# Patient Record
Sex: MALE | Race: WHITE | NOT HISPANIC OR LATINO | Employment: OTHER | URBAN - METROPOLITAN AREA
[De-identification: names, ages, dates, MRNs, and addresses within clinical notes are randomized per-mention and may not be internally consistent; named-entity substitution may affect disease eponyms.]

---

## 2019-03-16 ENCOUNTER — APPOINTMENT (EMERGENCY)
Dept: CT IMAGING | Facility: HOSPITAL | Age: 79
End: 2019-03-16
Payer: COMMERCIAL

## 2019-03-16 ENCOUNTER — APPOINTMENT (EMERGENCY)
Dept: RADIOLOGY | Facility: HOSPITAL | Age: 79
End: 2019-03-16
Payer: COMMERCIAL

## 2019-03-16 ENCOUNTER — HOSPITAL ENCOUNTER (EMERGENCY)
Facility: HOSPITAL | Age: 79
Discharge: HOME | End: 2019-03-17
Attending: EMERGENCY MEDICINE
Payer: COMMERCIAL

## 2019-03-16 VITALS
DIASTOLIC BLOOD PRESSURE: 78 MMHG | TEMPERATURE: 97.8 F | HEART RATE: 62 BPM | RESPIRATION RATE: 16 BRPM | OXYGEN SATURATION: 96 % | BODY MASS INDEX: 32.84 KG/M2 | WEIGHT: 235.45 LBS | SYSTOLIC BLOOD PRESSURE: 158 MMHG
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
## 2019-03-16 DIAGNOSIS — S09.90XA INJURY OF HEAD, INITIAL ENCOUNTER: ICD-10-CM

## 2019-03-16 DIAGNOSIS — S63.289A DISLOCATION OF PROXIMAL INTERPHALANGEAL JOINT OF FINGER, INITIAL ENCOUNTER: Primary | ICD-10-CM

## 2019-03-16 PROCEDURE — 70450 CT HEAD/BRAIN W/O DYE: CPT

## 2019-03-16 PROCEDURE — 73140 X-RAY EXAM OF FINGER(S): CPT

## 2019-03-16 PROCEDURE — 90715 TDAP VACCINE 7 YRS/> IM: CPT | Performed by: PHYSICIAN ASSISTANT

## 2019-03-16 PROCEDURE — 99284 EMERGENCY DEPT VISIT MOD MDM: CPT

## 2019-03-16 PROCEDURE — 90471 IMMUNIZATION ADMIN: CPT

## 2019-03-16 RX ADMIN — TETANUS TOXOID, REDUCED DIPHTHERIA TOXOID AND ACELLULAR PERTUSSIS VACCINE, ADSORBED 0.5 ML: 5; 2.5; 8; 8; 2.5 SUSPENSION INTRAMUSCULAR at 23:17

## 2019-03-17 ENCOUNTER — APPOINTMENT (EMERGENCY)
Dept: RADIOLOGY | Facility: HOSPITAL | Age: 79
End: 2019-03-17
Payer: COMMERCIAL

## 2019-03-17 PROCEDURE — 73140 X-RAY EXAM OF FINGER(S): CPT

## 2019-03-17 RX ORDER — DIPHENHYDRAMINE HCL 25 MG
50 TABLET ORAL ONCE
Status: COMPLETED | OUTPATIENT
Start: 2019-03-17 | End: 2019-03-17

## 2019-03-17 RX ORDER — TETRACAINE HCL 10 MG/ML
2 INJECTION SUBARACHNOID ONCE
Status: COMPLETED | OUTPATIENT
Start: 2019-03-17 | End: 2019-03-17

## 2019-03-17 RX ADMIN — TETRACAINE HCL 2 ML: 10 INJECTION SUBARACHNOID at 01:04

## 2019-03-17 RX ADMIN — DIPHENHYDRAMINE HCL 50 MG: 25 TABLET, FILM COATED ORAL at 01:35

## 2019-03-17 NOTE — ED PROVIDER NOTES
History  Chief Complaint   Patient presents with    Fall     Pt presents to ED from home after having a few drinks then falling hitting head and finger injury  Pt (+) deformity to right ring finger  Pt (-) thinners, (-) LOC  Pt (+) hx hypothyroidism, HTN  Patient is a 61-year-old male with a past medical history of hypertension and hypothyroidism presenting to the emergency department for evaluation after a fall  The fall occurred approximately 1 hour  He estimates that he has had 5 alcoholic drinks today  Patient tripped while walking in the dark  He denies prodromal symptoms including dizziness and loss of consciousness  He hit his head and landed on his right hand  He denies loss of consciousness  Pt presents with an obvious deformity to right ring finger  He denies numbness/tingling, weakness, headache, blurred vision, tinnitus, chest pain, shortness of breath, abdominal pain  Pt not on blood thinners  Last tetanus unknown  History provided by:  Patient   used: No    Fall   Mechanism of injury: fall    Injury location:  Head/neck and hand  Hand injury location:  R hand  Time since incident:  1 hour  Arrived directly from scene: yes    Fall:     Fall occurred:  Tripped    Point of impact:  Head and hands    Entrapped after fall: no    Suspicion of alcohol use: yes    Suspicion of drug use: no    Tetanus status:  Unknown  Prior to arrival data:     Patient ambulatory at scene: yes      Loss of consciousness: no      Amnesic to event: no    Associated symptoms: no abdominal pain, no back pain, no chest pain, no difficulty breathing, no headaches, no loss of consciousness, no neck pain and no vomiting    Risk factors: no anticoagulation therapy        Prior to Admission Medications   Prescriptions Last Dose Informant Patient Reported?  Taking?   levothyroxine 25 mcg tablet   Yes Yes   Sig: Take 25 mcg by mouth daily   losartan (COZAAR) 50 mg tablet   Yes Yes   Sig: Take 50 mg by mouth daily      Facility-Administered Medications: None       Past Medical History:   Diagnosis Date    Disease of thyroid gland     hypothyroidism    Hernia        Past Surgical History:   Procedure Laterality Date    APPENDECTOMY      HERNIA REPAIR      SHOULDER SURGERY Left        History reviewed  No pertinent family history  I have reviewed and agree with the history as documented  Social History     Tobacco Use    Smoking status: Light Tobacco Smoker     Types: Cigars    Smokeless tobacco: Never Used   Substance Use Topics    Alcohol use: Yes     Alcohol/week: 1 8 oz     Types: 3 Cans of beer per week     Comment: socially    Drug use: No        Review of Systems   Constitutional: Negative for chills and fever  Eyes: Negative for visual disturbance  Respiratory: Negative for shortness of breath  Cardiovascular: Negative for chest pain  Gastrointestinal: Negative for abdominal pain and vomiting  Musculoskeletal: Positive for arthralgias and joint swelling  Negative for back pain, gait problem and neck pain  Neurological: Negative for dizziness, loss of consciousness, syncope, weakness, light-headedness, numbness and headaches  Psychiatric/Behavioral: Negative for confusion  All other systems reviewed and are negative  Physical Exam  Physical Exam   Constitutional: He appears well-developed and well-nourished  He appears distressed  Pt uncomfortable on exam with mild distress  HENT:   Head: Normocephalic and atraumatic  Right Ear: External ear normal    Left Ear: External ear normal    Eyes: Pupils are equal, round, and reactive to light  EOM are normal  Right eye exhibits no discharge  Left eye exhibits no discharge  No scleral icterus  Cardiovascular: Normal rate, regular rhythm and normal heart sounds  No murmur heard  Pulmonary/Chest: Effort normal and breath sounds normal  No stridor  No respiratory distress  He has no wheezes  He has no rales     Abdominal: Soft  Bowel sounds are normal  He exhibits no distension  There is no tenderness  There is no guarding  Musculoskeletal:   Dislocation of PIP joint of right ring finger  Pt able to move all fingers without issue  No other bony tenderness in hand  Neurological: He is alert  He has normal strength  He is not disoriented  No cranial nerve deficit or sensory deficit  GCS eye subscore is 4  GCS verbal subscore is 5  GCS motor subscore is 6    5/5 strength and gross sensation intact in all extremities  Sensation intact distal to dislocation  Skin: Skin is warm and dry  Capillary refill takes less than 2 seconds  He is not diaphoretic  Small abrasion over right eyebrow  No active bleeding  Nursing note and vitals reviewed        Vital Signs  ED Triage Vitals   Temperature Pulse Respirations Blood Pressure SpO2   03/16/19 2230 03/16/19 2230 03/16/19 2230 03/16/19 2230 03/16/19 2230   97 8 °F (36 6 °C) 62 16 158/78 96 %      Temp Source Heart Rate Source Patient Position - Orthostatic VS BP Location FiO2 (%)   03/16/19 2230 03/16/19 2230 03/16/19 2230 03/16/19 2230 --   Oral Monitor Sitting Left arm       Pain Score       03/17/19 0104       Worst Possible Pain           Vitals:    03/16/19 2230   BP: 158/78   Pulse: 62   Patient Position - Orthostatic VS: Sitting       qSOFA     Row Name 03/16/19 2230                Altered mental status GCS < 15          Respiratory Rate > / =57  0        Systolic BP < / =179  0        Q Sofa Score  0              Visual Acuity  Visual Acuity      Most Recent Value   L Pupil Size (mm)  5   R Pupil Size (mm)  5          ED Medications  Medications   tetanus-diphtheria-acellular pertussis (BOOSTRIX) IM injection 0 5 mL (0 5 mL Intramuscular Given 3/16/19 2317)   tetracaine 1 % injection 2 mL (2 mL Injection Given 3/17/19 0104)   diphenhydrAMINE (BENADRYL) tablet 50 mg (50 mg Oral Given 3/17/19 0135)       Diagnostic Studies  Results Reviewed     None                 CT head without contrast   Final Result by Tabatha Lomax MD (03/17 0011)      No intracranial hemorrhage or calvarial fracture  Workstation performed: [id number]         XR finger fourth digit-ring RIGHT   ED Interpretation by Dewey Arrieta PA-C (03/17 0021)   Dislocation of PIP joint  No fracture       XR finger fourth digit-ring RIGHT    (Results Pending)              Procedures  Orthopedic Injury  Date/Time: 3/17/2019 8:25 AM  Performed by: eDwey Arrieta PA-C  Authorized by: [employer], PA-C     Patient Location:  ED  Other Assisting Provider: No    Verbal consent obtained?: Yes    Consent given by:  Patient  Patient identity confirmed:  Verbally with patient and arm band  Time out: Immediately prior to the procedure a time out was called    Injury location:  Finger  Location details:  Right ring finger  Injury type:  Dislocation  Neurovascular status: Neurovascularly intact    Distal perfusion: normal    Neurological function: normal    Range of motion: reduced    Local anesthesia used?: Yes    Anesthesia:  Digital block  Local anesthetic: Tetracaine  Anesthetic total (ml):  2  Manipulation performed?: Yes    Reduction successful?: Yes    Confirmation: Reduction confirmed by x-ray    Immobilization:  Splint  Splint type:  Finger splint, static  Neurovascular status: Neurovascularly intact    Distal perfusion: normal    Neurological function: normal    Patient tolerance:  Patient tolerated the procedure well with no immediate complications           Phone Contacts  ED Phone Contact    ED Course  ED Course as of Mar 17 0826   Sheela Gomes Mar 17, 2019   0051 Pt has allergy to lidocaine with his reaction being hives  Will need to use janie compound due to the allergy  Waiting for pharmacy call back                         MDM  Number of Diagnoses or Management Options  Dislocation of proximal interphalangeal joint of finger, initial encounter: new and requires workup  Fall, initial encounter: new and requires workup  Injury of head, initial encounter: new and requires workup  Diagnosis management comments: Pt is a 78yo male presenting to the emergency department for evaluation after a fall with head strike  CT ordered to r/o intracranial bleed as pt has been consuming alcohol which was negative  Pt with obvious dislocation of right ring finger PIP dislocation  Finger is neurovascularly intact  X-ray showed no evidence of fracture  Dislocation reduced after digital block with tetracaine administered  Pt tolerated well with no immediate complications  X-ray after reduction showed appropriate placement  Finger splint applied  Pt to be discharged with ortho follow-up  Red flags and when to return to ED discussed  Pt expressed understanding and is agreeable to plan  Amount and/or Complexity of Data Reviewed  Tests in the radiology section of CPT®: ordered and reviewed  Obtain history from someone other than the patient: yes  Review and summarize past medical records: yes  Independent visualization of images, tracings, or specimens: yes    Risk of Complications, Morbidity, and/or Mortality  Presenting problems: moderate  Diagnostic procedures: moderate  Management options: moderate        Disposition  Final diagnoses:   Dislocation of proximal interphalangeal joint of finger, initial encounter   Fall, initial encounter   Injury of head, initial encounter     Time reflects when diagnosis was documented in both MDM as applicable and the Disposition within this note     Time User Action Codes Description Comment    3/17/2019  1:29 AM Butch Wilkerson Add [S63 289A] Dislocation of proximal interphalangeal joint of finger, initial encounter     3/17/2019  1:29 AM Levon Wilkerson Sit  TBDJ] Fall, initial encounter     3/17/2019  1:29 AM Butch Wilkerson Add [S09 90XA] Injury of head, initial encounter       ED Disposition     ED Disposition Condition Date/Time Comment    Discharge Stable Sun Mar 17, 2019  1:29 AM [place] to [place]/self care  Follow-up Information     Follow up With Specialties Details Why Contact Info Additional [address] [facility]   [address] [facility] [id number]  [address] [facility], [address] [place] [address] Emergency Department Emergency Medicine  If symptoms worsen [address] [id number]  [phone] [facility], [address], [place], [place], [address]          Discharge Medication List as of 3/17/2019  1:30 AM      CONTINUE these medications which have NOT CHANGED    Details   levothyroxine 25 mcg tablet Take 25 mcg by mouth daily, Until Discontinued, Historical Med      losartan (COZAAR) 50 mg tablet Take 50 mg by mouth daily, Until Discontinued, Historical Med           No discharge procedures on file      ED Provider  Electronically Signed by           Naa Pollock PA-C  03/17/19 0896

## 2019-04-27 ENCOUNTER — TRANSCRIBE ORDERS (OUTPATIENT)
Dept: URGENT CARE | Facility: MEDICAL CENTER | Age: 79
End: 2019-04-27

## 2019-04-27 ENCOUNTER — APPOINTMENT (OUTPATIENT)
Dept: URGENT CARE | Facility: MEDICAL CENTER | Age: 79
End: 2019-04-27
Attending: PHYSICIAN ASSISTANT

## 2019-04-27 DIAGNOSIS — Z02.1 PRE-EMPLOYMENT HEALTH SCREENING EXAMINATION: ICD-10-CM

## 2019-04-27 DIAGNOSIS — Z02.1 PRE-EMPLOYMENT HEALTH SCREENING EXAMINATION: Primary | ICD-10-CM

## 2019-04-27 LAB — RUBV IGG SERPL IA-ACNC: >175 IU/ML

## 2019-04-27 PROCEDURE — 86787 VARICELLA-ZOSTER ANTIBODY: CPT

## 2019-04-27 PROCEDURE — 86735 MUMPS ANTIBODY: CPT

## 2019-04-27 PROCEDURE — 86762 RUBELLA ANTIBODY: CPT

## 2019-04-27 PROCEDURE — 86480 TB TEST CELL IMMUN MEASURE: CPT

## 2019-04-27 PROCEDURE — 86765 RUBEOLA ANTIBODY: CPT

## 2019-04-29 LAB
GAMMA INTERFERON BACKGROUND BLD IA-ACNC: 0.03 IU/ML
M TB IFN-G BLD-IMP: NEGATIVE
M TB IFN-G CD4+ BCKGRND COR BLD-ACNC: -0.01 IU/ML
M TB IFN-G CD4+ BCKGRND COR BLD-ACNC: -0.01 IU/ML
MITOGEN IGNF BCKGRD COR BLD-ACNC: >10 IU/ML
VZV IGG SER IA-ACNC: NORMAL

## 2019-04-30 LAB
MEV IGG SER QL: NORMAL
MUV IGG SER QL: NORMAL

## 2019-10-11 ENCOUNTER — TRANSCRIBE ORDERS (OUTPATIENT)
Dept: ADMINISTRATIVE | Facility: HOSPITAL | Age: 79
End: 2019-10-11

## 2019-10-11 DIAGNOSIS — N13.8 ENLARGED PROSTATE WITH URINARY OBSTRUCTION: Primary | ICD-10-CM

## 2019-10-11 DIAGNOSIS — N40.1 ENLARGED PROSTATE WITH URINARY OBSTRUCTION: Primary | ICD-10-CM

## 2019-10-14 ENCOUNTER — HOSPITAL ENCOUNTER (OUTPATIENT)
Dept: ULTRASOUND IMAGING | Facility: HOSPITAL | Age: 79
Discharge: HOME | End: 2019-10-14
Payer: COMMERCIAL

## 2019-10-14 DIAGNOSIS — N40.1 ENLARGED PROSTATE WITH URINARY OBSTRUCTION: ICD-10-CM

## 2019-10-14 DIAGNOSIS — N13.8 ENLARGED PROSTATE WITH URINARY OBSTRUCTION: ICD-10-CM

## 2019-10-14 PROCEDURE — 51798 US URINE CAPACITY MEASURE: CPT

## 2021-01-25 ENCOUNTER — IMMUNIZATIONS (OUTPATIENT)
Dept: FAMILY MEDICINE CLINIC | Facility: HOSPITAL | Age: 81
End: 2021-01-25

## 2021-01-25 DIAGNOSIS — Z23 ENCOUNTER FOR IMMUNIZATION: Primary | ICD-10-CM

## 2021-01-25 PROCEDURE — 0011A SARS-COV-2 / COVID-19 MRNA VACCINE (MODERNA) 100 MCG: CPT

## 2021-01-25 PROCEDURE — 91301 SARS-COV-2 / COVID-19 MRNA VACCINE (MODERNA) 100 MCG: CPT

## 2021-02-20 ENCOUNTER — IMMUNIZATIONS (OUTPATIENT)
Dept: FAMILY MEDICINE CLINIC | Facility: HOSPITAL | Age: 81
End: 2021-02-20

## 2021-02-20 DIAGNOSIS — Z23 ENCOUNTER FOR IMMUNIZATION: Primary | ICD-10-CM

## 2021-02-20 PROCEDURE — 0012A SARS-COV-2 / COVID-19 MRNA VACCINE (MODERNA) 100 MCG: CPT

## 2021-02-20 PROCEDURE — 91301 SARS-COV-2 / COVID-19 MRNA VACCINE (MODERNA) 100 MCG: CPT

## 2021-03-28 ENCOUNTER — HOSPITAL ENCOUNTER (EMERGENCY)
Facility: HOSPITAL | Age: 81
Discharge: HOME | End: 2021-03-28
Attending: EMERGENCY MEDICINE
Payer: COMMERCIAL

## 2021-03-28 ENCOUNTER — APPOINTMENT (EMERGENCY)
Dept: CT IMAGING | Facility: HOSPITAL | Age: 81
End: 2021-03-28
Payer: COMMERCIAL

## 2021-03-28 VITALS
DIASTOLIC BLOOD PRESSURE: 82 MMHG | HEART RATE: 65 BPM | SYSTOLIC BLOOD PRESSURE: 152 MMHG | OXYGEN SATURATION: 100 % | RESPIRATION RATE: 18 BRPM | TEMPERATURE: 97.7 F

## 2021-03-28 DIAGNOSIS — K59.00 CONSTIPATION: Primary | ICD-10-CM

## 2021-03-28 DIAGNOSIS — R93.89 ABNORMAL CT SCAN: ICD-10-CM

## 2021-03-28 LAB
ALBUMIN SERPL BCP-MCNC: 3.7 G/DL (ref 3.5–5)
ALP SERPL-CCNC: 100 U/L (ref 46–116)
ALT SERPL W P-5'-P-CCNC: 20 U/L (ref 12–78)
ANION GAP SERPL CALCULATED.3IONS-SCNC: 10 MMOL/L (ref 4–13)
AST SERPL W P-5'-P-CCNC: 13 U/L (ref 5–45)
BACTERIA UR QL AUTO: ABNORMAL /HPF
BASOPHILS # BLD AUTO: 0.02 THOUSANDS/ΜL (ref 0–0.1)
BASOPHILS NFR BLD AUTO: 0 % (ref 0–1)
BILIRUB SERPL-MCNC: 0.71 MG/DL (ref 0.2–1)
BILIRUB UR QL STRIP: NEGATIVE
BUN SERPL-MCNC: 22 MG/DL (ref 5–25)
CALCIUM SERPL-MCNC: 8.9 MG/DL (ref 8.3–10.1)
CHLORIDE SERPL-SCNC: 106 MMOL/L (ref 100–108)
CLARITY UR: CLEAR
CO2 SERPL-SCNC: 27 MMOL/L (ref 21–32)
COLOR UR: ABNORMAL
CREAT SERPL-MCNC: 1.18 MG/DL (ref 0.6–1.3)
EOSINOPHIL # BLD AUTO: 0.01 THOUSAND/ΜL (ref 0–0.61)
EOSINOPHIL NFR BLD AUTO: 0 % (ref 0–6)
ERYTHROCYTE [DISTWIDTH] IN BLOOD BY AUTOMATED COUNT: 13.9 % (ref 11.6–15.1)
GFR SERPL CREATININE-BSD FRML MDRD: 59 ML/MIN/1.73SQ M
GLUCOSE SERPL-MCNC: 102 MG/DL (ref 65–140)
GLUCOSE UR STRIP-MCNC: NEGATIVE MG/DL
HCT VFR BLD AUTO: 35.8 % (ref 36.5–49.3)
HGB BLD-MCNC: 11.6 G/DL (ref 12–17)
HGB UR QL STRIP.AUTO: ABNORMAL
IMM GRANULOCYTES # BLD AUTO: 0.04 THOUSAND/UL (ref 0–0.2)
IMM GRANULOCYTES NFR BLD AUTO: 0 % (ref 0–2)
KETONES UR STRIP-MCNC: NEGATIVE MG/DL
LEUKOCYTE ESTERASE UR QL STRIP: NEGATIVE
LYMPHOCYTES # BLD AUTO: 2.54 THOUSANDS/ΜL (ref 0.6–4.47)
LYMPHOCYTES NFR BLD AUTO: 27 % (ref 14–44)
MCH RBC QN AUTO: 30.9 PG (ref 26.8–34.3)
MCHC RBC AUTO-ENTMCNC: 32.4 G/DL (ref 31.4–37.4)
MCV RBC AUTO: 95 FL (ref 82–98)
MONOCYTES # BLD AUTO: 0.52 THOUSAND/ΜL (ref 0.17–1.22)
MONOCYTES NFR BLD AUTO: 5 % (ref 4–12)
NEUTROPHILS # BLD AUTO: 6.44 THOUSANDS/ΜL (ref 1.85–7.62)
NEUTS SEG NFR BLD AUTO: 68 % (ref 43–75)
NITRITE UR QL STRIP: NEGATIVE
NON-SQ EPI CELLS URNS QL MICRO: ABNORMAL /HPF
NRBC BLD AUTO-RTO: 0 /100 WBCS
PH UR STRIP.AUTO: 6 [PH]
PLATELET # BLD AUTO: 100 THOUSANDS/UL (ref 149–390)
PMV BLD AUTO: 10.2 FL (ref 8.9–12.7)
POTASSIUM SERPL-SCNC: 4.2 MMOL/L (ref 3.5–5.3)
PROT SERPL-MCNC: 6.8 G/DL (ref 6.4–8.2)
PROT UR STRIP-MCNC: NEGATIVE MG/DL
RBC # BLD AUTO: 3.76 MILLION/UL (ref 3.88–5.62)
RBC #/AREA URNS AUTO: ABNORMAL /HPF
SODIUM SERPL-SCNC: 143 MMOL/L (ref 136–145)
SP GR UR STRIP.AUTO: 1.01 (ref 1–1.03)
UROBILINOGEN UR QL STRIP.AUTO: 0.2 E.U./DL
WBC # BLD AUTO: 9.57 THOUSAND/UL (ref 4.31–10.16)
WBC #/AREA URNS AUTO: ABNORMAL /HPF

## 2021-03-28 PROCEDURE — 85025 COMPLETE CBC W/AUTO DIFF WBC: CPT | Performed by: PHYSICIAN ASSISTANT

## 2021-03-28 PROCEDURE — 81001 URINALYSIS AUTO W/SCOPE: CPT | Performed by: PHYSICIAN ASSISTANT

## 2021-03-28 PROCEDURE — 80053 COMPREHEN METABOLIC PANEL: CPT | Performed by: PHYSICIAN ASSISTANT

## 2021-03-28 PROCEDURE — 36415 COLL VENOUS BLD VENIPUNCTURE: CPT | Performed by: PHYSICIAN ASSISTANT

## 2021-03-28 PROCEDURE — 96360 HYDRATION IV INFUSION INIT: CPT

## 2021-03-28 PROCEDURE — 99285 EMERGENCY DEPT VISIT HI MDM: CPT | Performed by: PHYSICIAN ASSISTANT

## 2021-03-28 PROCEDURE — G1004 CDSM NDSC: HCPCS

## 2021-03-28 PROCEDURE — 74176 CT ABD & PELVIS W/O CONTRAST: CPT

## 2021-03-28 PROCEDURE — 99284 EMERGENCY DEPT VISIT MOD MDM: CPT

## 2021-03-28 RX ADMIN — SODIUM CHLORIDE 1000 ML: 0.9 INJECTION, SOLUTION INTRAVENOUS at 12:44

## 2021-03-28 NOTE — ED PROVIDER NOTES
History  Chief Complaint   Patient presents with    Constipation     pt c/o constipation starting this am  tried a suppository and enema this am w/ no relief     This is a 68-year-old male patient who states he has been constipated for last 2-3 days  Complaining of some fullness and discomfort is left lower quadrant  He attempted taking milk of magnesia and use an enema this morning all he got back was the liquid from the enema  States he tried to self disimpact and did get some stool out but still unable to pass stool  Also states due to the stool backup he is having difficulty urinating  Nothing seems to make this better worse  Denies chills headache blurred cough sore throat nausea vomiting no chest pain or breath urgency frequency dysuria  No testicular pain  Differential diagnosis includes not limited to constipation, mass, diverticulitis less likely, colitis less likely  Decreased urination secondary to dehydration, urine retention, stool causing urine retention  Patient will initially have bladder scan  David Chambers if this is just constipation is to remove the constipation which improved the discomfort and improve the urine flow  Prior to Admission Medications   Prescriptions Last Dose Informant Patient Reported? Taking?   levothyroxine 25 mcg tablet   Yes No   Sig: Take 25 mcg by mouth daily   losartan (COZAAR) 50 mg tablet   Yes No   Sig: Take 50 mg by mouth daily      Facility-Administered Medications: None       Past Medical History:   Diagnosis Date    Disease of thyroid gland     hypothyroidism    Hernia     Hypertension        Past Surgical History:   Procedure Laterality Date    APPENDECTOMY      HERNIA REPAIR      SHOULDER SURGERY Left        History reviewed  No pertinent family history  I have reviewed and agree with the history as documented      E-Cigarette/Vaping     E-Cigarette/Vaping Substances     Social History     Tobacco Use    Smoking status: Light Tobacco Smoker Types: Cigars    Smokeless tobacco: Never Used   Substance Use Topics    Alcohol use: Yes     Alcohol/week: 3 0 standard drinks     Types: 3 Cans of beer per week     Frequency: Monthly or less     Comment: socially    Drug use: No       Review of Systems   Constitutional: Negative for diaphoresis, fatigue and fever  HENT: Negative for congestion, ear pain, nosebleeds and sore throat  Eyes: Negative for photophobia, pain, discharge and visual disturbance  Respiratory: Negative for cough, choking, chest tightness, shortness of breath and wheezing  Cardiovascular: Negative for chest pain and palpitations  Gastrointestinal: Positive for abdominal pain and constipation  Negative for abdominal distention, anal bleeding, blood in stool, diarrhea, nausea, rectal pain and vomiting  Genitourinary: Positive for difficulty urinating  Negative for decreased urine volume, discharge, dysuria, enuresis, flank pain, frequency, genital sores, hematuria, penile pain, penile swelling, scrotal swelling, testicular pain and urgency  Musculoskeletal: Negative for back pain, gait problem and joint swelling  Skin: Negative for color change and rash  Neurological: Negative for dizziness, syncope and headaches  Psychiatric/Behavioral: Negative for behavioral problems and confusion  The patient is not nervous/anxious  All other systems reviewed and are negative  Physical Exam  Physical Exam  Vitals signs and nursing note reviewed  Constitutional:       Appearance: He is well-developed  HENT:      Head: Normocephalic and atraumatic  Right Ear: External ear normal       Left Ear: External ear normal       Nose: Nose normal    Eyes:      Conjunctiva/sclera: Conjunctivae normal       Pupils: Pupils are equal, round, and reactive to light  Neck:      Musculoskeletal: Normal range of motion and neck supple  Cardiovascular:      Rate and Rhythm: Normal rate and regular rhythm     Pulmonary: Effort: Pulmonary effort is normal       Breath sounds: Normal breath sounds  Abdominal:      General: Bowel sounds are normal       Palpations: Abdomen is soft  Tenderness: There is abdominal tenderness in the suprapubic area and left lower quadrant  There is no right CVA tenderness, left CVA tenderness, guarding or rebound  Negative signs include Pillai's sign, Rovsing's sign, McBurney's sign, psoas sign and obturator sign  Skin:     General: Skin is warm  Neurological:      Mental Status: He is alert     Psychiatric:         Behavior: Behavior normal          Vital Signs  ED Triage Vitals   Temperature Pulse Respirations Blood Pressure SpO2   03/28/21 1152 03/28/21 1152 03/28/21 1152 03/28/21 1153 03/28/21 1152   97 7 °F (36 5 °C) 70 18 142/63 100 %      Temp src Heart Rate Source Patient Position - Orthostatic VS BP Location FiO2 (%)   -- 03/28/21 1330 03/28/21 1330 03/28/21 1330 --    Monitor Lying Right arm       Pain Score       --                  Vitals:    03/28/21 1152 03/28/21 1153 03/28/21 1330 03/28/21 1400   BP:  142/63 151/73 152/82   Pulse: 70  68 65   Patient Position - Orthostatic VS:   Lying Lying         Visual Acuity      ED Medications  Medications   sodium chloride 0 9 % bolus 1,000 mL (0 mL Intravenous Stopped 3/28/21 1344)       Diagnostic Studies  Results Reviewed     Procedure Component Value Units Date/Time    Urine Microscopic [[id number]]  (Abnormal) Collected: 03/28/21 1428    Lab Status: Final result Specimen: Urine, Clean Catch Updated: 03/28/21 1513     RBC, UA 4-10 /hpf      WBC, UA 1-2 /hpf      Epithelial Cells None Seen /hpf      Bacteria, UA None Seen /hpf     UA w Reflex to Microscopic w Reflex to Culture [[id number]]  (Abnormal) Collected: 03/28/21 1428    Lab Status: Final result Specimen: Urine, Clean Catch Updated: 03/28/21 1432     Color, UA Light Yellow     Clarity, UA Clear     Specific Gravity, UA 1 010     pH, UA 6 0     Leukocytes, UA Negative Nitrite, UA Negative     Protein, UA Negative mg/dl      Glucose, UA Negative mg/dl      Ketones, UA Negative mg/dl      Urobilinogen, UA 0 2 E U /dl      Bilirubin, UA Negative     Blood, UA Small    Comprehensive metabolic panel [[id number]] Collected: 03/28/21 1239    Lab Status: Final result Specimen: Blood from Arm, Right Updated: 03/28/21 1320     Sodium 143 mmol/L      Potassium 4 2 mmol/L      Chloride 106 mmol/L      CO2 27 mmol/L      ANION GAP 10 mmol/L      BUN 22 mg/dL      Creatinine 1 18 mg/dL      Glucose 102 mg/dL      Calcium 8 9 mg/dL      AST 13 U/L      ALT 20 U/L      Alkaline Phosphatase 100 U/L      Total Protein 6 8 g/dL      Albumin 3 7 g/dL      Total Bilirubin 0 71 mg/dL      eGFR 59 ml/min/1 73sq m     Narrative:      [facility] guidelines for Chronic Kidney Disease (CKD):     Stage 1 with normal or high GFR (GFR > 90 mL/min/1 73 square meters)    Stage 2 Mild CKD (GFR = 60-89 mL/min/1 73 square meters)    Stage 3A Moderate CKD (GFR = 45-59 mL/min/1 73 square meters)    Stage 3B Moderate CKD (GFR = 30-44 mL/min/1 73 square meters)    Stage 4 Severe CKD (GFR = 15-29 mL/min/1 73 square meters)    Stage 5 End Stage CKD (GFR <15 mL/min/1 73 square meters)  Note: GFR calculation is accurate only with a steady state creatinine    CBC and differential [[id number]]  (Abnormal) Collected: 03/28/21 1239    Lab Status: Final result Specimen: Blood from Arm, Right Updated: 03/28/21 1255     WBC 9 57 Thousand/uL      RBC 3 76 Million/uL      Hemoglobin 11 6 g/dL      Hematocrit 35 8 %      MCV 95 fL      MCH 30 9 pg      MCHC 32 4 g/dL      RDW 13 9 %      MPV 10 2 fL      Platelets 993 Thousands/uL      nRBC 0 /100 WBCs      Neutrophils Relative 68 %      Immat GRANS % 0 %      Lymphocytes Relative 27 %      Monocytes Relative 5 %      Eosinophils Relative 0 %      Basophils Relative 0 %      Neutrophils Absolute 6 44 Thousands/µL      Immature Grans Absolute 0 04 Thousand/uL      Lymphocytes Absolute 2 54 Thousands/µL      Monocytes Absolute 0 52 Thousand/µL      Eosinophils Absolute 0 01 Thousand/µL      Basophils Absolute 0 02 Thousands/µL                  CT abdomen pelvis wo contrast   Final Result by Brayan Cummings DO (03/28 1301)   1  Moderate constipation and fecal impaction  Mild circumferential wall thickening in the distal sigmoid colon and rectum  Correlate for superimposed proctocolitis  2   Diffuse osteoporosis with scattered lucencies of the thoracolumbar vertebral bodies as well as the femoral necks bilaterally  Coarsening of the trabecular markings  Correlate for Paget's disease  Impending pathologic fracture is not excluded  Recommend follow-up orthopedic surgery consultation  I personally discussed this study with Dr Darrell Villegas on 3/28/2021 at 12:46 PM                Workstation performed: [id number]                    Procedures  Procedures         ED Course  ED Course as of Mar 28 1741   Destiny Coleman Mar 28, 2021   1259 Received call from radiologist patient was shown to have constipation  Incidental finding of his bones being "washed out" especially femoral necks  I discussed with the patient stating he should not run or do any heavy impact until cleared by his physician  This could be severe osteoporosis or Paget disease disease      1410 After the soapsuds enema patient had a large bowel movement relieving his constipation symptoms  He feels better when like to go  I discussed with he and his wife the findings on the CT scan  After reviewing labs his calcium is normal as out fossa is normal but he still needs a follow-up to rule out Paget's disease and severe osteoporosis  He was advised not to perform any strenuous weight-bearing exercises                                                MDM    Disposition  Final diagnoses:   Constipation   Abnormal CT scan     Time reflects when diagnosis was documented in both MDM as applicable and the Disposition within this note     Time User Action Codes Description Comment    3/28/2021  2:11 PM Mary Bernal Add [K59 00] Constipation     3/28/2021  2:11 PM Rey Murdock Add [R93 89] Abnormal CT scan       ED Disposition     ED Disposition Condition Date/Time Comment    Discharge Stable Sun Mar 28, 2021  2:11 PM Manjinder Lubin discharge to home/self care  Follow-up Information     Follow up With Specialties Details Why [id number] [facility], DO Family Medicine Call in 1 day  [address]  [place] [id number] [phone]            Discharge Medication List as of 3/28/2021  2:13 PM      CONTINUE these medications which have NOT CHANGED    Details   levothyroxine 25 mcg tablet Take 25 mcg by mouth daily, Until Discontinued, Historical Med      losartan (COZAAR) 50 mg tablet Take 50 mg by mouth daily, Until Discontinued, Historical Med           No discharge procedures on file      PDMP Review     None          ED Provider  Electronically Signed by           Chun Kramer PA-C  03/28/21 6599

## 2021-03-28 NOTE — DISCHARGE INSTRUCTIONS
Your CAT scan shows that you have a washed out appearance of your bones  Do not perform any strenuous weight-bearing activity  You must call your family doctor tomorrow to have further testing to see the cause of this bone abnormality  This could be osteoporosis or paget's disease according to the radiologist who read her study  Regardless you must follow-up with your family doctor tomorrow for further testing and refrain from any high impact exercise that put stress on your bones  Please return with any worsening symptoms  Such as increased pain, fever, chills or any questions comments or concerns

## 2021-08-30 ENCOUNTER — APPOINTMENT (EMERGENCY)
Dept: RADIOLOGY | Facility: HOSPITAL | Age: 81
End: 2021-08-30
Payer: COMMERCIAL

## 2021-08-30 ENCOUNTER — HOSPITAL ENCOUNTER (EMERGENCY)
Facility: HOSPITAL | Age: 81
Discharge: HOME | End: 2021-08-30
Attending: EMERGENCY MEDICINE | Admitting: EMERGENCY MEDICINE
Payer: COMMERCIAL

## 2021-08-30 VITALS
OXYGEN SATURATION: 100 % | BODY MASS INDEX: 29.01 KG/M2 | HEART RATE: 59 BPM | SYSTOLIC BLOOD PRESSURE: 178 MMHG | DIASTOLIC BLOOD PRESSURE: 82 MMHG | WEIGHT: 208 LBS | RESPIRATION RATE: 16 BRPM | TEMPERATURE: 98.1 F

## 2021-08-30 DIAGNOSIS — K59.00 CONSTIPATION, UNSPECIFIED CONSTIPATION TYPE: Primary | ICD-10-CM

## 2021-08-30 PROCEDURE — 74022 RADEX COMPL AQT ABD SERIES: CPT

## 2021-08-30 PROCEDURE — 99285 EMERGENCY DEPT VISIT HI MDM: CPT | Performed by: PHYSICIAN ASSISTANT

## 2021-08-30 PROCEDURE — 99283 EMERGENCY DEPT VISIT LOW MDM: CPT

## 2021-08-30 NOTE — ED ATTENDING ATTESTATION
8/30/2021  I, Kenzie Eason MD, saw and evaluated the patient  I have discussed the patient with the resident/non-physician practitioner and agree with the resident's/non-physician practitioner's findings, Plan of Care, and MDM as documented in the resident's/non-physician practitioner's note, except where noted  All available labs and Radiology studies were reviewed  I was present for key portions of any procedure(s) performed by the resident/non-physician practitioner and I was immediately available to provide assistance  At this point I agree with the current assessment done in the Emergency Department  I have conducted an independent evaluation of this patient a history and physical is as follows: Patient is a 68year old male with constipation since yesterday  Tried to remove hard stool digitally this AM  No abdominal pain  No fever  States he had a normal colonoscopy about 1 month ago  No GI bleeding  No N/V  States he has had soap suds enema in the ED before which helped  [facility] website checked on this patient and no  Rx found  Was last seen in this ED on 3/28/21 for constipation  Mucous membranes moist  Lungs clear  Heart regular without murmur  Abdomen soft and nontender  Good bowel sounds  No rash  No pallor  No LE edema  DDx including but not limited to: Constipation, obstipation, pseudo-obstruction, fecal impaction, bowel obstruction, ileus; doubt acute surgical intraabdominal process  Will check x-ray and if okay will give soap suds enema       ED Course         Critical Care Time  Procedures

## 2021-08-30 NOTE — ED PROVIDER NOTES
History  Chief Complaint   Patient presents with    Constipation     pt states he has not had a bowel movement since yesterday and feels constipated  pt denies nausea and is passing gas in triage  pt states last time this happened a soap suds enema helped  today pt states he dug some out this morning and it was like marbles  This is a 21-year-old male with past medical history significant for constipation, hypertension, and abdominal hernia repair presenting to the emergency department today for 1 day's worth of constipation  The patient notes last time this happened he came to the emergency department got a soapsuds enema and felt better afterwards  The patient notes his last normal bowel movement was Saturday afternoon and he since has only been able to get [facility] out in terms of his stool  The patient did attempt use of psyllium yesterday without any relief  The patient does have feelings of tenesmus and notes he is still passing gas  The patient denies any abdominal pain, nausea, or vomiting  He notes he is mildly bloated; however, this is not near as bloated as he was last time he had to come to the emergency department for constipation  Past surgical history significant for abdominal hernia repair though this is not recent  The patient does not take anything for constipation on a daily basis  The patient denies any chest pain or shortness of breath  No blood per rectum  No urinary difficulty  No dysuria or hematuria  The patient denies other complaints at this time  History provided by:  Patient   used: No    Constipation  Severity:  Mild  Time since last bowel movement:  1 day  Timing:  Constant  Progression:  Worsening  Chronicity:  Recurrent  Context: dehydration    Context: not narcotics    Stool description:  Pellet like  Relieved by:  Nothing  Worsened by:  Nothing  Ineffective treatments: Psyllium    Associated symptoms: flatus    Associated symptoms: no abdominal pain, no anorexia, no back pain, no diarrhea, no dysuria, no fever, no hematochezia, no nausea, no urinary retention and no vomiting    Risk factors: hx of abdominal surgery    Risk factors: no obesity, no recent illness and no recent surgery        Prior to Admission Medications   Prescriptions Last Dose Informant Patient Reported? Taking?   levothyroxine 25 mcg tablet   Yes No   Sig: Take 25 mcg by mouth daily   losartan (COZAAR) 50 mg tablet   Yes No   Sig: Take 50 mg by mouth daily      Facility-Administered Medications: None       Past Medical History:   Diagnosis Date    Disease of thyroid gland     hypothyroidism    Hernia     Hypertension        Past Surgical History:   Procedure Laterality Date    APPENDECTOMY      HERNIA REPAIR      SHOULDER SURGERY Left        History reviewed  No pertinent family history  I have reviewed and agree with the history as documented  E-Cigarette/Vaping     E-Cigarette/Vaping Substances     Social History     Tobacco Use    Smoking status: Light Tobacco Smoker     Types: Cigars    Smokeless tobacco: Never Used   Substance Use Topics    Alcohol use: Yes     Alcohol/week: 3 0 standard drinks     Types: 3 Cans of beer per week     Comment: socially    Drug use: No       Review of Systems   Constitutional: Negative for chills, diaphoresis and fever  Eyes: Negative for visual disturbance  Respiratory: Negative for cough, chest tightness, shortness of breath and wheezing  Cardiovascular: Negative for chest pain, palpitations and leg swelling  Gastrointestinal: Positive for constipation and flatus  Negative for abdominal distention, abdominal pain, anorexia, blood in stool, diarrhea, hematochezia, nausea and vomiting  Genitourinary: Negative for dysuria  Musculoskeletal: Negative for back pain  Skin: Negative for wound  Neurological: Negative for dizziness, seizures, syncope, weakness, light-headedness, numbness and headaches  Psychiatric/Behavioral: Negative for confusion  Physical Exam  Physical Exam  Vitals and nursing note reviewed  Exam conducted with a chaperone present (RN)  Constitutional:       General: He is not in acute distress  Appearance: Normal appearance  He is normal weight  He is not ill-appearing, toxic-appearing or diaphoretic  HENT:      Head: Normocephalic and atraumatic  Nose: Nose normal       Mouth/Throat:      Mouth: Mucous membranes are moist    Eyes:      General: No scleral icterus  Right eye: No discharge  Left eye: No discharge  Extraocular Movements: Extraocular movements intact  Conjunctiva/sclera: Conjunctivae normal    Cardiovascular:      Rate and Rhythm: Normal rate and regular rhythm  Pulses: Normal pulses  Heart sounds: Normal heart sounds  No murmur heard  No friction rub  No gallop  Comments: 2+ radial pulses bilaterally  Pulmonary:      Effort: Pulmonary effort is normal  No respiratory distress  Breath sounds: Normal breath sounds  No stridor  No wheezing, rhonchi or rales  Abdominal:      General: Abdomen is flat  There is no distension  Palpations: Abdomen is soft  Tenderness: There is no abdominal tenderness  There is no right CVA tenderness, left CVA tenderness or guarding  Comments: Soft, nontender, nondistended, and without organomegaly   Genitourinary:     Rectum: Normal       Comments: There is some pellet-like stool in the rectal vault; no evidence of obstruction  Non thrombosed hemorrhoids noted on rectal examination  Normal sphincter tone  Musculoskeletal:         General: Normal range of motion  Right lower leg: No edema  Left lower leg: No edema  Comments: Negative Sarah Sign bilaterally   Skin:     General: Skin is warm and dry  Capillary Refill: Capillary refill takes less than 2 seconds  Coloration: Skin is not jaundiced or pale     Neurological:      General: No focal deficit present  Mental Status: He is alert and oriented to person, place, and time  Mental status is at baseline  Comments: 5/5 strength in bilateral upper and lower extremities  Normal sensation of bilateral upper and lower extremities   Psychiatric:         Mood and Affect: Mood normal          Behavior: Behavior normal          Vital Signs  ED Triage Vitals   Temperature Pulse Respirations Blood Pressure SpO2   08/30/21 0651 08/30/21 0639 08/30/21 0639 08/30/21 0639 08/30/21 0639   98 1 °F (36 7 °C) 59 16 (!) 178/82 100 %      Temp src Heart Rate Source Patient Position - Orthostatic VS BP Location FiO2 (%)   -- -- -- -- --             Pain Score       --                  Vitals:    08/30/21 0639   BP: (!) 178/82   Pulse: 59         Visual Acuity      ED Medications  Medications - No data to display    Diagnostic Studies  Results Reviewed     None                 XR abdomen obstruction series   ED Interpretation by José Dorantes PA-C (08/30 9389)   Moderate stool burden evident                 Procedures  Procedures         ED Course  ED Course as of Sep 02 0931   Mon Aug 30, 2021   0704 Rectal examination chaperoned by RN  Internal and external hemorrhoids with moderate stool burden in the rectal vault  Negative guaiac  No evidence of physiologic obstruction on digital examination  Will order obstruction series and plan for soap suds enema  0725 Moderate stool burden on obstruction series  Will continue with soap suds enema here in the ED  RN aware  Will continue monitoring  1926 Patient noted only liquid coming out per rectum after soap suds  No stool  Patient also now reporting difficulty urinating  Patient anxious that he will not be able to urinate similar to the last time he was constipated  Will bladder scan in ED  If WNL, will plan for DC home with magnesium citrate or other anti-constipation medication  Will continue to follow  MDM  Number of Diagnoses or Management Options  Constipation, unspecified constipation type: new and requires workup  Diagnosis management comments: This is a 79-year-old male presenting to the emergency department for constipation x1 day  The patient came to the emergency department before for a soapsuds enema which notes made him feel better  Obstruction series significant for moderate stool burden  Soapsuds enema performed x1 with some stool relief  The patient was dispositioned by Peng Ruiz PA-C  The patient is stable for discharge at this time  Strict return precautions given including chest pain, shortness of breath, dizziness, lightheadedness, syncopal episodes, blood per rectum, severe abdominal pain, inability to pass stool, etc  Recommended the patient take magnesium citrate at home to help facilitate further bowel movements  Also recommended patient use Colace daily for stool softener  Recommended patient follow-up with a gastroenterologist   The patient verifies understanding and agrees to the plan at this time  All questions answered to the patient's satisfaction  Amount and/or Complexity of Data Reviewed  Tests in the radiology section of CPT®: ordered and reviewed  Discuss the patient with other providers: yes (Peng Carrillo)        Disposition  Final diagnoses:   None     ED Disposition     None      Follow-up Information    None         Patient's Medications   Discharge Prescriptions    No medications on file     No discharge procedures on file      PDMP Review       Value Time User    PDMP Reviewed  Yes 8/30/2021  7:00 AM Homero Castillo MD          ED Provider  Electronically Signed by           Eliezer Adam PA-C  09/02/21 [id number]

## 2021-12-22 ENCOUNTER — IMMUNIZATIONS (OUTPATIENT)
Dept: FAMILY MEDICINE CLINIC | Facility: HOSPITAL | Age: 81
End: 2021-12-22

## 2021-12-22 DIAGNOSIS — Z23 ENCOUNTER FOR IMMUNIZATION: Primary | ICD-10-CM

## 2021-12-22 PROCEDURE — 0064A COVID-19 MODERNA VACC 0.25 ML BOOSTER: CPT

## 2021-12-22 PROCEDURE — 91306 COVID-19 MODERNA VACC 0.25 ML BOOSTER: CPT

## 2023-03-05 NOTE — ED CARE HANDOFF
Emergency Department Sign Out Note        Sign out and transfer of care from [facility] AND [facility]  See Separate Emergency Department note  The patient, You Meza, was evaluated by the previous provider for constipation  Workup Completed:  H&P, imaging, soap suds enema    ED Course / Workup Pending (followup): Bladder scan, disposition                                     Procedures  MDM    Disposition  Final diagnoses:   Constipation, unspecified constipation type     Time reflects when diagnosis was documented in both MDM as applicable and the Disposition within this note     Time User Action Codes Description Comment    8/30/2021  8:14 AM Niurka Wagoner Pill Add [K59 00] Constipation, unspecified constipation type       ED Disposition     ED Disposition Condition Date/Time Comment    Discharge Stable Mon Aug 30, 2021  8:14 AM You Meza discharge to home/self care  Follow-up Information    None       Patient's Medications   Discharge Prescriptions    No medications on file     No discharge procedures on file         ED Provider  Electronically Signed by     Dillan Gaviria PA-C  08/30/21 2055
Admission Reconciliation is Completed  Discharge Reconciliation is Completed

## 2023-11-14 ENCOUNTER — APPOINTMENT (OUTPATIENT)
Dept: LAB | Facility: HOSPITAL | Age: 83
End: 2023-11-14
Payer: COMMERCIAL

## 2023-11-14 PROCEDURE — 87086 URINE CULTURE/COLONY COUNT: CPT

## 2023-11-15 ENCOUNTER — TELEPHONE (OUTPATIENT)
Dept: LAB | Facility: HOSPITAL | Age: 83
End: 2023-11-15

## 2023-11-16 LAB — BACTERIA UR CULT: NORMAL

## 2024-01-17 ENCOUNTER — APPOINTMENT (INPATIENT)
Dept: RADIOLOGY | Facility: HOSPITAL | Age: 84
DRG: 964 | End: 2024-01-17
Attending: NURSE PRACTITIONER
Payer: COMMERCIAL

## 2024-01-17 ENCOUNTER — APPOINTMENT (EMERGENCY)
Dept: RADIOLOGY | Facility: HOSPITAL | Age: 84
End: 2024-01-17
Payer: COMMERCIAL

## 2024-01-17 ENCOUNTER — APPOINTMENT (INPATIENT)
Dept: RADIOLOGY | Facility: HOSPITAL | Age: 84
DRG: 964 | End: 2024-01-17
Payer: COMMERCIAL

## 2024-01-17 ENCOUNTER — APPOINTMENT (INPATIENT)
Dept: RADIOLOGY | Facility: HOSPITAL | Age: 84
DRG: 964 | End: 2024-01-17
Attending: STUDENT IN AN ORGANIZED HEALTH CARE EDUCATION/TRAINING PROGRAM
Payer: COMMERCIAL

## 2024-01-17 ENCOUNTER — HOSPITAL ENCOUNTER (EMERGENCY)
Facility: HOSPITAL | Age: 84
Discharge: OTHER INSTITUTION | End: 2024-01-17
Attending: EMERGENCY MEDICINE
Payer: COMMERCIAL

## 2024-01-17 ENCOUNTER — HOSPITAL ENCOUNTER (INPATIENT)
Facility: HOSPITAL | Age: 84
LOS: 7 days | DRG: 964 | End: 2024-01-24
Attending: SURGERY | Admitting: SURGERY
Payer: COMMERCIAL

## 2024-01-17 VITALS
OXYGEN SATURATION: 96 % | SYSTOLIC BLOOD PRESSURE: 147 MMHG | WEIGHT: 192 LBS | BODY MASS INDEX: 29.1 KG/M2 | TEMPERATURE: 97.8 F | HEART RATE: 76 BPM | RESPIRATION RATE: 19 BRPM | DIASTOLIC BLOOD PRESSURE: 79 MMHG | HEIGHT: 68 IN

## 2024-01-17 DIAGNOSIS — S22.41XA CLOSED FRACTURE OF MULTIPLE RIBS OF RIGHT SIDE, INITIAL ENCOUNTER: ICD-10-CM
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
## 2024-01-17 DIAGNOSIS — S32.591A CLOSED FRACTURE OF MULTIPLE PUBIC RAMI, RIGHT, INITIAL ENCOUNTER (CMS/HCC): ICD-10-CM
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
## 2024-01-17 PROBLEM — D64.9 NORMOCYTIC ANEMIA: Status: ACTIVE | Noted: 2024-01-17

## 2024-01-17 PROBLEM — N50.1 PELVIC HEMATOMA IN MALE: Status: ACTIVE | Noted: 2024-01-17

## 2024-01-17 PROBLEM — S12.100A CLOSED ODONTOID FRACTURE (CMS/HCC): Status: ACTIVE | Noted: 2024-01-17

## 2024-01-17 PROBLEM — J90 PLEURAL EFFUSION ON LEFT: Status: ACTIVE | Noted: 2024-01-17

## 2024-01-17 PROBLEM — N18.31 STAGE 3A CHRONIC KIDNEY DISEASE (CMS/HCC): Status: ACTIVE | Noted: 2024-01-17

## 2024-01-17 LAB
ABO + RH BLD: NORMAL
ABO + RH BLD: NORMAL
ALBUMIN SERPL-MCNC: 3.2 G/DL (ref 3.5–5.7)
ALBUMIN SERPL-MCNC: 3.3 G/DL (ref 3.5–5.7)
ALP SERPL-CCNC: 63 IU/L (ref 34–125)
ALP SERPL-CCNC: 66 IU/L (ref 34–125)
ALT SERPL-CCNC: 8 IU/L (ref 7–52)
ALT SERPL-CCNC: 8 IU/L (ref 7–52)
AMPHET UR QL SCN: NOT DETECTED
ANION GAP SERPL CALC-SCNC: 10 MEQ/L (ref 3–15)
ANION GAP SERPL CALC-SCNC: 9 MEQ/L (ref 3–15)
ANISOCYTOSIS BLD QL SMEAR: ABNORMAL
ANISOCYTOSIS BLD QL SMEAR: ABNORMAL
APTT PPP: 37 SEC (ref 23–35)
AST SERPL-CCNC: 16 IU/L (ref 13–39)
AST SERPL-CCNC: 22 IU/L (ref 13–39)
BACTERIA URNS QL MICRO: ABNORMAL /HPF
BARBITURATES UR QL SCN: NOT DETECTED
BASOPHILS # BLD: 0.02 K/UL (ref 0.01–0.1)
BASOPHILS # BLD: 0.03 K/UL (ref 0.01–0.1)
BASOPHILS NFR BLD: 0.2 %
BASOPHILS NFR BLD: 0.3 %
BENZODIAZ UR QL SCN: NOT DETECTED
BILIRUB DIRECT SERPL-MCNC: 0.1 MG/DL
BILIRUB SERPL-MCNC: 0.5 MG/DL (ref 0.3–1.2)
BILIRUB SERPL-MCNC: 0.7 MG/DL (ref 0.3–1.2)
BILIRUB UR QL STRIP.AUTO: NEGATIVE MG/DL
BLD GP AB SCN SERPL QL: NEGATIVE
BLD GP AB SCN SERPL QL: NEGATIVE
BNP SERPL-MCNC: 244 PG/ML
BUN SERPL-MCNC: 33 MG/DL (ref 7–25)
BUN SERPL-MCNC: 35 MG/DL (ref 7–25)
CALCIUM SERPL-MCNC: 9.5 MG/DL (ref 8.6–10.3)
CALCIUM SERPL-MCNC: 9.9 MG/DL (ref 8.6–10.3)
CANNABINOIDS UR QL SCN: NOT DETECTED
CAOX CRY URNS QL MICRO: 1 /HPF
CHLORIDE SERPL-SCNC: 103 MEQ/L (ref 98–107)
CHLORIDE SERPL-SCNC: 104 MEQ/L (ref 98–107)
CLARITY UR REFRACT.AUTO: CLEAR
CO2 SERPL-SCNC: 24 MEQ/L (ref 21–31)
CO2 SERPL-SCNC: 24 MEQ/L (ref 21–31)
COCAINE UR QL SCN: NOT DETECTED
COLOR UR AUTO: COLORLESS
CREAT SERPL-MCNC: 1.6 MG/DL (ref 0.7–1.3)
CREAT SERPL-MCNC: 1.6 MG/DL (ref 0.7–1.3)
D AG BLD QL: POSITIVE
D AG BLD QL: POSITIVE
DIFFERENTIAL METHOD BLD: ABNORMAL
DIFFERENTIAL METHOD BLD: ABNORMAL
EGFRCR SERPLBLD CKD-EPI 2021: 43 ML/MIN/1.73M*2
EGFRCR SERPLBLD CKD-EPI 2021: 43 ML/MIN/1.73M*2
EOSINOPHIL # BLD: 0 K/UL (ref 0.04–0.54)
EOSINOPHIL # BLD: 0.03 K/UL (ref 0.04–0.54)
EOSINOPHIL NFR BLD: 0 %
EOSINOPHIL NFR BLD: 0.3 %
ERYTHROCYTE [DISTWIDTH] IN BLOOD BY AUTOMATED COUNT: 15.8 % (ref 11.6–14.4)
ERYTHROCYTE [DISTWIDTH] IN BLOOD BY AUTOMATED COUNT: 15.9 % (ref 11.6–14.4)
ERYTHROCYTE [DISTWIDTH] IN BLOOD BY AUTOMATED COUNT: 15.9 % (ref 11.6–14.4)
ETHANOL SERPL-MCNC: <10 MG/DL
FENTANYL URINE SCR: NOT DETECTED
GLUCOSE SERPL-MCNC: 108 MG/DL (ref 70–99)
GLUCOSE SERPL-MCNC: 108 MG/DL (ref 70–99)
GLUCOSE UR STRIP.AUTO-MCNC: NEGATIVE MG/DL
HCT VFR BLD AUTO: 28.5 % (ref 40.1–51)
HCT VFR BLD AUTO: 30.7 % (ref 40.1–51)
HCT VFR BLDCO AUTO: 31.1 % (ref 40.1–51)
HGB BLD-MCNC: 10 G/DL (ref 13.7–17.5)
HGB BLD-MCNC: 9.3 G/DL (ref 13.7–17.5)
HGB BLD-MCNC: 9.9 G/DL (ref 13.7–17.5)
HGB UR QL STRIP.AUTO: NEGATIVE
HYALINE CASTS #/AREA URNS LPF: ABNORMAL /LPF
HYPOCHROMIA BLD QL SMEAR: ABNORMAL
IMM GRANULOCYTES # BLD AUTO: 0.04 K/UL (ref 0–0.08)
IMM GRANULOCYTES # BLD AUTO: 0.09 K/UL (ref 0–0.08)
IMM GRANULOCYTES NFR BLD AUTO: 0.5 %
IMM GRANULOCYTES NFR BLD AUTO: 1 %
INR PPP: 1.6
KETONES UR STRIP.AUTO-MCNC: NEGATIVE MG/DL
LABORATORY COMMENT REPORT: NORMAL
LABORATORY COMMENT REPORT: NORMAL
LACTATE SERPL-SCNC: 1.2 MMOL/L (ref 0.4–2)
LEUKOCYTE ESTERASE UR QL STRIP.AUTO: 1
LIPASE SERPL-CCNC: 19 U/L (ref 11–82)
LYMPHOCYTES # BLD: 1.53 K/UL (ref 1.2–3.5)
LYMPHOCYTES # BLD: 1.61 K/UL (ref 1.2–3.5)
LYMPHOCYTES NFR BLD: 18.6 %
LYMPHOCYTES NFR BLD: 18.7 %
MAGNESIUM SERPL-MCNC: 1.9 MG/DL (ref 1.8–2.5)
MCH RBC QN AUTO: 31.4 PG (ref 28–33.2)
MCH RBC QN AUTO: 31.4 PG (ref 28–33.2)
MCH RBC QN AUTO: 31.8 PG (ref 28–33.2)
MCHC RBC AUTO-ENTMCNC: 32.2 G/DL (ref 32.2–36.5)
MCHC RBC AUTO-ENTMCNC: 32.2 G/DL (ref 32.2–36.5)
MCHC RBC AUTO-ENTMCNC: 32.6 G/DL (ref 32.2–36.5)
MCV RBC AUTO: 97.5 FL (ref 83–98)
MCV RBC AUTO: 97.6 FL (ref 83–98)
MCV RBC AUTO: 97.8 FL (ref 83–98)
MONOCYTES # BLD: 0.48 K/UL (ref 0.3–1)
MONOCYTES # BLD: 0.57 K/UL (ref 0.3–1)
MONOCYTES NFR BLD: 5.8 %
MONOCYTES NFR BLD: 6.6 %
NEUTROPHILS # BLD: 6.17 K/UL (ref 1.7–7)
NEUTROPHILS # BLD: 6.3 K/UL (ref 1.7–7)
NEUTS SEG NFR BLD: 73.1 %
NEUTS SEG NFR BLD: 74.9 %
NITRITE UR QL STRIP.AUTO: NEGATIVE
NRBC BLD-RTO: 0 %
NRBC BLD-RTO: 0 %
OPIATES UR QL SCN: POSITIVE
PCP UR QL SCN: NOT DETECTED
PDW BLD AUTO: 10.1 FL (ref 9.4–12.4)
PDW BLD AUTO: 10.3 FL (ref 9.4–12.4)
PDW BLD AUTO: 10.4 FL (ref 9.4–12.4)
PH UR STRIP.AUTO: 6 [PH]
PLAT MORPH BLD: NORMAL
PLAT MORPH BLD: NORMAL
PLATELET # BLD AUTO: 100 K/UL (ref 150–350)
PLATELET # BLD AUTO: 103 K/UL (ref 150–350)
PLATELET # BLD AUTO: 96 K/UL (ref 150–350)
PLATELET # BLD EST: ABNORMAL 10*3/UL
PLATELET # BLD EST: ABNORMAL 10*3/UL
POLYCHROMASIA BLD QL SMEAR: ABNORMAL
POTASSIUM SERPL-SCNC: 4.4 MEQ/L (ref 3.5–5.1)
POTASSIUM SERPL-SCNC: 4.5 MEQ/L (ref 3.5–5.1)
PROT SERPL-MCNC: 8.2 G/DL (ref 6–8.2)
PROT SERPL-MCNC: 8.3 G/DL (ref 6–8.2)
PROT UR QL STRIP.AUTO: ABNORMAL
PROTHROMBIN TIME: 19.3 SEC (ref 12.2–14.5)
RBC # BLD AUTO: 2.92 M/UL (ref 4.5–5.8)
RBC # BLD AUTO: 3.15 M/UL (ref 4.5–5.8)
RBC # BLD AUTO: 3.18 M/UL (ref 4.5–5.8)
RBC #/AREA URNS HPF: ABNORMAL /HPF
SODIUM SERPL-SCNC: 137 MEQ/L (ref 136–145)
SODIUM SERPL-SCNC: 137 MEQ/L (ref 136–145)
SP GR UR REFRACT.AUTO: 1.03
SPECIMEN EXP DATE BLD: NORMAL
SPECIMEN EXP DATE BLD: NORMAL
SQUAMOUS URNS QL MICRO: ABNORMAL /HPF
UROBILINOGEN UR STRIP-ACNC: 0.2 EU/DL
WBC # BLD AUTO: 8.24 K/UL (ref 3.8–10.5)
WBC # BLD AUTO: 8.63 K/UL (ref 3.8–10.5)
WBC # BLD AUTO: 9.03 K/UL (ref 3.8–10.5)
WBC #/AREA URNS HPF: ABNORMAL /HPF

## 2024-01-17 PROCEDURE — 85025 COMPLETE CBC W/AUTO DIFF WBC: CPT | Performed by: NURSE PRACTITIONER

## 2024-01-17 PROCEDURE — 83880 ASSAY OF NATRIURETIC PEPTIDE: CPT | Performed by: PHYSICIAN ASSISTANT

## 2024-01-17 PROCEDURE — 80053 COMPREHEN METABOLIC PANEL: CPT | Performed by: PHYSICIAN ASSISTANT

## 2024-01-17 PROCEDURE — 63600105 HC IODINE BASED CONTRAST: Mod: JZ | Performed by: PHYSICIAN ASSISTANT

## 2024-01-17 PROCEDURE — 63600000 HC DRUGS/DETAIL CODE: Mod: JZ,JG | Performed by: PHYSICIAN ASSISTANT

## 2024-01-17 PROCEDURE — G1004 CDSM NDSC: HCPCS

## 2024-01-17 PROCEDURE — 96361 HYDRATE IV INFUSION ADD-ON: CPT | Mod: 59

## 2024-01-17 PROCEDURE — G0480 DRUG TEST DEF 1-7 CLASSES: HCPCS | Performed by: NURSE PRACTITIONER

## 2024-01-17 PROCEDURE — 74018 RADEX ABDOMEN 1 VIEW: CPT

## 2024-01-17 PROCEDURE — 85025 COMPLETE CBC W/AUTO DIFF WBC: CPT | Performed by: PHYSICIAN ASSISTANT

## 2024-01-17 PROCEDURE — 72170 X-RAY EXAM OF PELVIS: CPT

## 2024-01-17 PROCEDURE — 36415 COLL VENOUS BLD VENIPUNCTURE: CPT | Performed by: PHYSICIAN ASSISTANT

## 2024-01-17 PROCEDURE — 80307 DRUG TEST PRSMV CHEM ANLYZR: CPT | Performed by: NURSE PRACTITIONER

## 2024-01-17 PROCEDURE — 63700000 HC SELF-ADMINISTRABLE DRUG: Performed by: NURSE PRACTITIONER

## 2024-01-17 PROCEDURE — 81003 URINALYSIS AUTO W/O SCOPE: CPT | Performed by: EMERGENCY MEDICINE

## 2024-01-17 PROCEDURE — 93010 ELECTROCARDIOGRAM REPORT: CPT | Performed by: INTERNAL MEDICINE

## 2024-01-17 PROCEDURE — 82248 BILIRUBIN DIRECT: CPT | Performed by: PHYSICIAN ASSISTANT

## 2024-01-17 PROCEDURE — 86900 BLOOD TYPING SEROLOGIC ABO: CPT

## 2024-01-17 PROCEDURE — 71045 X-RAY EXAM CHEST 1 VIEW: CPT

## 2024-01-17 PROCEDURE — 83735 ASSAY OF MAGNESIUM: CPT | Performed by: NURSE PRACTITIONER

## 2024-01-17 PROCEDURE — 0W9B30Z DRAINAGE OF LEFT PLEURAL CAVITY WITH DRAINAGE DEVICE, PERCUTANEOUS APPROACH: ICD-10-PCS | Performed by: STUDENT IN AN ORGANIZED HEALTH CARE EDUCATION/TRAINING PROGRAM

## 2024-01-17 PROCEDURE — 85027 COMPLETE CBC AUTOMATED: CPT | Mod: 59 | Performed by: PHYSICIAN ASSISTANT

## 2024-01-17 PROCEDURE — 3E0337Z INTRODUCTION OF ELECTROLYTIC AND WATER BALANCE SUBSTANCE INTO PERIPHERAL VEIN, PERCUTANEOUS APPROACH: ICD-10-PCS | Performed by: EMERGENCY MEDICINE

## 2024-01-17 PROCEDURE — 71260 CT THORAX DX C+: CPT | Mod: ME

## 2024-01-17 PROCEDURE — 70450 CT HEAD/BRAIN W/O DYE: CPT | Mod: ME

## 2024-01-17 PROCEDURE — 83605 ASSAY OF LACTIC ACID: CPT | Performed by: NURSE PRACTITIONER

## 2024-01-17 PROCEDURE — 96376 TX/PRO/DX INJ SAME DRUG ADON: CPT | Mod: 59

## 2024-01-17 PROCEDURE — 86901 BLOOD TYPING SEROLOGIC RH(D): CPT

## 2024-01-17 PROCEDURE — 72125 CT NECK SPINE W/O DYE: CPT | Mod: ME

## 2024-01-17 PROCEDURE — 99223 1ST HOSP IP/OBS HIGH 75: CPT | Performed by: SURGERY

## 2024-01-17 PROCEDURE — 96374 THER/PROPH/DIAG INJ IV PUSH: CPT | Mod: 59

## 2024-01-17 PROCEDURE — 85610 PROTHROMBIN TIME: CPT | Performed by: NURSE PRACTITIONER

## 2024-01-17 PROCEDURE — 99284 EMERGENCY DEPT VISIT MOD MDM: CPT | Mod: 25

## 2024-01-17 PROCEDURE — 20000000 HC ROOM AND CARE ICU

## 2024-01-17 PROCEDURE — 80048 BASIC METABOLIC PNL TOTAL CA: CPT | Performed by: PHYSICIAN ASSISTANT

## 2024-01-17 PROCEDURE — 25800000 HC PHARMACY IV SOLUTIONS: Performed by: NURSE PRACTITIONER

## 2024-01-17 PROCEDURE — 80053 COMPREHEN METABOLIC PANEL: CPT | Performed by: NURSE PRACTITIONER

## 2024-01-17 PROCEDURE — 63600000 HC DRUGS/DETAIL CODE: Mod: JG | Performed by: EMERGENCY MEDICINE

## 2024-01-17 PROCEDURE — 93005 ELECTROCARDIOGRAM TRACING: CPT | Performed by: NURSE PRACTITIONER

## 2024-01-17 PROCEDURE — 86870 RBC ANTIBODY IDENTIFICATION: CPT

## 2024-01-17 PROCEDURE — 74177 CT ABD & PELVIS W/CONTRAST: CPT | Mod: ME

## 2024-01-17 PROCEDURE — 87086 URINE CULTURE/COLONY COUNT: CPT | Performed by: EMERGENCY MEDICINE

## 2024-01-17 PROCEDURE — 83690 ASSAY OF LIPASE: CPT | Performed by: PHYSICIAN ASSISTANT

## 2024-01-17 PROCEDURE — 63600105 HC IODINE BASED CONTRAST: Performed by: NURSE PRACTITIONER

## 2024-01-17 PROCEDURE — 25800000 HC PHARMACY IV SOLUTIONS: Performed by: EMERGENCY MEDICINE

## 2024-01-17 PROCEDURE — 85730 THROMBOPLASTIN TIME PARTIAL: CPT | Performed by: NURSE PRACTITIONER

## 2024-01-17 RX ORDER — ACETAMINOPHEN 325 MG/1
975 TABLET ORAL ONCE
Status: DISCONTINUED | OUTPATIENT
Start: 2024-01-17 | End: 2024-01-17 | Stop reason: HOSPADM

## 2024-01-17 RX ORDER — SODIUM CHLORIDE 9 MG/ML
INJECTION, SOLUTION INTRAVENOUS CONTINUOUS
Status: DISCONTINUED | OUTPATIENT
Start: 2024-01-17 | End: 2024-01-17 | Stop reason: HOSPADM

## 2024-01-17 RX ORDER — MORPHINE SULFATE 2 MG/ML
2 INJECTION, SOLUTION INTRAMUSCULAR; INTRAVENOUS ONCE
Status: DISCONTINUED | OUTPATIENT
Start: 2024-01-17 | End: 2024-01-17 | Stop reason: HOSPADM

## 2024-01-17 RX ORDER — MORPHINE SULFATE 2 MG/ML
2 INJECTION, SOLUTION INTRAMUSCULAR; INTRAVENOUS ONCE
Status: COMPLETED | OUTPATIENT
Start: 2024-01-17 | End: 2024-01-17

## 2024-01-17 RX ORDER — AMOXICILLIN 250 MG
1 CAPSULE ORAL 2 TIMES DAILY
Status: DISCONTINUED | OUTPATIENT
Start: 2024-01-17 | End: 2024-01-20

## 2024-01-17 RX ORDER — DEXTROSE 50 % IN WATER (D50W) INTRAVENOUS SYRINGE
25 AS NEEDED
Status: DISCONTINUED | OUTPATIENT
Start: 2024-01-17 | End: 2024-01-24 | Stop reason: HOSPADM

## 2024-01-17 RX ORDER — SODIUM CHLORIDE 9 MG/ML
INJECTION, SOLUTION INTRAVENOUS CONTINUOUS
Status: DISCONTINUED | OUTPATIENT
Start: 2024-01-17 | End: 2024-01-18

## 2024-01-17 RX ORDER — IOPAMIDOL 755 MG/ML
100 INJECTION, SOLUTION INTRAVASCULAR
Status: COMPLETED | OUTPATIENT
Start: 2024-01-17 | End: 2024-01-17

## 2024-01-17 RX ORDER — OXYCODONE HYDROCHLORIDE 5 MG/1
5 TABLET ORAL EVERY 4 HOURS PRN
Status: DISCONTINUED | OUTPATIENT
Start: 2024-01-17 | End: 2024-01-18

## 2024-01-17 RX ORDER — ACETAMINOPHEN 325 MG/1
975 TABLET ORAL EVERY 6 HOURS
Status: DISCONTINUED | OUTPATIENT
Start: 2024-01-17 | End: 2024-01-24 | Stop reason: HOSPADM

## 2024-01-17 RX ORDER — ONDANSETRON HYDROCHLORIDE 2 MG/ML
4 INJECTION, SOLUTION INTRAVENOUS EVERY 6 HOURS PRN
Status: DISCONTINUED | OUTPATIENT
Start: 2024-01-17 | End: 2024-01-24 | Stop reason: HOSPADM

## 2024-01-17 RX ORDER — OXYCODONE HYDROCHLORIDE 5 MG/1
10 TABLET ORAL EVERY 4 HOURS PRN
Status: DISCONTINUED | OUTPATIENT
Start: 2024-01-17 | End: 2024-01-18

## 2024-01-17 RX ORDER — IBUPROFEN 200 MG
16-32 TABLET ORAL AS NEEDED
Status: DISCONTINUED | OUTPATIENT
Start: 2024-01-17 | End: 2024-01-20

## 2024-01-17 RX ORDER — DEXTROSE 40 %
15-30 GEL (GRAM) ORAL AS NEEDED
Status: DISCONTINUED | OUTPATIENT
Start: 2024-01-17 | End: 2024-01-20

## 2024-01-17 RX ORDER — BISACODYL 10 MG/1
10 SUPPOSITORY RECTAL DAILY PRN
Status: DISCONTINUED | OUTPATIENT
Start: 2024-01-17 | End: 2024-01-24 | Stop reason: HOSPADM

## 2024-01-17 RX ORDER — ASCORBIC ACID 500 MG
1000 TABLET ORAL DAILY
COMMUNITY
End: 2024-02-07 | Stop reason: HOSPADM

## 2024-01-17 RX ORDER — IOPAMIDOL 755 MG/ML
50 INJECTION, SOLUTION INTRAVASCULAR
Status: COMPLETED | OUTPATIENT
Start: 2024-01-17 | End: 2024-01-17

## 2024-01-17 RX ADMIN — SODIUM CHLORIDE 500 ML: 9 INJECTION, SOLUTION INTRAVENOUS at 11:23

## 2024-01-17 RX ADMIN — SODIUM CHLORIDE: 9 INJECTION, SOLUTION INTRAVENOUS at 16:28

## 2024-01-17 RX ADMIN — SODIUM CHLORIDE: 9 INJECTION, SOLUTION INTRAVENOUS at 11:23

## 2024-01-17 RX ADMIN — IOPAMIDOL 100 ML: 755 INJECTION, SOLUTION INTRAVENOUS at 12:02

## 2024-01-17 RX ADMIN — MORPHINE SULFATE 2 MG: 2 INJECTION, SOLUTION INTRAMUSCULAR; INTRAVENOUS at 11:24

## 2024-01-17 RX ADMIN — MORPHINE SULFATE 2 MG: 2 INJECTION, SOLUTION INTRAMUSCULAR; INTRAVENOUS at 15:30

## 2024-01-17 RX ADMIN — ACETAMINOPHEN 975 MG: 325 TABLET ORAL at 17:15

## 2024-01-17 RX ADMIN — SENNOSIDES AND DOCUSATE SODIUM 1 TABLET: 50; 8.6 TABLET ORAL at 19:49

## 2024-01-17 RX ADMIN — IOPAMIDOL 50 ML: 755 INJECTION, SOLUTION INTRAVENOUS at 20:27

## 2024-01-17 ASSESSMENT — COGNITIVE AND FUNCTIONAL STATUS - GENERAL
CLIMB 3 TO 5 STEPS WITH RAILING: 1 - TOTAL
STANDING UP FROM CHAIR USING ARMS: 1 - TOTAL
MOVING TO AND FROM BED TO CHAIR: 2 - A LOT
CLIMB 3 TO 5 STEPS WITH RAILING: 2 - A LOT
MOVING TO AND FROM BED TO CHAIR: 1 - TOTAL
WALKING IN HOSPITAL ROOM: 1 - TOTAL
STANDING UP FROM CHAIR USING ARMS: 2 - A LOT
WALKING IN HOSPITAL ROOM: 2 - A LOT

## 2024-01-17 ASSESSMENT — ENCOUNTER SYMPTOMS
NECK PAIN: 0
SHORTNESS OF BREATH: 0
DIZZINESS: 0
ARTHRALGIAS: 1
VOMITING: 0
HEADACHES: 0
FLANK PAIN: 1
BACK PAIN: 0
NAUSEA: 0

## 2024-01-17 NOTE — ASSESSMENT & PLAN NOTE
· Baseline creatinine 1.4 to 1.5 per review of chart - returned to baseline at 1.5  · Avoid nephrotoxins

## 2024-01-17 NOTE — ASSESSMENT & PLAN NOTE
· Age-indeterminate, seen on prior scans - MRI done, appears chronic  · Patient asymptomatic  · Flex-ex XR--widening of the 1st-2nd cervical interspinous process & anterior translocation of the superior fracture fragment  · Maintain C-collar -> intermittent non compliance with collar despite pt being alert and oriented x 3 with competence to understand the risks of not wearing the collar  · Pt, son and wife aware of the necessity to wear collar and educated on risks.  · [facility] offered surgery but patient refusing at this time - Will follow up as outpatient in 4-6 weeks  · Collar AAT

## 2024-01-17 NOTE — ASSESSMENT & PLAN NOTE
· Most recent Hgb 10 to 11 per review of chart  · Followed by hematology at [facility]   · Hgb sable in the 8-9 range

## 2024-01-17 NOTE — PLAN OF CARE
Problem: Adult Inpatient Plan of Care  Goal: Plan of Care Review  Outcome: Progressing     Problem: Adult Inpatient Plan of Care  Goal: Optimal Comfort and Wellbeing  Outcome: Progressing     Problem: Skin Injury Risk Increased  Goal: Skin Health and Integrity  Outcome: Progressing     Problem: Fall Injury Risk  Goal: Absence of Fall and Fall-Related Injury  Outcome: Progressing

## 2024-01-17 NOTE — ED ATTESTATION NOTE
I have personally seen and examined Randolph Daley. I personally performed the key components of the encounter and provided a substantive portion of the care and medical decision making for this patient.    I reviewed and agree with physician assistant’s assessment and plan of care, with any exceptions as documented below.     My focused history, examination, assessment, and plan of care of Randolph Daley is as follows:    Brief History:   83-year-old male presented with right sided torso and right groin discomfort after slip and fall on the ice landing on his right side.  Denies any shortness of breath.  Denies any extremity injuries or pain  Focused Physical Exam:   Vitals:    01/17/24 1029   BP: (!) 168/78   Pulse: 60   Resp: 16   Temp:    SpO2: 95%   Awake and alert good eye contact pleasant cooperative without respiratory distress.  No facial asymmetry.  Nontender cervical spine without any step-offs.  Soft nondistended abdomen with right upper quadrant tenderness to palpation without hepatomegaly.  Palpable crepitus with right ribs with step-offs without any swelling.  Intact full pulses in all 4 extremities with subjective pain and tenderness to passive range of motion of the right medial groin without any step-offs crepitus.  No ecchymosis to right lower extremity with intact full neurovascular exam of the right foot      Assessment / Plan / MDM:  Clinical history and exam concerning for but not limited to:  Rib fractures  Possible solid organ injuries  Possible pelvic fracture and pubic ramus       Doorn Dawson MD  01/17/24 6699

## 2024-01-17 NOTE — H&P
TRAUMA SURGERY:  HISTORY & PHYSICAL       PATIENT NAME:  Randolph Daley YOB: 1940    AGE:  83 y.o.  GENDER: male   MRN:  [id number]  PATIENT #: [id number]         Activation Time: accepted for transfer from [facility] ED @ 1310, arrived SICU 1417 Level of Trauma: consult   Trauma Arrival Time: n/a Time Patient Seen: 1420     HISTORY OF PRESENT ILLNESS/INJURY     Mechanism of Injury:  Slip and fall on ice    Attending Comments     I Dr. Ruvalcaba, examined the patient. I reviewed the vitals, labs, imaging and medication data in the chart on 1/17/2024 6 PM  The patient is an 83-year-old male who was transferred from [facility] after slip and fall complaining of right-sided hip pain discomfort there was no loss of consciousness GCS of 15 no shortness of breath and no outward signs of trauma workup completed at [facility] revealed posterior right rib fracture 8 9/10 ad11 incidentally there is bilateral pleural effusion left larger than right patient has a history of RSV couple weeks ago fluid is simple fluid and not hemorrhagic patient pulse ox upper 90s on room air.  Other traumatic findings including pelvic fracture pubic ramus fracture with pelvic hematoma compressing on the urinary bladder but no blush.  On arrival to [facility] surgical ICU cystogram was completed concerning for urethral/bladder injury Womack catheter was placed with pink bloody tinged urine discussed with urology likely will obtain a retrograde urethrogram pending final recommendation    I reviewed and agree with the NP/PA/resident's documentation and plan of care with the exception or additions below.  NOTE: Some or all of the note above was created with the use of dictation software, please note this dictation software can have anomalies in its ability to transcribe. Please contact me directly for anything that seems abnormal for clarification    Shelley Ruvalcaba M.D.      83 y.o. male who was transferred from [facility] ED to  "[place] SICU on 1/17/2024 s/p slip and fall on ice. Patient states he was dropping off his grandchildren at school and when he turned around to get back in the car he slipped and fell, heard a \"crack\" and had immediate onset of right chest wall and right hip/groin pain.  Diagnostic imaging showed right posterior rib fractures 8 to 11 and right pubic ramus fracture with pelvic hematoma. Patient was transferred to [place] for further management of his traumatic injuries.     Relevant PMH: See below     Pharmacological Risk Factors:   · Oral Anti-Coagulation: DENIES   · Antiplatelet Therapy: DENIES     No data found.     REVIEW OF SYSTEMS     14 point ROS completed and pertinent positives as listed in HPI or as stated. All others negative.    PAST MEDICAL HISTORY   No past medical history on file.  PAST SURGICAL HISTORY   No past surgical history on file.   FAMILY HISTORY     Non-contributory    SOCIAL HISTORY     Social History     Socioeconomic History   • Marital status:      Spouse name: Not on file   • Number of children: Not on file   • Years of education: Not on file   • Highest education level: Not on file   Occupational History   • Not on file   Tobacco Use   • Smoking status: Not on file   • Smokeless tobacco: Not on file   Substance and Sexual Activity   • Alcohol use: Not on file   • Drug use: Not on file   • Sexual activity: Not on file   Other Topics Concern   • Not on file   Social History Narrative   • Not on file     Social Determinants of Health     Financial Resource Strain: Not on file   Food Insecurity: No Food Insecurity (1/17/2024)    Hunger Vital Sign    • Worried About Running Out of Food in the Last Year: Never true    • Ran Out of Food in the Last Year: Never true   Transportation Needs: Not on file   Physical Activity: Not on file   Stress: Not on file   Social Connections: Not on file   Intimate Partner Violence: Not on file   Housing Stability: Not on file     HOME MEDICATIONS     ALLERGIES " "    Allergies   Allergen Reactions   • Lidocaine Hives   • Prednisone Hives and Other (see comments)     hives to \"steroids\"  hives to \"steroids\"     • Other      Steroids  Steroids     • Polyethylene Glycol      PRIMARY CARE PHYSICIAN   Bear Morin, DO      PHYSICAL EXAM     NEURO: GCS 15.  AAOx3. Non-focal on exam. Sensation Intact.    R L MOTOR (0-5):   5 5 C4 (deltoid)   5 5 C5 (biceps)   5 5 C6 (wrist ext)   5 5 C7 (triceps)   5 5 C8 (finger flexion)   5 5 T1 (hand intrinsics)   5 5 L2 (hip flexors)   5 5 L3 (quads) knee ext   5 5 L4 (TA) dorsiflex   5 5 L5 (EHL)    5 5 S1 (gastrocs) plantar flex     Anal Contraction: yes  Anal Sensation: yes    HEENT: NCAT. Midface is stable. NO malocclusion. GRUPO @ 3mm, EOMi. Neck supple. Trachea ML.   SPINE: Denies midline c-spine tenderness. Endorses pre-existing R paraspinal muscle pain from abnormal sleep position, unchanged after fall.  C-Collar in situ. Denies T/L/S spine tenderness. There are no stepoffs/deformities.   CHEST: Equal chest expansion, endorses R posterolateral chest wall tenderness, no crepitus.  LUNGS: LCTA bilaterally, BS diminished in LLL. No use of accessory muscles or respiratory distress.   CV: RRR, S1/S2. +2 radial/DP/femoral pulses.  ABDOMEN: Soft, nontender, nondistended.   PELVIS: Stable, R hip and groin pain  : Genitalia unremarkable.  RECTAL: Digital rectal exam deferred   EXTREMITIES: No palpable deformities.    SKIN: warm, dry, NO external signs of trauma.        DIAGNOSTIC DATA     LABS:  Recent Results (from the past 24 hour(s))   CBC and Differential    Collection Time: 01/17/24 11:19 AM   Result Value Ref Range    WBC 8.63 3.80 - 10.50 K/uL    RBC 3.18 (L) 4.50 - 5.80 M/uL    Hemoglobin 10.0 (L) 13.7 - 17.5 g/dL    Hematocrit 31.1 (L) 40.1 - 51.0 %    MCV 97.8 83.0 - 98.0 fL    MCH 31.4 28.0 - 33.2 pg    MCHC 32.2 32.2 - 36.5 g/dL    RDW 15.9 (H) 11.6 - 14.4 %    Platelets 103 (L) 150 - 350 K/uL    MPV 10.3 9.4 - 12.4 fL    " Differential Type Auto     nRBC 0.0 <=0.0 %    Immature Granulocytes 1.0 %    Neutrophils 73.1 %    Lymphocytes 18.7 %    Monocytes 6.6 %    Eosinophils 0.3 %    Basophils 0.3 %    Immature Granulocytes, Absolute 0.09 (H) 0.00 - 0.08 K/uL    Neutrophils, Absolute 6.30 1.70 - 7.00 K/uL    Lymphocytes, Absolute 1.61 1.20 - 3.50 K/uL    Monocytes, Absolute 0.57 0.30 - 1.00 K/uL    Eosinophils, Absolute 0.03 (L) 0.04 - 0.54 K/uL    Basophils, Absolute 0.03 0.01 - 0.10 K/uL    PLT Morphology Normal     Platelet Estimate Decreased (51,000-149,000)     Anisocytosis 1+    Basic metabolic panel    Collection Time: 01/17/24 11:19 AM   Result Value Ref Range    Sodium 137 136 - 145 mEQ/L    Potassium 4.5 3.5 - 5.1 mEQ/L    Chloride 104 98 - 107 mEQ/L    CO2 24 21 - 31 mEQ/L    BUN 35 (H) 7 - 25 mg/dL    Creatinine 1.6 (H) 0.7 - 1.3 mg/dL    Glucose 108 (H) 70 - 99 mg/dL    Calcium 9.5 8.6 - 10.3 mg/dL    eGFR 43.0 (L) >=60.0 mL/min/1.73m*2    Anion Gap 9 3 - 15 mEQ/L   Hepatic function panel    Collection Time: 01/17/24 11:19 AM   Result Value Ref Range    Albumin 3.2 (L) 3.5 - 5.7 g/dL    Bilirubin, Total 0.5 0.3 - 1.2 mg/dL    Bilirubin, Direct 0.1 <=0.4 mg/dL    Alkaline Phosphatase 63 34 - 125 IU/L    AST (SGOT) 22 13 - 39 IU/L    ALT (SGPT) 8 7 - 52 IU/L    Total Protein 8.2 6.0 - 8.2 g/dL   Lipase    Collection Time: 01/17/24 11:19 AM   Result Value Ref Range    Lipase 19 11 - 82 U/L   B-type natriuretic peptide    Collection Time: 01/17/24 11:19 AM   Result Value Ref Range     (H) <=100 pg/mL   Type and Screen [facility] Lab    Collection Time: 01/17/24  1:24 PM   Result Value Ref Range    Specimen Expiration 01/20/2024     Antibody Screen Negative     ABO A     Rh Factor Positive     History Check No type on file    UA Reflex to Culture (Macroscopic)    Collection Time: 01/17/24  2:16 PM    Specimen: Urine, Clean Catch   Result Value Ref Range    Color, Urine Colorless Yellow, Colorless    Clarity, Urine Clear Clear     Specific Gravity, Urine 1.027 1.005 - 1.030    pH, Urine 6.0 4.5 - 8.0    Leukocyte Esterase +1 (A) Negative    Nitrite, Urine Negative Negative    Protein, Urine Trace (A) Negative    Glucose, Urine Negative Negative mg/dL    Ketones, Urine Negative Negative mg/dL    Urobilinogen, Urine 0.2 <2.0 EU/dL EU/dL    Bilirubin, Urine Negative Negative mg/dL    Blood, Urine Negative Negative   UA Microscopic    Collection Time: 01/17/24  2:16 PM   Result Value Ref Range    RBC, Urine 0 TO 4 0 TO 4 /HPF    WBC, Urine 10 TO 20 (A) 0 TO 3 /HPF    Squamous Epithelial Rare (A) None Seen /hpf    Hyaline Cast None Seen None Seen /lpf    Bacteria, Urine Rare (A) None Seen /HPF    Calcium Oxalate Crystals +1 (A) None Seen /hpf   CBC    Collection Time: 01/17/24  3:29 PM   Result Value Ref Range    WBC 9.03 3.80 - 10.50 K/uL    RBC 2.92 (L) 4.50 - 5.80 M/uL    Hemoglobin 9.3 (L) 13.7 - 17.5 g/dL    Hematocrit 28.5 (L) 40.1 - 51.0 %    MCV 97.6 83.0 - 98.0 fL    MCH 31.8 28.0 - 33.2 pg    MCHC 32.6 32.2 - 36.5 g/dL    RDW 15.8 (H) 11.6 - 14.4 %    Platelets 96 (L) 150 - 350 K/uL    MPV 10.1 9.4 - 12.4 fL   ECG 12 lead    Collection Time: 01/17/24  4:19 PM   Result Value Ref Range    Ventricular rate 74     Atrial rate 74     MT Interval 182     QRS duration 106     QT Interval 416     QTC Calculation(Bazett) 461     P Axis 52     R Axis -14     T Wave Axis 23    CBC and Differential    Collection Time: 01/17/24  4:34 PM   Result Value Ref Range    WBC 8.24 3.80 - 10.50 K/uL    RBC 3.15 (L) 4.50 - 5.80 M/uL    Hemoglobin 9.9 (L) 13.7 - 17.5 g/dL    Hematocrit 30.7 (L) 40.1 - 51.0 %    MCV 97.5 83.0 - 98.0 fL    MCH 31.4 28.0 - 33.2 pg    MCHC 32.2 32.2 - 36.5 g/dL    RDW 15.9 (H) 11.6 - 14.4 %    Platelets 100 (L) 150 - 350 K/uL    MPV 10.4 9.4 - 12.4 fL   Lactic acid, Venous    Collection Time: 01/17/24  4:34 PM   Result Value Ref Range    Lactate 1.2 0.4 - 2.0 mmol/L   Protime-INR    Collection Time: 01/17/24  4:34 PM   Result Value  Ref Range    PT 19.3 (H) 12.2 - 14.5 sec    INR 1.6     PTT    Collection Time: 01/17/24  4:34 PM   Result Value Ref Range    PTT 37 (H) 23 - 35 sec   Type and Screen [facility] Lab    Collection Time: 01/17/24  4:34 PM   Result Value Ref Range    Specimen Expiration 01/20/2024     History Check Previous type on file         Serum creatinine: 1.6 mg/dL (H) 01/17/24 1119  Estimated creatinine clearance: 38.9 mL/min (A)    IMAGINGS:  CT CERVICAL SPINE WITHOUT IV CONTRAST    Result Date: 1/17/2024  IMPRESSION: *  No evidence of acute posttraumatic intracranial injury. *  Lateral ventriculomegaly, indeterminate though usually related to volume loss or normal pressure hydrocephalus. *  Age-indeterminate odontoid process fracture which was not reported on outside imaging. The results were critically read back with DANIEL CHAN on 1/17/2024 1:05 PM. COMMENT: BRAIN: Brain parenchyma:  Age related involution and  ischemic small vessel changes. Gray-white matter differentiation is normal.  No evidence of intracranial hemorrhage, midline shift or other mass effect. Ventricles, cisterns, and sulci:  Symmetric lateral ventriculomegaly. Cranial carotid arteries: Mural calcification. Calvarium and extra cranial soft tissues:  No evidence of a displaced calvarial fracture.   Scalp soft tissue within normal limits. Visualized paranasal sinuses and mastoid air cells:  Partial opacification of the right mastoid air cells. CERVICAL SPINE: Cervicothoracic alignment: Craniocervical junction is normal in appearance. Atlantodental distance is not widened. Odontoid process fracture just at the inferior margin of the atlantodens articulation with a 1.3 cm Doppler process fracture which does not appear to extend into the C2 base. Prevertebral soft tissues: Normal in thickness. Vertebral bodies and intervertebral discs: Severe osseous demineralization. Cortical thickening and coarsening/thickening of the trabeculae due to osseous  demineralization or pagetoid changes. Vertebral body heights and alignment are maintained.  There are multilevel degenerative changes with intervertebral disc space narrowing, endplate spurring, facet hypertrophy, and uncovertebral hypertrophy, most pronounced at C4-5. Cervical and upper thoracic spinal canal: There are varying degrees of central canal and neural foraminal stenosis, however there is no high-grade osseous encroachment on the central canal. Extra vertebral soft tissues: Vascular calcifications. Partially imaged left pleural effusion is more completely characterized on the contemporaneous CT chest.     CT CHEST WITH IV CONTRAST    Result Date: 1/17/2024  IMPRESSION: 1.  Right superior pubic ramus parasymphyseal acute, comminuted fracture with concomitant right adductor/obturator intramuscular hematomas and perivesicular hematomas. Right perivesicular hematomas associated with mild mass effect on the urinary bladder and perivesicular infiltration. CT cystogram can be considered if there is concern for urinary bladder posttraumatic injury. 2.  Right eighth through 11th posterior acute rib fractures. 3.  Large, loculated left pleural effusion with near-complete collapse left lower lobe. Moderate right layering pleural effusion. 4.  Infiltration about SMA and SMV noting a poorly seen pancreas, of indeterminate etiology. Correlation with lipase exclude pancreatitis can be considered. Follow-up elective MRI abdomen without and with intravenous contrast can be considered if further imaging evaluation of the pancreas is desired. 5.  Hepatosplenomegaly. 6.  Incompletely characterized right lower pole renal cystic lesion measuring 8.2 cm. Elective renal ultrasound can be considered. Alternatively, this lesion can be followed with an MRI if that study is performed for the pancreatic findings described above. The results were critically read back with Lizbeth Marino PA C on 1/17/2024 12:57 PM.    CT ABDOMEN  PELVIS WITH IV CONTRAST    Result Date: 1/17/2024  IMPRESSION: 1.  Right superior pubic ramus parasymphyseal acute, comminuted fracture with concomitant right adductor/obturator intramuscular hematomas and perivesicular hematomas. Right perivesicular hematomas associated with mild mass effect on the urinary bladder and perivesicular infiltration. CT cystogram can be considered if there is concern for urinary bladder posttraumatic injury. 2.  Right eighth through 11th posterior acute rib fractures. 3.  Large, loculated left pleural effusion with near-complete collapse left lower lobe. Moderate right layering pleural effusion. 4.  Infiltration about SMA and SMV noting a poorly seen pancreas, of indeterminate etiology. Correlation with lipase exclude pancreatitis can be considered. Follow-up elective MRI abdomen without and with intravenous contrast can be considered if further imaging evaluation of the pancreas is desired. 5.  Hepatosplenomegaly. 6.  Incompletely characterized right lower pole renal cystic lesion measuring 8.2 cm. Elective renal ultrasound can be considered. Alternatively, this lesion can be followed with an MRI if that study is performed for the pancreatic findings described above. The results were critically read back with Lizbeth Marino PA C on 1/17/2024 12:57 PM.    CT HEAD WITHOUT IV CONTRAST    Result Date: 1/17/2024  IMPRESSION: Motion degraded examination. No acute intracranial lesion. Atrophy, nonspecific white matter changes probably secondary to chronic small vessel ischemia and atherosclerotic vascular calcification.     X-RAY CHEST 1 VIEW    Result Date: 1/17/2024  IMPRESSION: Chin overlies chest, limiting evaluation of the thoracic inlet. Cardiomegaly with pulmonary vascular congestion. Moderate left and small right pleural effusions with underlying atelectasis/airspace disease no pneumothorax seen.        CONSULTS      Consultant Emergent  Urgent    Routine Time Paged Time Responded  EMERGENT Arrival Time   Orthopedic surgery Routine [id number]    Neurosurgery  Routine 1432 1433           *emergent requires <30 minutes response on site; urgent requires <12 hours response on site.      IMPRESSION/PLAN     83 y.o. male s/p slip and fall on ice    Closed odontoid fracture (CMS/HCC)  Assessment & Plan  · Age-indeterminate, seen on prior scans  · Patient asymptomatic  · Neurosurgery will see to assess for acute injury, will keep in hard cervical collar for now    Stage 3a chronic kidney disease (CMS/[facility])  Assessment & Plan  · Baseline creatinine 1.4 to 1.5 per review of chart  · Avoid nephrotoxins    Normocytic anemia  Assessment & Plan  · Most recent Hgb 10 to 11 per review of chart  · Followed by hematology at [facility]    Pleural effusion on left  Assessment & Plan  · Incidental finding on CT imaging, loculated  · Will consult IR for pigtail placement    Pelvic hematoma in male  Assessment & Plan  · In setting of pubic ramus fracture, no active extravasation noted   · Causing mass effect on bladder, will place avila and check CT cystogram to r/o bladder injury  · Trend cbc q 6 hr  · Hold DVT ppx until hgb stable    Closed fracture of ramus of right pubis (CMS/HCC)  Assessment & Plan  · Orthopedic surgery consult  · NWB RLE       Closed fracture of multiple ribs of right side  Assessment & Plan  Multimodal analgesia -->  APAP ATC, PRN oxycodone  IS/C&Db  Wean O2 as tolerated for goal POX > 93%  AM CXR --> f/u effusions       * Fall due to slipping on ice or snow, initial encounter  Assessment & Plan  · Fall precautions  · PT/OT/PMR evaluations when cleared to mobilize    Bladder injury/urethral injury  Urology reviewing scans  Likely plan for retrograde urethrogram  Avila is in place  Monitor hemoglobin  Hold chemical DVT prophylaxis    VTE PPX: SCDs & chemoprophylaxis contraindicated due to pelvic hematoma    GI PPX:  none needed    DISPOSITION:  Admit to SICU     CODE STATUS: Full  Code    Final recommendations are pending attending co-signature.    COURTNEY Salazar  Service Pager: #[phone]  Phone: [phone]  1/17/2024  5:50 PM

## 2024-01-17 NOTE — ASSESSMENT & PLAN NOTE
Multimodal analgesia -->  APAP ATC, PRN ultram  IS/C&Db  Wean O2 as tolerated for goal POX > 93%

## 2024-01-17 NOTE — ASSESSMENT & PLAN NOTE
· In setting of pubic ramus fracture, no active extravasation noted   · Causing mass effect on bladder. Avila placed with hematuria  · Urology consulted--recommended maintaining avila with CT Cystogram this week/prior to discharge - unclear if injury present  · Serial Hgbs stable.

## 2024-01-17 NOTE — ED PROVIDER NOTES
Emergency Medicine Note  HPI   HISTORY OF PRESENT ILLNESS     83 year old male presents to the ER for evaluation after a slip and fall. Pt slipped on the ice approx 1 hour ago. He landed on his right side. He has significant pain to his right ribs, worse with moving, breathing. He also has pain in the R groin area. No head injury or LOC. No neck or back pain. No extremity pain. Pt is not on any blood thinners. He is not SOB             Patient History   PAST HISTORY     Reviewed from Nursing Triage:       No past medical history on file.    No past surgical history on file.    No family history on file.    Social History     Tobacco Use   • Smoking status: Former     Types: Cigarettes, Cigars   • Smokeless tobacco: Never   Substance Use Topics   • Alcohol use: Not Currently   • Drug use: Never         Review of Systems   REVIEW OF SYSTEMS     Review of Systems   Respiratory: Negative for shortness of breath.    Cardiovascular: Positive for chest pain (ribs).   Gastrointestinal: Negative for nausea and vomiting.   Genitourinary: Positive for flank pain.   Musculoskeletal: Positive for arthralgias. Negative for back pain and neck pain.   Neurological: Negative for dizziness and headaches.         VITALS     ED Vitals    Date/Time Temp Pulse Resp BP SpO2 [facility]   01/17/24 1515 -- 76 19 147/79 96 % SDN   01/17/24 1445 -- 76 18 155/80 94 % RKF   01/17/24 1415 -- 68 18 151/84 96 % SDN   01/17/24 1315 -- 62 19 144/77 94 % SDN   01/17/24 1213 -- 69 18 159/84 95 % SDN   01/17/24 1029 -- 60 16 168/78 95 % TS   01/17/24 1028 36.6 °C (97.8 °F) -- -- -- -- TS        Pulse Ox %: 95 % (01/17/24 1048)  Pulse Ox Interpretation: Normal (01/17/24 1048)           Physical Exam   PHYSICAL EXAM     Physical Exam  Constitutional:       General: He is not in acute distress.     Appearance: Normal appearance. He is not ill-appearing or toxic-appearing.   HENT:      Head: Normocephalic and atraumatic.   Eyes:      Extraocular Movements:  Extraocular movements intact.      Conjunctiva/sclera: Conjunctivae normal.      Pupils: Pupils are equal, round, and reactive to light.   Pulmonary:      Effort: Pulmonary effort is normal.      Comments: Significant tenderness to right lateral inferior ribs.  R flank/RUQ tendenress. No ecchymosis or crepitus   Abdominal:      Tenderness: There is abdominal tenderness (RUQ).   Musculoskeletal:      Cervical back: Normal range of motion and neck supple. No tenderness.      Comments: Full ROM of all 4 extremities. No bony tenderness    +TTP R groin   Skin:     General: Skin is warm and dry.   Neurological:      Mental Status: He is alert.           PROCEDURES     Procedures     DATA     Results     Procedure Component Value Units Date/Time    CBC [[id number]]  (Abnormal) Collected: 01/17/24 1529    Specimen: Blood, Venous Updated: 01/17/24 1605     WBC 9.03 K/uL      RBC 2.92 M/uL      Hemoglobin 9.3 g/dL      Hematocrit 28.5 %      MCV 97.6 fL      MCH 31.8 pg      MCHC 32.6 g/dL      RDW 15.8 %      Platelets 96 K/uL      Comment: RESULTS OBTAINED AFTER VORTEXING TO ELIMINATE PLT CLUMPSSPECIMEN QUALITY CHECKED        MPV 10.1 fL     Urinalysis with Reflex Culture (ED and Outpatient only) [[id number]]  (Abnormal) Collected: 01/17/24 1416    Specimen: Urine, Clean Catch Updated: 01/17/24 1426    Narrative:      The following orders were created for panel order Urinalysis with Reflex Culture (ED and Outpatient only).  Procedure                               Abnormality         Status                     ---------                               -----------         ------                     UA Reflex to Culture (Ma...[[id number]]  Abnormal            Final result               UA Microscopic[[id number]]               Abnormal            Final result                 Please view results for these tests on the individual orders.    UA Microscopic [[id number]]  (Abnormal) Collected: 01/17/24 1416    Specimen: Urine, Clean Catch  Updated: 01/17/24 1426     RBC, Urine 0 TO 4 /HPF      WBC, Urine 10 TO 20 /HPF      Squamous Epithelial Rare /hpf      Hyaline Cast None Seen /lpf      Bacteria, Urine Rare /HPF      Calcium Oxalate Crystals +1 /hpf     UA Reflex to Culture (Macroscopic) [[id number]]  (Abnormal) Collected: 01/17/24 1416    Specimen: Urine, Clean Catch Updated: 01/17/24 1424     Color, Urine Colorless     Clarity, Urine Clear     Specific Gravity, Urine 1.027     pH, Urine 6.0     Leukocyte Esterase +1     Nitrite, Urine Negative     Protein, Urine Trace     Comment: Trace False Positive Protein can be seen with alkaline or highly buffered urines or urine with high specific gravity. Suggest clinical correlation.        Glucose, Urine Negative mg/dL      Ketones, Urine Negative mg/dL      Urobilinogen, Urine 0.2 EU/dL      Bilirubin, Urine Negative mg/dL      Blood, Urine Negative     Comment: The sensitivity of the occult blood test is equivalent to approximately 4 intact RBC/HPF.       Type and Screen [facility] [[id number]] Collected: 01/17/24 1324    Specimen: Blood, Venous Updated: 01/17/24 1415     Specimen Expiration 01/20/2024     Antibody Screen Negative     ABO A     Rh Factor Positive     History Check No type on file    B-type natriuretic peptide [[id number]]  (Abnormal) Collected: 01/17/24 1119    Specimen: Blood, Venous Updated: 01/17/24 1348      pg/mL     [facility] Draw Panel [[id number]] Collected: 01/17/24 1324    Specimen: Blood, Venous Updated: 01/17/24 1328    Narrative:      The following orders were created for panel order [facility] Draw Panel.  Procedure                               Abnormality         Status                     ---------                               -----------         ------                     RAINBOW LT BLUE[[id number]]                                  In process                   Please view results for these tests on the individual orders.    RAINBOW LT BLUE [[id number]] Collected: 01/17/24  1324    Specimen: Blood, Venous Updated: 01/17/24 1328    Hepatic function panel [[id number]]  (Abnormal) Collected: 01/17/24 1119    Specimen: Blood, Venous Updated: 01/17/24 1310     Albumin 3.2 g/dL      Bilirubin, Total 0.5 mg/dL      Bilirubin, Direct 0.1 mg/dL      Alkaline Phosphatase 63 IU/L      AST (SGOT) 22 IU/L      ALT (SGPT) 8 IU/L      Total Protein 8.2 g/dL      Comment: Test performed on plasma which typically contains approximately 0.4 g/dL more protein than serum.       Lipase [[id number]]  (Normal) Collected: 01/17/24 1119    Specimen: Blood, Venous Updated: 01/17/24 1310     Lipase 19 U/L     Basic metabolic panel [[id number]]  (Abnormal) Collected: 01/17/24 1119    Specimen: Blood, Venous Updated: 01/17/24 1159     Sodium 137 mEQ/L      Potassium 4.5 mEQ/L      Comment: Results obtained on plasma. Plasma Potassium values may be up to 0.4 mEQ/L less than serum values. The differences may be greater for patients with high platelet or white cell counts.        Chloride 104 mEQ/L      CO2 24 mEQ/L      BUN 35 mg/dL      Creatinine 1.6 mg/dL      Glucose 108 mg/dL      Calcium 9.5 mg/dL      eGFR 43.0 mL/min/1.73m*2      Comment: Calculation based on the Chronic Kidney Disease Epidemiology Collaboration (CKD-EPI) equation refit without adjustment for race.        Anion Gap 9 mEQ/L     CBC and Differential [[id number]]  (Abnormal) Collected: 01/17/24 1119    Specimen: Blood, Venous Updated: 01/17/24 1155     WBC 8.63 K/uL      RBC 3.18 M/uL      Hemoglobin 10.0 g/dL      Hematocrit 31.1 %      MCV 97.8 fL      MCH 31.4 pg      MCHC 32.2 g/dL      RDW 15.9 %      Platelets 103 K/uL      Comment: ALL RESULTS HAVE BEEN RECHECKED        MPV 10.3 fL      Differential Type Auto     nRBC 0.0 %      Immature Granulocytes 1.0 %      Neutrophils 73.1 %      Lymphocytes 18.7 %      Monocytes 6.6 %      Eosinophils 0.3 %      Basophils 0.3 %      Immature Granulocytes, Absolute 0.09 K/uL      Neutrophils, Absolute  6.30 K/uL      Lymphocytes, Absolute 1.61 K/uL      Monocytes, Absolute 0.57 K/uL      Eosinophils, Absolute 0.03 K/uL      Basophils, Absolute 0.03 K/uL      PLT Morphology Normal     Platelet Estimate Decreased (51,000-149,000)     Anisocytosis 1+          Imaging Results          CT CHEST WITH IV CONTRAST (Final result)  Result time 01/17/24 12:57:49    Final result                 Impression:    IMPRESSION:  1.  Right superior pubic ramus parasymphyseal acute, comminuted fracture with  concomitant right adductor/obturator intramuscular hematomas and perivesicular  hematomas. Right perivesicular hematomas associated with mild mass effect on the  urinary bladder and perivesicular infiltration. CT cystogram can be considered  if there is concern for urinary bladder posttraumatic injury.  2.  Right eighth through 11th posterior acute rib fractures.  3.  Large, loculated left pleural effusion with near-complete collapse left  lower lobe. Moderate right layering pleural effusion.  4.  Infiltration about SMA and SMV noting a poorly seen pancreas, of  indeterminate etiology. Correlation with lipase exclude pancreatitis can be  considered. Follow-up elective MRI abdomen without and with intravenous contrast  can be considered if further imaging evaluation of the pancreas is desired.  5.  Hepatosplenomegaly.  6.  Incompletely characterized right lower pole renal cystic lesion measuring  8.2 cm. Elective renal ultrasound can be considered. Alternatively, this lesion  can be followed with an MRI if that study is performed for the pancreatic  findings described above.    The results were critically read back with Lizbeth Marino PA C on 1/17/2024  12:57 PM.             Narrative:    CLINICAL HISTORY: Abdominal trauma, blunt  Additional history from chart:  83 year old male presents to the ER for evaluation after a slip and fall. Pt  slipped on the ice approx 1 hour ago. He landed on his right side. He has  significant  pain to his right ribs, worse with moving, breathing. He also has  pain in the R groin area. No head injury or LOC. No neck or back pain. No  extremity pain. Pt is not on any blood thinners. He is not SOB    COMPARISON: None. Outside CT abdomen and pelvis report from 3/20/2021 was  reviewed; unfortunately, images are not available for review.    COMMENT:    TECHNIQUE: CT of the chest, abdomen and pelvis was performed after the  uneventful administration of intravenous contrast with the patient in the supine  position. Images reconstructed/reformatted in the axial, coronal and  sagittal  planes.    CT DOSE:  One or more dose reduction techniques (e.g. automated exposure  control, adjustment of the mA and/or kV according to patient size, use of  iterative reconstruction technique) utilized for this examination.  ADMINISTERED CONTRAST:  100mL of iopamidoL (ISOVUE-370) 370 mg iodine /mL (76 %) injection 100 mL    CHEST  CHEST WALL AND LOWER NECK: Minimal diffuse subcutaneous soft tissue  infiltration.  MEDIASTINUM AND JUAN M: within normal limits  HEART: Cardiomegaly. No pericardial effusion.  VESSELS:  No evidence of acute posttraumatic aortic injury.  Vascular calcifications, including coronary artery calcifications.  Aortic and mitral annulus/valvular calcifications.  Ascending aortic aneurysm measuring 4.6 cm at the sinus of Valsalva and up to 5  cm within the mid ascending aorta. Descending thoracic aortic caliber is within  normal limits, measuring up to 2.9 cm (all measurements on coronal sequences).  Enlarged main pulmonary artery at up to 4.3 cm, finding associated with lateral  hypertension.  Extensive abdominal vascular calcifications. Abdominal aortic caliber is normal.  Infiltration of the fat about the SMA/SMV without evidence of vascular injury.  LUNG , LARGE AIRWAYS AND PLEURA: No evidence of a pneumothorax or  pneumomediastinum. Moderate right layering pleural effusion with concomitant  right lower lobe  basilar compressive volume loss. Moderate to large left pleural  effusion with possible areas of loculation in concomitant near-complete collapse  of the left lower lobe.    BONES:  Right posterior eighth rib minimally displaced acute fracture.  Right posterior ninth rib nondisplaced acute fracture.  Right posterior 10th rib minimally displaced acute fracture.  Right posterior 11th rib nondisplaced acute fracture.  Severe diffuse osseous demineralization. Multilevel degenerative disc disease.  Diffuse coarsening of the osseous trabeculae with areas of cortical thickening,  which can be seen with pagetoid changes.    Right superior pubic ramus parasymphyseal comminuted fracture, acute.  Concomitant right adductor and obturator intramuscular hematomas, severely  involving the obturator internus. Perivesicular hematomas and right inguinal  hematomas related to the fracture, or also seen. Right perivesicular hematomas  cause mass effect on the adjacent urinary bladder. Diffuse infiltration of the  perivesicular space.  Degenerative changes of the sacroiliac joints, more severe on the left, no  evidence of sacral joint widening. No evidence of cysts parasymphyseal widening.    Multilevel Schmorl's nodes, most notable involving the L2-3 vertebral bodies.    ABDOMEN AND PELVIS  LIVER: Liver measures 21.8 cm sagittal dimension.  BILE DUCTS: normal caliber  GALLBLADDER: Normal  PANCREAS: Limited assessment.  SPLEEN: Splenomegaly at 15.3 cm maximum sagittal dimension.  ADRENALS: within normal limits  KIDNEYS/URETERS/BLADDER: Multiple renal cysts. Additionally, there is a  posterior right lower pole partially rim calcified 8.2 cm mostly exophytic  cystic lesion with indeterminate attenuation characteristics. Kidneys enhance  excrete contrast symmetrically. No hydroureteronephrosis.    REPRODUCTIVE ORGANS: no pelvic masses    BOWEL: Moderate stool burden. Normal bowel caliber.  no ascites or free air, no fluid  collection.  ABDOMINAL LYMPH NODES: within normal limits.  ABDOMINAL WALL: Right hip posttraumatic changes described in the bone section  above. Infiltration throughout the subcutaneous soft tissue, most notably  involving the right lower quadrant ventral abdominal subcutaneous soft tissue.  Gluteal subcutaneous calcifications demonstrated on the right.  Bilateral inguinal postsurgical changes.                               CT ABDOMEN PELVIS WITH IV CONTRAST (Final result)  Result time 01/17/24 12:57:49    Final result                 Impression:    IMPRESSION:  1.  Right superior pubic ramus parasymphyseal acute, comminuted fracture with  concomitant right adductor/obturator intramuscular hematomas and perivesicular  hematomas. Right perivesicular hematomas associated with mild mass effect on the  urinary bladder and perivesicular infiltration. CT cystogram can be considered  if there is concern for urinary bladder posttraumatic injury.  2.  Right eighth through 11th posterior acute rib fractures.  3.  Large, loculated left pleural effusion with near-complete collapse left  lower lobe. Moderate right layering pleural effusion.  4.  Infiltration about SMA and SMV noting a poorly seen pancreas, of  indeterminate etiology. Correlation with lipase exclude pancreatitis can be  considered. Follow-up elective MRI abdomen without and with intravenous contrast  can be considered if further imaging evaluation of the pancreas is desired.  5.  Hepatosplenomegaly.  6.  Incompletely characterized right lower pole renal cystic lesion measuring  8.2 cm. Elective renal ultrasound can be considered. Alternatively, this lesion  can be followed with an MRI if that study is performed for the pancreatic  findings described above.    The results were critically read back with Lizbeth Marino PA C on 1/17/2024  12:57 PM.             Narrative:    CLINICAL HISTORY: Abdominal trauma, blunt  Additional history from chart:  83 year old  male presents to the ER for evaluation after a slip and fall. Pt  slipped on the ice approx 1 hour ago. He landed on his right side. He has  significant pain to his right ribs, worse with moving, breathing. He also has  pain in the R groin area. No head injury or LOC. No neck or back pain. No  extremity pain. Pt is not on any blood thinners. He is not SOB    COMPARISON: None. Outside CT abdomen and pelvis report from 3/20/2021 was  reviewed; unfortunately, images are not available for review.    COMMENT:    TECHNIQUE: CT of the chest, abdomen and pelvis was performed after the  uneventful administration of intravenous contrast with the patient in the supine  position. Images reconstructed/reformatted in the axial, coronal and  sagittal  planes.    CT DOSE:  One or more dose reduction techniques (e.g. automated exposure  control, adjustment of the mA and/or kV according to patient size, use of  iterative reconstruction technique) utilized for this examination.  ADMINISTERED CONTRAST:  100mL of iopamidoL (ISOVUE-370) 370 mg iodine /mL (76 %) injection 100 mL    CHEST  CHEST WALL AND LOWER NECK: Minimal diffuse subcutaneous soft tissue  infiltration.  MEDIASTINUM AND JUAN M: within normal limits  HEART: Cardiomegaly. No pericardial effusion.  VESSELS:  No evidence of acute posttraumatic aortic injury.  Vascular calcifications, including coronary artery calcifications.  Aortic and mitral annulus/valvular calcifications.  Ascending aortic aneurysm measuring 4.6 cm at the sinus of Valsalva and up to 5  cm within the mid ascending aorta. Descending thoracic aortic caliber is within  normal limits, measuring up to 2.9 cm (all measurements on coronal sequences).  Enlarged main pulmonary artery at up to 4.3 cm, finding associated with lateral  hypertension.  Extensive abdominal vascular calcifications. Abdominal aortic caliber is normal.  Infiltration of the fat about the SMA/SMV without evidence of vascular injury.  LUNG ,  LARGE AIRWAYS AND PLEURA: No evidence of a pneumothorax or  pneumomediastinum. Moderate right layering pleural effusion with concomitant  right lower lobe basilar compressive volume loss. Moderate to large left pleural  effusion with possible areas of loculation in concomitant near-complete collapse  of the left lower lobe.    BONES:  Right posterior eighth rib minimally displaced acute fracture.  Right posterior ninth rib nondisplaced acute fracture.  Right posterior 10th rib minimally displaced acute fracture.  Right posterior 11th rib nondisplaced acute fracture.  Severe diffuse osseous demineralization. Multilevel degenerative disc disease.  Diffuse coarsening of the osseous trabeculae with areas of cortical thickening,  which can be seen with pagetoid changes.    Right superior pubic ramus parasymphyseal comminuted fracture, acute.  Concomitant right adductor and obturator intramuscular hematomas, severely  involving the obturator internus. Perivesicular hematomas and right inguinal  hematomas related to the fracture, or also seen. Right perivesicular hematomas  cause mass effect on the adjacent urinary bladder. Diffuse infiltration of the  perivesicular space.  Degenerative changes of the sacroiliac joints, more severe on the left, no  evidence of sacral joint widening. No evidence of cysts parasymphyseal widening.    Multilevel Schmorl's nodes, most notable involving the L2-3 vertebral bodies.    ABDOMEN AND PELVIS  LIVER: Liver measures 21.8 cm sagittal dimension.  BILE DUCTS: normal caliber  GALLBLADDER: Normal  PANCREAS: Limited assessment.  SPLEEN: Splenomegaly at 15.3 cm maximum sagittal dimension.  ADRENALS: within normal limits  KIDNEYS/URETERS/BLADDER: Multiple renal cysts. Additionally, there is a  posterior right lower pole partially rim calcified 8.2 cm mostly exophytic  cystic lesion with indeterminate attenuation characteristics. Kidneys enhance  excrete contrast symmetrically. No  hydroureteronephrosis.    REPRODUCTIVE ORGANS: no pelvic masses    BOWEL: Moderate stool burden. Normal bowel caliber.  no ascites or free air, no fluid collection.  ABDOMINAL LYMPH NODES: within normal limits.  ABDOMINAL WALL: Right hip posttraumatic changes described in the bone section  above. Infiltration throughout the subcutaneous soft tissue, most notably  involving the right lower quadrant ventral abdominal subcutaneous soft tissue.  Gluteal subcutaneous calcifications demonstrated on the right.  Bilateral inguinal postsurgical changes.                               CT HEAD WITHOUT IV CONTRAST (Final result)  Result time 01/17/24 12:34:09    Final result                 Impression:    IMPRESSION: Motion degraded examination.    No acute intracranial lesion.    Atrophy, nonspecific white matter changes probably secondary to chronic small  vessel ischemia and atherosclerotic vascular calcification.                 Narrative:    CLINICAL HISTORY:  Injury, trauma, minor (Age >= 65y)    COMMENT:   CT imaging of the brain and cervical spine was performed without  intravenous contrast administration. Multiplanar reformats were reviewed.    Administered contrast and dose:  [<None>]              CT DOSE:   One or more dose reduction techniques (e.g. automated exposure  control, adjustment of the mA and/or kV according to patient size, use of  iterative reconstruction technique) utilized for this examination.    Comparison: None.    Brain:    [Motion degraded examination.    The ventricular system and sulci reflect mild to moderate global volume loss.  No findings of midline shift, mass effect, acute hemorrhage, or acute  transcortical infarct.  Posterior fossa probable arachnoid cyst.  Mild  nonspecific periventricular and subcortical white matter hypoattenuation.  Intracranial vascular consultations.  Mild right mastoid air cell effusion.                                 CT CERVICAL SPINE WITHOUT IV CONTRAST (Final  result)  Result time 01/17/24 13:05:42    Final result                 Impression:    IMPRESSION:  *  No evidence of acute posttraumatic intracranial injury.  *  Lateral ventriculomegaly, indeterminate though usually related to volume loss  or normal pressure hydrocephalus.  *  Age-indeterminate odontoid process fracture which was not reported on outside  imaging.    The results were critically read back with DANIEL CHAN on 1/17/2024  1:05 PM.    COMMENT:    BRAIN:  Brain parenchyma:  Age related involution and  ischemic small vessel changes.  Gray-white matter differentiation is normal.  No evidence of intracranial  hemorrhage, midline shift or other mass effect.  Ventricles, cisterns, and sulci:  Symmetric lateral ventriculomegaly.  Cranial carotid arteries: Mural calcification.  Calvarium and extra cranial soft tissues:  No evidence of a displaced  calvarial fracture.   Scalp soft tissue within normal limits.  Visualized paranasal sinuses and mastoid air cells:  Partial opacification of  the right mastoid air cells.    CERVICAL SPINE:    Cervicothoracic alignment: Craniocervical junction is normal in appearance.  Atlantodental distance is not widened. Odontoid process fracture just at the  inferior margin of the atlantodens articulation with a 1.3 cm Doppler process  fracture which does not appear to extend into the C2 base.  Prevertebral soft tissues: Normal in thickness.  Vertebral bodies and intervertebral discs: Severe osseous demineralization.  Cortical thickening and coarsening/thickening of the trabeculae due to osseous  demineralization or pagetoid changes. Vertebral body heights and alignment are  maintained.  There are multilevel degenerative changes with intervertebral disc  space narrowing, endplate spurring, facet hypertrophy, and uncovertebral  hypertrophy, most pronounced at C4-5.  Cervical and upper thoracic spinal canal: There are varying degrees of central  canal and neural foraminal  stenosis, however there is no high-grade osseous  encroachment on the central canal.  Extra vertebral soft tissues: Vascular calcifications. Partially imaged left  pleural effusion is more completely characterized on the contemporaneous CT  chest.                 Narrative:      STUDY:   CT examinations of the head and cervical spine performed without  intravenous contrast following the [facility] standard protocols.  Images reconstructed/reformatted in the axial, coronal and sagittal planes.  CT DOSE:  One or more dose reduction techniques (e.g. automated exposure  control, adjustment of the mA and/or kV according to patient size, use of  iterative reconstruction technique) utilized for this examination.    CLINICAL HISTORY: Neck trauma (Age >= 65y)    COMPARISON: None. CT angiogram report from 9/28/2021 and 3/16/2019;  unfortunately, images are not available for review.                               X-RAY CHEST 1 VIEW (Final result)  Result time 01/17/24 12:01:28    Final result                 Impression:    IMPRESSION:  Chin overlies chest, limiting evaluation of the thoracic inlet. Cardiomegaly  with pulmonary vascular congestion. Moderate left and small right pleural  effusions with underlying atelectasis/airspace disease no pneumothorax seen.                     Narrative:    CLINICAL HISTORY: r/o PTX    COMPARISON: None    COMMENT:    TECHNIQUE: Single frontal view of the chest was performed.    LINES/TUBES/HARDWARE: None    LUNGS AND PLEURA: See Impression.    CARDIOMEDIASTINUM: Cardiomegaly. Aortic atherosclerosis.    SKELETON/OTHER: Degenerative changes of the spine and shoulders.                                No orders to display       Scoring tools                                  ED Course & MDM   MDM / ED COURSE / CLINICAL IMPRESSION / DISPO     MDM    ED Course as of 01/17/24 2036 Wed Jan 17, 2024   1048 Pt declined analgesics except for Tylenol [HL]   1255 I d/w radiology. Pt w/ multiple  findings including R superior pubic ramus fx w/R paravesicular hematoma,  8-11 rib fx, Large pleural effusion primarily on the left, among other findings. Awaiting official reading  [AC]   1307 RN to place [place] collar  [AC]   1307 CT CHEST WITH IV CONTRAST  IMPRESSION:  1.  Right superior pubic ramus parasymphyseal acute, comminuted fracture with  concomitant right adductor/obturator intramuscular hematomas and perivesicular  hematomas. Right perivesicular hematomas associated with mild mass effect on the  urinary bladder and perivesicular infiltration. CT cystogram can be considered  if there is concern for urinary bladder posttraumatic injury.  2.  Right eighth through 11th posterior acute rib fractures.  3.  Large, loculated left pleural effusion with near-complete collapse left  lower lobe. Moderate right layering pleural effusion.  4.  Infiltration about SMA and SMV noting a poorly seen pancreas, of  indeterminate etiology. Correlation with lipase exclude pancreatitis can be  considered. Follow-up elective MRI abdomen without and with intravenous contrast  can be considered if further imaging evaluation of the pancreas is desired.  5.  Hepatosplenomegaly.  6.  Incompletely characterized right lower pole renal cystic lesion measuring  8.2 cm. Elective renal ultrasound can be considered. Alternatively, this lesion  can be followed with an MRI if that study is performed for the pancreatic  findings described above.     The results were critically read back with Lizbeth Marino PA C on 1/17/2024  12:57 PM.        Specimen Collected: 01/17/24 12:30         [HL]   1312 Transfer center called  Discussed with traumatologist who accepted pt to ICU     [HL]   1313 Consent for transfer obtained from pt, witnessed by his wife who pt requested to sign for him    Pt re-examined without any resp distress, normal complexion and conjuntiva    Stable vitals    Will conset for blood transfusion in case    Dr Ruvalcaba  accepted pt to trauma icu [HL]   1336 Blood transfusion consent obtained in case patient deteriorates in the future with known pelvic fracture hematomas [HL]   1513 BP(!): 155/80 [HL]   1514 Heart Rate: 76 [HL]   1514 Stable blood pressure along with heart rate    Awaiting for transportation to [facility] trauma service ICU    Critical care time 45 minutes [HL]   1526 Traumatologist called for repeat CBC while patient is awaiting for transportation along with CT cystogram and Womack catheter placement    Traumatologist also stated that if transportation is here prior to the imaging studies done or the Womack catheter is placed, he would like patient to be transported without delay and without completion of the requested Womack catheter placement with a CT cystogram    CBC can be drawn and sent    Transportation is here to take patient now [HL]   1529 CBC will be drawn before transport  However CT cystogram and Womack catheter placement would not be done due to the need for rapid transportation to [facility] [HL]      ED Course User Index  [AC] Lizbeth Marino PA C  [HL] Doron Dawson MD     Clinical Impression      Fall, initial encounter   Closed fracture of multiple pubic rami, right, initial encounter (CMS/[facility])   Closed fracture of multiple ribs of right side, initial encounter     _________________     ED Disposition   Transfer to Another Facility                   Lizbeth Marino PA C  01/17/24 2037

## 2024-01-18 ENCOUNTER — APPOINTMENT (INPATIENT)
Dept: RADIOLOGY | Facility: HOSPITAL | Age: 84
DRG: 964 | End: 2024-01-18
Attending: NURSE PRACTITIONER
Payer: COMMERCIAL

## 2024-01-18 LAB
ANION GAP SERPL CALC-SCNC: 9 MEQ/L (ref 3–15)
APTT PPP: 38 SEC (ref 23–35)
ATRIAL RATE: 74
BACTERIA UR CULT: ABNORMAL
BACTERIA UR CULT: ABNORMAL
BLD GP AB INVEST PLASRBC-IMP: NORMAL
BUN SERPL-MCNC: 31 MG/DL (ref 7–25)
CALCIUM SERPL-MCNC: 9.7 MG/DL (ref 8.6–10.3)
CHLORIDE SERPL-SCNC: 102 MEQ/L (ref 98–107)
CO2 SERPL-SCNC: 24 MEQ/L (ref 21–31)
CREAT SERPL-MCNC: 1.6 MG/DL (ref 0.7–1.3)
EGFRCR SERPLBLD CKD-EPI 2021: 43 ML/MIN/1.73M*2
ERYTHROCYTE [DISTWIDTH] IN BLOOD BY AUTOMATED COUNT: 15.8 % (ref 11.6–14.4)
GLUCOSE SERPL-MCNC: 108 MG/DL (ref 70–99)
HCT VFR BLD AUTO: 28.4 % (ref 40.1–51)
HGB BLD-MCNC: 9.4 G/DL (ref 13.7–17.5)
INR PPP: 1.8
LABORATORY COMMENT REPORT: NORMAL
LABORATORY COMMENT REPORT: NORMAL
MAGNESIUM SERPL-MCNC: 1.9 MG/DL (ref 1.8–2.5)
MCH RBC QN AUTO: 31.9 PG (ref 28–33.2)
MCHC RBC AUTO-ENTMCNC: 33.1 G/DL (ref 32.2–36.5)
MCV RBC AUTO: 96.3 FL (ref 83–98)
P AXIS: 52
PDW BLD AUTO: 10.2 FL (ref 9.4–12.4)
PHOSPHATE SERPL-MCNC: 4.2 MG/DL (ref 2.4–4.7)
PLATELET # BLD AUTO: 99 K/UL (ref 150–350)
POTASSIUM SERPL-SCNC: 4.3 MEQ/L (ref 3.5–5.1)
PR INTERVAL: 182
PROTHROMBIN TIME: 20.3 SEC (ref 12.2–14.5)
QRS DURATION: 106
QT INTERVAL: 416
QTC CALCULATION(BAZETT): 461
R AXIS: -14
RBC # BLD AUTO: 2.95 M/UL (ref 4.5–5.8)
SODIUM SERPL-SCNC: 135 MEQ/L (ref 136–145)
T WAVE AXIS: 23
VENTRICULAR RATE: 74
WBC # BLD AUTO: 9.42 K/UL (ref 3.8–10.5)

## 2024-01-18 PROCEDURE — 99232 SBSQ HOSP IP/OBS MODERATE 35: CPT | Performed by: SURGERY

## 2024-01-18 PROCEDURE — 85730 THROMBOPLASTIN TIME PARTIAL: CPT | Performed by: STUDENT IN AN ORGANIZED HEALTH CARE EDUCATION/TRAINING PROGRAM

## 2024-01-18 PROCEDURE — 63700000 HC SELF-ADMINISTRABLE DRUG: Performed by: NURSE PRACTITIONER

## 2024-01-18 PROCEDURE — 85730 THROMBOPLASTIN TIME PARTIAL: CPT | Performed by: NURSE PRACTITIONER

## 2024-01-18 PROCEDURE — 85610 PROTHROMBIN TIME: CPT | Performed by: NURSE PRACTITIONER

## 2024-01-18 PROCEDURE — 80048 BASIC METABOLIC PNL TOTAL CA: CPT | Performed by: NURSE PRACTITIONER

## 2024-01-18 PROCEDURE — 36415 COLL VENOUS BLD VENIPUNCTURE: CPT | Performed by: NURSE PRACTITIONER

## 2024-01-18 PROCEDURE — 71045 X-RAY EXAM CHEST 1 VIEW: CPT

## 2024-01-18 PROCEDURE — 97162 PT EVAL MOD COMPLEX 30 MIN: CPT | Mod: GP

## 2024-01-18 PROCEDURE — 63600000 HC DRUGS/DETAIL CODE: Mod: JW | Performed by: PHYSICIAN ASSISTANT

## 2024-01-18 PROCEDURE — 20000000 HC ROOM AND CARE ICU

## 2024-01-18 PROCEDURE — 99222 1ST HOSP IP/OBS MODERATE 55: CPT | Performed by: NEUROLOGICAL SURGERY

## 2024-01-18 PROCEDURE — 83735 ASSAY OF MAGNESIUM: CPT | Performed by: NURSE PRACTITIONER

## 2024-01-18 PROCEDURE — 85027 COMPLETE CBC AUTOMATED: CPT | Performed by: NURSE PRACTITIONER

## 2024-01-18 PROCEDURE — 72050 X-RAY EXAM NECK SPINE 4/5VWS: CPT

## 2024-01-18 PROCEDURE — 85610 PROTHROMBIN TIME: CPT | Performed by: STUDENT IN AN ORGANIZED HEALTH CARE EDUCATION/TRAINING PROGRAM

## 2024-01-18 PROCEDURE — 97166 OT EVAL MOD COMPLEX 45 MIN: CPT | Mod: GO

## 2024-01-18 PROCEDURE — 97535 SELF CARE MNGMENT TRAINING: CPT | Mod: GO

## 2024-01-18 PROCEDURE — C1729 CATH, DRAINAGE: HCPCS

## 2024-01-18 PROCEDURE — 76775 US EXAM ABDO BACK WALL LIM: CPT

## 2024-01-18 PROCEDURE — 84100 ASSAY OF PHOSPHORUS: CPT | Performed by: NURSE PRACTITIONER

## 2024-01-18 RX ORDER — ASCORBIC ACID 250 MG
1000 TABLET ORAL DAILY
Status: DISCONTINUED | OUTPATIENT
Start: 2024-01-18 | End: 2024-01-24 | Stop reason: HOSPADM

## 2024-01-18 RX ORDER — LEVOTHYROXINE SODIUM 25 UG/1
25 TABLET ORAL
COMMUNITY

## 2024-01-18 RX ORDER — TRAMADOL HYDROCHLORIDE 50 MG/1
50 TABLET ORAL EVERY 6 HOURS PRN
Status: DISCONTINUED | OUTPATIENT
Start: 2024-01-18 | End: 2024-01-24 | Stop reason: HOSPADM

## 2024-01-18 RX ORDER — LEVOTHYROXINE SODIUM 25 UG/1
25 TABLET ORAL
Status: DISCONTINUED | OUTPATIENT
Start: 2024-01-18 | End: 2024-01-24 | Stop reason: HOSPADM

## 2024-01-18 RX ORDER — HYDROMORPHONE HYDROCHLORIDE 2 MG/1
2 TABLET ORAL EVERY 4 HOURS PRN
Status: DISCONTINUED | OUTPATIENT
Start: 2024-01-18 | End: 2024-01-20

## 2024-01-18 RX ORDER — SILDENAFIL 100 MG/1
100 TABLET, FILM COATED ORAL AS NEEDED
COMMUNITY
End: 2024-02-07 | Stop reason: HOSPADM

## 2024-01-18 RX ADMIN — ACETAMINOPHEN 975 MG: 325 TABLET ORAL at 00:08

## 2024-01-18 RX ADMIN — Medication 1000 MG: at 13:11

## 2024-01-18 RX ADMIN — ACETAMINOPHEN 975 MG: 325 TABLET ORAL at 23:06

## 2024-01-18 RX ADMIN — MEPIVACAINE HYDROCHLORIDE 5 ML: 15 INJECTION, SOLUTION EPIDURAL; INFILTRATION at 09:18

## 2024-01-18 RX ADMIN — SENNOSIDES AND DOCUSATE SODIUM 1 TABLET: 50; 8.6 TABLET ORAL at 08:45

## 2024-01-18 RX ADMIN — LEVOTHYROXINE SODIUM 25 MCG: 0.03 TABLET ORAL at 13:11

## 2024-01-18 RX ADMIN — ACETAMINOPHEN 975 MG: 325 TABLET ORAL at 13:11

## 2024-01-18 RX ADMIN — ACETAMINOPHEN 975 MG: 325 TABLET ORAL at 06:00

## 2024-01-18 RX ADMIN — OXYCODONE HYDROCHLORIDE 5 MG: 5 TABLET ORAL at 08:45

## 2024-01-18 RX ADMIN — ACETAMINOPHEN 975 MG: 325 TABLET ORAL at 17:16

## 2024-01-18 ASSESSMENT — COGNITIVE AND FUNCTIONAL STATUS - GENERAL
MOVING TO AND FROM BED TO CHAIR: 2 - A LOT
HELP NEEDED FOR PERSONAL GROOMING: 3 - A LITTLE
HELP NEEDED FOR BATHING: 2 - A LOT
MOVING TO AND FROM BED TO CHAIR: 2 - A LOT
DRESSING REGULAR LOWER BODY CLOTHING: 2 - A LOT
CLIMB 3 TO 5 STEPS WITH RAILING: 1 - TOTAL
STANDING UP FROM CHAIR USING ARMS: 2 - A LOT
CLIMB 3 TO 5 STEPS WITH RAILING: 1 - TOTAL
TOILETING: 2 - A LOT
STANDING UP FROM CHAIR USING ARMS: 2 - A LOT
WALKING IN HOSPITAL ROOM: 2 - A LOT
DRESSING REGULAR UPPER BODY CLOTHING: 3 - A LITTLE
AFFECT: WFL;ANXIOUS
AFFECT: WFL;ANXIOUS
WALKING IN HOSPITAL ROOM: 2 - A LOT
EATING MEALS: 4 - NONE

## 2024-01-18 NOTE — CONSULTS
"Physical Medicine and Rehabilitation Consult Note    Referring Physician: Shelley Yang MD [[id number]]      Subjective   Randolph Daley is a 83 y.o. male with PMHx of BPH, HTN who presents to [facility] from [facility] s/p fall on ice.  In the ED, chest CT demonstrated right rib 8 -11 rib fractures, right superior pubic rami fractures, right adductor/obturator hematoma, questionable posttraumatic bladder injury..  This afternoon, he endorses right hip flexor weakness.  He states that therapy went \"okay\" this morning.  He was able to get a bed and transfer to the chair.  He felt limited by his ability to swing his right leg forward.  He denies typical numbness, tingling, weakness.  Regarding the fall, he denies loss or alteration of consciousness.  She denies hitting his head.  At baseline, he is independent with mobility, transfers, ADLs.  He lives with his wife outside the [facility].  There are two-story house, first-floor set up.  He was down in [place] visiting his grandchildren when he fell    Past medical surgical history: As noted in HPI  Allergies: Lidocaine, Prednisone, Other, and Polyethylene glycol  Social history: Non-smoker  Allergies: Lidocaine, prednisone  History also provided by chart review    Family History: No family history on file.     Meds:    • acetaminophen  975 mg oral q6h RUDY   • sennosides-docusate sodium  1 tablet oral BID       Review of Systems  All 11 systems were reviewed and negative except as noted in the HPI.    Objective       Lab Results   Component Value Date    WBC 9.42 01/18/2024    HGB 9.4 (L) 01/18/2024    HCT 28.4 (L) 01/18/2024    PLT 99 (L) 01/18/2024    ALT 8 01/17/2024    AST 16 01/17/2024     (L) 01/18/2024    K 4.3 01/18/2024     01/18/2024    CREATININE 1.6 (H) 01/18/2024    BUN 31 (H) 01/18/2024    CO2 24 01/18/2024    INR 1.8 01/18/2024       Labs   Labs from today reviewed.  WBC 9.4, hemoglobin 9.4.  Sodium 135, potassium 4.3, creatinine 1.6  Imaging   CT of the " "chest abdomen pelvis reviewed from 1/17/2024   .1.  Right superior pubic ramus parasymphyseal acute, comminuted fracture with concomitant right adductor/obturator intramuscular hematomas and perivesicular hematomas. Right perivesicular hematomas associated with mild mass effect on the urinary bladder and perivesicular infiltration. CT cystogram can be considered if there is concern for urinary bladder posttraumatic injury. 2.  Right eighth through 11th posterior acute rib fractures. 3.  Large, loculated left pleural effusion with near-complete collapse left lower lobe. Moderate right layering pleural effusion. 4.  Infiltration about SMA and SMV noting a poorly seen pancreas, of indeterminate etiology.    Physical Exam  Visit Vitals  BP (!) 147/63   Pulse 60   Temp 36.9 °C (98.5 °F) (Temporal)   Resp (!) 21   Ht 1.727 m (5' 8\")   Wt 87.8 kg (193 lb 9 oz)   SpO2 97%   BMI 29.43 kg/m²     General: No acute distress.  Elderly man resting comfortably in bed  HEENT: No jaundice.  Oral mucosa moist.   Atraumatic.  No nuchal rigidity.  Neck: Supple.  Aspen collar in place  Lungs: Breathing unlabored.  No rales or rhonchi.  Heart: RRR. No peripheral edema.  Abdomen: Bowel sounds: na. Soft, NT   Skin: No rash. Warm and dry   Extremities: No acutely inflamed joints.  ROM functional.  Psych: pleasant, appropriate affect   Neurological:  Alert and oriented with intact speech and cognition.  Sensation: subjectively preserved to touch.  Motor testing shows no focal weakness.    Assessment     83-year-old gentleman now with functional deficits in mobility, transfers, ADLs 2/2    Fall with subsequent pelvic fractures, right rib fractures, questionable C2 fracture..  Fortunately, he is neurologically intact.  PT/OT functional assessments from this morning reviewed.  Patient was mod assist x 2 to transfer and ambulate 5 feet.    Questionable prostatic urethral injury: Urology consulted.  Womack in place.    Right superior pubic rami " fracture, abductor/obturator hematoma: Seen by orthopedics.  Weightbearing as tolerated.  No need for surgical intervention at this time.      Rehab rec: All options on the table for now.  This was his first day up and out of bed with therapy.  Agree with recommendations that if functional assessments do not improve by tomorrow, he would be appropriate for acute rehab to optimize functional independence and decrease caregiver burden.  If he begins to ambulate household distances, transfer with 1 person assist, can consider DC home with home health    Plan of care was discussed with primary team    Maggie Gallagher DO  1/18/2024  8:48 AM  Pager [phone]

## 2024-01-18 NOTE — PROGRESS NOTES
"   Physical Therapy -  Initial Evaluation     Patient: Randolph Daley  [facility]  MRN: [id number]  Today's date: 1/18/2024    HISTORY OF PRESENT ILLNESS     Randolph is a 83 y.o. male admitted on 1/17/2024 with Fall due to slipping on ice or snow, initial encounter [W00.9XXA]. Principal problem is Fall due to slipping on ice or snow, initial encounter.    Past Medical History  Randolph has no past medical history on file.    History of Present Illness   Presented to [facility] 1/17 after slip and fall on ice  CT head negative for acute changes, \"Lateral ventriculomegaly, indeterminate though usually related to volume loss or normal pressure hydrocephalus\"  CT C/S:   Age-indeterminate odontoid process fracture -> pending further imaging per neurosurgery. Maintain c-collar AAT    + R pubic ramus fx with hematoma -> per ortho fx is stable, no intervention required -> WBAT B/L LE   +R posterior rib fx 8, 9, 10 , 11  +also bilateral pleural effusion left larger than right (history of RSV couple weeks ago, fluid is not hemorrhagic)  Txf to SICU for trauma service     urology is consulted for concerns for prostatic urethral injury , from pelvic hematoma -> avila catheter placed     1/18: bedside left thoracentesis    PRIOR LEVEL OF FUNCTION AND LIVING ENVIRONMENT     Prior Level of Function    Flowsheet Row Most Recent Value   Dominant Hand right   Ambulation independent   Transferring independent   Toileting independent   Bathing independent   Dressing independent   Eating independent   IADLs independent   Driving/Transportation    Communication understands/communicates without difficulty   Prior Level of Function Comment indepedent and active baseline, was jogging prior to recent bunionectomy surgery. drives. retired (had made clothes/uniforms)   Assistive Device Currently Used at Home grab bar        Prior Living Environment    Flowsheet Row Most Recent Value   People in Home " "spouse   Current Living Arrangements home   Home Accessibility stairs within home (Group), stairs to enter home (Group)   Living Environment Comment lives w/ wife in [facility] with 1+1 LARRY no rail, has 1st floor bedroom and full bathroom w/ stall shower + built in bench + GB        VITALS AND PAIN     PT Vitals    Date/Time Pulse HR Source SpO2 Pt Activity O2 Therapy BP BP Location BP Method Pt Position Observations [facility]   01/18/24 0941 68 Monitor 97 % At rest None (Room air) 154/82 Left upper arm Automatic Sitting EOB with PT & OT KORY          01/18/24 1000   Vitals   Heart Rate 66   Resp 17   SpO2 97 %   /67   MAP (mmHg) 91 mmHg        PT Pain    Date/Time Pain Type Side/Orientation Location Rating: Rest Rating: Activity Interventions [facility]   01/18/24 0941 Pain Assessment right pelvic 0 -- \"20\" care clustered;position adjusted [facility]           Objective   OBJECTIVE     Start time:  0935  End time:  0952  Session Length: 17 min  Mode of Treatment: physical therapy, co-treatment (co-tx with OT as skilled Ax2 required to safely mobilize given traumatic injuries)    General Observations  Patient received  (seated EOB w/ RN present, pt had just finished bedisde thoracocentesis, RN about to txf pt to chair). He was no issues or concerns identified by nurse prior to session, agreeable to therapy.      Precautions: fall, brace worn at all times, weight bearing (c-collar AAT per trauma CRNP and per neurosurgery)  Extremity Weight-Bearing Status: right lower extremity, left lower extremity  Left LE Weight-Bearing Status: weight-bearing as tolerated (WBAT)  Right LE Weight-Bearing Status: weight-bearing as tolerated (WBAT)    Limitations/Impairments: safety/cognitive      PT Eval and Treat - 01/18/24 0935        Cognition    Orientation Status oriented x 4     Affect/Mental Status WFL;anxious     Behavioral Issues overwhelmed easily     Follows Commands follows one-step commands;75-90% accuracy;verbal cues/prompting " required;repetition of directions required;physical/tactile prompts required     Comment, Cognition Pt pleasant and cooperative, motivated to get OOB to chair. However is anxious w/ regard to pain. Benefits from positive reassurance, encouragment, frequent safety cues.        Vision Assessment/Intervention    Vision Assessment corrective lenses for reading   and for driving at night       Lower Extremity Assessment    LE Assessment ROM and Strength WFL     General Observations RLE pain limited . LLE at least 4/5 throughout        Lower Extremity Strength    Hip, Right (Strength) 2+     Knee, Right (Strength) 3-     Ankle, Right (Strength) at least 3+        Bed Mobility    [place] not tested     Comment received pt sitting EOB        Mobility Belt    Mobility Belt Used for All Out of Bed Activity no     Reason Mobility Belt Not Used medical contraindication     Reason Mobility Belt Not Used rib fx        Sit/Stand Transfer    Surface edge of bed     [place] moderate assist (50-74% patient effort);2 person assist     Safety/Cues increased time to complete;sequencing;technique;preparatory posture;maintaining center of gravity over base of support;proper use of assistive device;moderate;verbal cues     Assistive Device walker, front-wheeled     Transfer Comments from EOB w/ assist to supplement force production and maintain balance. pt w/ heavy reliance on LLE and UEs due to RLE pain. maintains RLE w/ knee flexion in stance. cues for upright posture   modAx2 for eccentric control to low recliner chair, with assist to extend RLE for pain mgmt and assist to guide hand placement       Gait Training    [place], Gait moderate assist (50-74% patient effort);2 person assist     Safety/Cues increased time to complete;technique;proper use of assistive device;gait deviations;sequencing;maintaining center of gravity over base of support     Assistive Device walker, front-wheeled     Distance in Feet 6 feet      Pattern step-to     Deviations/Abnormal Patterns step length decreased;ace decreased;antalgic;base of support, narrow     Comment (Gait/Stairs) ambulated forward from bed surface and chair brought behind pt for seated rest. antalgic throughout. assist required for L lateral weight shift and for RLE advancement. max cues for UE support on RW as well as optimal positioning in walker. assist for balance throughout and for RW mgmt. pt anxious due to pain throughout trial        Stairs Training    [place], Stairs not tested     Comment unsafe at this time        Balance    Static Sitting Balance WFL;unsupported;sitting, edge of bed     Dynamic Sitting Balance mild impairment;sitting, edge of bed     Sit to Stand Dynamic Balance moderate impairment;supported     Static Standing Balance moderate impairment;supported     Dynamic Standing Balance moderate impairment;supported     Comment, Balance modAx2 for static stand w/ RW, max cues for RLE extension in stance and UE use on walker to offload RLE        Impairments/Safety Issues    Impairments Affecting Function balance;endurance/activity tolerance;pain;strength;range of motion (ROM)     Safety Issues Affecting Function awareness of need for assistance;insight into deficits/self-awareness;problem-solving;positioning of assistive device;judgment                        Orthosis Neck Cervical Collar (Active)   Date Obtained/Time Obtained: 01/17/24 1900   Location: Neck  Type: Cervical Collar  Features: Hard  Therapeutic Indications: fracture immobilization  Wearing Schedule: wear at all times (remove only for hygiene and skin inspection)     Orthosis Neck Cervical Collar (Active)   Skin Assessment intact;no redness;no breakdown 01/18/24 0800   Compliance/Wearing Issues compliant with wearing schedule 01/18/24 0800       Education Documentation  Treatment Plan, taught by Humaira Addison, PT at 1/18/2024  1:15 PM.  Learner: Patient  Readiness: Acceptance  Method:  Explanation  Response: Verbalizes Understanding, Needs Reinforcement  Comment: role of PT, PoC, safety w/ mobility, RW use, importance of OOB and upright, rehab goals, gait mechanics, balance/fall prevention        Session Outcome  Patient upright, in chair at end of session, call light in reach, personal items in reach, all needs met (no chair alarm pads available, RN aware). Nursing notified about patient's performance, patient's position, and patient's response to therapy/activity.    AM-PAC™ - Mobility (Current Function)     Turning form your back to your side while in flat bed without using bedrails 3 - A Little   Moving from lying on your back to sitting on the side of a flat bed without using bedrails 2 - A Lot   Moving to and from a bed to a chair 2 - A Lot   Standing up from a chair using your arms 2 - A Lot   To walk in a hospital room 2 - A Lot   Climbing 3-5 steps with a railing 1 - Total   AM-PAC™ Mobility Score 12      ASSESSMENT AND PLAN     Progress Summary  PT eval complete, pt admitted after fall on ice w/ (R) pelvic fx, (R) rib fx, and B/L plerual effusion. Pt requires modAx2 for STS txfs and brief ambulation w/ RW. Pt mainly limited by R pelvic pain, and benefits from encouragement and safety cues for mobility. Pt also w/ balance, endurance, strength deficits. Will continue to follow to optimize functional status and safety. Rec acute rehab at d/c pending progress, as pt is well below independent, active, ambulatory basline    Patient/Family Therapy Goals Statement: have less pain    PT Plan    Flowsheet Row Most Recent Value   Rehab Potential good, to achieve stated therapy goals at 01/18/2024 0935   Therapy Frequency 5 times/wk at 01/18/2024 0935   Planned Therapy Interventions balance training, bed mobility training, gait training, home exercise program, postural re-education, strengthening, patient/family education, stair training, transfer training, neuromuscular re-education, ROM (range of  motion) at 01/18/2024 0935          PT Discharge Recommendations    Flowsheet Row Most Recent Value   PT Recommended Discharge Disposition acute rehab/Inpatient Rehab Facility at 01/18/2024 0935   Anticipated Equipment Needs if Discharged Home (PT) walker, front-wheeled at 01/18/2024 0935               PT Goals    Flowsheet Row Most Recent Value   Bed Mobility Goal 1    Activity/Assistive Device bed mobility activities, all at 01/18/2024 0935   [place] supervision required at 01/18/2024 0935   Time Frame by discharge at 01/18/2024 0935   Progress/Outcome goal ongoing at 01/18/2024 0935   Transfer Goal 1    Activity/Assistive Device all transfers, walker, front-wheeled at 01/18/2024 0935   [place] minimum assist (75% or more patient effort) at 01/18/2024 0935   Time Frame by discharge at 01/18/2024 0935   Progress/Outcome goal ongoing at 01/18/2024 0935   Gait Training Goal 1    Activity/Assistive Device gait (walking locomotion), decrease fall risk, diminish gait deviation, increase endurance/gait distance, improve balance and speed at 01/18/2024 0935   [place] minimum assist (75% or more patient effort) at 01/18/2024 0935   Distance 40 at 01/18/2024 0935   Time Frame by discharge at 01/18/2024 0935   Progress/Outcome goal ongoing at 01/18/2024 0935

## 2024-01-18 NOTE — PROGRESS NOTES
TRAUMA SURGERY DAILY PROGRESS NOTE     SUBJECTIVE   Flex-ex XR with suspicious of spine instability. Pt denies weakness or numbness to BUE/BLE.   IS 750cc without evident splinting    VITAL SIGNS / PHYSICAL EXAM   Last 24 hours:  Temp:  [36.1 °C (97 °F)-37 °C (98.6 °F)] 36.6 °C (97.9 °F)  Heart Rate:  [56-85] 61  Resp:  [17-45] 18  BP: (127-161)/(58-84) 132/69    Physical Exam  NAD, GCS 15, AAOx3, QUILES  C-collar in place  RRR  Non-labored breathing  Abd soft, NT, ND  Womack draining cyu  Ext warm,well perfused  DIAGNOSTIC DATA     Recent Results (from the past 24 hour(s))   UA Reflex to Culture (Macroscopic)    Collection Time: 01/17/24  2:16 PM    Specimen: Urine, Clean Catch   Result Value Ref Range    Color, Urine Colorless Yellow, Colorless    Clarity, Urine Clear Clear    Specific Gravity, Urine 1.027 1.005 - 1.030    pH, Urine 6.0 4.5 - 8.0    Leukocyte Esterase +1 (A) Negative    Nitrite, Urine Negative Negative    Protein, Urine Trace (A) Negative    Glucose, Urine Negative Negative mg/dL    Ketones, Urine Negative Negative mg/dL    Urobilinogen, Urine 0.2 <2.0 EU/dL EU/dL    Bilirubin, Urine Negative Negative mg/dL    Blood, Urine Negative Negative   UA Microscopic    Collection Time: 01/17/24  2:16 PM   Result Value Ref Range    RBC, Urine 0 TO 4 0 TO 4 /HPF    WBC, Urine 10 TO 20 (A) 0 TO 3 /HPF    Squamous Epithelial Rare (A) None Seen /hpf    Hyaline Cast None Seen None Seen /lpf    Bacteria, Urine Rare (A) None Seen /HPF    Calcium Oxalate Crystals +1 (A) None Seen /hpf   CBC    Collection Time: 01/17/24  3:29 PM   Result Value Ref Range    WBC 9.03 3.80 - 10.50 K/uL    RBC 2.92 (L) 4.50 - 5.80 M/uL    Hemoglobin 9.3 (L) 13.7 - 17.5 g/dL    Hematocrit 28.5 (L) 40.1 - 51.0 %    MCV 97.6 83.0 - 98.0 fL    MCH 31.8 28.0 - 33.2 pg    MCHC 32.6 32.2 - 36.5 g/dL    RDW 15.8 (H) 11.6 - 14.4 %    Platelets 96 (L) 150 - 350 K/uL    MPV 10.1 9.4 - 12.4 fL   ECG 12 lead    Collection Time: 01/17/24  4:19 PM    Result Value Ref Range    Ventricular rate 74     Atrial rate 74     MT Interval 182     QRS duration 106     QT Interval 416     QTC Calculation(Bazett) 461     P Axis 52     R Axis -14     T Wave Axis 23    CBC and Differential    Collection Time: 01/17/24  4:34 PM   Result Value Ref Range    WBC 8.24 3.80 - 10.50 K/uL    RBC 3.15 (L) 4.50 - 5.80 M/uL    Hemoglobin 9.9 (L) 13.7 - 17.5 g/dL    Hematocrit 30.7 (L) 40.1 - 51.0 %    MCV 97.5 83.0 - 98.0 fL    MCH 31.4 28.0 - 33.2 pg    MCHC 32.2 32.2 - 36.5 g/dL    RDW 15.9 (H) 11.6 - 14.4 %    Platelets 100 (L) 150 - 350 K/uL    MPV 10.4 9.4 - 12.4 fL    Differential Type Auto     nRBC 0.0 <=0.0 %    Immature Granulocytes 0.5 %    Neutrophils 74.9 %    Lymphocytes 18.6 %    Monocytes 5.8 %    Eosinophils 0.0 %    Basophils 0.2 %    Immature Granulocytes, Absolute 0.04 0.00 - 0.08 K/uL    Neutrophils, Absolute 6.17 1.70 - 7.00 K/uL    Lymphocytes, Absolute 1.53 1.20 - 3.50 K/uL    Monocytes, Absolute 0.48 0.30 - 1.00 K/uL    Eosinophils, Absolute 0.00 (L) 0.04 - 0.54 K/uL    Basophils, Absolute 0.02 0.01 - 0.10 K/uL    PLT Morphology Normal     Platelet Estimate Decreased (51,000-149,000)     Anisocytosis 1+     Hypochromia Occasional     Polychromasia Occasional    Comprehensive metabolic panel    Collection Time: 01/17/24  4:34 PM   Result Value Ref Range    Sodium 137 136 - 145 mEQ/L    Potassium 4.4 3.5 - 5.1 mEQ/L    Chloride 103 98 - 107 mEQ/L    CO2 24 21 - 31 mEQ/L    BUN 33 (H) 7 - 25 mg/dL    Creatinine 1.6 (H) 0.7 - 1.3 mg/dL    Glucose 108 (H) 70 - 99 mg/dL    Calcium 9.9 8.6 - 10.3 mg/dL    AST (SGOT) 16 13 - 39 IU/L    ALT (SGPT) 8 7 - 52 IU/L    Alkaline Phosphatase 66 34 - 125 IU/L    Total Protein 8.3 (H) 6.0 - 8.2 g/dL    Albumin 3.3 (L) 3.5 - 5.7 g/dL    Bilirubin, Total 0.7 0.3 - 1.2 mg/dL    eGFR 43.0 (L) >=60.0 mL/min/1.73m*2    Anion Gap 10 3 - 15 mEQ/L   Ethanol    Collection Time: 01/17/24  4:34 PM   Result Value Ref Range    Ethanol <10 <10  mg/dL   Lactic acid, Venous    Collection Time: 01/17/24  4:34 PM   Result Value Ref Range    Lactate 1.2 0.4 - 2.0 mmol/L   Magnesium    Collection Time: 01/17/24  4:34 PM   Result Value Ref Range    Magnesium 1.9 1.8 - 2.5 mg/dL   Protime-INR    Collection Time: 01/17/24  4:34 PM   Result Value Ref Range    PT 19.3 (H) 12.2 - 14.5 sec    INR 1.6     PTT    Collection Time: 01/17/24  4:34 PM   Result Value Ref Range    PTT 37 (H) 23 - 35 sec   Type and Screen [facility]    Collection Time: 01/17/24  4:34 PM   Result Value Ref Range    Specimen Expiration 01/20/2024     Antibody Screen Negative     ABO A     Rh Factor Positive     History Check Previous type on file    Antibody identification    Collection Time: 01/17/24  4:34 PM   Result Value Ref Range    Antibody ID Non-Specific Cold    Repeat ABO/Rh (Type Check)    Collection Time: 01/17/24  4:43 PM   Result Value Ref Range    History Check Previous type on file     History Check Previous type on file    Drug screen panel, urine    Collection Time: 01/17/24  4:58 PM   Result Value Ref Range    PCP Scrn, Ur Not Detected Not Detected    Benzodiazepine Ur Qual Not Detected Not Detected    Cocaine Screen, Urine Not Detected Not Detected    Amphetamine+Methamphetamine Screen, Ur Not Detected Not Detected    Cannabinoid Screen, Urine Not Detected Not Detected    Opiate Scrn, Ur Positive (A) Not Detected    Barbiturate Screen, Ur Not Detected Not Detected    Fentanyl Screen, Urine Not Detected Not Detected   CBC    Collection Time: 01/18/24 12:28 AM   Result Value Ref Range    WBC 9.42 3.80 - 10.50 K/uL    RBC 2.95 (L) 4.50 - 5.80 M/uL    Hemoglobin 9.4 (L) 13.7 - 17.5 g/dL    Hematocrit 28.4 (L) 40.1 - 51.0 %    MCV 96.3 83.0 - 98.0 fL    MCH 31.9 28.0 - 33.2 pg    MCHC 33.1 32.2 - 36.5 g/dL    RDW 15.8 (H) 11.6 - 14.4 %    Platelets 99 (L) 150 - 350 K/uL    MPV 10.2 9.4 - 12.4 fL   Basic metabolic panel    Collection Time: 01/18/24 12:28 AM   Result Value Ref Range     Sodium 135 (L) 136 - 145 mEQ/L    Potassium 4.3 3.5 - 5.1 mEQ/L    Chloride 102 98 - 107 mEQ/L    CO2 24 21 - 31 mEQ/L    BUN 31 (H) 7 - 25 mg/dL    Creatinine 1.6 (H) 0.7 - 1.3 mg/dL    Glucose 108 (H) 70 - 99 mg/dL    Calcium 9.7 8.6 - 10.3 mg/dL    eGFR 43.0 (L) >=60.0 mL/min/1.73m*2    Anion Gap 9 3 - 15 mEQ/L   Magnesium    Collection Time: 01/18/24 12:28 AM   Result Value Ref Range    Magnesium 1.9 1.8 - 2.5 mg/dL   Phosphorus    Collection Time: 01/18/24 12:28 AM   Result Value Ref Range    Phosphorus 4.2 2.4 - 4.7 mg/dL   Protime-INR    Collection Time: 01/18/24 12:28 AM   Result Value Ref Range    PT 20.3 (H) 12.2 - 14.5 sec    INR 1.8     PTT    Collection Time: 01/18/24 12:28 AM   Result Value Ref Range    PTT 38 (H) 23 - 35 sec     Serum creatinine: 1.6 mg/dL (H) 01/18/24 0028  Estimated creatinine clearance: 39 mL/min (A)  Microbiology Results     ** No results found for the last 720 hours. **        IMAGING:  No results found.   Radiology Imaging    XR SPINE CERVICAL COMPLETE 4 OR 5 VW    Narrative  CLINICAL HISTORY: Persistent neck pain with negative CT C-Spine   .    COMPARISON: 1/17/2024.    COMMENT: 4 views of cervical spine were obtained.    Odontoid process fracture better seen on recent CT. With flexion, there is  widening of the 1st-2nd cervical interspinous process space as well as slight  anterior translocation of the superior fracture fragment, suspicious for  instability across the odontoid process fracture.    Impression  IMPRESSION: Please see comment.    MEDICATIONS   Antibiotics:    PROBLEM LIST     Patient Active Problem List   Diagnosis   • Fall due to slipping on ice or snow, initial encounter   • Closed fracture of multiple ribs of right side   • Closed fracture of ramus of right pubis (CMS/HCC)   • Pelvic hematoma in male   • Pleural effusion on left   • Normocytic anemia   • Stage 3a chronic kidney disease (CMS/HCC)   • Closed odontoid fracture (CMS/HCC)     IMPRESSION/PLAN   83  y.o. male HD#1 s/p fall    Closed odontoid fracture (CMS/HCC)  Assessment & Plan  · Age-indeterminate, seen on prior scans  · Patient asymptomatic  · Flex-ex XR--widening of the 1st-2nd cervical interspinous process & anterior translocation of the superior fracture fragment  · Obtaining MRI C-spine  · Maintain C-collar  · F/u Neurosurgery recs     Closed fracture of multiple ribs of right side  Assessment & Plan  Multimodal analgesia -->  APAP ATC, PRN opiate  IS/C&Db  Wean O2 as tolerated for goal POX > 93%    Pelvic hematoma in male  Assessment & Plan  · In setting of pubic ramus fracture, no active extravasation noted   · Causing mass effect on bladder. Avila placed with concern for prostatic ureteral injury  · Urology consulted--recommended maintaining avila with outpatient follow up    Stage 3a chronic kidney disease (CMS/HCC)  Assessment & Plan  · Baseline creatinine 1.4 to 1.5 per review of chart  · Avoid nephrotoxins    Normocytic anemia  Assessment & Plan  · Most recent Hgb 10 to 11 per review of chart  · Followed by hematology at [facility]    Pleural effusion on left  Assessment & Plan  · Incidental finding on CT imaging, loculated  · S/p IR thoracentesis 1/18 with drainage of 1.1L  · continue to monitor     Closed fracture of ramus of right pubis (CMS/HCC)  Assessment & Plan  Orthopedic recs--non-op management. WBAT BLE    * Fall due to slipping on ice or snow, initial encounter  Assessment & Plan  · Fall precautions  · PT/OT/PMR eval

## 2024-01-18 NOTE — PLAN OF CARE
Care Coordination Admission Assessment Note    General Information:  Readmission Within the last 30 days: no previous admission in last 30 days  Does patient have a : No  Patient-Specific Goals (include timeframe):      Living Arrangements:  Arrived From: home  Current Living Arrangements: home  People in Home: spouse  Home Accessibility: stairs within home (Group), stairs to enter home (Group)  Living Arrangement Comments: Pt lives with wife in multi level home with 2 LARRY, complete firt floor setup    Housing Stability and Utility Access (SDOH):  In the last 12 months, was there a time when you were not able to pay the mortgage or rent on time?: No  In the last 12 months, how many places have you lived?: 1  In the last 12 months, was there a time when you did not have a steady place to sleep or slept in a shelter (including now)?: No  In the past 12 months has the electric, gas, oil, or water company threatened to shut off services in your home?:      Functional Status Prior to Admission:   Assistive Device/Animal Currently Used at Home: grab bar  Functional Status Comments: Independent  IADL Comments: Independent     Supports and Services:  Current Outpatient/Agency/Support Group: none  Type of Current Home Care Services:    History of home care episode or rehab stay: Denies    Discharge Needs Assessment:   Concerns to be Addressed: care coordination/care conferences, discharge planning  Current Discharge Risk: physical impairment  Anticipated Changes Related to Illness: none    Patient/Family Anticipated Discharge Plan:  Patient/Family Anticipates Transition To: home with family, home with help/services  Patient/Family Anticipated Services at Transition:      Connection to Community  Not applicable      Patient Choice:   Offered/Gave Vendor List: no  Patient's Choice of Community Agency(s):         Anticipated Discharge Plan:  Met with patient. Provided education and contact information for Care  Coordination services.: yes  Anticipated Discharge Disposition: other (see comments) (TBD)     Transportation Needs (SDOH):  Transportation Concerns: none  Transportation Anticipated: family or friend will provide  Is Out of Hospital DNR needed at discharge?: no    In the past 12 months, has lack of transportation kept you from medical appointments or from getting medications?: No  In the past 12 months, has lack of transportation kept you from meetings, work, or from getting things needed for daily living?: No    Concerns - comments: is a 83 y.o. male with a past medical history of HTN, BPH who presented to the ER at [place] on 1/17 after a slip and fall on ice.  He was found to have a posterior right rib fracture as well as a pubic ramus fracture with resultant pelvic hematoma.  Pt was transferred to the [facility] SICU for further workup and management. Plan for MRI today     Cm met with Pt's wife Adry hampton to complete MDTP. Cm verified demographic info and added additional emergency contact. CC introduce CC role.     Pt is covid vaccinated with 3 Moderna shots.     Pt does not use any D&A   ETOH <10  UDS + opiates,oxycodone given prior  No intervention required.     CM will follow for dispo planning needs.

## 2024-01-18 NOTE — PLAN OF CARE
Problem: Adult Inpatient Plan of Care  Goal: Plan of Care Review  Outcome: Progressing  Flowsheets (Taken 1/18/2024 1316)  Progress: improving  Outcome Evaluation: PT eval complete, rec acute rehab pending progress  Plan of Care Reviewed With: patient     Problem: Orthopaedic Fracture  Goal: Optimal Functional Ability  Outcome: Progressing

## 2024-01-18 NOTE — CONSULTS
Urology Consult    Subjective     Randolph Daley is a 83 y.o. male with a past medical history of HTN, BPH who presented to the ER at [place] on 1/17 after a slip and fall on ice.  He was found to have a posterior right rib fracture as well as a pubic ramus fracture with resultant pelvic hematoma.  Given this, he was transferred to the [facility] SICU for further workup and management.  On presentation, a Womack catheter was placed and a CT cystogram was performed showing concern for possible extravasation of contrast from the prostatic urethra; of note, only 100 cc of contrast was used.    On exam, he is resting comfortably and his pain is well-controlled.  He denies any fever/chills.  His Womack catheter, which of note was previously draining clear yellow is now a light pink color and draining without issue.  Patient does note a longstanding history of BPH for which she has been managed with Flomax and Avodart in the past.    Medical History:   No past medical history on file.    Surgical History:   No past surgical history on file.    Social History:   Social History     Social History Narrative   • Not on file       Family History:   No family history on file.    Allergies:   Lidocaine, Prednisone, Other, and Polyethylene glycol    Home Medications:  •  ascorbic acid, Take 1,000 mg by mouth daily.    Current Medications:  •  acetaminophen, 975 mg, oral, q6h RUDY  •  bisacodyL, 10 mg, rectal, Daily PRN  •  glucose, 16-32 g of dextrose, oral, PRN **OR** dextrose, 15-30 g of dextrose, oral, PRN **OR** glucagon, 1 mg, intramuscular, PRN **OR** dextrose 50 % in water (D50), 25 mL, intravenous, PRN  •  ondansetron, 4 mg, intravenous, q6h PRN  •  oxyCODONE, 10 mg, oral, q4h PRN  •  oxyCODONE, 5 mg, oral, q4h PRN  •  sennosides-docusate sodium, 1 tablet, oral, BID  •  sodium chloride 0.9 %, , intravenous, Continuous    Review of Systems:  All other systems reviewed are negative except those mentioned above      Objective  "    Physicial Exam  Visit Vitals  BP (!) 141/67   Pulse 60   Temp 36.4 °C (97.5 °F) (Temporal)   Resp 20   Ht 1.727 m (5' 8\")   Wt 87.8 kg (193 lb 9 oz)   SpO2 97%   BMI 29.43 kg/m²     General appearance: alert, cooperative, no acute distress  Lungs: Unlabored respirations, symmetric chest expansion  Heart: regular rate and rhythm  Abdomen: soft, non-tender to palpation, non-distended  Genitalia: Circumcised penis, testes centered bilaterally, Womack catheter in place draining light pink-colored urine  Rectal: deferred  Extremities: extremities normal, warm and well-perfused; no cyanosis, clubbing, or edema and no redness or tenderness in the calves bilaterally  Skin: no rashes or ulcers  Lymph nodes: no inguinal adenopathy  Neurologic: Alert and oriented X 3     Labs  BMP Results       01/17/24 01/17/24     1634 1119     137    K 4.4 4.5    Cl 103 104    CO2 24 24    Glucose 108 108    BUN 33 35    Creatinine 1.6 1.6    Calcium 9.9 9.5    Anion Gap 10 9    EGFR 43.0 43.0         Comment for K at 1119 on 01/17/24: Results obtained on plasma. Plasma Potassium values may be up to 0.4 mEQ/L less than serum values. The differences may be greater for patients with high platelet or white cell counts.    Comment for EGFR at 1634 on 01/17/24: Calculation based on the Chronic Kidney Disease Epidemiology Collaboration (CKD-EPI) equation refit without adjustment for race.    Comment for EGFR at 1119 on 01/17/24: Calculation based on the Chronic Kidney Disease Epidemiology Collaboration (CKD-EPI) equation refit without adjustment for race.        CBC Results       01/17/24 01/17/24 01/17/24     1634 1529 1119    WBC 8.24 9.03 8.63    RBC 3.15 2.92 3.18    HGB 9.9 9.3 10.0    HCT 30.7 28.5 31.1    MCV 97.5 97.6 97.8    MCH 31.4 31.8 31.4    MCHC 32.2 32.6 32.2     96 103         Comment for PLT at 1634 on 01/17/24: CONSISTENT WITH PREVIOUS RESULTS    Comment for PLT at 1529 on 01/17/24: RESULTS OBTAINED AFTER " VORTEXING TO ELIMINATE PLT CLUMPSSPECIMEN QUALITY CHECKED    Comment for PLT at 1119 on 01/17/24: ALL RESULTS HAVE BEEN RECHECKED        UA Results       01/17/24     1416    Color Colorless    Clarity Clear    Glucose Negative    Bilirubin Negative    Ketones Negative    Sp Grav 1.027    Blood Negative    Ph 6.0    Protein Trace    Urobilinogen 0.2    Nitrite Negative    Leuk Est +1    WBC 10 TO 20    RBC 0 TO 4    Bacteria Rare         Comment for Blood at 1416 on 01/17/24: The sensitivity of the occult blood test is equivalent to approximately 4 intact RBC/HPF.    Comment for Protein at 1416 on 01/17/24: Trace False Positive Protein can be seen with alkaline or highly buffered urines or urine with high specific gravity. Suggest clinical correlation.        Microbiology Results     ** No results found for the last 720 hours. **            Imaging  CT CYSTOGRAM WITHOUT IV CONTRAST    Result Date: 1/17/2024  IMPRESSION: Findings suggestive of extravasation of contrast from the urethra in the region of the prostate gland with increased hyperdensity in the prostate gland following the injection of contrast through the Womack catheter.    X-RAY PELVIS 1 OR 2 VIEWS    Result Date: 1/17/2024  IMPRESSION: See comment.     CT CERVICAL SPINE WITHOUT IV CONTRAST    Result Date: 1/17/2024  IMPRESSION: *  No evidence of acute posttraumatic intracranial injury. *  Lateral ventriculomegaly, indeterminate though usually related to volume loss or normal pressure hydrocephalus. *  Age-indeterminate odontoid process fracture which was not reported on outside imaging. The results were critically read back with DANIEL CHAN on 1/17/2024 1:05 PM. COMMENT: BRAIN: Brain parenchyma:  Age related involution and  ischemic small vessel changes. Gray-white matter differentiation is normal.  No evidence of intracranial hemorrhage, midline shift or other mass effect. Ventricles, cisterns, and sulci:  Symmetric lateral ventriculomegaly.  Cranial carotid arteries: Mural calcification. Calvarium and extra cranial soft tissues:  No evidence of a displaced calvarial fracture.   Scalp soft tissue within normal limits. Visualized paranasal sinuses and mastoid air cells:  Partial opacification of the right mastoid air cells. CERVICAL SPINE: Cervicothoracic alignment: Craniocervical junction is normal in appearance. Atlantodental distance is not widened. Odontoid process fracture just at the inferior margin of the atlantodens articulation with a 1.3 cm Doppler process fracture which does not appear to extend into the C2 base. Prevertebral soft tissues: Normal in thickness. Vertebral bodies and intervertebral discs: Severe osseous demineralization. Cortical thickening and coarsening/thickening of the trabeculae due to osseous demineralization or pagetoid changes. Vertebral body heights and alignment are maintained.  There are multilevel degenerative changes with intervertebral disc space narrowing, endplate spurring, facet hypertrophy, and uncovertebral hypertrophy, most pronounced at C4-5. Cervical and upper thoracic spinal canal: There are varying degrees of central canal and neural foraminal stenosis, however there is no high-grade osseous encroachment on the central canal. Extra vertebral soft tissues: Vascular calcifications. Partially imaged left pleural effusion is more completely characterized on the contemporaneous CT chest.     CT CHEST WITH IV CONTRAST    Result Date: 1/17/2024  IMPRESSION: 1.  Right superior pubic ramus parasymphyseal acute, comminuted fracture with concomitant right adductor/obturator intramuscular hematomas and perivesicular hematomas. Right perivesicular hematomas associated with mild mass effect on the urinary bladder and perivesicular infiltration. CT cystogram can be considered if there is concern for urinary bladder posttraumatic injury. 2.  Right eighth through 11th posterior acute rib fractures. 3.  Large, loculated left  pleural effusion with near-complete collapse left lower lobe. Moderate right layering pleural effusion. 4.  Infiltration about SMA and SMV noting a poorly seen pancreas, of indeterminate etiology. Correlation with lipase exclude pancreatitis can be considered. Follow-up elective MRI abdomen without and with intravenous contrast can be considered if further imaging evaluation of the pancreas is desired. 5.  Hepatosplenomegaly. 6.  Incompletely characterized right lower pole renal cystic lesion measuring 8.2 cm. Elective renal ultrasound can be considered. Alternatively, this lesion can be followed with an MRI if that study is performed for the pancreatic findings described above. The results were critically read back with Lizbeth Marino PA C on 1/17/2024 12:57 PM.    CT ABDOMEN PELVIS WITH IV CONTRAST    Result Date: 1/17/2024  IMPRESSION: 1.  Right superior pubic ramus parasymphyseal acute, comminuted fracture with concomitant right adductor/obturator intramuscular hematomas and perivesicular hematomas. Right perivesicular hematomas associated with mild mass effect on the urinary bladder and perivesicular infiltration. CT cystogram can be considered if there is concern for urinary bladder posttraumatic injury. 2.  Right eighth through 11th posterior acute rib fractures. 3.  Large, loculated left pleural effusion with near-complete collapse left lower lobe. Moderate right layering pleural effusion. 4.  Infiltration about SMA and SMV noting a poorly seen pancreas, of indeterminate etiology. Correlation with lipase exclude pancreatitis can be considered. Follow-up elective MRI abdomen without and with intravenous contrast can be considered if further imaging evaluation of the pancreas is desired. 5.  Hepatosplenomegaly. 6.  Incompletely characterized right lower pole renal cystic lesion measuring 8.2 cm. Elective renal ultrasound can be considered. Alternatively, this lesion can be followed with an MRI if that  study is performed for the pancreatic findings described above. The results were critically read back with Lizbeth Marino PA C on 1/17/2024 12:57 PM.    CT HEAD WITHOUT IV CONTRAST    Result Date: 1/17/2024  IMPRESSION: Motion degraded examination. No acute intracranial lesion. Atrophy, nonspecific white matter changes probably secondary to chronic small vessel ischemia and atherosclerotic vascular calcification.     X-RAY CHEST 1 VIEW    Result Date: 1/17/2024  IMPRESSION: Chin overlies chest, limiting evaluation of the thoracic inlet. Cardiomegaly with pulmonary vascular congestion. Moderate left and small right pleural effusions with underlying atelectasis/airspace disease no pneumothorax seen.        Assessment     83-year-old male admitted to the SICU after a fall on 1/17, with resultant pubic ramus fracture; urology is consulted for concerns for prostatic urethral injury        Plan     - Given that the patient is currently stable and he is adequate drainage of his bladder, there is no acute urologic intervention indicated this evening  - Continue to monitor urine output; if patient decompensates or urine output becomes significantly more sanguinous, please reach out to urology  - Would recommend maintain Womack catheter at this time and will discuss with attending in a.m. regarding further assessment and need for additional imaging/intervention  - Rest of care per primary team      [facility]  Zackery Burgos MD PGY2  [facility] Pager # [phone]  Tim Thompson Pager #[phone]

## 2024-01-18 NOTE — PROGRESS NOTES
Spoke with patient and patient's wife and confirmed with medication list from [facility] and/or patient's own pharmacy to complete the medication reconciliation.     Prior to admission medication list:    Medications Prior to Admission:   •  ascorbic acid (VITAMIN C) 500 mg tablet, Take 1,000 mg by mouth daily.  •  levothyroxine (SYNTHROID) 25 mcg tablet, Take 25 mcg by mouth daily before breakfast.  •  mecobal/levomefolat Ca/B6 phos (METANX ORAL), Take 1 tablet by mouth 2 (two) times a day.  •  sildenafiL (VIAGRA) 100 mg tablet, Take 100 mg by mouth as needed for erectile dysfunction.

## 2024-01-18 NOTE — PLAN OF CARE
Problem: Adult Inpatient Plan of Care  Goal: Plan of Care Review  Outcome: Progressing  Flowsheets (Taken 1/18/2024 0327)  Progress: improving  Outcome Evaluation: OT eval completed. REC acute rehab  Plan of Care Reviewed With: patient     Problem: Self-Care Deficit  Goal: Improved Ability to Complete Activities of Daily Living  Outcome: Progressing

## 2024-01-18 NOTE — PROGRESS NOTES
"   Occupational Therapy -  Initial Evaluation     Patient: Randolph Daley  [facility]  MRN: [id number]  Today's date: 1/18/2024    HISTORY OF PRESENT ILLNESS     Randolph is a 83 y.o. male admitted on 1/17/2024 with Fall due to slipping on ice or snow, initial encounter [W00.9XXA]. Principal problem is Fall due to slipping on ice or snow, initial encounter.    Past Medical History  Randolph has no past medical history on file.    History of Present Illness   Presented to [facility] 1/17 after slip and fall on ice  CT head negative for acute changes, \"Lateral ventriculomegaly, indeterminate though usually related to volume loss or normal pressure hydrocephalus\"  CT C/S:   Age-indeterminate odontoid process fracture -> pending further imaging per neurosurgery. Maintain c-collar AAT    + R pubic ramus fx with hematoma -> per ortho fx is stable, no intervention required -> WBAT B/L LE   +R posterior rib fx 8, 9, 10 , 11  +also bilateral pleural effusion left larger than right (history of RSV couple weeks ago, fluid is not hemorrhagic)  Txf to SICU for trauma service     urology is consulted for concerns for prostatic urethral injury , from pelvic hematoma -> avila catheter placed     1/18: bedside left thoracentesis    PRIOR LEVEL OF FUNCTION AND LIVING ENVIRONMENT     Prior Level of Function    Flowsheet Row Most Recent Value   Dominant Hand right   Ambulation independent   Transferring independent   Toileting independent   Bathing independent   Dressing independent   Eating independent   IADLs independent   Driving/Transportation    Communication understands/communicates without difficulty   Prior Level of Function Comment indepedent and active baseline, was jogging prior to recent bunionectomy surgery. drives. retired (had made clothes/uniforms)   Assistive Device Currently Used at Home grab bar        Prior Living Environment    Flowsheet Row Most Recent Value   People in " "Home spouse   Current Living Arrangements home   Home Accessibility stairs within home (Group), stairs to enter home (Group)   Living Environment Comment lives w/ wife in [facility] with 1+1 LARRY no rail, has 1st floor bedroom and full bathroom w/ stall shower + built in bench + GB        Occupational Profile    Flowsheet Row Most Recent Value   Successful Occupations retired, \"made womens clothes for 32 years   Environmental Supports and Barriers supportive wife        VITALS AND PAIN     OT Vitals    Date/Time Pulse HR Source SpO2 Pt Activity O2 Therapy BP BP Location BP Method Pt Position Observations [facility]   01/18/24 0941 68 Monitor 97 % At rest None (Room air) 154/82 Left upper arm Automatic Sitting EOB with PT & OT KORY      OT Pain    Date/Time Pain Type Side/Orientation Location Rating: Rest Rating: Activity Interventions [facility]   01/18/24 0941 Pain Assessment right pelvic 0 -- \"20\" care clustered;position adjusted KORY           Objective   OBJECTIVE     Start time:  0931  End time:  0957  Session Length: 26 min  Mode of Treatment: co-treatment, occupational therapy (expedite discharge planning, ax 2)    General Observations  Patient received  (sitting on edge of bed with neurosx team). He was agreeable to therapy, no issues or concerns identified by nurse prior to session.      Precautions: fall, brace worn at all times, weight bearing (hard collar AAT)  Extremity Weight-Bearing Status: right lower extremity, left lower extremity  Left LE Weight-Bearing Status: weight-bearing as tolerated (WBAT)  Right LE Weight-Bearing Status: weight-bearing as tolerated (WBAT)    Limitations/Impairments: safety/cognitive      OT Eval and Treat - 01/18/24 0931        Cognition    Orientation Status oriented x 4     Affect/Mental Status WFL;anxious     Behavioral Issues overwhelmed easily     Follows Commands follows one-step commands;increased processing time needed;delayed response/completion;physical/tactile prompts " required;repetition of directions required;verbal cues/prompting required     Cognitive Function executive function deficit;safety deficit;attention deficit     Attention Deficit minimal deficit;focused/sustained attention;requires cues/redirection to task     Executive Function Deficit moderate deficit;information processing;initiation;insight/awareness of deficits;judgment;problem-solving/reasoning     Safety Deficit moderate deficit;insight into deficits/self-awareness;judgment;problem-solving     Comment, Cognition pt pleasant and cooperative, engaged in therapy session, Pt benefits from multmodal cues for safety and postive reinforcement +/o session to max session     Cognitive Interventions/Strategies occupation/activity based interventions        Vision Assessment/Intervention    Vision Assessment corrective lenses for reading        Hearing Assessment    Hearing Status WFL        Sensory Assessment    Sensory Assessment sensation intact, upper extremities        Upper Extremity Assessment    UE Assessment ROM and Strength WFL except     General Observations only to 90 deg due to C collar and spinal precautions        Bed Mobility    Comment rec sitting edge of bed        Sit/Stand Transfer    Surface edge of bed     [place] moderate assist (50-74% patient effort);2 person assist     Safety/Cues increased time to complete;moderate;verbal cues;tactile cues;hand placement;preparatory posture;sequencing;proper use of assistive device;technique;maintaining center of gravity over base of support     Assistive Device walker, front-wheeled     Transfer Comments mod A x 2 +flexed posture and heavy reliance on RW with BUE ++pain with wb RLE        Surface-to-Surface Transfers    Transfer Location bed to chair     Transfer Technique stand step     [place] moderate assist (50-74% patient effort);2 person assist     Safety/Cues increased time to complete;maximal;verbal cues;tactile cues;hand  placement;initiation;maintaining center of gravity over base of support;preparatory posture;proper use of assistive device;sequencing;technique     Assistive Device walker, front-wheeled     Transfer Comments requires physical assist to promote wt shift and advance RLE with steps +cues for upright and RW management        Functional Mobility    Distance few steps at bedside     Functional Mobility [place] moderate assist (50-74% patient effort);2 person assist     Safety/Cues moderate;verbal cues;tactile cues;hand placement;preparatory posture;initiation;sequencing;technique     Assistive Device walker, front-wheeled        Upper Body Dressing    Tasks don     [place] supervision     Safety/Cues increased time to complete;minimal;verbal cues     Position edge of bed sitting;supported sitting        Lower Body Dressing    Tasks don;socks     [place] moderate assist (50-74% patient effort)     Safety/Cues increased time to complete;verbal cues;tactile cues;compensatory techniques;initiation;minimal     Position edge of bed sitting        Grooming    Tasks washes, rinses and dries hands;washes, rinses and dries face     [place] supervision;set up     Safety/Cues increased time to complete;minimal;verbal cues;initiation     Position edge of bed sitting;supported sitting     Setup Assistance obtain supplies        Self-Feeding    Tasks liquids to mouth;finger foods;scoop food onto utensil;utensil to mouth     [place] modified independence     Safety/Cues increased time to complete;minimal;verbal cues     Position supported sitting     Comment mod I with max cues to complete independently        Balance    Static Sitting Balance WFL;unsupported;sitting, edge of bed     Dynamic Sitting Balance mild impairment;unsupported;sitting, edge of bed     Sit to Stand Dynamic Balance moderate impairment;supported;standing     Static Standing Balance moderate impairment;supported;standing     Dynamic Standing  Balance moderate impairment;supported;standing     Balance Interventions occupation based/functional task        Impairments/Safety Issues    Impairments Affecting Function balance;endurance/activity tolerance;pain;strength;range of motion (ROM);postural/trunk control     Safety Issues Affecting Function awareness of need for assistance;insight into deficits/self-awareness;problem-solving;positioning of assistive device;judgment                        Orthosis Neck Cervical Collar (Active)   Date Obtained/Time Obtained: 01/17/24 1900   Location: Neck  Type: Cervical Collar  Features: Hard  Therapeutic Indications: fracture immobilization  Wearing Schedule: wear at all times (remove only for hygiene and skin inspection)     Orthosis Neck Cervical Collar (Active)   Skin Assessment intact;no redness;no breakdown 01/18/24 0800   Compliance/Wearing Issues compliant with wearing schedule 01/18/24 0800       Education Documentation  Treatment Plan, taught by Trini Lopes OT at 1/18/2024  3:56 PM.  Learner: Patient  Readiness: Acceptance  Method: Explanation, Demonstration  Response: Needs Reinforcement, Verbalizes Understanding  Comment: Role/goal of therapy session, OT plan of care, use of call bell, WBAT RLE, use of RW, adapted ADL/transfers,        Session Outcome  Patient upright, in chair at end of session, chair alarm on, all needs met, personal items in reach, call light in reach. Nursing notified about patient's performance, patient's position, and patient's response to therapy/activity.    AM-PAC™ - ADL (Current Function)     Putting on/taking off regular lower body clothing 2 - A Lot   Bathing 2 - A Lot   Toileting 2 - A Lot   Putting on/taking off regular upper body clothing 3 - A Little   Help for taking care of personal grooming 3 - A Little   Eating meals 4 - None   AM-PAC™ ADL Score 16      ASSESSMENT AND PLAN     Progress Summary  OT eval completed. pt presents with increased pain and anxiety with  max cues for safety Pt currenlty requires mod A x 2 with bed mob, mod A x 2 STS and few steps with RW. Pt benefits from encouragement and postive reinforcement +/o session. Pt will continue to benefit from OT to max functional ind and safety    Patient/Family Therapy Goal Statement: to have less pain    OT Plan    Flowsheet Row Most Recent Value   Rehab Potential good, to achieve stated therapy goals at 01/18/2024 0931   Therapy Frequency 5 times/wk at 01/18/2024 0931   Planned Therapy Interventions activity tolerance training, adaptive equipment training, BADL retraining, functional balance retraining, occupation/activity based interventions, patient/caregiver education/training, transfer/mobility retraining at 01/18/2024 0931          OT Discharge Recommendations    Flowsheet Row Most Recent Value   OT Recommended Discharge Disposition acute rehab/Inpatient Rehab Facility at 01/18/2024 0931   Anticipated Equipment Needs if Discharged Home (OT) dressing equipment, commode, 3-in-1, shower chair at 01/18/2024 0931               OT Goals    Flowsheet Row Most Recent Value   Bed Mobility Goal 1    Activity/Assistive Device bed mobility activities, all at 01/18/2024 0931   [place] modified independence at 01/18/2024 0931   Time Frame by discharge at 01/18/2024 0931   Progress/Outcome goal ongoing at 01/18/2024 0931   Transfer Goal 1    Activity/Assistive Device all transfers at 01/18/2024 0931   [place] modified independence at 01/18/2024 0931   Time Frame by discharge at 01/18/2024 0931   Progress/Outcome goal ongoing at 01/18/2024 0931   Dressing Goal 1    Activity/Adaptive Equipment dressing skills, all at 01/18/2024 0931   [place] modified independence at 01/18/2024 0931   Time Frame by discharge at 01/18/2024 0931   Progress/Outcome goal ongoing at 01/18/2024 0931   Grooming Goal 1    Activity/Assistive Device grooming skills, all at 01/18/2024 0931   [place] modified independence at 01/18/2024  0931   Time Frame by discharge at 01/18/2024 0931   Progress/Outcome goal ongoing at 01/18/2024 0931   Precaution Goal 1    Activity spinal precautions at 01/18/2024 0931   [place]/Cues independently at 01/18/2024 0931   Time Frame by discharge at 01/18/2024 0931   Progress/Outcome goal ongoing at 01/18/2024 0931

## 2024-01-18 NOTE — HOSPITAL COURSE
"Randolph is a 83 y.o. male admitted on 1/17/2024 with Fall due to slipping on ice or snow, initial encounter [W00.9XXA]. Principal problem is Fall due to slipping on ice or snow, initial encounter.    Past Medical History  Randolph has no past medical history on file.    History of Present Illness   Presented to [facility] 1/17 after slip and fall on ice  CT head negative for acute changes, \"Lateral ventriculomegaly, indeterminate though usually related to volume loss or normal pressure hydrocephalus\"  CT C/S:   Age-indeterminate odontoid process fracture -> pending further imaging per neurosurgery. Maintain c-collar AAT    + R pubic ramus fx with hematoma -> per ortho fx is stable, no intervention required -> WBAT B/L LE   +R posterior rib fx 8, 9, 10 , 11  +also bilateral pleural effusion left larger than right (history of RSV couple weeks ago, fluid is not hemorrhagic)  Txf to SICU for trauma service     urology is consulted for concerns for prostatic urethral injury , from pelvic hematoma -> avila catheter placed     1/18: bedside left thoracentesis  "

## 2024-01-18 NOTE — POST-PROCEDURE NOTE
Interventional Radiology Brief Postprocedure Note    Randolph Daley     Attending: DANIEL Yadav/ Neil Atkinson MD    Assistant: n/a    Diagnosis: left pleural effusion    Description of procedure: left thoracentesis    Contrast: none     Anesthesia:  Local     Volume of Lidocaine Utilized (ml): carbocaine 1% 5 mL     Medications: none     Complications: None      Estimated Blood Loss: Estimated Blood Loss: 0-10 ml    Anticoagulation: no restrictions    Specimens: 1.1L blood tinged/obdulia clear pleural fluid    Findings: successful bedside therapeutic u/s guided L thoracentesis, removed 1.1L, pt tolerated well, VSS, post procedure CXR ordered     1/18/2024 9:32 AM

## 2024-01-18 NOTE — CONSULTS
"Brief Nutrition Note    Recommendations      Continue reg diet  Ensure Plus BID     Clinical Course: Patient is a 83 y.o. male who was admitted on 1/17/2024 with a diagnosis of Fall due to slipping on ice or snow, initial encounter [W00.9XXA].     No past medical history on file.  No past surgical history on file.    Reason for Assessment  Reason For Assessment: per organizational policy  Diagnosis: trauma    [facility] Nutrition Screen Tool  Has patient lost weight without trying?: 3-->Yes, 24-33 lbs (Over last 4 years)  Has patient been eating poorly due to decreased appetite?: 0-->No  [facility] Nutrition Screen Score: 3     Nutrition/Diet History  Appetite Prior to Admission: Good-50-75%  Intake (%): 75%    Physical Findings  Last Bowel Movement: 01/18/24  Skin: intact     [id number] Physical Appearance  Last Bowel Movement: 01/18/24  Skin: intact     Nutrition Order  Nutrition Order: meets nutritional requirements  Nutrition Order Comments: reg diet     Anthropometrics  Height: 172.7 cm (5' 8\")     Current Weight  Weight Method: Bed scale  Weight: 87.8 kg (193 lb 9 oz)     Ideal Body Weight (IBW)  Ideal Body Weight (IBW) (kg): 70.89  % Ideal Body Weight: 123.85     Body Mass Index (BMI)  BMI (Calculated): 29.4     RETS18 Lab Results  Lab Results Reviewed: reviewed   BMP Results       01/18/24 01/17/24 01/17/24     0028 1634 1119     137 137    K 4.3 4.4 4.5    Cl 102 103 104    CO2 24 24 24    Glucose 108 108 108    BUN 31 33 35    Creatinine 1.6 1.6 1.6    Calcium 9.7 9.9 9.5    Anion Gap 9 10 9    EGFR 43.0 43.0 43.0         Comment for K at 1119 on 01/17/24: Results obtained on plasma. Plasma Potassium values may be up to 0.4 mEQ/L less than serum values. The differences may be greater for patients with high platelet or white cell counts.    Comment for EGFR at 0028 on 01/18/24: Calculation based on the Chronic Kidney Disease Epidemiology Collaboration (CKD-EPI) equation refit without adjustment for race.    Comment for " EGFR at 1634 on 01/17/24: Calculation based on the Chronic Kidney Disease Epidemiology Collaboration (CKD-EPI) equation refit without adjustment for race.    Comment for EGFR at 1119 on 01/17/24: Calculation based on the Chronic Kidney Disease Epidemiology Collaboration (CKD-EPI) equation refit without adjustment for race.        Mg 1.9  Phos 4.2      Medications  Pertinent Medications Reviewed: reviewed   • acetaminophen  975 mg oral q6h RUDY   • ascorbic acid  1,000 mg oral Daily   • levothyroxine  25 mcg oral Daily before breakfast   • sennosides-docusate sodium  1 tablet oral BID     Clinical comments:  Nutrition screen for trauma admission. Pt s/p slip and fall on ice, fractured pubic ramus with pelvic hematoma. Tolerated bkf well. NKFA. BMI- 29, noted wt loss of 30 lbs over the last 4 years, not severe for time frame.   Agree with reg diet, add Ensure compact BID.     Goals: Meet pts est nutrient needs  Monitor: Labs, skin, po diet renate, wts    Recommendations: See above       Date: 01/18/24  Signature: Rubi Baez RD

## 2024-01-18 NOTE — CONSULTS
Orthopedic Consult Note    Subjective     Patient is a 83-year-old male who was a trauma transfer from [facility] after he slipped and fell on ice earlier today.  Initial x-rays revealed a nondisplaced right type 1 lateral compression pelvic fracture, as well as multiple rib fractures.  Patient reports his pain is isolated in his right chest wall.  Denies any pain at rest in his right hip.  Patient denies any numbness or tingling in his extremities.  He does not take blood thinners. Orthopedics was consulted for further evaluation and management recommendations.      Medical History: No past medical history on file.    Surgical History: No past surgical history on file.    Social History:   Social History     Social History Narrative    Not on file       Family History: No family history on file.    Allergies: Lidocaine, Prednisone, Other, and Polyethylene glycol    Current Inpatient Medications   Medication Dose Route Frequency Provider Last Rate Last Admin    acetaminophen (TYLENOL) tablet 975 mg  975 mg oral q6h Agueda Shen CRNP   975 mg at 01/17/24 1715    bisacodyL (DULCOLAX) 10 mg suppository 10 mg  10 mg rectal Daily PRN Agueda Willard CRNP        glucose chewable tablet 16-32 g of dextrose  16-32 g of dextrose oral PRAgueda Cloud CRNP        Or    dextrose 40 % oral gel 15-30 g of dextrose  15-30 g of dextrose oral PRAgueda Cloud CRNP        Or    glucagon (GLUCAGEN) injection 1 mg  1 mg intramuscular PRN Agueda Willard CRNP        Or    dextrose 50 % in water (D50) injection 12.5 g  25 mL intravenous PRAgueda Cloud CRNP        ondansetron (ZOFRAN) injection 4 mg  4 mg intravenous q6h PRAgueda Cloud CRNP        oxyCODONE (ROXICODONE) immediate release tablet 10 mg  10 mg oral q4h PRAgueda Cloud CRNP        oxyCODONE (ROXICODONE) immediate release tablet 5 mg  5 mg oral q4h PRAgueda Cloud CRNP         sennosides-docusate sodium (SENOKOT-S) 8.6-50 mg per tablet 1 tablet  1 tablet oral BID Agueda Willard CRNP        sodium chloride 0.9 % infusion   intravenous Continuous Agueda Willard CRNP 60 mL/hr at 01/17/24 1700 Rate Verify at 01/17/24 1700        Medication List        ASK your doctor about these medications      ascorbic acid 500 mg tablet  Commonly known as: VITAMIN C  Take 1,000 mg by mouth daily.  Dose: 1,000 mg            Review of Systems  Pertinent items are noted in HPI.    Objective   Labs  CBC Results         01/17/24 01/17/24 01/17/24     1634 1529 1119    WBC 8.24 9.03 8.63    RBC 3.15 2.92 3.18    HGB 9.9 9.3 10.0    HCT 30.7 28.5 31.1    MCV 97.5 97.6 97.8    MCH 31.4 31.8 31.4    MCHC 32.2 32.6 32.2     96 103           Comment for PLT at 1634 on 01/17/24: CONSISTENT WITH PREVIOUS RESULTS    Comment for PLT at 1529 on 01/17/24: RESULTS OBTAINED AFTER VORTEXING TO ELIMINATE PLT CLUMPSSPECIMEN QUALITY CHECKED    Comment for PLT at 1119 on 01/17/24: ALL RESULTS HAVE BEEN RECHECKED          BMP Results         01/17/24 01/17/24     1634 1119     137    K 4.4 4.5    Cl 103 104    CO2 24 24    Glucose 108 108    BUN 33 35    Creatinine 1.6 1.6    Calcium 9.9 9.5    Anion Gap 10 9    EGFR 43.0 43.0           Comment for K at 1119 on 01/17/24: Results obtained on plasma. Plasma Potassium values may be up to 0.4 mEQ/L less than serum values. The differences may be greater for patients with high platelet or white cell counts.    Comment for EGFR at 1634 on 01/17/24: Calculation based on the Chronic Kidney Disease Epidemiology Collaboration (CKD-EPI) equation refit without adjustment for race.    Comment for EGFR at 1119 on 01/17/24: Calculation based on the Chronic Kidney Disease Epidemiology Collaboration (CKD-EPI) equation refit without adjustment for race.          Imaging  -X-ray pelvis: Right superior pubic ramus fracture  -CT chest, abdomen, pelvis: Right LC 1 pelvic  fracture      Physical Exam  Physical Examination:   General: AAOx3, verbally responds to questions, responds to stimuli  Head/neck:   C-collar in place    Extremities:  RUE:   No gross deformity  Skin intact, no abrasions or lacerations, no ecchymoses, no edema  No TTP along clavicle, AC joint, acromion, humeral head, medial and lateral humeral epicondyles, olecranon, radial head, distal radius  No crepitus with shoulder/elbow/wrist ROM  Compartments soft and compressible  Shoulder forward flexes to 100 without pain  Flexes/extends and supinates/pronates elbow without pain  Flexes/extends wrist without pain   MOTOR: M/AIN/U/R/PIN  SILT: Median/Ulnar/SRN  2+ radial pulse, fingers WWP w BCR    LUE:   No gross deformity  Skin intact, no abrasions or lacerations, no ecchymoses, no edema  No TTP along clavicle, AC joint, acromion, humeral head, medial and lateral humeral epicondyles, olecranon, radial head, distal radius  No crepitus with shoulder/elbow/wrist ROM  Compartments soft and compressible  Shoulder forward flexes to 100 without pain  Flexes/extends and supinates/pronates elbow without pain  Flexes/extends wrist without pain   MOTOR: M/AIN/U/R/PIN  SILT: Median/Ulnar/SRN  2+ radial pulse, fingers WWP w BCR    RLE:   No gross deformity  Skin intact, no abrasions or lacerations, no ecchymoses, no edema  No TTP  No crepitus with ROM of the hip/knee/ankle  Compartments soft and compressible  Flexes/extends hip without pain  Flexes/extends knee without pain  MOTOR: FHL/EHL/TA/GSC  SILT: DP/SP/T/S/S  Able to active straight leg raise with mild right groin pain  2+ DP pulses  Foot WWP w BCR  Negative log roll    LLE:  No gross deformity  Skin intact, no abrasions or lacerations, no ecchymoses, no edema  No TTP  No crepitus with ROM of the hip/knee/ankle  Compartments soft and compressible  Flexes/extends hip without pain  Flexes/extends knee without pain  MOTOR: FHL/EHL/TA/GSC  SILT: DP/SP/T/S/S  Able to active  straight leg raise  2+ DP pulses  Foot WWP w BCR  Negative log roll      Assessment   Pelvic Fracture s/p Fall  Multiple Rib Fractures       Plan   - This is a stable frcature pattern and will not require further imaging or treatment  - WBAT B/L LEXT  - DVT PPX  - Pressure sore prophylaxis  - Rib Fractures per trauma  - F/U as needed  - Contact me with any issues/questions    Zackery Baer

## 2024-01-18 NOTE — PROGRESS NOTES
TRAUMA SURGERY:  TERTIARY NOTE       PATIENT NAME:  Randolph Daley YOB: 1940    AGE:  83 y.o.  GENDER: male   MRN:  [id number]  PATIENT #: [id number]     PRIMARY CARE PHYSICIAN     Bear Morin DO    SUBJECTIVE     C/o R sided chest pain, worse with activity. Pulling 750ml on IS. Reports hallucinations with oxycodone. Denies palps/SOB. Denies N/V.     VITAL SIGNS   Patient Vitals for the past 4 hrs:   BP Temp Temp src Pulse Resp SpO2   01/18/24 1400 139/67 -- -- 60 20 98 %   01/18/24 1300 132/69 -- -- 61 18 100 %   01/18/24 1245 136/65 36.6 °C (97.9 °F) Temporal 67 (!) 21 100 %      INTAKE & OUTPUT       Intake/Output Summary (Last 24 hours) at 1/18/2024 1557  Last data filed at 1/18/2024 1200  Gross per 24 hour   Intake 3792 ml   Output 2570 ml   Net 1222 ml     I/O this shift:  In: 1360 [P.O.:1000; I.V.:360]  Out: 600 [Urine:600]    MEDICATIONS   Infusions:           Scheduled:   • acetaminophen  975 mg oral q6h RUDY   • ascorbic acid  1,000 mg oral Daily   • levothyroxine  25 mcg oral Daily before breakfast   • sennosides-docusate sodium  1 tablet oral BID     Antibiotics:    PRN:     bisacodyL, 10 mg, Daily PRN  glucose, 16-32 g of dextrose, PRN   Or  dextrose, 15-30 g of dextrose, PRN   Or  glucagon, 1 mg, PRN   Or  dextrose 50 % in water (D50), 25 mL, PRN  HYDROmorphone, 2 mg, q4h PRN  ondansetron, 4 mg, q6h PRN  traMADoL, 50 mg, q6h PRN       PHYSICAL EXAM     NEURO: GCS 15. AAOx3, GRUPO @ 3mm, EOMi, CN II-XII grossly intact. Non-focal on exam. Following all commands. Sensation intact.    R L MOTOR (0-5):   5 5 C4 (deltoid)   5 5 C5 (biceps)   5 5 C6 (wrist ext)   5 5 C7 (triceps)   5 5 C8 (finger flexion)   5 5 T1 (hand intrinsics)   5 5 L2 (hip flexors)   5 5 L3 (quads) knee ext   5 5 L4 (TA) dorsiflex   5 5 L5 (EHL)   5 5 S1 (gastrocs) plantar flex   HENT: NC/AT. Nares clear. Oropharynx clear.  Midface stable.  Denies TTP.  Denies Malocclusion.  Trachea midline. Tongue midline.    SPINE: C-Spine non-tender to palp. Hard cervical collar in situ. T/L/S spine non-tender to palp, no stepoffs/deformities.   CHEST: Symmetric expansion, R chest wall tender to palp, no crepitus  LUNGS: LCTA bilaterally. No use of accessory muscles or respiratory distress.   CV: RRR, S1/S2. NSR. +2 radial/DP/femoral pulses.   ABDOMEN: Soft, nontender, nondistended. (+) bowel sounds     :avila with clear yellow urine  EXTREMITIES: No gross deformities. Non-tender to palp in all joints. Endorses pain with ambulation in right groin/pelvis.   SKIN: warm, dry.    NEW FINDINGS on Physical Exam: No    Lines, Drains, Airways, Wounds:     Peripheral IV (Adult) 01/17/24 Anterior;Left Forearm (Active)   Number of days: 1       Peripheral IV (Adult) 01/17/24 Right Antecubital (Active)   Number of days: 1       Urethral Catheter 18 Fr (Active)   Number of days: 1     INJURIES REVIEWED     Injuries Identified to Date:  1. R rib fractures 8 to 11  2. R superior pubic ramus fracture with pelvic hematoma  3. Age-indeterminate odontoid fracture  4. Prostatic urethral injury    DIAGNOSTIC DATA     LABS:  Results from last 7 days   Lab Units 01/18/24  0028 01/17/24  1634 01/17/24  1119   SODIUM mEQ/L 135* 137 137   POTASSIUM mEQ/L 4.3 4.4 4.5   CHLORIDE mEQ/L 102 103 104   CO2 mEQ/L 24 24 24   BUN mg/dL 31* 33* 35*   CREATININE mg/dL 1.6* 1.6* 1.6*   CALCIUM mg/dL 9.7 9.9 9.5   GLUCOSE mg/dL 108* 108* 108*   MAGNESIUM mg/dL 1.9 1.9  --    PHOSPHORUS mg/dL 4.2  --   --    ALBUMIN g/dL  --  3.3* 3.2*   BILIRUBIN TOTAL mg/dL  --  0.7 0.5   ALK PHOS IU/L  --  66 63   ALT IU/L  --  8 8   AST IU/L  --  16 22     Results from last 7 days   Lab Units 01/18/24  0028 01/17/24  1634 01/17/24  1529   WBC K/uL 9.42 8.24 9.03   HEMOGLOBIN g/dL 9.4* 9.9* 9.3*   HEMATOCRIT % 28.4* 30.7* 28.5*   PLATELETS K/uL 99* 100* 96*     Results from last 7 days   Lab Units 01/18/24  0028 01/17/24  1634   PTT sec 38* 37*   PROTIME sec 20.3* 19.3*   INR  1.8 1.6      ABG Results    No lab values to display.       Serum creatinine: 1.6 mg/dL (H) 01/18/24 0028  Estimated creatinine clearance: 39 mL/min (A)  Microbiology Results     ** No results found for the last 720 hours. **          IMAGING:  X-RAY CHEST 1 VIEW    Result Date: 1/18/2024  IMPRESSION: Please see comment.    X-RAY CERVICAL SPINE COMPLETE 4 OR 5 VIEWS    Result Date: 1/18/2024  IMPRESSION: Please see comment.    ULTRASOUND KIDNEYS    Result Date: 1/18/2024  IMPRESSION: Multiple right renal cortical and sinus cysts. Lesion seen in the right lower pole on CT corresponds to an exophytic cyst with some marginal calcification. Mild right hydronephrosis. Small left renal cortical cysts, largest measured in the upper pole. Mildly increased renal cortical echogenicity, likely related to underlying chronic medical renal disease.     IR THORACENTESIS    Result Date: 1/18/2024  IMPRESSION: Successful bedside therapeutic ultrasound-guided left thoracentesis with 1.1 L pleural fluid removed and discarded.  All components of the time-out, debriefing, and handling of the specimen were conducted as per the ASAP Specimen Protocol. I certify that I have personally reviewed this examination and agree with Humaira Marley's report. Neil Atkinson M.D.    X-RAY CHEST 1 VIEW    Result Date: 1/18/2024  IMPRESSION: Please see comment.    X-RAY ABDOMEN 1 VIEW    Result Date: 1/18/2024  IMPRESSION: Please see comment.    CT CYSTOGRAM WITHOUT IV CONTRAST    Result Date: 1/17/2024  IMPRESSION: Findings suggestive of extravasation of contrast from the urethra in the region of the prostate gland with increased hyperdensity in the prostate gland following the injection of contrast through the Womack catheter.    X-RAY PELVIS 1 OR 2 VIEWS    Result Date: 1/17/2024  IMPRESSION: See comment.     CT CERVICAL SPINE WITHOUT IV CONTRAST    Result Date: 1/17/2024  IMPRESSION: *  No evidence of acute posttraumatic intracranial injury. *  Lateral  ventriculomegaly, indeterminate though usually related to volume loss or normal pressure hydrocephalus. *  Age-indeterminate odontoid process fracture which was not reported on outside imaging. The results were critically read back with DANIEL CHAN on 1/17/2024 1:05 PM. COMMENT: BRAIN: Brain parenchyma:  Age related involution and  ischemic small vessel changes. Gray-white matter differentiation is normal.  No evidence of intracranial hemorrhage, midline shift or other mass effect. Ventricles, cisterns, and sulci:  Symmetric lateral ventriculomegaly. Cranial carotid arteries: Mural calcification. Calvarium and extra cranial soft tissues:  No evidence of a displaced calvarial fracture.   Scalp soft tissue within normal limits. Visualized paranasal sinuses and mastoid air cells:  Partial opacification of the right mastoid air cells. CERVICAL SPINE: Cervicothoracic alignment: Craniocervical junction is normal in appearance. Atlantodental distance is not widened. Odontoid process fracture just at the inferior margin of the atlantodens articulation with a 1.3 cm Doppler process fracture which does not appear to extend into the C2 base. Prevertebral soft tissues: Normal in thickness. Vertebral bodies and intervertebral discs: Severe osseous demineralization. Cortical thickening and coarsening/thickening of the trabeculae due to osseous demineralization or pagetoid changes. Vertebral body heights and alignment are maintained.  There are multilevel degenerative changes with intervertebral disc space narrowing, endplate spurring, facet hypertrophy, and uncovertebral hypertrophy, most pronounced at C4-5. Cervical and upper thoracic spinal canal: There are varying degrees of central canal and neural foraminal stenosis, however there is no high-grade osseous encroachment on the central canal. Extra vertebral soft tissues: Vascular calcifications. Partially imaged left pleural effusion is more completely characterized  on the contemporaneous CT chest.     CT CHEST WITH IV CONTRAST    Result Date: 1/17/2024  IMPRESSION: 1.  Right superior pubic ramus parasymphyseal acute, comminuted fracture with concomitant right adductor/obturator intramuscular hematomas and perivesicular hematomas. Right perivesicular hematomas associated with mild mass effect on the urinary bladder and perivesicular infiltration. CT cystogram can be considered if there is concern for urinary bladder posttraumatic injury. 2.  Right eighth through 11th posterior acute rib fractures. 3.  Large, loculated left pleural effusion with near-complete collapse left lower lobe. Moderate right layering pleural effusion. 4.  Infiltration about SMA and SMV noting a poorly seen pancreas, of indeterminate etiology. Correlation with lipase exclude pancreatitis can be considered. Follow-up elective MRI abdomen without and with intravenous contrast can be considered if further imaging evaluation of the pancreas is desired. 5.  Hepatosplenomegaly. 6.  Incompletely characterized right lower pole renal cystic lesion measuring 8.2 cm. Elective renal ultrasound can be considered. Alternatively, this lesion can be followed with an MRI if that study is performed for the pancreatic findings described above. The results were critically read back with Lizbeth Marino PA C on 1/17/2024 12:57 PM.    CT ABDOMEN PELVIS WITH IV CONTRAST    Result Date: 1/17/2024  IMPRESSION: 1.  Right superior pubic ramus parasymphyseal acute, comminuted fracture with concomitant right adductor/obturator intramuscular hematomas and perivesicular hematomas. Right perivesicular hematomas associated with mild mass effect on the urinary bladder and perivesicular infiltration. CT cystogram can be considered if there is concern for urinary bladder posttraumatic injury. 2.  Right eighth through 11th posterior acute rib fractures. 3.  Large, loculated left pleural effusion with near-complete collapse left lower  lobe. Moderate right layering pleural effusion. 4.  Infiltration about SMA and SMV noting a poorly seen pancreas, of indeterminate etiology. Correlation with lipase exclude pancreatitis can be considered. Follow-up elective MRI abdomen without and with intravenous contrast can be considered if further imaging evaluation of the pancreas is desired. 5.  Hepatosplenomegaly. 6.  Incompletely characterized right lower pole renal cystic lesion measuring 8.2 cm. Elective renal ultrasound can be considered. Alternatively, this lesion can be followed with an MRI if that study is performed for the pancreatic findings described above. The results were critically read back with Lizbeth Marino PA C on 1/17/2024 12:57 PM.    CT HEAD WITHOUT IV CONTRAST    Result Date: 1/17/2024  IMPRESSION: Motion degraded examination. No acute intracranial lesion. Atrophy, nonspecific white matter changes probably secondary to chronic small vessel ischemia and atherosclerotic vascular calcification.     X-RAY CHEST 1 VIEW    Result Date: 1/17/2024  IMPRESSION: Chin overlies chest, limiting evaluation of the thoracic inlet. Cardiomegaly with pulmonary vascular congestion. Moderate left and small right pleural effusions with underlying atelectasis/airspace disease no pneumothorax seen.           LABS AND FINAL IMPRESSIONS OF ALL IMAGING REVIEWED: Yes     INCIDENTAL FINDINGS: Discussed with patient    MEDICAL RECONCILIATION REVIEWED: Yes     HOME MEDICATIONS RESUMED AS APPROPRIATE: YES    C-SPINE CLEARANCE: NO-->cervical fractures    PROBLEM LIST     Patient Active Problem List    Diagnosis Date Noted   • Fall due to slipping on ice or snow, initial encounter 01/17/2024   • Closed fracture of multiple ribs of right side 01/17/2024   • Closed fracture of ramus of right pubis (CMS/HCC) 01/17/2024   • Pelvic hematoma in male 01/17/2024   • Pleural effusion on left 01/17/2024   • Normocytic anemia 01/17/2024   • Stage 3a chronic kidney disease  (CMS/[facility]) 01/17/2024   • Closed odontoid fracture (CMS/[facility]) 01/17/2024     IMPRESSION/PLAN     83 y.o. male HD#1 s/p slip and fall on ice    Closed odontoid fracture (CMS/[facility])  Assessment & Plan  · Age-indeterminate, seen on prior scans  · Patient asymptomatic  · Flex-ex XR--widening of the 1st-2nd cervical interspinous process & anterior translocation of the superior fracture fragment  · Obtaining MRI C-spine  · Maintain C-collar  · F/u Neurosurgery recs     Stage 3a chronic kidney disease (CMS/[facility])  Assessment & Plan  · Baseline creatinine 1.4 to 1.5 per review of chart  · Avoid nephrotoxins    Normocytic anemia  Assessment & Plan  · Most recent Hgb 10 to 11 per review of chart  · Follow[facility] hematology at [facility]    Pleural effusion on left  Assessment & Plan  · Incidental finding on CT imaging, loculated  · S/p IR thoracentesis 1/18 with drainage of 1.1L  · continue to monitor     Pelvic hematoma in male  Assessment & Plan  · In setting of pubic ramus fracture, no active extravasation noted   · Causing mass effect on bladder. Avila placed with concern for prostatic ureteral injury  · Urology consulted--recommended maintaining avila with outpatient follow up    Closed fracture of ramus of right pubis (CMS/[facility])  Assessment & Plan  Orthopedic recs--non-op management. WBAT BLE    Closed fracture of multiple ribs of right side  Assessment & Plan  Multimodal analgesia -->  APAP ATC, PRN opiate  IS/C&Db  Wean O2 as tolerated for goal POX > 93%    * Fall due to slipping on ice or snow, initial encounter  Assessment & Plan  · Fall precautions  · PT/OT/PMR eval    VTE PPX: SCDs & Lovenox 30 mg BID    GI PPX:  none needed      CODE STATUS: Full Code    COURTNEY Salazar  1/18/2024  3:57 PM

## 2024-01-18 NOTE — CONSULTS
Neurosurgery Consultation    CC: Slip on ice, fall    Reason for Consultation:  Age-indeterminate odontoid fracture    Requesting Provider:  Agueda Willard CRNP    Patient seen and examined at:  01/18/24 08:10    History of Present Illness:   Randolph Daley is a 83 y.o. male, with significant past medical history of BPH, hypertension, recent RSV infection, who presented to [facility] for evaluation after slip and fall on ice.  He was noted to have numerous injuries including multiple right rib fractures, pelvic fracture, and age-indeterminate cervical fracture.    On interview, patient denies any current neck pain.  He reports that he sustained an injury to his neck in the past, he sought medical evaluation but imaging was not obtained.  He denies any pain extending into his upper extremities, change in sensation, changes in strength, bowel or bladder dysfunction.    Medical History: No past medical history on file.    Surgical History: No past surgical history on file.    Family History: No family history on file.  Family history non-contributory to neurological condition.    Social History:   Social History     Socioeconomic History   • Marital status:    Tobacco Use   • Smoking status: Former     Types: Cigarettes, Cigars   • Smokeless tobacco: Never   Substance and Sexual Activity   • Alcohol use: Not Currently   • Drug use: Never     Social Determinants of Health     Food Insecurity: No Food Insecurity (1/17/2024)    Hunger Vital Sign    • Worried About Running Out of Food in the Last Year: Never true    • Ran Out of Food in the Last Year: Never true   Transportation Needs: No Transportation Needs (1/18/2024)    PRAPARE - Transportation    • Lack of Transportation (Medical): No    • Lack of Transportation (Non-Medical): No   Housing Stability: Low Risk  (1/18/2024)    Housing Stability Vital Sign    • Unable to Pay for Housing in the Last Year: No    • Number of Places Lived in the Last  Year: 1    • Unstable Housing in the Last Year: No       Allergies: Lidocaine, Prednisone, Other, and Polyethylene glycol    Home Medications:   •  ascorbic acid, Take 1,000 mg by mouth daily.  •  levothyroxine, Take 25 mcg by mouth daily before breakfast.  •  sildenafiL, Take 100 mg by mouth as needed for erectile dysfunction.    Current Medications:   •  acetaminophen, 975 mg, oral, q6h RUDY  •  ascorbic acid, 1,000 mg, oral, Daily  •  bisacodyL, 10 mg, rectal, Daily PRN  •  glucose, 16-32 g of dextrose, oral, PRN **OR** dextrose, 15-30 g of dextrose, oral, PRN **OR** glucagon, 1 mg, intramuscular, PRN **OR** dextrose 50 % in water (D50), 25 mL, intravenous, PRN  •  levothyroxine, 25 mcg, oral, Daily before breakfast  •  ondansetron, 4 mg, intravenous, q6h PRN  •  oxyCODONE, 10 mg, oral, q4h PRN  •  oxyCODONE, 5 mg, oral, q4h PRN  •  sennosides-docusate sodium, 1 tablet, oral, BID    Review of Systems: A 14 point review of systems was performed and aside from what is mentioned above is otherwise negative.    General physical examination:  Temp:  [36.1 °C (97 °F)-37 °C (98.6 °F)] 37 °C (98.6 °F)  Heart Rate:  [56-85] 70  Resp:  [17-45] 18  BP: (127-161)/(58-84) 127/67     The patient is sitting in his bed in no acute distress, appears his stated age.   There is no peripheral edema and there are 2+ radial and dorsal pedis pulses.      General Appearance: Awake, not in acute distress   Head: Normocephalic, atraumatic.   Eyes:  ENMT: No conjunctival injection. Symmetrical lids  Atraumatic external nose and ears. Moist MM.   Neck: Supple, no nuchal rigidity.   Respiratory: Good respiratory effort.   Cardiovascular: Regular rate.   Abdomen: Soft   Extremities: No edema.   Skin: Dry, no rashes.       Neurologic examination:  Mental status:  The patient is alert, attentive, and oriented. Speech is clear and fluent with good repetition, comprehension, and naming.  Remote and recent memory are normal as well as fund of  knowledge.    Cranial nerves:  CN II: Pupils are equal and briskly reactive to light.   CN III, IV, VI: Extra-occular muscles are intact  CN V: Facial sensation is grossly intact.   CN VII: Face is symmetric with normal eye closure and smile.  CN VIII: Hearing is grossly intact  CN IX, X: Palate elevates symmetrically. Phonation is normal.  CN XI: Head turning and shoulder shrug are intact  CN XII: Tongue is midline with normal movements and no atrophy.    Motor:  Muscle bulk and tone are normal.    Abduct arm >90    C5 Elbow flex    C5, C6 Wrist ext    C6 Elbow ext    C7 Finger flex    C8 Abduct 5th Dig    T1 Hip flex      L2, L3 Knee ext    L3, L4 Dorsi  flex    L4, L5 Great toe ext    L5 Plantar flex    S1   L 5 5 5 5 5 5 5 5 5 5 5   R 5 5 5 5 5 5 4+PL 5 5 5 5         Reflexes:  Reflexes are 2+ and symmetric at the biceps, brachialis, triceps, patellar, and Achilli's. There is no Ambriz's sign or ankle clonus.    Sensory:  Intact light touch throughout all 4 extremities.    Coordination:  Romberg deferred secondary to fall risk.    Gait:  Gait deferred secondary to fall risk.      Data Review:    Labs  WBC   Date Value Ref Range Status   01/18/2024 9.42 3.80 - 10.50 K/uL Final     Hemoglobin   Date Value Ref Range Status   01/18/2024 9.4 (L) 13.7 - 17.5 g/dL Final     Hematocrit   Date Value Ref Range Status   01/18/2024 28.4 (L) 40.1 - 51.0 % Final     MCV   Date Value Ref Range Status   01/18/2024 96.3 83.0 - 98.0 fL Final     Platelets   Date Value Ref Range Status   01/18/2024 99 (L) 150 - 350 K/uL Final     Sodium   Date Value Ref Range Status   01/18/2024 135 (L) 136 - 145 mEQ/L Final     Potassium   Date Value Ref Range Status   01/18/2024 4.3 3.5 - 5.1 mEQ/L Final     BUN   Date Value Ref Range Status   01/18/2024 31 (H) 7 - 25 mg/dL Final     Creatinine   Date Value Ref Range Status   01/18/2024 1.6 (H) 0.7 - 1.3 mg/dL Final     PT   Date Value Ref Range Status   01/18/2024 20.3 (H) 12.2 - 14.5 sec  Final     PTT   Date Value Ref Range Status   01/18/2024 38 (H) 23 - 35 sec Final     Comment:     The Standard Therapeutic Range for Heparin is 74 to 106 seconds.     INR   Date Value Ref Range Status   01/18/2024 1.8   Final     Comment:     Moderate Intensity Anticoagulation = 2.0 to 3.0, High Intensity = 2.5 to 3.5       Images    CT cervical spine without contrast 1/17/2024   CLINICAL HISTORY: Neck trauma (Age >= 65y)     COMPARISON: None. CT angiogram report from 9/28/2021 and 3/16/2019;  unfortunately, images are not available for review.     --  IMPRESSION:  *  No evidence of acute posttraumatic intracranial injury.  *  Lateral ventriculomegaly, indeterminate though usually related to volume loss  or normal pressure hydrocephalus.  *  Age-indeterminate odontoid process fracture which was not reported on outside  Imaging.    CT head without contrast 1/17/2024  CT DOSE:   One or more dose reduction techniques (e.g. automated exposure  control, adjustment of the mA and/or kV according to patient size, use of  iterative reconstruction technique) utilized for this examination.     Comparison: None.     Brain:     [Motion degraded examination.     The ventricular system and sulci reflect mild to moderate global volume loss.  No findings of midline shift, mass effect, acute hemorrhage, or acute  transcortical infarct.  Posterior fossa probable arachnoid cyst.  Mild  nonspecific periventricular and subcortical white matter hypoattenuation.  Intracranial vascular consultations.  Mild right mastoid air cell effusion.        --  IMPRESSION: Motion degraded examination.     No acute intracranial lesion.     Atrophy, nonspecific white matter changes probably secondary to chronic small  vessel ischemia and atherosclerotic vascular calcification.    Images were personally reviewed by myself.    Assessment and Plan:  In summary, Randolph Daley is a 83 y.o. male who presented to [facility] after a slip and fall on  ice landing on his right side.  He was found to have multiple injuries including right rib fractures, bilateral pleural effusion, pelvic fracture, and age-indeterminate cervical fracture.    It is likely this C2 fracture was from prior injury as it appears to be well-corticated.  Recommend patient undergo flexion-extension x-rays as well as an MRI of the cervical spine.  Please maintain hard cervical collar in the interim.  Will have further recommendations after imaging is obtained.      Please call neurosurgery with additional questions, concerns, new symptoms.

## 2024-01-18 NOTE — PROGRESS NOTES
Urology Progress Note    Subjective     Patient EMILY, came in overnight due to slip and fall, fracturing his pubic ramus with pelvic hematoma compressing on the urinary bladder. CT cystogram showed extrav of contrast from the urethra near prostatic urethra. Avila catheter was placed. Patient reports no issues with avila catheter, no fevers, chills, bladder spasms.    Objective   Temp:  [36.1 °C (97 °F)-36.9 °C (98.5 °F)] 36.9 °C (98.5 °F)  Heart Rate:  [56-85] 59  Resp:  [16-45] 19  BP: (129-168)/(58-84) 145/65    Physical exam  General: well appearing, cooperative, pleasant  HEENT: normochephalic, atraumatic, c collar in place   Respiratory: normal effort, on room air  Cardiovascular: regular rate and rhythm.  Abdomen: soft, non tender, non-distended, no suprapubic pain or distension   Extremities: warm, well perfused, no edema  Genitourinary: circumcised penis, bilaterally descended testicles, 18fr avila catheter in place draining clear yellow urine.   Psych: normal affect    Results from last 7 days   Lab Units 01/18/24  0028 01/17/24  1634 01/17/24  1119   SODIUM mEQ/L 135* 137 137   POTASSIUM mEQ/L 4.3 4.4 4.5   CHLORIDE mEQ/L 102 103 104   CO2 mEQ/L 24 24 24   BUN mg/dL 31* 33* 35*   CREATININE mg/dL 1.6* 1.6* 1.6*   GLUCOSE mg/dL 108* 108* 108*     Results from last 7 days   Lab Units 01/18/24  0028 01/17/24  1634 01/17/24  1529   WBC K/uL 9.42 8.24 9.03   HEMOGLOBIN g/dL 9.4* 9.9* 9.3*   PLATELETS K/uL 99* 100* 96*       Assessment/Plan   83-year-old male admitted to the SICU after a fall on 1/17, with resultant pubic ramus fracture; urology is consulted for concerns for prostatic urethral injury    - Keep avila catheter in place, no urologic intervention indicated for now. Should follow up outpatient for imaging studies to evaluate injury  - Continue to monitor urine output. If the color of urine changes, please reach out to urology  - Potentially repeat cystogram with more contrast inserted to evaluate  potential injury better.   - Rest of care per primary team    Rebecca Kaye DO PGY1  Urology Resident  [facility]  [facility] Pager #[phone]  [place] Pager #[phone]

## 2024-01-18 NOTE — OR SURGEON
Pre-Procedure patient identification:  I am the primary operating surgeon/proceduralist and I have reviewed the applicable pathology reports and radiology studies for this procedure. I have identified the patient and confirmed laterality is left on 01/18/24 at 9:02 AM DANIEL Yadav C

## 2024-01-19 ENCOUNTER — BMR PREADMISSION ASSESSMENT (INPATIENT)
Dept: ADMISSIONS | Facility: REHABILITATION | Age: 84
DRG: 560 | End: 2024-01-19
Payer: COMMERCIAL

## 2024-01-19 LAB
ANION GAP SERPL CALC-SCNC: 12 MEQ/L (ref 3–15)
APTT PPP: 36 SEC (ref 23–35)
BUN SERPL-MCNC: 29 MG/DL (ref 7–25)
CALCIUM SERPL-MCNC: 9.4 MG/DL (ref 8.6–10.3)
CHLORIDE SERPL-SCNC: 103 MEQ/L (ref 98–107)
CO2 SERPL-SCNC: 22 MEQ/L (ref 21–31)
CREAT SERPL-MCNC: 1.6 MG/DL (ref 0.7–1.3)
EGFRCR SERPLBLD CKD-EPI 2021: 43 ML/MIN/1.73M*2
ERYTHROCYTE [DISTWIDTH] IN BLOOD BY AUTOMATED COUNT: 15.7 % (ref 11.6–14.4)
ERYTHROCYTE [DISTWIDTH] IN BLOOD BY AUTOMATED COUNT: 15.7 % (ref 11.6–14.4)
GLUCOSE SERPL-MCNC: 90 MG/DL (ref 70–99)
HCT VFR BLD AUTO: 26 % (ref 40.1–51)
HCT VFR BLD AUTO: 27.2 % (ref 40.1–51)
HGB BLD-MCNC: 8.8 G/DL (ref 13.7–17.5)
HGB BLD-MCNC: 8.9 G/DL (ref 13.7–17.5)
INR PPP: 1.7
MAGNESIUM SERPL-MCNC: 1.9 MG/DL (ref 1.8–2.5)
MCH RBC QN AUTO: 31.1 PG (ref 28–33.2)
MCH RBC QN AUTO: 31.8 PG (ref 28–33.2)
MCHC RBC AUTO-ENTMCNC: 32.7 G/DL (ref 32.2–36.5)
MCHC RBC AUTO-ENTMCNC: 33.8 G/DL (ref 32.2–36.5)
MCV RBC AUTO: 93.9 FL (ref 83–98)
MCV RBC AUTO: 95.1 FL (ref 83–98)
PDW BLD AUTO: 10.3 FL (ref 9.4–12.4)
PDW BLD AUTO: 10.5 FL (ref 9.4–12.4)
PHOSPHATE SERPL-MCNC: 3.9 MG/DL (ref 2.4–4.7)
PLATELET # BLD AUTO: 88 K/UL (ref 150–350)
PLATELET # BLD AUTO: 89 K/UL (ref 150–350)
POTASSIUM SERPL-SCNC: 4 MEQ/L (ref 3.5–5.1)
PROTHROMBIN TIME: 19.9 SEC (ref 12.2–14.5)
RBC # BLD AUTO: 2.77 M/UL (ref 4.5–5.8)
RBC # BLD AUTO: 2.86 M/UL (ref 4.5–5.8)
SODIUM SERPL-SCNC: 137 MEQ/L (ref 136–145)
WBC # BLD AUTO: 7.54 K/UL (ref 3.8–10.5)
WBC # BLD AUTO: 8.48 K/UL (ref 3.8–10.5)

## 2024-01-19 PROCEDURE — 85027 COMPLETE CBC AUTOMATED: CPT | Performed by: NURSE PRACTITIONER

## 2024-01-19 PROCEDURE — 63700000 HC SELF-ADMINISTRABLE DRUG

## 2024-01-19 PROCEDURE — 97535 SELF CARE MNGMENT TRAINING: CPT | Mod: GO

## 2024-01-19 PROCEDURE — 99233 SBSQ HOSP IP/OBS HIGH 50: CPT | Performed by: STUDENT IN AN ORGANIZED HEALTH CARE EDUCATION/TRAINING PROGRAM

## 2024-01-19 PROCEDURE — 97530 THERAPEUTIC ACTIVITIES: CPT | Mod: GP

## 2024-01-19 PROCEDURE — 84100 ASSAY OF PHOSPHORUS: CPT | Performed by: NURSE PRACTITIONER

## 2024-01-19 PROCEDURE — 63700000 HC SELF-ADMINISTRABLE DRUG: Performed by: NURSE PRACTITIONER

## 2024-01-19 PROCEDURE — 36415 COLL VENOUS BLD VENIPUNCTURE: CPT | Performed by: NURSE PRACTITIONER

## 2024-01-19 PROCEDURE — 21400000 HC ROOM AND CARE CCU/INTERMEDIATE

## 2024-01-19 PROCEDURE — 82310 ASSAY OF CALCIUM: CPT | Performed by: NURSE PRACTITIONER

## 2024-01-19 PROCEDURE — 80048 BASIC METABOLIC PNL TOTAL CA: CPT | Performed by: NURSE PRACTITIONER

## 2024-01-19 PROCEDURE — 99232 SBSQ HOSP IP/OBS MODERATE 35: CPT | Performed by: NEUROLOGICAL SURGERY

## 2024-01-19 PROCEDURE — 63600000 HC DRUGS/DETAIL CODE: Mod: JZ

## 2024-01-19 PROCEDURE — 83735 ASSAY OF MAGNESIUM: CPT | Performed by: NURSE PRACTITIONER

## 2024-01-19 RX ORDER — ENOXAPARIN SODIUM 100 MG/ML
30 INJECTION SUBCUTANEOUS
Status: DISCONTINUED | OUTPATIENT
Start: 2024-01-19 | End: 2024-01-24 | Stop reason: HOSPADM

## 2024-01-19 RX ORDER — GUAIFENESIN 100 MG/5ML
200 SOLUTION ORAL EVERY 4 HOURS PRN
Status: DISCONTINUED | OUTPATIENT
Start: 2024-01-19 | End: 2024-01-20

## 2024-01-19 RX ADMIN — ACETAMINOPHEN 975 MG: 325 TABLET ORAL at 06:38

## 2024-01-19 RX ADMIN — LEVOTHYROXINE SODIUM 25 MCG: 0.03 TABLET ORAL at 06:34

## 2024-01-19 RX ADMIN — SENNOSIDES AND DOCUSATE SODIUM 1 TABLET: 50; 8.6 TABLET ORAL at 20:28

## 2024-01-19 RX ADMIN — ACETAMINOPHEN 975 MG: 325 TABLET ORAL at 18:10

## 2024-01-19 RX ADMIN — SENNOSIDES AND DOCUSATE SODIUM 1 TABLET: 50; 8.6 TABLET ORAL at 08:53

## 2024-01-19 RX ADMIN — ENOXAPARIN SODIUM 30 MG: 30 INJECTION SUBCUTANEOUS at 20:28

## 2024-01-19 RX ADMIN — Medication 1000 MG: at 08:53

## 2024-01-19 RX ADMIN — ACETAMINOPHEN 975 MG: 325 TABLET ORAL at 11:57

## 2024-01-19 RX ADMIN — GUAIFENESIN 200 MG: 100 SOLUTION ORAL at 21:34

## 2024-01-19 ASSESSMENT — COGNITIVE AND FUNCTIONAL STATUS - GENERAL
WALKING IN HOSPITAL ROOM: 2 - A LOT
MOVING TO AND FROM BED TO CHAIR: 2 - A LOT
MOVING TO AND FROM BED TO CHAIR: 2 - A LOT
AFFECT: WFL;ANXIOUS
CLIMB 3 TO 5 STEPS WITH RAILING: 1 - TOTAL
STANDING UP FROM CHAIR USING ARMS: 2 - A LOT
STANDING UP FROM CHAIR USING ARMS: 2 - A LOT
HELP NEEDED FOR PERSONAL GROOMING: 3 - A LITTLE
DRESSING REGULAR LOWER BODY CLOTHING: 2 - A LOT
HELP NEEDED FOR BATHING: 2 - A LOT
TOILETING: 2 - A LOT
EATING MEALS: 4 - NONE
CLIMB 3 TO 5 STEPS WITH RAILING: 1 - TOTAL
MOVING TO AND FROM BED TO CHAIR: 2 - A LOT
WALKING IN HOSPITAL ROOM: 2 - A LOT
WALKING IN HOSPITAL ROOM: 2 - A LOT
STANDING UP FROM CHAIR USING ARMS: 2 - A LOT
CLIMB 3 TO 5 STEPS WITH RAILING: 2 - A LOT
AFFECT: WFL;ANXIOUS
CLIMB 3 TO 5 STEPS WITH RAILING: 2 - A LOT
STANDING UP FROM CHAIR USING ARMS: 2 - A LOT
MOVING TO AND FROM BED TO CHAIR: 2 - A LOT
WALKING IN HOSPITAL ROOM: 2 - A LOT
DRESSING REGULAR UPPER BODY CLOTHING: 3 - A LITTLE

## 2024-01-19 NOTE — PROGRESS NOTES
Urology Daily Progress Note    Subjective   Interval History: resting comfortably in bed.  Womack catheter in place draining clear yellow urine.    Objective     Vital signs in last 24 hours:  Temp:  [36.6 °C (97.9 °F)-37 °C (98.6 °F)] 36.9 °C (98.4 °F)  Heart Rate:  [53-74] 58  Resp:  [12-29] 18  BP: (111-167)/(57-83) 134/63      Intake/Output Summary (Last 24 hours) at 1/19/2024 0619  Last data filed at 1/19/2024 0600  Gross per 24 hour   Intake 2850 ml   Output 3985 ml   Net -1135 ml     Intake/Output this shift:  I/O this shift:  In: 990 [P.O.:980; I.V.:10]  Out: 2485 [Urine:2485]    Labs  BMP Results       01/18/24 01/17/24 01/17/24     0028 1634 1119     137 137    K 4.3 4.4 4.5    Cl 102 103 104    CO2 24 24 24    Glucose 108 108 108    BUN 31 33 35    Creatinine 1.6 1.6 1.6    Calcium 9.7 9.9 9.5    Anion Gap 9 10 9    EGFR 43.0 43.0 43.0         Comment for K at 1119 on 01/17/24: Results obtained on plasma. Plasma Potassium values may be up to 0.4 mEQ/L less than serum values. The differences may be greater for patients with high platelet or white cell counts.    Comment for EGFR at 0028 on 01/18/24: Calculation based on the Chronic Kidney Disease Epidemiology Collaboration (CKD-EPI) equation refit without adjustment for race.    Comment for EGFR at 1634 on 01/17/24: Calculation based on the Chronic Kidney Disease Epidemiology Collaboration (CKD-EPI) equation refit without adjustment for race.    Comment for EGFR at 1119 on 01/17/24: Calculation based on the Chronic Kidney Disease Epidemiology Collaboration (CKD-EPI) equation refit without adjustment for race.        CBC Results       01/19/24 01/18/24 01/18/24     0522 2318 0028    WBC 7.54 8.48 9.42    RBC 2.86 2.77 2.95    HGB 8.9 8.8 9.4    HCT 27.2 26.0 28.4    MCV 95.1 93.9 96.3    MCH 31.1 31.8 31.9    MCHC 32.7 33.8 33.1    PLT 88 89 99         Comment for PLT at 0522 on 01/19/24: CONSISTENT WITH PREVIOUS RESULTS    Comment for PLT at 2318 on  "01/18/24: ALL RESULTS HAVE BEEN RECHECKED        UA Results       01/17/24     1416    Color Colorless    Clarity Clear    Glucose Negative    Bilirubin Negative    Ketones Negative    Sp Grav 1.027    Blood Negative    Ph 6.0    Protein Trace    Urobilinogen 0.2    Nitrite Negative    Leuk Est +1    WBC 10 TO 20    RBC 0 TO 4    Bacteria Rare         Comment for Blood at 1416 on 01/17/24: The sensitivity of the occult blood test is equivalent to approximately 4 intact RBC/HPF.    Comment for Protein at 1416 on 01/17/24: Trace False Positive Protein can be seen with alkaline or highly buffered urines or urine with high specific gravity. Suggest clinical correlation.        Microbiology Results     Procedure Component Value Units Date/Time    Urine culture Urine, Clean Catch [[id number]]  (Abnormal) Collected: 01/17/24 1416    Specimen: Urine, Clean Catch Updated: 01/18/24 1800     Urine Culture **Positive Culture**      1,000-4,000 cfu/mL Gram Positive Cocci    Narrative:      Please call Microbiology if clinical considerations warrant further testing. Isolate will be held for 7 days.            Imaging  No new imaging    Physicial Exam  Visit Vitals  /63   Pulse (!) 58   Temp 36.9 °C (98.4 °F) (Temporal)   Resp 18   Ht 1.727 m (5' 8\")   Wt 87.8 kg (193 lb 9 oz)   SpO2 97%   BMI 29.43 kg/m²     General appearance: alert, cooperative, no acute distress  Lungs: Unlabored respirations, symmetric chest expansion  Heart: regular rate and rhythm  Abdomen: soft, non tedner  Genitalia: avila in place draining clear yellow urine  Rectal: Deferrred  Extremities: extremities normal, warm and well-perfused; no cyanosis, clubbing, or edema and no redness or tenderness in the calves bilaterally  Neurologic: Alert and oriented X 3       Assessment   83-year-old male admitted to the SICU after a fall on 1/17, with resultant pubic ramus fracture; urology is consulted for concerns for prostatic urethral injury        Plan   - " Keep avila catheter in place, no urologic intervention indicated for minimum 1 week. Repeat cystogram prior to void trial in approximately 1 week.  - Continue to monitor urine output. If the color of urine changes, please reach out to urology  - Rest of care per primary team    Emmett Salvador DO, PGY4  [facility]  [facility] [phone]  [place] [phone]

## 2024-01-19 NOTE — PROGRESS NOTES
"   Occupational Therapy -  Initial Evaluation     Patient: Randolph Daley  Location: [facility] [address] [address]  MRN: [id number]  Today's date: 1/19/2024    HISTORY OF PRESENT ILLNESS     Randolph is a 83 y.o. male admitted on 1/17/2024 with Fall due to slipping on ice or snow, initial encounter [W00.9XXA]. Principal problem is Fall due to slipping on ice or snow, initial encounter.    Past Medical History  Randolph has no past medical history on file.    History of Present Illness   Presented to [facility] 1/17 after slip and fall on ice  CT head negative for acute changes, \"Lateral ventriculomegaly, indeterminate though usually related to volume loss or normal pressure hydrocephalus\"  CT C/S:   Age-indeterminate odontoid process fracture -> pending further imaging per neurosurgery. Maintain c-collar AAT    + R pubic ramus fx with hematoma -> per ortho fx is stable, no intervention required -> WBAT B/L LE   +R posterior rib fx 8, 9, 10 , 11  +also bilateral pleural effusion left larger than right (history of RSV couple weeks ago, fluid is not hemorrhagic)  Txf to SICU for trauma service     urology is consulted for concerns for prostatic urethral injury , from pelvic hematoma -> avila catheter placed     1/18: bedside left thoracentesis    PRIOR LEVEL OF FUNCTION AND LIVING ENVIRONMENT     Prior Level of Function    Flowsheet Row Most Recent Value   Dominant Hand right   Ambulation independent   Transferring independent   Toileting independent   Bathing independent   Dressing independent   Eating independent   IADLs independent   Driving/Transportation    Communication understands/communicates without difficulty   Prior Level of Function Comment indepedent and active baseline, was jogging prior to recent bunionectomy surgery. drives. retired (had made clothes/uniforms)   Assistive Device Currently Used at Home grab bar        Prior Living Environment    Flowsheet Row Most Recent Value   People " "in Home spouse   Current Living Arrangements home   Home Accessibility stairs within home (Group), stairs to enter home (Group)   Living Environment Comment lives w/ wife in [facility] with 1+1 LARRY no rail, has 1st floor bedroom and full bathroom w/ stall shower + built in bench + GB        Occupational Profile    Flowsheet Row Most Recent Value   Successful Occupations retired, \"made womens clothes for 32 years   Environmental Supports and Barriers supportive wife        VITALS AND PAIN     OT Vitals    Date/Time Pulse Resp SpO2 Pt Activity O2 Therapy BP MAP BP Location BP Method Pt Position [facility]   01/19/24 0830 67 24 97 % -- -- 139/74 -- -- -- -- [facility]   01/19/24 0831 -- -- -- -- -- -- 101 mmHg -- -- -- [facility]   01/19/24 0831 64 23 98 % At rest None (Room air) 139/74 -- Left upper arm Automatic Lying [facility]   01/19/24 0845 76 14 96 % -- -- 154/72 104 mmHg -- -- -- [facility]      OT Pain    Date/Time Pain Type Side/Orientation Location Rating: Rest Rating: Activity Description Interventions [facility]   01/19/24 0831 Pain Assessment right pelvic 0 8 constant pillow support provided;position adjusted [facility]           Objective   OBJECTIVE     Start time:  0830  End time:  0846  Session Length: 16 min  Mode of Treatment: co-treatment, occupational therapy    General Observations  Patient received supine, in bed. He was no issues or concerns identified by nurse prior to session, agreeable to therapy.      Precautions: fall, brace worn at all times, weight bearing (hard collar AAT)  Extremity Weight-Bearing Status: right lower extremity, left lower extremity  Left LE Weight-Bearing Status: weight-bearing as tolerated (WBAT)  Right LE Weight-Bearing Status: weight-bearing as tolerated (WBAT)    Limitations/Impairments: safety/cognitive      OT Eval and Treat - 01/19/24 0830        Cognition    Orientation Status oriented x 4     Affect/Mental Status WFL;anxious     Behavioral Issues difficulty managing stress;overwhelmed easily     Follows Commands " follows one-step commands;increased processing time needed;verbal cues/prompting required;physical/tactile prompts required;repetition of directions required   further impacted by anxiety    Cognitive Function executive function deficit     Attention Deficit focused/sustained attention     Comment, Cognition pt pleasant and motivated to participate in therapy session, but lmited by anxiety and fear of pain with all mov't. pt benefits from positive reinforcment and multimodal cues to faciliate session.     Cognitive Interventions/Strategies occupation/activity based interventions        Bed Mobility    Bed Mobility Activities supine to sit     [place] 2 person assist;maximum assist (25-49% patient effort)     Safety/Cues maximal;tactile cues;verbal cues;hand placement;preparatory posture;technique     Assistive Device bed rails;head of bed elevated     Comment OOB to R Pt requires mod A x 2 wtih resistance noted to advance BLE to edge of bed and A to bring trunk upright.        Sit/Stand Transfer    Surface elevated bed     [place] moderate assist (50-74% patient effort);2 person assist     Safety/Cues maximal;tactile cues;verbal cues;hand placement;preparatory posture;sequencing;technique;proper use of assistive device     Assistive Device walker, front-wheeled     Transfer Comments from edge of bed with max multmodal cues for safe handplacment and upright posture.        Functional Mobility    Distance in room/bathroom     Functional Mobility [place] moderate assist (50-74% patient effort);2 person assist     Safety/Cues maximal;tactile cues;hand placement;verbal cues;preparatory posture;sequencing;technique;proper use of assistive device     Assistive Device walker, front-wheeled     Functional Mobility Comments with max cues for technique and sequencing. A for wt shifting to L to advance LLE        Upper Body Dressing    Tasks don     [place] supervision     Safety/Cues increased time to  complete;minimal;verbal cues     Position edge of bed sitting     Comment max encouragment to actively engage in task        Grooming    Tasks washes, rinses and dries face     Ethan set up;supervision     Safety/Cues increased time to complete     Setup Assistance obtain supplies        Balance    Static Sitting Balance WFL;unsupported;sitting, edge of bed     Dynamic Sitting Balance mild impairment;unsupported;sitting, edge of bed     Sit to Stand Dynamic Balance moderate impairment;supported;standing     Static Standing Balance supported;moderate impairment;standing     Dynamic Standing Balance moderate impairment;supported;standing     Balance Interventions occupation based/functional task        Impairments/Safety Issues    Impairments Affecting Function balance;endurance/activity tolerance;pain;strength;range of motion (ROM)     Safety Issues Affecting Function awareness of need for assistance;insight into deficits/self-awareness;problem-solving;positioning of assistive device;judgment                        Orthosis Neck Cervical Collar (Active)   Date Obtained/Time Obtained: 01/17/24 1900   Location: Neck  Type: Cervical Collar  Features: Hard  Therapeutic Indications: fracture immobilization  Wearing Schedule: wear at all times (remove only for hygiene and skin inspection)     Orthosis Neck Cervical Collar (Active)   Skin Assessment intact;no redness;no breakdown 01/19/24 1005   Compliance/Wearing Issues needs reinforcement;other (see comments) (patient stated neuro told him he doesn't need to wear collar, advised I would reach out to team to confirm, patient removed collar) 01/19/24 1005          Session Outcome  Patient upright, in chair at end of session, chair alarm on, personal items in reach, call light in reach, all needs met. Nursing notified about patient's performance, patient's response to therapy/activity, and patient's position.    AM-PAC™ - ADL (Current Function)     Putting on/taking  off regular lower body clothing 2 - A Lot   Bathing 2 - A Lot   Toileting 2 - A Lot   Putting on/taking off regular upper body clothing 3 - A Little   Help for taking care of personal grooming 3 - A Little   Eating meals 4 - None   AM-PAC™ ADL Score 16      ASSESSMENT AND PLAN     Progress Summary  OT sesssion completed. pt continues to be limted by pain and anxiety leading to need for max cue and mod A x 2 wtih all transfers and mob, max A LB selfcare and encourageent to compelte grooming and UB dressing tasks. pt will continue to beneift from OT to max functional ind and dec carers    Patient/Family Therapy Goal Statement: to have less pain    OT Plan    Flowsheet Row Most Recent Value   Rehab Potential good, to achieve stated therapy goals at 01/19/2024 0830   Therapy Frequency 5 times/wk at 01/19/2024 0830   Planned Therapy Interventions activity tolerance training, adaptive equipment training, BADL retraining, functional balance retraining, occupation/activity based interventions, patient/caregiver education/training, transfer/mobility retraining at 01/19/2024 0830          OT Discharge Recommendations    Flowsheet Row Most Recent Value   OT Recommended Discharge Disposition acute rehab/Inpatient Rehab Facility at 01/19/2024 0830   Anticipated Equipment Needs if Discharged Home (OT) dressing equipment, commode, 3-in-1, shower chair at 01/19/2024 0830               OT Goals    Flowsheet Row Most Recent Value   Bed Mobility Goal 1    Activity/Assistive Device bed mobility activities, all at 01/18/2024 0931   [place] modified independence at 01/18/2024 0931   Time Frame by discharge at 01/18/2024 0931   Progress/Outcome goal ongoing at 01/18/2024 0931   Transfer Goal 1    Activity/Assistive Device all transfers at 01/18/2024 0931   [place] modified independence at 01/18/2024 0931   Time Frame by discharge at 01/18/2024 0931   Progress/Outcome goal ongoing at 01/18/2024 0931   Dressing Goal 1     Activity/Adaptive Equipment dressing skills, all at 01/18/2024 0931   [place] modified independence at 01/18/2024 0931   Time Frame by discharge at 01/18/2024 0931   Progress/Outcome goal ongoing at 01/18/2024 0931   Grooming Goal 1    Activity/Assistive Device grooming skills, all at 01/18/2024 0931   [place] modified independence at 01/18/2024 0931   Time Frame by discharge at 01/18/2024 0931   Progress/Outcome goal ongoing at 01/18/2024 0931   Precaution Goal 1    Activity spinal precautions at 01/18/2024 0931   [place]/Cues independently at 01/18/2024 0931   Time Frame by discharge at 01/18/2024 0931   Progress/Outcome goal ongoing at 01/18/2024 0931

## 2024-01-19 NOTE — PROGRESS NOTES
"   Physical Therapy -  Daily Treatment/Progress Note     Patient: Randolph Daley  Location: [facility] [address] [address]  MRN: [id number]  Today's date: 1/19/2024    HISTORY OF PRESENT ILLNESS     Randolph is a 83 y.o. male admitted on 1/17/2024 with Fall due to slipping on ice or snow, initial encounter [W00.9XXA]. Principal problem is Fall due to slipping on ice or snow, initial encounter.    Past Medical History  Randolph has no past medical history on file.    History of Present Illness   Presented to [facility] 1/17 after slip and fall on ice  CT head negative for acute changes, \"Lateral ventriculomegaly, indeterminate though usually related to volume loss or normal pressure hydrocephalus\"  CT C/S:   Age-indeterminate odontoid process fracture -> pending further imaging per neurosurgery. Maintain c-collar AAT    + R pubic ramus fx with hematoma -> per ortho fx is stable, no intervention required -> WBAT B/L LE   +R posterior rib fx 8, 9, 10 , 11  +also bilateral pleural effusion left larger than right (history of RSV couple weeks ago, fluid is not hemorrhagic)  Txf to SICU for trauma service     urology is consulted for concerns for prostatic urethral injury , from pelvic hematoma -> avila catheter placed     1/18: bedside left thoracentesis    PRIOR LEVEL OF FUNCTION AND LIVING ENVIRONMENT     Prior Level of Function    Flowsheet Row Most Recent Value   Dominant Hand right   Ambulation independent   Transferring independent   Toileting independent   Bathing independent   Dressing independent   Eating independent   IADLs independent   Driving/Transportation    Communication understands/communicates without difficulty   Prior Level of Function Comment indepedent and active baseline, was jogging prior to recent bunionectomy surgery. drives. retired (had made clothes/uniforms)   Assistive Device Currently Used at Home grab bar        Prior Living Environment    Flowsheet Row Most Recent Value "   People in Home spouse   Current Living Arrangements home   Home Accessibility stairs within home (Group), stairs to enter home (Group)   Living Environment Comment lives w/ wife in [facility] with 1+1 LARRY no rail, has 1st floor bedroom and full bathroom w/ stall shower + built in bench + GB        VITALS AND PAIN     PT Vitals    Date/Time Pulse Resp SpO2 Pt Activity O2 Therapy BP MAP BP Location BP Method Pt Position [facility]   01/19/24 0830 67 24 97 % -- -- 139/74 -- -- -- -- [facility]   01/19/24 0831 -- -- -- -- -- -- 101 mmHg -- -- -- [facility]   01/19/24 0831 64 23 98 % At rest None (Room air) 139/74 -- Left upper arm Automatic Lying [facility]   01/19/24 0845 76 14 96 % -- -- 154/72 104 mmHg -- -- -- [facility]      PT Pain    Date/Time Pain Type Side/Orientation Location Rating: Rest Rating: Activity Description Interventions [facility]   01/19/24 0831 Pain Assessment right pelvic 0 8 constant pillow support provided;position adjusted SLK           Objective   OBJECTIVE     Start time:  0829  End time:  0847  Session Length: 18 min  Mode of Treatment: physical therapy, co-treatment (co-tx with OT given high pain levels and pt requiring Ax2 prior session)    General Observations  Patient received in bed, supine. He was agreeable to therapy, no issues or concerns identified by nurse prior to session. c-collar in place    Precautions: fall, brace worn at all times, weight bearing (hard c-collar AAT)  Extremity Weight-Bearing Status: right lower extremity, left lower extremity  Left LE Weight-Bearing Status: weight-bearing as tolerated (WBAT)  Right LE Weight-Bearing Status: weight-bearing as tolerated (WBAT)    Limitations/Impairments: safety/cognitive      PT Eval and Treat - 01/19/24 0829        Cognition    Orientation Status oriented x 4     Affect/Mental Status WFL;anxious     Behavioral Issues difficulty managing stress;overwhelmed easily;verbal outbursts     Follows Commands follows one-step commands;over 90% accuracy;increased processing time  needed;verbal cues/prompting required;repetition of directions required;physical/tactile prompts required     Comment, Cognition Pt pleasant, cooperative, motivated for OOB. However is anxious w/ regard to pain. Benefits from frequent encouragement/ positive reassurance. Also benefits from step-by-step cuing for txf technique and ambulation.        Bed Mobility    Bed Mobility Activities supine to sit     [place] maximum assist (25-49% patient effort);2 person assist     Safety/Cues increased time to complete;sequencing;hand placement;technique;initiation     Assistive Device head of bed elevated;bed rails;draw sheet     Comment OOB to R, pt w/ minimal initiation of RLE toward EOB and resistant to therapist's assist to advance. Ultimately requires maxAx2 at trunk for upright and at LEs to transition to EOB. Significantly increased time to scoot forward, w/ reliance on B/L UEs.        Mobility Belt    Mobility Belt Used for All Out of Bed Activity no     Reason Mobility Belt Not Used medical contraindication     Reason Mobility Belt Not Used rib fx        Sit/Stand Transfer    Surface edge of bed     [place] moderate assist (50-74% patient effort);2 person assist     Safety/Cues increased time to complete;sequencing;technique;preparatory posture;hand placement;maintaining center of gravity over base of support;proper use of assistive device;maximal;verbal cues;tactile cues     Assistive Device walker, front-wheeled     Transfer Comments from EOB, assist to boost force production and maintain balance. pt w/ increased reliance on LLE. pt slow to rise and demo's forward flexed posture. requires max cues for full LE extension and for upright, also cues for optimal UE use to offload LEs        Gait Training    [place], Gait moderate assist (50-74% patient effort);2 person assist     Safety/Cues increased time to complete;technique;proper use of assistive device;hand  placement;initiation;sequencing;maintaining center of gravity over base of support;gait deviations     Assistive Device walker, front-wheeled     Distance in Feet 14 feet     Pattern step-to     Deviations/Abnormal Patterns antalgic;step length decreased;ace decreased     Comment (Gait/Stairs) ambulated forward from EOB to window. pt still requiring assist at trunk for L lateral weight shift. for first  2 steps pt requiring therapist to advance RLE. w/ max cues and encouragement pt able to clear R foot from floor surface to take steps (though still requiring assist for lateral weight shift and balance). improved step length w/ LLE noted. distance limited by fatigue. several standing rest breaks required throughout due to pain        Stairs Training    [place], Stairs not tested     Comment unsafe at this time given performance w/ ambulation        Balance    Static Sitting Balance WFL;unsupported;sitting, edge of bed     Dynamic Sitting Balance mild impairment;sitting, edge of bed     Sit to Stand Dynamic Balance moderate impairment;supported     Static Standing Balance moderate impairment;supported     Dynamic Standing Balance moderate impairment;supported     Comment, Balance minAx2 for static stand w/ RW. cues for upright posture, RLE extension in stance, and optimal UE use on walker to offload RLE. pt benefits from RW for pain mgmt, balance, energy conservation though requires assist for safe use        Impairments/Safety Issues    Impairments Affecting Function balance;endurance/activity tolerance;pain;strength;range of motion (ROM)     Safety Issues Affecting Function awareness of need for assistance;insight into deficits/self-awareness;problem-solving;positioning of assistive device;judgment;safety precautions follow-through/compliance;safety precaution awareness                        Orthosis Neck Cervical Collar (Active)   Date Obtained/Time Obtained: 01/17/24 1900   Location: Neck  Type: Cervical  Collar  Features: Hard  Therapeutic Indications: fracture immobilization  Wearing Schedule: wear at all times (remove only for hygiene and skin inspection)     Orthosis Neck Cervical Collar (Active)   Skin Assessment intact;no redness;no breakdown 01/19/24 1005   Compliance/Wearing Issues needs reinforcement;other (see comments) (patient stated neuro told him he doesn't need to wear collar, advised I would reach out to team to confirm, patient removed collar) 01/19/24 1005       Education Documentation  Treatment Plan, taught by Humaira Addison, PT at 1/19/2024 12:17 PM.  Learner: Patient  Readiness: Acceptance  Method: Explanation  Response: Verbalizes Understanding, Needs Reinforcement  Comment: role of PT, PoC, safety w/ mobility, c-collar use, txf technique and gait mechanics, RW use, rehab goals        Session Outcome  Patient in chair, upright at end of session, all needs met, personal items in reach, chair alarm on, call light in reach. Nursing notified about patient's position, patient's performance, and patient's response to therapy/activity. (RN present)    AM-PAC™ - Mobility (Current Function)     Turning form your back to your side while in flat bed without using bedrails 2 - A Lot   Moving from lying on your back to sitting on the side of a flat bed without using bedrails 2 - A Lot   Moving to and from a bed to a chair 2 - A Lot   Standing up from a chair using your arms 2 - A Lot   To walk in a hospital room 2 - A Lot   Climbing 3-5 steps with a railing 1 - Total   AM-PAC™ Mobility Score 11      ASSESSMENT AND PLAN     Progress Summary  Randolph requires maxAx2 for bed mobility in follow up PT session and modAx2 for STS txfs. He remains motivated for OOB though benefits from frequent encouragment and positive reassurance due to pain. Tolerated increased ambulation distance today within room w/ RW and demo's improved ability to advance RLE. Will continue to follow to address strength, balance,  endurance, safety deficits. Continue to rec acute rehab at d/c as pt remains high fall risk and well below independent, active baseline    Patient/Family Therapy Goals Statement: have less pain    PT Plan    Flowsheet Row Most Recent Value   Rehab Potential good, to achieve stated therapy goals at 01/19/2024 0829   Therapy Frequency 5 times/wk at 01/19/2024 0829   Planned Therapy Interventions balance training, bed mobility training, gait training, home exercise program, postural re-education, strengthening, patient/family education, stair training, transfer training, neuromuscular re-education, ROM (range of motion) at 01/19/2024 0829          PT Discharge Recommendations    Flowsheet Row Most Recent Value   PT Recommended Discharge Disposition acute rehab/Inpatient Rehab Facility at 01/19/2024 0829   Anticipated Equipment Needs if Discharged Home (PT) walker, front-wheeled, wheelchair, wheelchair cushion at 01/19/2024 0829               PT Goals    Flowsheet Row Most Recent Value   Bed Mobility Goal 1    Activity/Assistive Device bed mobility activities, all at 01/18/2024 0935   [place] supervision required at 01/18/2024 0935   Time Frame by discharge at 01/18/2024 0935   Progress/Outcome goal ongoing at 01/18/2024 0935   Transfer Goal 1    Activity/Assistive Device all transfers, walker, front-wheeled at 01/18/2024 0935   [place] minimum assist (75% or more patient effort) at 01/18/2024 0935   Time Frame by discharge at 01/18/2024 0935   Progress/Outcome goal ongoing at 01/18/2024 0935   Gait Training Goal 1    Activity/Assistive Device gait (walking locomotion), decrease fall risk, diminish gait deviation, increase endurance/gait distance, improve balance and speed at 01/18/2024 0935   [place] minimum assist (75% or more patient effort) at 01/18/2024 0935   Distance 40 at 01/18/2024 0935   Time Frame by discharge at 01/18/2024 0935   Progress/Outcome goal ongoing at 01/18/2024 0935

## 2024-01-19 NOTE — PROGRESS NOTES
Patient refused MRI at time of procedure, unwilling to proceed despite offers of pain medication and anti-anxiolytics. Continue hard cervical collar, will obtain MRI tomorrow if patient is agreeable.

## 2024-01-19 NOTE — PLAN OF CARE
Care Coordination Discharge Plan Note     Discharge Needs Assessment  Concerns to be Addressed: care coordination/care conferences, discharge planning  Current Discharge Risk: physical impairment    Anticipated Discharge Plan  Anticipated Discharge Disposition: acute rehab/Inpatient Rehab Facility, home with home health       Patient Choice  Offered/Gave Vendor List: no  Patient's Choice of Community Agency(s):           ---------------------------------------------------------------------------------------------------------------------    Interdisciplinary Discharge Plan Review:  Participants:advanced practice provider, family/lay caregiver, physician, , nursing, dietitian/nutrition services, occupational therapy, physical therapy, social work/services    Concerns Comments: Per trauma rounds, Pt admitted s/p fall with rib fxs and pubic rami fx with pelvic hematoma. Pt went to IR yesterday for thoracentisis, 1L removed. AM CXR. Pt unable to complete MRI last night due to claustrophobia.. Collar AAT. Pt tariq rec'd for [facility], SW to discuss with Pt. CM will follow.    Discharge Plan:   Disposition/Destination:   /    Discharge Facility:    Community Resources:      Discharge Transportation:  Is Out of Hospital DNR needed at Discharge: no  Does patient need discharge transport?

## 2024-01-19 NOTE — PROGRESS NOTES
Called to room, patient transferred without cervical collar to step down unit.  Patient briefly allowed C collar but he quickly removed.  Patient fell on ice resulting in C2 fracture with widening of first and second cervical interspinous process with concern for stability.  Per neurosurgery recommendation, patient to maintain cervical collar.  Also recommended MRI of C spine with possible need for surgical fixation.    Discussed with patient and family at length.  Patient states he is claustrophobic and will not discuss MRI.  He refuses to discuss sedation or calming techniques and will not consider MRI at this time.  Patient also states that he will not consent for surgery and does not wish to discuss further.      Patient refuses cervical collar stating he is claustrophobic and is unable to breathe with C Collar.  He states his injury is from prior fall and does not need to be further worked up or need surgery.  I explained the CT results and limitations of CT which require MRI for diagnosistics.  I also explained that an unstable C2 injury may result in paralysis, increased pain, paresthesia, or death.  Patient expresses gratitude in information but continues to refuse C Collar.  I also informed patient that an unstable C2 fracture may result in catastrophic injury should he experience another fall.  He states he is able to golf and walk grandchildren to the bus stop.  I emphasized concern for further injury and disability resulting from future traumatic injury.    Patient is neuro inact with 5/5 strength to bilateral upper and left lower extremities.  Right lower extremity strength is limited to pain secondary to pelvic fracture.     Patient and family advised to notify trauma if patient changes will consent to C collar or MRI.

## 2024-01-19 NOTE — PROGRESS NOTES
Per chart review, pt rec'd for Acute Rehab. However, if pt able to ambulate household distances and transfer with 1 person asssist, can consider d/c home with home health.     SW spoke with pt and pts spouse via phone, provided education on services provided at [facility] level of care. SW discussed referral and insurance authorization process as well, pts wife verbalized understanding. Pt and pts spouse were visiting from the [facility] facility in the area as family would be readily available to provide support. Pts wife stated they would discuss placement options with family and follow up with SW.     Soft referral made to [facility] liaison. DISPO: [facility] OTF: medical stability, bed confirmation and insurance authorization.     ADDENDUM: SW followed up with pts spouse, family still discussing [facility] vs [facility] near pts home in [facility]. Pts wife stated decision would be made tomorrow. SW explained weekend SW would be available to follow up and discuss further. SW to continue to coordinate care until discharge. VANESA Ball

## 2024-01-19 NOTE — PLAN OF CARE
Problem: Adult Inpatient Plan of Care  Goal: Plan of Care Review  Outcome: Progressing  Flowsheets (Taken 1/19/2024 0552)  Progress: improving  Outcome Evaluation: VSS, trending CBC q6hr.  Pain well controlled.  Foleyb remians in place with adequate urine output.  Patient did have multiple hours in a row with large urine outputs with provider aware.  Plan of Care Reviewed With: patient

## 2024-01-19 NOTE — PROGRESS NOTES
Patient was seen and examined 01/19/24     CC: Right hip pain  Subjective   Patient continues to have pain limited amatory dysfunction.  He is max assist x 2 for sit to stand out of the recliner with myself and nursing today.  He asked if he could wait till tomorrow to evaluate the need for rehab,- I told him I would not expect him to be safe to DC home by tomorrow, and that he would benefit from acute rehab at discharge.    Issues regarding the c-collar and MRI reviewed.  Patient was wearing the cervical collar this morning on my exam.    Physical Exam    Objective     Vital signs in last 24 hours:  Temp:  [36.3 °C (97.3 °F)-37 °C (98.6 °F)] 36.3 °C (97.3 °F)  Heart Rate:  [53-76] 60  Resp:  [12-24] 18  BP: (111-167)/(57-83) 121/61     Gen: No acute distress  HEENT: MMM, atraumatic   CV: RRR, no peripheral edema  Pulm: comfortable on RA, CTAB  Abd: soft, NT  Ext: no evidence of spasticity or rigidity   Skin: no rashes, warm, dry   Neuro: CN intact, SILT in all extremities 4/5 left-sided hip flexion, 3/5 right-sided hip flexion, pain limited.  Psych: AAOx3, appropriate, pleasant     Lab Results   Component Value Date    WBC 7.54 01/19/2024    HGB 8.9 (L) 01/19/2024    HCT 27.2 (L) 01/19/2024    PLT 88 (L) 01/19/2024    ALT 8 01/17/2024    AST 16 01/17/2024     01/19/2024    K 4.0 01/19/2024     01/19/2024    CREATININE 1.6 (H) 01/19/2024    BUN 29 (H) 01/19/2024    CO2 22 01/19/2024    INR 1.7 01/18/2024         Labs   Labs from today reviewed.  WBC 7.5, hemoglobin 8.9, sodium 137, potassium 4.0, creatinine 1.6  Imaging   kidney ultrasound from yesterday reviewed.  IMPRESSION:  Multiple right renal cortical and sinus cysts. Lesion seen in the right lower  pole on CT corresponds to an exophytic cyst with some marginal calcification.  Mild right hydronephrosis.     Small left renal cortical cysts, largest measured in the upper pole.     Mildly increased renal cortical echogenicity, likely related to  underlying  chronic medical renal disease.       Assessment   Assessment     83-year-old gentleman now with functional deficits in mobility, transfers, ADLs 2/2     Fall with subsequent pelvic fractures, right rib fractures, questionable C2 fracture..  Fortunately, he is neurologically intact.  PT/OT functional assessments from this morning reviewed.  -Patient refusing MRI.  Intermittent compliance with cervical collar.  However, this morning, when I assessed him, he was wearing his cervical collar.   Patient was mod assist x 2 to transfer and ambulating 5-10 feet  -Agree that functional assessments would be improved if patient was willing to take opioid analgesia.     Questionable prostatic urethral injury: Urology consulted.  [place]om[place] in place.     Right superior pubic rami fracture, abductor/obturator hematoma: Seen by orthopedics.  Weightbearing as tolerated.        Rehab rec: Functional assessment was not significantly better today.  He still mod to max assist of 2 for sit to stand trial, ambulating about 10 feet.  Agree with recommendation for acute rehab upon discharge.    Patient requires 24-hour nursing and 3 times a week physiatry oversight given his medical complexity regarding unstable C2 fracture and prostatic urethral injury with new indwelling Womack in place.  Patient requires urology follow-up in 1 week.  Anticipate with therapy he will do quite well and anticipated length of stay 7 to 10 days.  He is motivated to return home soon as possible and his wife is willing to provide supervision and assist.     Plan of care was discussed with therapy and social work    Maggie Gallagher DO  Pager [phone]  1/19/2024  1:55 PM

## 2024-01-19 NOTE — PROGRESS NOTES
Neurosurgery Daily Progress Note    Subjective/Objective:     No overnight events.  Patient is without any neck pain, pain extending to his upper extremities, change sensation or changes strength.    Temp:  [36.6 °C (97.9 °F)-37 °C (98.6 °F)] 37 °C (98.6 °F)  Heart Rate:  [53-76] 64  Resp:  [12-24] 17  BP: (111-167)/(57-83) 128/60    Intake/Output Summary (Last 24 hours) at 1/19/2024 0935  Last data filed at 1/19/2024 0900  Gross per 24 hour   Intake 3150 ml   Output 4160 ml   Net -1010 ml         General: lying supine in bed in no acute distress  Neuro: A&O x 3, interacting appr with fluent speech  Cn: EOMI, no facial asymmetry, tongue midline  Motor: 5/5 throughout all 4 ext without pronator drift  Sensation: Intact and equal to light touch all 4 ext      Scheduled Meds:  • acetaminophen  975 mg oral q6h RUDY   • ascorbic acid  1,000 mg oral Daily   • levothyroxine  25 mcg oral Daily before breakfast   • sennosides-docusate sodium  1 tablet oral BID     Continuous Infusions:  PRN Meds:.•  bisacodyL  •  glucose **OR** dextrose **OR** glucagon **OR** dextrose 50 % in water (D50)  •  HYDROmorphone  •  ondansetron  •  traMADoL    CBC Results       01/19/24 01/18/24 01/18/24     0522 2318 0028    WBC 7.54 8.48 9.42    RBC 2.86 2.77 2.95    HGB 8.9 8.8 9.4    HCT 27.2 26.0 28.4    MCV 95.1 93.9 96.3    MCH 31.1 31.8 31.9    MCHC 32.7 33.8 33.1    PLT 88 89 99         Comment for PLT at 0522 on 01/19/24: CONSISTENT WITH PREVIOUS RESULTS    Comment for PLT at 2318 on 01/18/24: ALL RESULTS HAVE BEEN RECHECKED        BMP Results       01/19/24 01/18/24 01/17/24     0522 0028 1634     135 137    K 4.0 4.3 4.4    Cl 103 102 103    CO2 22 24 24    Glucose 90 108 108    BUN 29 31 33    Creatinine 1.6 1.6 1.6    Calcium 9.4 9.7 9.9    Anion Gap 12 9 10    EGFR 43.0 43.0 43.0         Comment for EGFR at 0522 on 01/19/24: Calculation based on the Chronic Kidney Disease Epidemiology Collaboration (CKD-EPI) equation refit  without adjustment for race.    Comment for EGFR at 0028 on 01/18/24: Calculation based on the Chronic Kidney Disease Epidemiology Collaboration (CKD-EPI) equation refit without adjustment for race.    Comment for EGFR at 1634 on 01/17/24: Calculation based on the Chronic Kidney Disease Epidemiology Collaboration (CKD-EPI) equation refit without adjustment for race.        Results from last 7 days   Lab Units 01/18/24  2318 01/18/24  0028 01/17/24  1634   INR  1.7 1.8 1.6   PTT sec 36* 38* 37*       Laboratory results were personally reviewed.    Imaging:    X-ray cervical spine  Odontoid process fracture better seen on recent CT. With flexion, there is  widening of the 1st-2nd cervical interspinous process space as well as slight  anterior translocation of the superior fracture fragment, suspicious for  instability across the odontoid process fracture.     --  IMPRESSION: Please see comment.    Imaging was personally reviewed.      Assessment/Plan:    To summarize, Randolph Daley is a 83 y.o. male who presented to [facility] after a slip and fall on ice landing on his right side.  He was found to have multiple injuries including right rib fractures, bilateral pleural effusion, pelvic fracture, and age-indeterminate cervical fracture.     It is likely this C2 fracture was from prior injury as it appears to have well-corticated margins. Flexion-extension x-rays of the cervical spine disclose widening of the first and second cervical interspinous process concerning for instability.      Recommend patient undergo an MRI of the cervical spine.  At this time he is declining.  We discussed with patient in regards to this injury as it is likely an unhealed and unstable fracture and he is at risk should he sustain unrestrained forces across his head and neck.  We also discussed surgical intervention for fixation of this fracture, patient is not interested at this time.  Recommend continuing with a hard cervical  collar.      Please call neurosurgery with additional questions, concerns, new symptoms

## 2024-01-20 LAB
ANION GAP SERPL CALC-SCNC: 9 MEQ/L (ref 3–15)
BUN SERPL-MCNC: 26 MG/DL (ref 7–25)
CALCIUM SERPL-MCNC: 9.4 MG/DL (ref 8.6–10.3)
CHLORIDE SERPL-SCNC: 101 MEQ/L (ref 98–107)
CO2 SERPL-SCNC: 25 MEQ/L (ref 21–31)
CREAT SERPL-MCNC: 1.5 MG/DL (ref 0.7–1.3)
EGFRCR SERPLBLD CKD-EPI 2021: 46.5 ML/MIN/1.73M*2
ERYTHROCYTE [DISTWIDTH] IN BLOOD BY AUTOMATED COUNT: 15.6 % (ref 11.6–14.4)
GLUCOSE SERPL-MCNC: 90 MG/DL (ref 70–99)
HCT VFR BLD AUTO: 27.7 % (ref 40.1–51)
HGB BLD-MCNC: 9.1 G/DL (ref 13.7–17.5)
MAGNESIUM SERPL-MCNC: 1.9 MG/DL (ref 1.8–2.5)
MCH RBC QN AUTO: 31.4 PG (ref 28–33.2)
MCHC RBC AUTO-ENTMCNC: 32.9 G/DL (ref 32.2–36.5)
MCV RBC AUTO: 95.5 FL (ref 83–98)
PDW BLD AUTO: 10.7 FL (ref 9.4–12.4)
PHOSPHATE SERPL-MCNC: 3.4 MG/DL (ref 2.4–4.7)
PLATELET # BLD AUTO: 84 K/UL (ref 150–350)
POTASSIUM SERPL-SCNC: 4 MEQ/L (ref 3.5–5.1)
RBC # BLD AUTO: 2.9 M/UL (ref 4.5–5.8)
SODIUM SERPL-SCNC: 135 MEQ/L (ref 136–145)
WBC # BLD AUTO: 7.13 K/UL (ref 3.8–10.5)

## 2024-01-20 PROCEDURE — 83735 ASSAY OF MAGNESIUM: CPT | Performed by: NURSE PRACTITIONER

## 2024-01-20 PROCEDURE — 99231 SBSQ HOSP IP/OBS SF/LOW 25: CPT | Performed by: PHYSICIAN ASSISTANT

## 2024-01-20 PROCEDURE — 85027 COMPLETE CBC AUTOMATED: CPT | Performed by: NURSE PRACTITIONER

## 2024-01-20 PROCEDURE — 80048 BASIC METABOLIC PNL TOTAL CA: CPT | Performed by: NURSE PRACTITIONER

## 2024-01-20 PROCEDURE — 36415 COLL VENOUS BLD VENIPUNCTURE: CPT | Performed by: NURSE PRACTITIONER

## 2024-01-20 PROCEDURE — 63700000 HC SELF-ADMINISTRABLE DRUG: Performed by: NURSE PRACTITIONER

## 2024-01-20 PROCEDURE — 21400000 HC ROOM AND CARE CCU/INTERMEDIATE

## 2024-01-20 PROCEDURE — 63600000 HC DRUGS/DETAIL CODE: Mod: JZ

## 2024-01-20 PROCEDURE — 99231 SBSQ HOSP IP/OBS SF/LOW 25: CPT | Performed by: SURGERY

## 2024-01-20 PROCEDURE — 63700000 HC SELF-ADMINISTRABLE DRUG

## 2024-01-20 PROCEDURE — 84100 ASSAY OF PHOSPHORUS: CPT | Performed by: NURSE PRACTITIONER

## 2024-01-20 RX ORDER — DOCUSATE SODIUM 100 MG/1
100 CAPSULE, LIQUID FILLED ORAL 2 TIMES DAILY
Status: DISCONTINUED | OUTPATIENT
Start: 2024-01-20 | End: 2024-01-24 | Stop reason: HOSPADM

## 2024-01-20 RX ORDER — GUAIFENESIN 600 MG/1
600 TABLET, EXTENDED RELEASE ORAL 2 TIMES DAILY
Status: DISCONTINUED | OUTPATIENT
Start: 2024-01-20 | End: 2024-01-20

## 2024-01-20 RX ORDER — GUAIFENESIN 100 MG/5ML
400 SOLUTION ORAL EVERY 6 HOURS PRN
Status: DISCONTINUED | OUTPATIENT
Start: 2024-01-20 | End: 2024-01-24 | Stop reason: HOSPADM

## 2024-01-20 RX ORDER — LANOLIN ALCOHOL/MO/W.PET/CERES
800 CREAM (GRAM) TOPICAL ONCE
Status: COMPLETED | OUTPATIENT
Start: 2024-01-20 | End: 2024-01-20

## 2024-01-20 RX ORDER — BISACODYL 5 MG
10 TABLET, DELAYED RELEASE (ENTERIC COATED) ORAL DAILY
Status: DISCONTINUED | OUTPATIENT
Start: 2024-01-20 | End: 2024-01-24 | Stop reason: HOSPADM

## 2024-01-20 RX ORDER — SENNOSIDES 8.6 MG/1
3 TABLET ORAL NIGHTLY
Status: DISCONTINUED | OUTPATIENT
Start: 2024-01-20 | End: 2024-01-24 | Stop reason: HOSPADM

## 2024-01-20 RX ADMIN — MAGNESIUM OXIDE TAB 400 MG (241.3 MG ELEMENTAL MG) 800 MG: 400 (241.3 MG) TAB at 12:21

## 2024-01-20 RX ADMIN — GUAIFENESIN 600 MG: 600 TABLET ORAL at 09:32

## 2024-01-20 RX ADMIN — DOCUSATE SODIUM 100 MG: 100 CAPSULE, LIQUID FILLED ORAL at 09:32

## 2024-01-20 RX ADMIN — ACETAMINOPHEN 975 MG: 325 TABLET ORAL at 18:31

## 2024-01-20 RX ADMIN — SENNOSIDES 3 TABLET: 8.6 TABLET, FILM COATED ORAL at 21:55

## 2024-01-20 RX ADMIN — Medication 1000 MG: at 09:32

## 2024-01-20 RX ADMIN — ENOXAPARIN SODIUM 30 MG: 30 INJECTION SUBCUTANEOUS at 06:02

## 2024-01-20 RX ADMIN — GUAIFENESIN 200 MG: 100 SOLUTION ORAL at 04:28

## 2024-01-20 RX ADMIN — ACETAMINOPHEN 975 MG: 325 TABLET ORAL at 12:20

## 2024-01-20 RX ADMIN — GUAIFENESIN 400 MG: 100 SOLUTION ORAL at 16:43

## 2024-01-20 RX ADMIN — ENOXAPARIN SODIUM 30 MG: 30 INJECTION SUBCUTANEOUS at 18:31

## 2024-01-20 RX ADMIN — GUAIFENESIN 400 MG: 100 SOLUTION ORAL at 22:53

## 2024-01-20 RX ADMIN — ACETAMINOPHEN 975 MG: 325 TABLET ORAL at 03:35

## 2024-01-20 RX ADMIN — BISACODYL 10 MG: 5 TABLET, COATED ORAL at 09:32

## 2024-01-20 RX ADMIN — LEVOTHYROXINE SODIUM 25 MCG: 0.03 TABLET ORAL at 08:00

## 2024-01-20 ASSESSMENT — COGNITIVE AND FUNCTIONAL STATUS - GENERAL
STANDING UP FROM CHAIR USING ARMS: 2 - A LOT
WALKING IN HOSPITAL ROOM: 1 - TOTAL
WALKING IN HOSPITAL ROOM: 1 - TOTAL
MOVING TO AND FROM BED TO CHAIR: 2 - A LOT
MOVING TO AND FROM BED TO CHAIR: 2 - A LOT
STANDING UP FROM CHAIR USING ARMS: 2 - A LOT
CLIMB 3 TO 5 STEPS WITH RAILING: 1 - TOTAL
STANDING UP FROM CHAIR USING ARMS: 2 - A LOT
CLIMB 3 TO 5 STEPS WITH RAILING: 1 - TOTAL
MOVING TO AND FROM BED TO CHAIR: 2 - A LOT
CLIMB 3 TO 5 STEPS WITH RAILING: 1 - TOTAL
WALKING IN HOSPITAL ROOM: 1 - TOTAL

## 2024-01-20 NOTE — NURSING NOTE
Patient refusing to keep c-collar on at all times. Reiterated neurology's recommendation to wear it at all times. Patient continues to remove it. Patient stated that NP advised him he did not have to wear it all the time. Reached out to COURTNEY Nguyen Ma regarding directives. Per COURTNEY Nguyen Ma, he advised patient and his wife that it is recommended that patient wears the c-collar at all times, but if he chooses not to, he needs to understand the risks. I reiterated the directives and the risks to patient and his wife.

## 2024-01-20 NOTE — PROGRESS NOTES
"Neurosurgery Daily Progress Note    Subjective/Objective:     No overnight events.  Patient is without any neck pain, pain extending to his upper extremities, change sensation or changes strength. Describes discomfort from collar and difficulty breathing \"because I'm claustrophobic\". Wife at bedside.    Temp:  [36.3 °C (97.3 °F)-36.8 °C (98.2 °F)] 36.7 °C (98 °F)  Heart Rate:  [56-76] 56  Resp:  [14-18] 16  BP: (121-154)/(60-75) 131/66    Intake/Output Summary (Last 24 hours) at 1/20/2024 0834  Last data filed at 1/20/2024 0335  Gross per 24 hour   Intake 510 ml   Output 3850 ml   Net -3340 ml         General: OOB sittin on commode in no acute distress  Neuro: A&O x 3, interacting appr with fluent speech  Cn: EOMI, no facial asymmetry, tongue midline  Motor: 5/5 throughout all 4 ext without pronator drift, except right hip flexion 4+/5 (patient attributes to pelvic fracture)  Sensation: Intact and equal to light touch all 4 ext    Wearing c-collar.      Scheduled Meds:  • acetaminophen  975 mg oral q6h RUDY   • ascorbic acid  1,000 mg oral Daily   • bisacodyL  10 mg oral Daily   • docusate sodium  100 mg oral BID   • enoxaparin  30 mg subcutaneous q12h (6a, 6p)   • levothyroxine  25 mcg oral Daily before breakfast   • senna  3 tablet oral Nightly     Continuous Infusions:  PRN Meds:.•  bisacodyL  •  [DISCONTINUED] glucose **OR** [DISCONTINUED] dextrose **OR** [DISCONTINUED] glucagon **OR** dextrose 50 % in water (D50)  •  guaiFENesin  •  ondansetron  •  traMADoL     CBC Results       01/20/24 01/19/24 01/18/24     0707 0522 2318    WBC 7.13 7.54 8.48    RBC 2.90 2.86 2.77    HGB 9.1 8.9 8.8    HCT 27.7 27.2 26.0    MCV 95.5 95.1 93.9    MCH 31.4 31.1 31.8    MCHC 32.9 32.7 33.8    PLT 84 88 89         Comment for PLT at 0707 on 01/20/24: CONSISTENT WITH PREVIOUS RESULTS    Comment for PLT at 0522 on 01/19/24: CONSISTENT WITH PREVIOUS RESULTS    Comment for PLT at 2318 on 01/18/24: ALL RESULTS HAVE BEEN RECHECKED    "     BMP Results       01/20/24 01/19/24 01/18/24     0707 0522 0028     137 135    K 4.0 4.0 4.3    Cl 101 103 102    CO2 25 22 24    Glucose 90 90 108    BUN 26 29 31    Creatinine 1.5 1.6 1.6    Calcium 9.4 9.4 9.7    Anion Gap 9 12 9    EGFR 46.5 43.0 43.0         Comment for EGFR at 0707 on 01/20/24: Calculation based on the Chronic Kidney Disease Epidemiology Collaboration (CKD-EPI) equation refit without adjustment for race.    Comment for EGFR at 0522 on 01/19/24: Calculation based on the Chronic Kidney Disease Epidemiology Collaboration (CKD-EPI) equation refit without adjustment for race.    Comment for EGFR at 0028 on 01/18/24: Calculation based on the Chronic Kidney Disease Epidemiology Collaboration (CKD-EPI) equation refit without adjustment for race.            Results from last 7 days   Lab Units 01/18/24  2318 01/18/24  0028 01/17/24  1634   INR  1.7 1.8 1.6   PTT sec 36* 38* 37*       Laboratory results were personally reviewed.    Imaging:    X-ray cervical spine  Odontoid process fracture better seen on recent CT. With flexion, there is  widening of the 1st-2nd cervical interspinous process space as well as slight  anterior translocation of the superior fracture fragment, suspicious for  instability across the odontoid process fracture.     --  IMPRESSION: Please see comment.    Imaging was personally reviewed.      Assessment/Plan:    To summarize, Randolph Daley is a 83 y.o. male who presented to [facility] after a slip and fall on ice landing on his right side.  He was found to have multiple injuries including right rib fractures, bilateral pleural effusion, pelvic fracture, and age-indeterminate cervical fracture.     It is likely this C2 fracture was from prior injury as it appears to have well-corticated margins. Flexion-extension x-rays of the cervical spine disclose widening of the first and second cervical interspinous process concerning for instability.      Recommend  patient undergo an MRI of the cervical spine.  At this time he continues to decline.  We discussed with patient in regards to this injury as it is likely an unhealed and unstable fracture and he is at risk should he sustain unrestrained forces across his head and neck.  We also discussed surgical intervention for fixation of this fracture, patient is not interested at this time.  Recommend continuing with a hard cervical collar.      Danette Morin PA-C  [id number]    Please call neurosurgery with additional questions, concerns, new symptoms

## 2024-01-20 NOTE — NURSING NOTE
"VSS on RA, AAOx4. Pt continuously refusing c-collar. RN reinforced need to wear the collar at all times, however patient continues to take collar off when RN leaves the room and states it is \"making him cough and choke.\" Womack draining very large amounts of urine, CRNP made aware. Pt c/o rib pain but refusing pain medication when offered. Pt currently resting in bed with call light in reach.  "

## 2024-01-20 NOTE — PLAN OF CARE
Care Coordination Discharge Plan Note     Discharge Needs Assessment  Concerns to be Addressed: care coordination/care conferences, discharge planning  Current Discharge Risk: physical impairment    Anticipated Discharge Plan  Anticipated Discharge Disposition: acute rehab/Inpatient Rehab Facility, home with home health       Patient Choice  Offered/Gave Vendor List: no  Patient's Choice of Community Agency(s):           ---------------------------------------------------------------------------------------------------------------------    Interdisciplinary Discharge Plan Review:  Participants:     Concerns Comments: Per update from Trauma NP, plan is for acute rehab vs. home. Pt is refusing MRI but after conversation with RN, will now consider getting it tomorrow. TIANNA spoke with [facility] liaison, they will re-assess on Monday depending on what happens tomorrow. TIANNA following.    Discharge Plan:   Disposition/Destination:   /    Discharge Facility:    Community Resources:      Discharge Transportation:  Is Out of Hospital DNR needed at Discharge: no  Does patient need discharge transport?

## 2024-01-20 NOTE — PROGRESS NOTES
"Per RN, she d/w pt, pt's wife and son re: MRI.  She stated that both son and wife are adamant that pt gets MRI done and pt was willing to try MRI now.    I went to bedside to discuss MRI with pt.  Pt currently has his collar off.  I reiterated that he must wear it at all times.  I explained to him that sedation for MRI is NOT general anesthesia and that he will get either ativan or valium to help relax him and possibly will help him sleep in the MRI.  I asked if he was agreeable to MRI and he stated \"not right now and only my son was agreeable to the MRI\".  Pt stated he will let us know walt.      At this time, pt has full capacity for own decision making.    He verbalized understanding the risks of not wearing a collar and he was able to repeat back our conversation that he fully understands the situation.    Pt's wife and son was not in the room.    I will speak with them as well when they return.    All questions answered to the best of my ability.  "

## 2024-01-20 NOTE — PROGRESS NOTES
I spent > 20 mins with pt and wife at bedside explaining the need to wear his collar at all times as he likely has an unhealed and unstable fracture.  He is at high risk for potential paralysis should he sustain unrestrained forces across his head and neck.  Pt is currently refusing MRI despite offering meds for sedation as he is claustrophobic.  He is currently refusing to wear his collar as well despite what we discussed above.  He will intermittent wear his collar at his own discretion despite the current recommendation is that he wears his collar AT ALL TIMES.    I explained that given he's refusing surgery and MRI; that he is able to be discharged.    Current PT/OT/PMR recs is AR, however I explained to them rehab facilities may not accept him due to his non-compliance with his collar.  Once SW/CM has exhaust all AR options, then next option would be SNF.  I made them aware that SNF may not accept him as well due to his collar non-compliance.    In the event, that we do not have any accepting rehab or SNF facilities then the only option would home w .      They both voiced understanding.    All questions answered to the best of my ability.    OTF TBD pending accepting facilities.

## 2024-01-21 PROCEDURE — 63700000 HC SELF-ADMINISTRABLE DRUG: Performed by: NURSE PRACTITIONER

## 2024-01-21 PROCEDURE — 21400000 HC ROOM AND CARE CCU/INTERMEDIATE

## 2024-01-21 PROCEDURE — 99232 SBSQ HOSP IP/OBS MODERATE 35: CPT | Performed by: SURGERY

## 2024-01-21 PROCEDURE — 97116 GAIT TRAINING THERAPY: CPT | Mod: GP,CQ

## 2024-01-21 PROCEDURE — 63600000 HC DRUGS/DETAIL CODE: Mod: JZ

## 2024-01-21 RX ORDER — LORAZEPAM 1 MG/1
1 TABLET ORAL
Status: COMPLETED | OUTPATIENT
Start: 2024-01-21 | End: 2024-01-22

## 2024-01-21 RX ADMIN — DOCUSATE SODIUM 100 MG: 100 CAPSULE, LIQUID FILLED ORAL at 08:03

## 2024-01-21 RX ADMIN — GUAIFENESIN 400 MG: 100 SOLUTION ORAL at 09:01

## 2024-01-21 RX ADMIN — GUAIFENESIN 400 MG: 100 SOLUTION ORAL at 22:47

## 2024-01-21 RX ADMIN — ENOXAPARIN SODIUM 30 MG: 30 INJECTION SUBCUTANEOUS at 06:01

## 2024-01-21 RX ADMIN — GUAIFENESIN 400 MG: 100 SOLUTION ORAL at 16:09

## 2024-01-21 RX ADMIN — ACETAMINOPHEN 975 MG: 325 TABLET ORAL at 06:01

## 2024-01-21 RX ADMIN — ACETAMINOPHEN 975 MG: 325 TABLET ORAL at 17:07

## 2024-01-21 RX ADMIN — ENOXAPARIN SODIUM 30 MG: 30 INJECTION SUBCUTANEOUS at 17:07

## 2024-01-21 RX ADMIN — SENNOSIDES 3 TABLET: 8.6 TABLET, FILM COATED ORAL at 21:56

## 2024-01-21 RX ADMIN — LEVOTHYROXINE SODIUM 25 MCG: 0.03 TABLET ORAL at 08:03

## 2024-01-21 RX ADMIN — Medication 1000 MG: at 08:03

## 2024-01-21 RX ADMIN — DOCUSATE SODIUM 100 MG: 100 CAPSULE, LIQUID FILLED ORAL at 20:24

## 2024-01-21 ASSESSMENT — COGNITIVE AND FUNCTIONAL STATUS - GENERAL
MOVING TO AND FROM BED TO CHAIR: 2 - A LOT
WALKING IN HOSPITAL ROOM: 2 - A LOT
MOVING TO AND FROM BED TO CHAIR: 2 - A LOT
MOVING TO AND FROM BED TO CHAIR: 2 - A LOT
STANDING UP FROM CHAIR USING ARMS: 2 - A LOT
WALKING IN HOSPITAL ROOM: 2 - A LOT
CLIMB 3 TO 5 STEPS WITH RAILING: 1 - TOTAL
AFFECT: CONFUSED
WALKING IN HOSPITAL ROOM: 2 - A LOT
CLIMB 3 TO 5 STEPS WITH RAILING: 1 - TOTAL
CLIMB 3 TO 5 STEPS WITH RAILING: 1 - TOTAL

## 2024-01-21 NOTE — PROGRESS NOTES
"   Physical Therapy -  Daily Treatment/Progress Note     Patient: Randolph Daley  Location: [facility] [address] [address]  MRN: [id number]  Today's date: 1/21/2024    HISTORY OF PRESENT ILLNESS     Randolph is a 83 y.o. male admitted on 1/17/2024 with Fall due to slipping on ice or snow, initial encounter [W00.9XXA]. Principal problem is Fall due to slipping on ice or snow, initial encounter.    Past Medical History  Randolph has no past medical history on file.    History of Present Illness   Presented to [facility] 1/17 after slip and fall on ice  CT head negative for acute changes, \"Lateral ventriculomegaly, indeterminate though usually related to volume loss or normal pressure hydrocephalus\"  CT C/S:   Age-indeterminate odontoid process fracture -> pending further imaging per neurosurgery. Maintain c-collar AAT    + R pubic ramus fx with hematoma -> per ortho fx is stable, no intervention required -> WBAT B/L LE   +R posterior rib fx 8, 9, 10 , 11  +also bilateral pleural effusion left larger than right (history of RSV couple weeks ago, fluid is not hemorrhagic)  Txf to SICU for trauma service     urology is consulted for concerns for prostatic urethral injury , from pelvic hematoma -> avila catheter placed     1/18: bedside left thoracentesis    PRIOR LEVEL OF FUNCTION AND LIVING ENVIRONMENT     Prior Level of Function    Flowsheet Row Most Recent Value   Dominant Hand right   Ambulation independent   Transferring independent   Toileting independent   Bathing independent   Dressing independent   Eating independent   IADLs independent   Driving/Transportation    Communication understands/communicates without difficulty   Prior Level of Function Comment indepedent and active baseline, was jogging prior to recent bunionectomy surgery. drives. retired (had made clothes/uniforms)   Assistive Device Currently Used at Home grab bar        Prior Living Environment    Flowsheet Row Most Recent Value "   People in Home spouse   Current Living Arrangements home   Home Accessibility stairs within home (Group), stairs to enter home (Group)   Living Environment Comment [place] w/ [place] [facility] with 1+1 LARRY no rail, has 1st floor bedroom and full bathroom w/ stall shower + built in bench + GB        VITALS AND PAIN     PT Vitals    Date/Time Pulse Resp SpO2 BP MAP BP Location BP Method Pt Position Observations [facility]   01/21/24 0955 70 -- 94 % 140/67 -- -- -- -- -- [place]   01/21/24 1015 74 18 96 % 140/67 96 mmHg Right upper arm Automatic Sitting OOB to chair MEM      PT Pain    Date/Time Rating: Rest Rating: Activity [facility]   01/21/24 0955 0 - no pain 4 - moderate pain KS           Objective   OBJECTIVE     Start time:  0955  End time:  1020  Session Length: 25 min  Mode of Treatment: physical therapy    General Observations  Patient received supine. He was agreeable to therapy, no issues or concerns identified by nurse prior to session. agreeable to wearing cx for session    Precautions: fall, brace worn at all times, weight bearing  Extremity Weight-Bearing Status: right lower extremity, left lower extremity  Right LE Weight-Bearing Status: weight-bearing as tolerated (WBAT)    Limitations/Impairments: safety/cognitive      PT Eval and Treat - 01/21/24 1410        Cognition    Orientation Status oriented x 3;disoriented to;time     Affect/Mental Status confused     Follows Commands follows one-step commands;50-74% accuracy;physical/tactile prompts required;repetition of directions required;verbal cues/prompting required     Cognitive Function memory deficit;executive function deficit     Executive Function Deficit moderate deficit;information processing;insight/awareness of deficits;judgment;planning/decision-making     Memory Deficit episodic memory;recall, recent events     Comment, Cognition wife reports pt called her last night with some confusion        Bed Mobility    Bed Mobility Activities sit to supine     [place]  moderate assist (50-74% patient effort);2 person assist     Safety/Cues moderate;preparatory posture;technique     Assistive Device bed rails;draw sheet        Mobility Belt    Mobility Belt Used for All Out of Bed Activity no     Reason Mobility Belt Not Used medical contraindication     Reason Mobility Belt Not Used rib fx, pelvic fracture        Sit/Stand Transfer    Surface edge of bed;elevated bed     [place] minimum assist (75% or more patient effort);2 person assist     Safety/Cues increased time to complete;hand placement;proper use of assistive device;technique     Assistive Device walker, front-wheeled        Gait Training    [place], Gait minimum assist (75% or more patient effort);2 person assist     Safety/Cues verbal cues;gait deviations;hand placement;preparatory posture;proper use of assistive device;technique     Assistive Device walker, front-wheeled     Distance in Feet 5 feet     Pattern step-to     Deviations/Abnormal Patterns bilateral deviations;antalgic;ace decreased;stride length decreased     Comment (Gait/Stairs) ambulated forward from EOB to window. pt still requiring assist at trunk for L lateral weight shift. for first 2 steps pt requiring therapist to advance RLE. w/ max cues and encouragement pt able to clear R foot from floor surface to take steps (though still requiring assist for lateral weight shift and balance).        Lower Extremity (Therapeutic Exercise)    Exercise Position/Type seated;AROM (active range of motion)     General Exercise ankle pumps;LAQ (long arc quad);marching while seated     Reps and Sets 10        Impairments/Safety Issues    Impairments Affecting Function balance;endurance/activity tolerance;pain;strength;range of motion (ROM)     Functional Endurance poor was I     Safety Issues Affecting Function awareness of need for assistance;insight into deficits/self-awareness;problem-solving;positioning of assistive device;judgment                         Orthosis Neck Cervical Collar (Active)   Date Obtained/Time Obtained: 01/17/24 1900   Location: Neck  Type: Cervical Collar  Features: Hard  Therapeutic Indications: fracture immobilization  Wearing Schedule: wear at all times (remove only for hygiene and skin inspection)     Orthosis Neck Cervical Collar (Active)   Skin Assessment no redness;no breakdown 01/21/24 0800   Compliance/Wearing Issues non-compliant with wearing schedule due to;needs reinforcement;impaired insight;refusing to wear (encouraged use, pt consistently loosening, removing, or refusing) 01/21/24 0800          Session Outcome  Patient in chair at end of session, chair alarm on, all needs met, call light in reach. Nursing notified about patient's performance.    AM-PAC™ - Mobility (Current Function)     Turning form your back to your side while in flat bed without using bedrails 3 - A Little   Moving from lying on your back to sitting on the side of a flat bed without using bedrails 2 - A Lot   Moving to and from a bed to a chair 2 - A Lot   Standing up from a chair using your arms 2 - A Lot   To walk in a hospital room 2 - A Lot   Climbing 3-5 steps with a railing 1 - Total   AM-PAC™ Mobility Score 12      ASSESSMENT AND PLAN     Progress Summary  Memo is mod A of 2 for bed mobility transfer and amb with rw. He seems to struggle with his attention and requires reptitive directions. wife and son arrived at endof session, rec acute rehab with SNF back up    Patient/Family Therapy Goals Statement: have less pain    PT Plan    Flowsheet Row Most Recent Value   Rehab Potential good, to achieve stated therapy goals at 01/21/2024 1410   Therapy Frequency 5 times/wk at 01/21/2024 1410   Planned Therapy Interventions balance training, bed mobility training, gait training, home exercise program, postural re-education, strengthening, patient/family education, stair training, transfer training, neuromuscular re-education, ROM (range of motion) at  01/21/2024 1410          PT Discharge Recommendations    Flowsheet Row Most Recent Value   PT Recommended Discharge Disposition acute rehab/Inpatient Rehab Facility at 01/21/2024 1410   Anticipated Equipment Needs if Discharged Home (PT) walker, front-wheeled, wheelchair, wheelchair cushion at 01/21/2024 1410               PT Goals    Flowsheet Row Most Recent Value   Bed Mobility Goal 1    Activity/Assistive Device bed mobility activities, all at 01/18/2024 0935   [place] supervision required at 01/18/2024 0935   Time Frame by discharge at 01/18/2024 0935   Progress/Outcome goal ongoing at 01/18/2024 0935   Transfer Goal 1    Activity/Assistive Device all transfers, walker, front-wheeled at 01/18/2024 0935   [place] minimum assist (75% or more patient effort) at 01/18/2024 0935   Time Frame by discharge at 01/18/2024 0935   Progress/Outcome goal ongoing at 01/18/2024 0935   Gait Training Goal 1    Activity/Assistive Device gait (walking locomotion), decrease fall risk, diminish gait deviation, increase endurance/gait distance, improve balance and speed at 01/18/2024 0935   [place] minimum assist (75% or more patient effort) at 01/18/2024 0935   Distance 40 at 01/18/2024 0935   Time Frame by discharge at 01/18/2024 0935   Progress/Outcome goal ongoing at 01/18/2024 0935

## 2024-01-21 NOTE — PROGRESS NOTES
TRAUMA SURGERY:  DAILY PROGRESS NOTE       PATIENT NAME:  Randolph Daley YOB: 1940    AGE:  83 y.o.  GENDER: male   MRN:  [id number]  PATIENT #: [id number]     PRIMARY CARE PHYSICIAN     Bear Morin, DO    SUBJECTIVE     Pt wearing a C collar in the chair. Not in distress. Afebrile VSS     VITAL SIGNS     Patient Vitals for the past 4 hrs:   BP Temp Temp src Pulse Resp SpO2   01/21/24 1200 139/65 36.7 °C (98 °F) Oral (!) 58 20 96 %   01/21/24 1015 140/67 -- -- 74 18 96 %      INTAKE & OUTPUT       Intake/Output Summary (Last 24 hours) at 1/21/2024 1255  Last data filed at 1/21/2024 0900  Gross per 24 hour   Intake 20 ml   Output 3800 ml   Net -3780 ml     I/O this shift:  In: -   Out: 550 [Urine:550]    MEDICATIONS   Infusions:           Scheduled:   • acetaminophen  975 mg oral q6h RUDY   • ascorbic acid  1,000 mg oral Daily   • bisacodyL  10 mg oral Daily   • docusate sodium  100 mg oral BID   • enoxaparin  30 mg subcutaneous q12h (6a, 6p)   • levothyroxine  25 mcg oral Daily before breakfast   • senna  3 tablet oral Nightly     Antibiotics:    PRN:     bisacodyL, 10 mg, Daily PRN  dextrose 50 % in water (D50), 25 mL, PRN  guaiFENesin, 400 mg, q6h PRN  ondansetron, 4 mg, q6h PRN  traMADoL, 50 mg, q6h PRN       PHYSICAL EXAM     NEURO: GCS 15. AAOx3, GRUPO @ 3mm, EOMi, CN II-XII grossly intact. Non-focal on exam. Following all commands. Sensation intact.    R L MOTOR (0-5):   5 5 C4 (deltoid)   5 5 C5 (biceps)   5 5 C6 (wrist ext)   5 5 C7 (triceps)   5 5 C8 (finger flexion)   5 5 T1 (hand intrinsics)   5 5 L2 (hip flexors)   5 5 L3 (quads) knee ext   5 5 L4 (TA) dorsiflex   5 5 L5 (EHL)   5 5 S1 (gastrocs) plantar flex   HENT: NC/AT. Nares clear. Oropharynx clear.  Midface stable.  Denies TTP.  Denies Malocclusion.  Trachea midline. Tongue midline.   SPINE: C-Spine non-tender to palp. Hard cervical collar in situ. T/L/S spine non-tender to palp, no stepoffs/deformities.   CHEST: Symmetric  expansion, R chest wall tender to palp, no crepitus  LUNGS: LCTA bilaterally. No use of accessory muscles or respiratory distress.   CV: RRR, S1/S2. NSR. +2 radial/DP/femoral pulses.   ABDOMEN: Soft, nontender, nondistended.     :avila with clear yellow urine  EXTREMITIES: No gross deformities. Non-tender to palp in all joints. Endorses pain with ambulation in right groin/pelvis.   SKIN: warm, dry.      INJURIES REVIEWED     Injuries Identified to Date:  1. R rib fractures 8 to 11  2. R superior pubic ramus fracture with pelvic hematoma  3. Age-indeterminate odontoid fracture  4. Prostatic urethral injury    DIAGNOSTIC DATA     LABS:  Results from last 7 days   Lab Units 01/20/24  0707 01/19/24  0522 01/18/24  0028 01/17/24  1634 01/17/24  1119 01/17/24  1119   SODIUM mEQ/L 135* 137 135* 137  --  137   POTASSIUM mEQ/L 4.0 4.0 4.3 4.4  --  4.5   CHLORIDE mEQ/L 101 103 102 103  --  104   CO2 mEQ/L 25 22 24 24  --  24   BUN mg/dL 26* 29* 31* 33*  --  35*   CREATININE mg/dL 1.5* 1.6* 1.6* 1.6*  --  1.6*   CALCIUM mg/dL 9.4 9.4 9.7 9.9  --  9.5   GLUCOSE mg/dL 90 90 108* 108*  --  108*   MAGNESIUM mg/dL 1.9 1.9 1.9 1.9   < >  --    PHOSPHORUS mg/dL 3.4 3.9 4.2  --   --   --    ALBUMIN g/dL  --   --   --  3.3*  --  3.2*   BILIRUBIN TOTAL mg/dL  --   --   --  0.7  --  0.5   ALK PHOS IU/L  --   --   --  66  --  63   ALT IU/L  --   --   --  8  --  8   AST IU/L  --   --   --  16  --  22    < > = values in this interval not displayed.     Results from last 7 days   Lab Units 01/20/24  0707 01/19/24  0522 01/18/24  2318   WBC K/uL 7.13 7.54 8.48   HEMOGLOBIN g/dL 9.1* 8.9* 8.8*   HEMATOCRIT % 27.7* 27.2* 26.0*   PLATELETS K/uL 84* 88* 89*     Results from last 7 days   Lab Units 01/18/24  2318 01/18/24  0028 01/17/24  1634   PTT sec 36* 38* 37*   PROTIME sec 19.9* 20.3* 19.3*   INR  1.7 1.8 1.6     ABG Results    No lab values to display.       Serum creatinine: 1.5 mg/dL (H) 01/20/24 0707  Estimated creatinine clearance:  41.6 mL/min (A)  Microbiology Results     ** No results found for the last 720 hours. **          IMAGING:  IR THORACENTESIS    Result Date: 1/18/2024  IMPRESSION: Successful bedside therapeutic ultrasound-guided left thoracentesis with 1.1 L pleural fluid removed and discarded.  All components of the time-out, debriefing, and handling of the specimen were conducted as per the ASAP Specimen Protocol. I certify that I have personally reviewed this examination and agree with Humaira Marley's report. Neil Atkinson M.D.    X-RAY CHEST 1 VIEW    Result Date: 1/18/2024  IMPRESSION: Please see comment.    X-RAY CERVICAL SPINE COMPLETE 4 OR 5 VIEWS    Result Date: 1/18/2024  IMPRESSION: Please see comment.    ULTRASOUND KIDNEYS    Result Date: 1/18/2024  IMPRESSION: Multiple right renal cortical and sinus cysts. Lesion seen in the right lower pole on CT corresponds to an exophytic cyst with some marginal calcification. Mild right hydronephrosis. Small left renal cortical cysts, largest measured in the upper pole. Mildly increased renal cortical echogenicity, likely related to underlying chronic medical renal disease.     X-RAY CHEST 1 VIEW    Result Date: 1/18/2024  IMPRESSION: Please see comment.    X-RAY ABDOMEN 1 VIEW    Result Date: 1/18/2024  IMPRESSION: Please see comment.    CT CYSTOGRAM WITHOUT IV CONTRAST    Result Date: 1/17/2024  IMPRESSION: Findings suggestive of extravasation of contrast from the urethra in the region of the prostate gland with increased hyperdensity in the prostate gland following the injection of contrast through the Womack catheter.    X-RAY PELVIS 1 OR 2 VIEWS    Result Date: 1/17/2024  IMPRESSION: See comment.     CT CERVICAL SPINE WITHOUT IV CONTRAST    Result Date: 1/17/2024  IMPRESSION: *  No evidence of acute posttraumatic intracranial injury. *  Lateral ventriculomegaly, indeterminate though usually related to volume loss or normal pressure hydrocephalus. *  Age-indeterminate odontoid  process fracture which was not reported on outside imaging. The results were critically read back with DANIEL CHAN on 1/17/2024 1:05 PM. COMMENT: BRAIN: Brain parenchyma:  Age related involution and  ischemic small vessel changes. Gray-white matter differentiation is normal.  No evidence of intracranial hemorrhage, midline shift or other mass effect. Ventricles, cisterns, and sulci:  Symmetric lateral ventriculomegaly. Cranial carotid arteries: Mural calcification. Calvarium and extra cranial soft tissues:  No evidence of a displaced calvarial fracture.   Scalp soft tissue within normal limits. Visualized paranasal sinuses and mastoid air cells:  Partial opacification of the right mastoid air cells. CERVICAL SPINE: Cervicothoracic alignment: Craniocervical junction is normal in appearance. Atlantodental distance is not widened. Odontoid process fracture just at the inferior margin of the atlantodens articulation with a 1.3 cm Doppler process fracture which does not appear to extend into the C2 base. Prevertebral soft tissues: Normal in thickness. Vertebral bodies and intervertebral discs: Severe osseous demineralization. Cortical thickening and coarsening/thickening of the trabeculae due to osseous demineralization or pagetoid changes. Vertebral body heights and alignment are maintained.  There are multilevel degenerative changes with intervertebral disc space narrowing, endplate spurring, facet hypertrophy, and uncovertebral hypertrophy, most pronounced at C4-5. Cervical and upper thoracic spinal canal: There are varying degrees of central canal and neural foraminal stenosis, however there is no high-grade osseous encroachment on the central canal. Extra vertebral soft tissues: Vascular calcifications. Partially imaged left pleural effusion is more completely characterized on the contemporaneous CT chest.     CT CHEST WITH IV CONTRAST    Result Date: 1/17/2024  IMPRESSION: 1.  Right superior pubic ramus  parasymphyseal acute, comminuted fracture with concomitant right adductor/obturator intramuscular hematomas and perivesicular hematomas. Right perivesicular hematomas associated with mild mass effect on the urinary bladder and perivesicular infiltration. CT cystogram can be considered if there is concern for urinary bladder posttraumatic injury. 2.  Right eighth through 11th posterior acute rib fractures. 3.  Large, loculated left pleural effusion with near-complete collapse left lower lobe. Moderate right layering pleural effusion. 4.  Infiltration about SMA and SMV noting a poorly seen pancreas, of indeterminate etiology. Correlation with lipase exclude pancreatitis can be considered. Follow-up elective MRI abdomen without and with intravenous contrast can be considered if further imaging evaluation of the pancreas is desired. 5.  Hepatosplenomegaly. 6.  Incompletely characterized right lower pole renal cystic lesion measuring 8.2 cm. Elective renal ultrasound can be considered. Alternatively, this lesion can be followed with an MRI if that study is performed for the pancreatic findings described above. The results were critically read back with Lizbeth Marino PA C on 1/17/2024 12:57 PM.    CT ABDOMEN PELVIS WITH IV CONTRAST    Result Date: 1/17/2024  IMPRESSION: 1.  Right superior pubic ramus parasymphyseal acute, comminuted fracture with concomitant right adductor/obturator intramuscular hematomas and perivesicular hematomas. Right perivesicular hematomas associated with mild mass effect on the urinary bladder and perivesicular infiltration. CT cystogram can be considered if there is concern for urinary bladder posttraumatic injury. 2.  Right eighth through 11th posterior acute rib fractures. 3.  Large, loculated left pleural effusion with near-complete collapse left lower lobe. Moderate right layering pleural effusion. 4.  Infiltration about SMA and SMV noting a poorly seen pancreas, of indeterminate  etiology. Correlation with lipase exclude pancreatitis can be considered. Follow-up elective MRI abdomen without and with intravenous contrast can be considered if further imaging evaluation of the pancreas is desired. 5.  Hepatosplenomegaly. 6.  Incompletely characterized right lower pole renal cystic lesion measuring 8.2 cm. Elective renal ultrasound can be considered. Alternatively, this lesion can be followed with an MRI if that study is performed for the pancreatic findings described above. The results were critically read back with Lizbeth Marino PA C on 1/17/2024 12:57 PM.    CT HEAD WITHOUT IV CONTRAST    Result Date: 1/17/2024  IMPRESSION: Motion degraded examination. No acute intracranial lesion. Atrophy, nonspecific white matter changes probably secondary to chronic small vessel ischemia and atherosclerotic vascular calcification.     X-RAY CHEST 1 VIEW    Result Date: 1/17/2024  IMPRESSION: Chin overlies chest, limiting evaluation of the thoracic inlet. Cardiomegaly with pulmonary vascular congestion. Moderate left and small right pleural effusions with underlying atelectasis/airspace disease no pneumothorax seen.           PROBLEM LIST     Patient Active Problem List    Diagnosis Date Noted   • Fall due to slipping on ice or snow, initial encounter 01/17/2024   • Closed fracture of multiple ribs of right side 01/17/2024   • Closed fracture of ramus of right pubis (CMS/HCC) 01/17/2024   • Pelvic hematoma in male 01/17/2024   • Pleural effusion on left 01/17/2024   • Normocytic anemia 01/17/2024   • Stage 3a chronic kidney disease (CMS/HCC) 01/17/2024   • Closed odontoid fracture (CMS/HCC) 01/17/2024     IMPRESSION/PLAN     83 y.o. male HD#4 s/p slip and fall on ice    Closed odontoid fracture (CMS/HCC)  Assessment & Plan  · Age-indeterminate, seen on prior scans  · Patient asymptomatic  · Flex-ex XR--widening of the 1st-2nd cervical interspinous process & anterior translocation of the superior  fracture fragment  · Refusing MRI and surgery - NSU aware   · Maintain C-collar -> non compliance with collar intermittently despite pt being alert and oriented x 3 with competence to understand the risks of not wearing the collar  · I spent > 30 minutes with the patient, his son Anastacio and his wife Adry and we discussed the need for wearing the C collar and also the need for MRI to help clarify urgency and need for surgical fusion which the neurosurgery team has also discussed with the patient previously. The patient and the family will discuss and let us know when we can proceed with scheduling the MRI C spine.       Stage 3a chronic kidney disease (CMS/HCC)  Assessment & Plan  · Baseline creatinine 1.4 to 1.5 per review of chart  · Avoid nephrotoxins     Normocytic anemia  Assessment & Plan  · Most recent Hgb 10 to 11 per review of chart  · Followed by hematology at [facility]     Pleural effusion on left  Assessment & Plan  · Incidental finding on CT imaging, loculated  · S/p IR thoracentesis 1/18 with drainage of 1.1L  · continue to monitor    · Breathing comfortably on room air    Pelvic hematoma in male  Assessment & Plan  · In setting of pubic ramus fracture, no active extravasation noted   · Causing mass effect on bladder. Avila placed with concern for prostatic urethral injury  · Urology consulted--recommended maintaining avila with CT Cystogram next week    Closed fracture of ramus of right pubis (CMS/[facility])  Assessment & Plan  Orthopedic recs--non-op management. WBAT BLE     Closed fracture of multiple ribs of right side  Assessment & Plan  Multimodal analgesia -->  APAP ATC, PRN opiate  IS/C&Db  Wean O2 as tolerated for goal POX > 93%     * Fall due to slipping on ice or snow, initial encounter  Assessment & Plan  · Fall precautions  · PT/OT/PMR eval     VTE PPX: SCDs & Lovenox 30 mg BID    GI PPX:  none needed      CODE STATUS: Full Code    Cody Natarajan MD  1/21/2024  12:55 PM

## 2024-01-21 NOTE — PROGRESS NOTES
Per RN, pt is agreeable to MRI with premed with PO ativan 1 mg x1 1 hour prior to study.    Orders placed in epic.

## 2024-01-21 NOTE — CONSULTS
Responded to page asking to visit patient. Page source and reason unknown. Spoke to family at patient's door, they do not have any particular needs. Patient asked to greet me, I came inside. Patient sitting uncomfortably due to pain. Spoke about his fall and how he is feeling. Wished him quick recovery and to call us back if needed. He does not need  visit.    Rabbi Familia Barber, Spiritual Care Associate, [id number]

## 2024-01-21 NOTE — PLAN OF CARE
Plan of Care Review  Outcome Evaluation: Pt continues to be noncomplient with aspen collar despite knowing the risks. RN walked in on patient attempting to get out of bed multiple times and reiiterated that patient is not allowed to get up by himself or without the collar. Patient verbalized understanding but continues to be noncomplient with direction. Robatussin given for cough. All other medications reviewed and given. Womack intact. Bed alarm on. Pt resting in bed wtih call light in reach.

## 2024-01-21 NOTE — PLAN OF CARE
Per information gathered at medical rounds; OTF is TBD with plan for Acute rehab. Patient inconsistent with compliance with collar. [facility] following to reassess for admission on Monday 1/22/24. Following closely to expedite DC. SW will remain available for emotional support and DC planning. VANESA Collazo

## 2024-01-22 ENCOUNTER — APPOINTMENT (INPATIENT)
Dept: RADIOLOGY | Facility: HOSPITAL | Age: 84
DRG: 964 | End: 2024-01-22
Attending: NURSE PRACTITIONER
Payer: COMMERCIAL

## 2024-01-22 DIAGNOSIS — S12.112K CLOSED NONDISPLACED ODONTOID FRACTURE WITH TYPE II MORPHOLOGY AND NONUNION, SUBSEQUENT ENCOUNTER: Primary | ICD-10-CM

## 2024-01-22 PROCEDURE — A9579 GAD-BASE MR CONTRAST NOS,1ML: HCPCS | Performed by: NURSE PRACTITIONER

## 2024-01-22 PROCEDURE — 21400000 HC ROOM AND CARE CCU/INTERMEDIATE

## 2024-01-22 PROCEDURE — 72156 MRI NECK SPINE W/O & W/DYE: CPT | Mod: MG

## 2024-01-22 PROCEDURE — 200200 PR NO CHARGE: Performed by: NEUROLOGICAL SURGERY

## 2024-01-22 PROCEDURE — G1004 CDSM NDSC: HCPCS

## 2024-01-22 PROCEDURE — A9573: HCPCS | Performed by: NURSE PRACTITIONER

## 2024-01-22 PROCEDURE — 99232 SBSQ HOSP IP/OBS MODERATE 35: CPT | Performed by: SURGERY

## 2024-01-22 PROCEDURE — 63700000 HC SELF-ADMINISTRABLE DRUG: Performed by: NURSE PRACTITIONER

## 2024-01-22 PROCEDURE — 63600000 HC DRUGS/DETAIL CODE: Mod: JZ

## 2024-01-22 PROCEDURE — 99232 SBSQ HOSP IP/OBS MODERATE 35: CPT | Performed by: NEUROLOGICAL SURGERY

## 2024-01-22 RX ADMIN — ACETAMINOPHEN 975 MG: 325 TABLET ORAL at 02:10

## 2024-01-22 RX ADMIN — DOCUSATE SODIUM 100 MG: 100 CAPSULE, LIQUID FILLED ORAL at 20:00

## 2024-01-22 RX ADMIN — TRAMADOL HYDROCHLORIDE 50 MG: 50 TABLET, COATED ORAL at 21:18

## 2024-01-22 RX ADMIN — BISACODYL 10 MG: 5 TABLET, COATED ORAL at 09:16

## 2024-01-22 RX ADMIN — DOCUSATE SODIUM 100 MG: 100 CAPSULE, LIQUID FILLED ORAL at 09:16

## 2024-01-22 RX ADMIN — Medication 1000 MG: at 09:16

## 2024-01-22 RX ADMIN — ACETAMINOPHEN 975 MG: 325 TABLET ORAL at 12:00

## 2024-01-22 RX ADMIN — ENOXAPARIN SODIUM 30 MG: 30 INJECTION SUBCUTANEOUS at 06:06

## 2024-01-22 RX ADMIN — LORAZEPAM 1 MG: 1 TABLET ORAL at 10:05

## 2024-01-22 RX ADMIN — ACETAMINOPHEN 975 MG: 325 TABLET ORAL at 06:06

## 2024-01-22 RX ADMIN — ACETAMINOPHEN 975 MG: 325 TABLET ORAL at 23:27

## 2024-01-22 RX ADMIN — SENNOSIDES 3 TABLET: 8.6 TABLET, FILM COATED ORAL at 21:18

## 2024-01-22 RX ADMIN — ACETAMINOPHEN 975 MG: 325 TABLET ORAL at 17:44

## 2024-01-22 RX ADMIN — GADOPICLENOL 8.8 ML: 485.1 INJECTION INTRAVENOUS at 10:48

## 2024-01-22 RX ADMIN — TRAMADOL HYDROCHLORIDE 50 MG: 50 TABLET, COATED ORAL at 02:10

## 2024-01-22 RX ADMIN — LEVOTHYROXINE SODIUM 25 MCG: 0.03 TABLET ORAL at 07:09

## 2024-01-22 RX ADMIN — ENOXAPARIN SODIUM 30 MG: 30 INJECTION SUBCUTANEOUS at 17:44

## 2024-01-22 NOTE — PLAN OF CARE
Care Coordination Discharge Plan Note     Discharge Needs Assessment  Concerns to be Addressed: care coordination/care conferences, discharge planning  Current Discharge Risk: physical impairment    Anticipated Discharge Plan  Anticipated Discharge Disposition: acute rehab/Inpatient Rehab Facility       Patient Choice  Offered/Gave Vendor List: yes  Patient's Choice of Community Agency(s): [facility]    Patient and/or patient guardian/advocate was made aware of their right to choose a provider. A list of eligible providers was presented and reviewed with the patient and/or patient guardian/advocate in written and/or verbal form. The list delineates providers in the patient’s desired geographic area who are participating in the Medicare program and/or providers contracted with the patient’s primary insurance. The Medicare list and quality ratings were obtained from the Medicare.gov [medicare.gov] website.    ---------------------------------------------------------------------------------------------------------------------    Interdisciplinary Discharge Plan Review:  Participants:advanced practice provider, occupational therapy, , social work/services, dietitian/nutrition services, physician, nursing, physical therapy    Concerns Comments: Per trauma rounds, Pt is now agreeable to having MRI done with ativan ordered for claustrophobia. Collar AAT, Pt inconsistent with this. [facility] following for admission eval when stable. Pt will need insurance auth. OTF mid week. CM will follow.    Discharge Plan:   Disposition/Destination:   /    Discharge Facility:    Community Resources:      Discharge Transportation:  Is Out of Hospital DNR needed at Discharge: no  Does patient need discharge transport? Yes

## 2024-01-22 NOTE — PROGRESS NOTES
Patient: Randolph Daley  Location: [address]  MRN:  [id number]  Today's date:  1/22/2024    Attempted to see patient for therapy. Unable due to patient at test or procedure (at MRI, will follow up as schedule allows).

## 2024-01-22 NOTE — PLAN OF CARE
Pt. Is AAOX4 , VSS NRS, pain manage by tramadol . C-collar in place. Bed is alarmed & call bell within reach .

## 2024-01-22 NOTE — PROGRESS NOTES
TRAUMA SURGERY:  DAILY PROGRESS NOTE     PATIENT NAME:  Randolph Daley YOB: 1940    AGE:  83 y.o.  GENDER: male   MRN:  [id number]  PATIENT #: [id number]     SUBJECTIVE     No new events no neck pain neurologically intact refusing to use his c-collar plan for an MRI today denies CP/palps/SOB. Denies N/V.    VITAL SIGNS     Temp: Temp (24hrs), Av.4 °C (97.5 °F), Min:36.1 °C (97 °F), Max:36.6 °C (97.9 °F)     SBP:  Systolic (24hrs), Av , Min:103 , Max:143      DBP:  Diastolic (24hrs), Av, Min:57, Max:65    Recent:  No data found.          INTAKE & OUTPUT     I/O last 3 completed shifts:  In: 2730 [P.O.:2700; I.V.:30]  Out: 6050 [Urine:6050]     Intake/Output Summary (Last 24 hours) at 2024 1228  Last data filed at 2024 0800  Gross per 24 hour   Intake 1600 ml   Output 2925 ml   Net -1325 ml     I/O this shift:  In: 240 [P.O.:240]  Out: 900 [Urine:900]     MEDICATIONS     Infusions:             Scheduled:   • acetaminophen  975 mg oral q6h RUDY   • ascorbic acid  1,000 mg oral Daily   • bisacodyL  10 mg oral Daily   • docusate sodium  100 mg oral BID   • enoxaparin  30 mg subcutaneous q12h (6a, 6p)   • levothyroxine  25 mcg oral Daily before breakfast   • senna  3 tablet oral Nightly       Antibiotics:      PRN:     bisacodyL, 10 mg, Daily PRN  dextrose 50 % in water (D50), 25 mL, PRN  guaiFENesin, 400 mg, q6h PRN  ondansetron, 4 mg, q6h PRN  traMADoL, 50 mg, q6h PRN         DIAGNOSTIC DATA     LABS:  Results from last 7 days   Lab Units 24  0707 24  0522 24  0028 24  1634 24  1119 24  1119   SODIUM mEQ/L 135* 137 135* 137  --  137   POTASSIUM mEQ/L 4.0 4.0 4.3 4.4  --  4.5   CHLORIDE mEQ/L 101 103 102 103  --  104   CO2 mEQ/L 25 22 24 24  --  24   BUN mg/dL 26* 29* 31* 33*  --  35*   CREATININE mg/dL 1.5* 1.6* 1.6* 1.6*  --  1.6*   CALCIUM mg/dL 9.4 9.4 9.7 9.9  --  9.5   GLUCOSE mg/dL 90 90 108* 108*  --  108*   MAGNESIUM mg/dL 1.9 1.9 1.9  1.9   < >  --    PHOSPHORUS mg/dL 3.4 3.9 4.2  --   --   --    ALBUMIN g/dL  --   --   --  3.3*  --  3.2*   BILIRUBIN TOTAL mg/dL  --   --   --  0.7  --  0.5   ALK PHOS IU/L  --   --   --  66  --  63   ALT IU/L  --   --   --  8  --  8   AST IU/L  --   --   --  16  --  22    < > = values in this interval not displayed.     Results from last 7 days   Lab Units 01/20/24  0707 01/19/24  0522 01/18/24  2318   WBC K/uL 7.13 7.54 8.48   HEMOGLOBIN g/dL 9.1* 8.9* 8.8*   HEMATOCRIT % 27.7* 27.2* 26.0*   PLATELETS K/uL 84* 88* 89*     Results from last 7 days   Lab Units 01/18/24  2318 01/18/24  0028 01/17/24  1634   PTT sec 36* 38* 37*   PROTIME sec 19.9* 20.3* 19.3*   INR  1.7 1.8 1.6     ABG Results    No lab values to display.       Serum creatinine: 1.5 mg/dL (H) 01/20/24 0707  Estimated creatinine clearance: 41.6 mL/min (A)  Microbiology Results     Procedure Component Value Units Date/Time    Urine culture Urine, Clean Catch [[id number]]  (Abnormal) Collected: 01/17/24 1416    Specimen: Urine, Clean Catch Updated: 01/18/24 1800     Urine Culture **Positive Culture**      1,000-4,000 cfu/mL Gram Positive Cocci    Narrative:      Please call Microbiology if clinical considerations warrant further testing. Isolate will be held for 7 days.            IMAGING:  No results found.   Radiology Imaging    XR SPINE CERVICAL COMPLETE 4 OR 5 VW    Narrative  CLINICAL HISTORY: Persistent neck pain with negative CT C-Spine   .    COMPARISON: 1/17/2024.    COMMENT: 4 views of cervical spine were obtained.    Odontoid process fracture better seen on recent CT. With flexion, there is  widening of the 1st-2nd cervical interspinous process space as well as slight  anterior translocation of the superior fracture fragment, suspicious for  instability across the odontoid process fracture.    Impression  IMPRESSION: Please see comment.      PHYSICAL EXAM      Physical Exam  Constitutional:       Appearance: He is normal weight.   HENT:       Head: Normocephalic.      Nose: Nose normal.      Mouth/Throat:      Mouth: Mucous membranes are moist.   Pulmonary:      Effort: Pulmonary effort is normal.      Breath sounds: Normal breath sounds.   Abdominal:      General: Abdomen is flat. Bowel sounds are normal.      Palpations: Abdomen is soft.   Neurological:      Mental Status: He is alert.         Lines, Drains, Airways, Wounds:     Peripheral IV (Adult) 01/17/24 Right Antecubital (Active)   Number of days: 5       Peripheral IV (Adult) 01/18/24 Anterior;Left;Proximal Forearm (Active)   Number of days: 4       Urethral Catheter 18 Fr (Active)   Number of days: 5       PROBLEM LIST     Patient Active Problem List   Diagnosis   • Fall due to slipping on ice or snow, initial encounter   • Closed fracture of multiple ribs of right side   • Closed fracture of ramus of right pubis (CMS/HCC)   • Pelvic hematoma in male   • Pleural effusion on left   • Normocytic anemia   • Stage 3a chronic kidney disease (CMS/HCC)   • Closed odontoid fracture (CMS/HCC)       IMPRESSION/PLAN     83 y.o. male HD#5 s/p     The patient now decided to proceed with the MRI which is scheduled for today  Refusing to wear his collar I explained to the patient and his wife at bedside the need to wear his collar he understood the potential risk of paralysis promised will intermittently wear    Closed odontoid fracture (CMS/HCC)  Assessment & Plan  · Age-indeterminate, seen on prior scans  · Patient asymptomatic  · Flex-ex XR--widening of the 1st-2nd cervical interspinous process & anterior translocation of the superior fracture fragment  · Refusing MRI and surgery - NSU aware   · Maintain C-collar -> non compliance with collar intermittently despite pt being alert and oriented x 3 with competence to understand the risks of not wearing the collar  · I spent > 30 minutes with the patient, his son Anastacio and his wife Adry and we discussed the need for wearing the C collar and also the need  for MRI to help clarify urgency and need for surgical fusion which the neurosurgery team has also discussed with the patient previously. The patient and the family will discuss and let us know when we can proceed with scheduling the MRI C spine.       Stage 3a chronic kidney disease (CMS/HCC)  Assessment & Plan  · Baseline creatinine 1.4 to 1.5 per review of chart  · Avoid nephrotoxins     Normocytic anemia  Assessment & Plan  · Most recent Hgb 10 to 11 per review of chart  · Followed by hematology at [facility]     Pleural effusion on left  Assessment & Plan  · Incidental finding on CT imaging, loculated  · S/p IR thoracentesis 1/18 with drainage of 1.1L  · continue to monitor    · Breathing comfortably on room air    Pelvic hematoma in male  Assessment & Plan  · In setting of pubic ramus fracture, no active extravasation noted   · Causing mass effect on bladder. Avila placed with concern for prostatic urethral injury  · Urology consulted--recommended maintaining avila with CT Cystogram next week    Closed fracture of ramus of right pubis (CMS/[facility])  Assessment & Plan  Orthopedic recs--non-op management. WBAT BLE     Closed fracture of multiple ribs of right side  Assessment & Plan  Multimodal analgesia -->  APAP ATC, PRN opiate  IS/C&Db  Wean O2 as tolerated for goal POX > 93%     * Fall due to slipping on ice or snow, initial encounter  Assessment & Plan  · Fall precautions  · PT/OT/PMR eval         CODE STATUS: Full Code    Shelley Ruvalcaba MD  1/22/2024  12:28 PM

## 2024-01-22 NOTE — PROGRESS NOTES
Patient: Randolph Daley  Location: [address]  MRN:  [id number]  Today's date:  1/22/2024    Attempted to see patient for therapy. Unable due to patient at test or procedure (at MRI, will continue to follow for OT needs).

## 2024-01-22 NOTE — PROGRESS NOTES
Neurosurgery Daily Progress Note    Subjective/Objective:   No overnight events.  Patient is without any neck pain, pain extending to his upper extremities, change sensation or changes strength.    Temp:  [36.1 °C (97 °F)-36.7 °C (98 °F)] 36.1 °C (97 °F)  Heart Rate:  [56-74] 63  Resp:  [16-20] 18  BP: (103-143)/(57-65) 123/63    Intake/Output Summary (Last 24 hours) at 1/22/2024 1104  Last data filed at 1/22/2024 0800  Gross per 24 hour   Intake 1600 ml   Output 3150 ml   Net -1550 ml         General: lying supine in bed in no acute distress  Neuro: A&O x 3, interacting appr with fluent speech  Cn: EOMI, no facial asymmetry, tongue midline  Motor: 5/5 throughout all 4 ext without pronator drift  Sensation: Intact and equal to light touch all 4 ext      Scheduled Meds:  • acetaminophen  975 mg oral q6h RUDY   • ascorbic acid  1,000 mg oral Daily   • bisacodyL  10 mg oral Daily   • docusate sodium  100 mg oral BID   • enoxaparin  30 mg subcutaneous q12h (6a, 6p)   • levothyroxine  25 mcg oral Daily before breakfast   • senna  3 tablet oral Nightly     Continuous Infusions:  PRN Meds:.•  bisacodyL  •  [DISCONTINUED] glucose **OR** [DISCONTINUED] dextrose **OR** [DISCONTINUED] glucagon **OR** dextrose 50 % in water (D50)  •  guaiFENesin  •  ondansetron  •  traMADoL    CBC Results       01/20/24 01/19/24 01/18/24     0707 0522 2318    WBC 7.13 7.54 8.48    RBC 2.90 2.86 2.77    HGB 9.1 8.9 8.8    HCT 27.7 27.2 26.0    MCV 95.5 95.1 93.9    MCH 31.4 31.1 31.8    MCHC 32.9 32.7 33.8    PLT 84 88 89         Comment for PLT at 0707 on 01/20/24: CONSISTENT WITH PREVIOUS RESULTS    Comment for PLT at 0522 on 01/19/24: CONSISTENT WITH PREVIOUS RESULTS    Comment for PLT at 2318 on 01/18/24: ALL RESULTS HAVE BEEN RECHECKED            BMP Results       01/20/24 01/19/24 01/18/24     0707 0522 0028     137 135    K 4.0 4.0 4.3    Cl 101 103 102    CO2 25 22 24    Glucose 90 90 108    BUN 26 29 31    Creatinine 1.5 1.6 1.6     Calcium 9.4 9.4 9.7    Anion Gap 9 12 9    EGFR 46.5 43.0 43.0         Comment for EGFR at 0707 on 01/20/24: Calculation based on the Chronic Kidney Disease Epidemiology Collaboration (CKD-EPI) equation refit without adjustment for race.    Comment for EGFR at 0522 on 01/19/24: Calculation based on the Chronic Kidney Disease Epidemiology Collaboration (CKD-EPI) equation refit without adjustment for race.    Comment for EGFR at 0028 on 01/18/24: Calculation based on the Chronic Kidney Disease Epidemiology Collaboration (CKD-EPI) equation refit without adjustment for race.            PT PTT Results       01/18/24 01/18/24 01/17/24     2318 0028 1634    PT 19.9 20.3 19.3    INR 1.7 1.8 1.6    PTT 36 38 37         Comment for INR at 2318 on 01/18/24: Moderate Intensity Anticoagulation = 2.0 to 3.0, High Intensity = 2.5 to 3.5    Comment for INR at 0028 on 01/18/24: Moderate Intensity Anticoagulation = 2.0 to 3.0, High Intensity = 2.5 to 3.5    Comment for INR at 1634 on 01/17/24: Moderate Intensity Anticoagulation = 2.0 to 3.0, High Intensity = 2.5 to 3.5    Comment for PTT at 2318 on 01/18/24: The Standard Therapeutic Range for Heparin is 74 to 106 seconds.    Comment for PTT at 0028 on 01/18/24: The Standard Therapeutic Range for Heparin is 74 to 106 seconds.    Comment for PTT at 1634 on 01/17/24: The Standard Therapeutic Range for Heparin is 74 to 106 seconds.          Laboratory results were personally reviewed.    Imaging:    X-ray cervical spine  Odontoid process fracture better seen on recent CT. With flexion, there is  widening of the 1st-2nd cervical interspinous process space as well as slight  anterior translocation of the superior fracture fragment, suspicious for  instability across the odontoid process fracture.     --  IMPRESSION: Please see comment.    Imaging was personally reviewed.      Assessment/Plan:    To summarize, Randolph Daley is a 83 y.o. male who presented to [facility] after a  slip and fall on ice landing on his right side.  He was found to have multiple injuries including right rib fractures, bilateral pleural effusion, pelvic fracture, and age-indeterminate cervical fracture.     It is likely this C2 fracture was from prior injury as it appears to have well-corticated margins. Flexion-extension x-rays of the cervical spine disclose widening of the first and second cervical interspinous process concerning for instability.      MRI of the cervical spine was recommended however the patient has been declining due to extreme claustrophobia. We discussed with patient in regards to this injury as it is likely an unhealed and unstable fracture and he is at risk should he sustain unrestrained forces across his head and neck.  We also discussed surgical intervention for fixation of this fracture, patient would like to avoid surgery if at all possible. Recommend continuing with a hard cervical collar.     The patient has now decided that he is willing to proceed the MRI and is scheduled for today 1/22/2024.  He is going to be given antianxiety medications prior to this.      Will return with additional recommendations after the aforementioned studies      Please call neurosurgery with additional questions, concerns, new symptoms

## 2024-01-22 NOTE — NURSING NOTE
AAOx4, VSS on RA. Pt refusing c-collar despite risks/reinforcement. Medications reviewed and given. [place]omack intact. Pt transported via stretcher to 3SW. Plan for MRI in the morning.

## 2024-01-22 NOTE — PROGRESS NOTES
SW received voice message from pts wife regarding d/c. @5660, SW left voice message for pts wife.     ADDENDUM: @3591 SW received return phone call from pts wife. Wife confirmed pts interest in [facility]. Wife stated pt completed MRI and results read, wife asked of next steps. SW stated once pt medically stable, [facility] would evaluate for admission and d/c plan would proceed from there. SW stated she will receive update from team tomorrow AM. Wife verbalized understanding. SW provided update to [facility] liaisonChrista. SW to continue to coordinate care until discharge. VANESA Ball

## 2024-01-22 NOTE — PROGRESS NOTES
TRAUMA SURGERY:  DAILY PROGRESS NOTE     PATIENT NAME:  Randolph Daley YOB: 1940    AGE:  83 y.o.  GENDER: male   MRN:  [id number]  PATIENT #: [id number]     SUBJECTIVE     No overnight events no neck pain without collar in place during rounds refusing MRI due to claustrophobia discussed in detail the need to keep collar in place at all time understood risk and benefits promised will wear it denies CP/palps/SOB. Denies N/V.    VITAL SIGNS     Temp: Temp (24hrs), Av.4 °C (97.5 °F), Min:36.1 °C (97 °F), Max:36.6 °C (97.9 °F)     SBP:  Systolic (24hrs), Av , Min:103 , Max:143      DBP:  Diastolic (24hrs), Av, Min:57, Max:65    Recent:  No data found.          INTAKE & OUTPUT     I/O last 3 completed shifts:  In: 2730 [P.O.:2700; I.V.:30]  Out: 6050 [Urine:6050]     Intake/Output Summary (Last 24 hours) at 2024 1233  Last data filed at 2024 0800  Gross per 24 hour   Intake 1600 ml   Output 2925 ml   Net -1325 ml     I/O this shift:  In: 240 [P.O.:240]  Out: 900 [Urine:900]     MEDICATIONS     Infusions:             Scheduled:   • acetaminophen  975 mg oral q6h RUDY   • ascorbic acid  1,000 mg oral Daily   • bisacodyL  10 mg oral Daily   • docusate sodium  100 mg oral BID   • enoxaparin  30 mg subcutaneous q12h (6a, 6p)   • levothyroxine  25 mcg oral Daily before breakfast   • senna  3 tablet oral Nightly       Antibiotics:      PRN:     bisacodyL, 10 mg, Daily PRN  dextrose 50 % in water (D50), 25 mL, PRN  guaiFENesin, 400 mg, q6h PRN  ondansetron, 4 mg, q6h PRN  traMADoL, 50 mg, q6h PRN         DIAGNOSTIC DATA     LABS:  Results from last 7 days   Lab Units 24  0707 24  0522 24  0028 24  1634 24  1119 24  1119   SODIUM mEQ/L 135* 137 135* 137  --  137   POTASSIUM mEQ/L 4.0 4.0 4.3 4.4  --  4.5   CHLORIDE mEQ/L 101 103 102 103  --  104   CO2 mEQ/L 25  24 24  --  24   BUN mg/dL 26* 29* 31* 33*  --  35*   CREATININE mg/dL 1.5* 1.6* 1.6* 1.6*   --  1.6*   CALCIUM mg/dL 9.4 9.4 9.7 9.9  --  9.5   GLUCOSE mg/dL 90 90 108* 108*  --  108*   MAGNESIUM mg/dL 1.9 1.9 1.9 1.9   < >  --    PHOSPHORUS mg/dL 3.4 3.9 4.2  --   --   --    ALBUMIN g/dL  --   --   --  3.3*  --  3.2*   BILIRUBIN TOTAL mg/dL  --   --   --  0.7  --  0.5   ALK PHOS IU/L  --   --   --  66  --  63   ALT IU/L  --   --   --  8  --  8   AST IU/L  --   --   --  16  --  22    < > = values in this interval not displayed.     Results from last 7 days   Lab Units 01/20/24  0707 01/19/24  0522 01/18/24  2318   WBC K/uL 7.13 7.54 8.48   HEMOGLOBIN g/dL 9.1* 8.9* 8.8*   HEMATOCRIT % 27.7* 27.2* 26.0*   PLATELETS K/uL 84* 88* 89*     Results from last 7 days   Lab Units 01/18/24  2318 01/18/24  0028 01/17/24  1634   PTT sec 36* 38* 37*   PROTIME sec 19.9* 20.3* 19.3*   INR  1.7 1.8 1.6     ABG Results    No lab values to display.       Serum creatinine: 1.5 mg/dL (H) 01/20/24 0707  Estimated creatinine clearance: 41.6 mL/min (A)  Microbiology Results     Procedure Component Value Units Date/Time    Urine culture Urine, Clean Catch [[id number]]  (Abnormal) Collected: 01/17/24 1416    Specimen: Urine, Clean Catch Updated: 01/18/24 1800     Urine Culture **Positive Culture**      1,000-4,000 cfu/mL Gram Positive Cocci    Narrative:      Please call Microbiology if clinical considerations warrant further testing. Isolate will be held for 7 days.            IMAGING:  No results found.   Radiology Imaging    XR SPINE CERVICAL COMPLETE 4 OR 5 VW    Narrative  CLINICAL HISTORY: Persistent neck pain with negative CT C-Spine   .    COMPARISON: 1/17/2024.    COMMENT: 4 views of cervical spine were obtained.    Odontoid process fracture better seen on recent CT. With flexion, there is  widening of the 1st-2nd cervical interspinous process space as well as slight  anterior translocation of the superior fracture fragment, suspicious for  instability across the odontoid process fracture.    Impression  IMPRESSION: Please  see comment.      PHYSICAL EXAM      Physical Exam  Constitutional:       Appearance: Normal appearance. He is normal weight.   HENT:      Head: Normocephalic and atraumatic.   Pulmonary:      Effort: Pulmonary effort is normal.      Breath sounds: Normal breath sounds.   Abdominal:      General: Abdomen is flat. Bowel sounds are normal.      Palpations: Abdomen is soft.   Neurological:      Mental Status: He is alert.         Lines, Drains, Airways, Wounds:     Peripheral IV (Adult) 01/17/24 Right Antecubital (Active)   Number of days: 5       Peripheral IV (Adult) 01/18/24 Anterior;Left;Proximal Forearm (Active)   Number of days: 4       Urethral Catheter 18 Fr (Active)   Number of days: 5       PROBLEM LIST     Patient Active Problem List   Diagnosis   • Fall due to slipping on ice or snow, initial encounter   • Closed fracture of multiple ribs of right side   • Closed fracture of ramus of right pubis (CMS/HCC)   • Pelvic hematoma in male   • Pleural effusion on left   • Normocytic anemia   • Stage 3a chronic kidney disease (CMS/HCC)   • Closed odontoid fracture (CMS/HCC)       IMPRESSION/PLAN     83 y.o. male HD#5 s/p     Fracture likely old but remained nonhealing and unstable refusing collar refusing MRI due to claustrophobia phobia despite offering anxiolytics lengthy discussion with him and his wife understood the risk of not wearing his collar he promised he will    Closed odontoid fracture (CMS/HCC)  Assessment & Plan  · Age-indeterminate, seen on prior scans  · Patient asymptomatic  · Flex-ex XR--widening of the 1st-2nd cervical interspinous process & anterior translocation of the superior fracture fragment  · Refusing MRI and surgery - NSU aware   · Maintain C-collar -> non compliance with collar intermittently despite pt being alert and oriented x 3 with competence to understand the risks of not wearing the collar  · I spent > 30 minutes with the patient, his son Anastacio and his wife Adry and we  discussed the need for wearing the C collar and also the need for MRI to help clarify urgency and need for surgical fusion which the neurosurgery team has also discussed with the patient previously. The patient and the family will discuss and let us know when we can proceed with scheduling the MRI C spine.       Stage 3a chronic kidney disease (CMS/HCC)  Assessment & Plan  · Baseline creatinine 1.4 to 1.5 per review of chart  · Avoid nephrotoxins     Normocytic anemia  Assessment & Plan  · Most recent Hgb 10 to 11 per review of chart  · Followed by hematology at [facility]     Pleural effusion on left  Assessment & Plan  · Incidental finding on CT imaging, loculated  · S/p IR thoracentesis 1/18 with drainage of 1.1L  · continue to monitor    · Breathing comfortably on room air    Pelvic hematoma in male  Assessment & Plan  · In setting of pubic ramus fracture, no active extravasation noted   · Causing mass effect on bladder. Avila placed with concern for prostatic urethral injury  · Urology consulted--recommended maintaining avila with CT Cystogram next week    Closed fracture of ramus of right pubis (CMS/HCC)  Assessment & Plan  Orthopedic recs--non-op management. WBAT BLE     Closed fracture of multiple ribs of right side  Assessment & Plan  Multimodal analgesia -->  APAP ATC, PRN opiate  IS/C&Db  Wean O2 as tolerated for goal POX > 93%     * Fall due to slipping on ice or snow, initial encounter  Assessment & Plan  · Fall precautions  · PT/OT/PMR eval   This is a delayed entry of progress note patient seen on 1/20/2023 dictation 1/22/2023    CODE STATUS: Full Code    Shelley Ruvalcaba MD  1/22/2024  12:33 PM

## 2024-01-22 NOTE — DISCHARGE INSTRUCTIONS
[facility]  [address]  [place] [address]  Phone: [phone]  Fax: [phone]      Follow up with Dr. Sepulveda in 4-6 weeks time with cervical x-ray which has been ordered  Avoid bending, twisting, lifting over 5 lbs   Wear your hard collar at all times.  Please call [phone] to schedule or confirm an appointment     Senior Care Navigation Line:   [facility] offers a free Senior Care Navigation Line.  Our team serves as a resource for seniors, their families and caregivers to answer questions and provide guidance in making health care decisions.   We provide care in:  Medical Needs  Social and Psychosocial Concerns  Functional Support (transportation, home assistance, etc.)  Community Resources  Care Navigation  Offer information on a vast range of services at [facility] and the community  Facilitate referrals to community agencies, physicians and more.    Please call [phone] for more information.      Please call the TRAUMA OFFICE with any questions or concerns:     Dr. Cody Natarajan  Trauma Program Medical Director  [facility]  [facility] [address] ([facility] [address]  [place] [address]  Phone: [phone]

## 2024-01-23 PROCEDURE — 97535 SELF CARE MNGMENT TRAINING: CPT | Mod: GO

## 2024-01-23 PROCEDURE — 97530 THERAPEUTIC ACTIVITIES: CPT | Mod: GP

## 2024-01-23 PROCEDURE — 99232 SBSQ HOSP IP/OBS MODERATE 35: CPT | Performed by: SURGERY

## 2024-01-23 PROCEDURE — 63600000 HC DRUGS/DETAIL CODE: Mod: JW

## 2024-01-23 PROCEDURE — 97116 GAIT TRAINING THERAPY: CPT | Mod: GP

## 2024-01-23 PROCEDURE — 21400000 HC ROOM AND CARE CCU/INTERMEDIATE

## 2024-01-23 PROCEDURE — 97530 THERAPEUTIC ACTIVITIES: CPT | Mod: GO

## 2024-01-23 PROCEDURE — 63700000 HC SELF-ADMINISTRABLE DRUG: Performed by: NURSE PRACTITIONER

## 2024-01-23 RX ADMIN — ACETAMINOPHEN 975 MG: 325 TABLET ORAL at 13:04

## 2024-01-23 RX ADMIN — ACETAMINOPHEN 975 MG: 325 TABLET ORAL at 17:12

## 2024-01-23 RX ADMIN — BISACODYL 10 MG: 5 TABLET, COATED ORAL at 08:13

## 2024-01-23 RX ADMIN — ENOXAPARIN SODIUM 30 MG: 30 INJECTION SUBCUTANEOUS at 05:56

## 2024-01-23 RX ADMIN — ENOXAPARIN SODIUM 30 MG: 30 INJECTION SUBCUTANEOUS at 17:12

## 2024-01-23 RX ADMIN — GUAIFENESIN 400 MG: 100 SOLUTION ORAL at 20:35

## 2024-01-23 RX ADMIN — DOCUSATE SODIUM 100 MG: 100 CAPSULE, LIQUID FILLED ORAL at 08:13

## 2024-01-23 RX ADMIN — DOCUSATE SODIUM 100 MG: 100 CAPSULE, LIQUID FILLED ORAL at 20:34

## 2024-01-23 RX ADMIN — SENNOSIDES 3 TABLET: 8.6 TABLET, FILM COATED ORAL at 21:10

## 2024-01-23 RX ADMIN — GUAIFENESIN 400 MG: 100 SOLUTION ORAL at 08:13

## 2024-01-23 RX ADMIN — LEVOTHYROXINE SODIUM 25 MCG: 0.03 TABLET ORAL at 05:57

## 2024-01-23 RX ADMIN — Medication 1000 MG: at 08:13

## 2024-01-23 RX ADMIN — ACETAMINOPHEN 975 MG: 325 TABLET ORAL at 05:57

## 2024-01-23 ASSESSMENT — COGNITIVE AND FUNCTIONAL STATUS - GENERAL
HELP NEEDED FOR PERSONAL GROOMING: 3 - A LITTLE
WALKING IN HOSPITAL ROOM: 2 - A LOT
AFFECT: FLAT/BLUNTED AFFECT
STANDING UP FROM CHAIR USING ARMS: 2 - A LOT
CLIMB 3 TO 5 STEPS WITH RAILING: 2 - A LOT
HELP NEEDED FOR BATHING: 2 - A LOT
DRESSING REGULAR LOWER BODY CLOTHING: 2 - A LOT
TOILETING: 1 - TOTAL
CLIMB 3 TO 5 STEPS WITH RAILING: 2 - A LOT
AFFECT: CONFUSED
EATING MEALS: 3 - A LITTLE
DRESSING REGULAR UPPER BODY CLOTHING: 2 - A LOT
STANDING UP FROM CHAIR USING ARMS: 2 - A LOT
WALKING IN HOSPITAL ROOM: 2 - A LOT
MOVING TO AND FROM BED TO CHAIR: 2 - A LOT
MOVING TO AND FROM BED TO CHAIR: 2 - A LOT

## 2024-01-23 NOTE — PROGRESS NOTES
"   Physical Therapy -  Daily Treatment/Progress Note     Patient: Randolph Daley  Location: [facility]  MRN: [id number]  Today's date: 1/23/2024    HISTORY OF PRESENT ILLNESS     Randolph is a 83 y.o. male admitted on 1/17/2024 with Fall due to slipping on ice or snow, initial encounter [W00.9XXA]. Principal problem is Fall due to slipping on ice or snow, initial encounter.    Past Medical History  Randolph has no past medical history on file.    History of Present Illness   Presented to [facility] 1/17 after slip and fall on ice  CT head negative for acute changes, \"Lateral ventriculomegaly, indeterminate though usually related to volume loss or normal pressure hydrocephalus\"  CT C/S:   Age-indeterminate odontoid process fracture -> pending further imaging per neurosurgery. Maintain c-collar AAT    + R pubic ramus fx with hematoma -> per ortho fx is stable, no intervention required -> WBAT B/L LE   +R posterior rib fx 8, 9, 10 , 11  +also bilateral pleural effusion left larger than right (history of RSV couple weeks ago, fluid is not hemorrhagic)  Txf to SICU for trauma service     urology is consulted for concerns for prostatic urethral injury , from pelvic hematoma -> avila catheter placed     1/18: bedside left thoracentesis    PRIOR LEVEL OF FUNCTION AND LIVING ENVIRONMENT     Prior Level of Function    Flowsheet Row Most Recent Value   Dominant Hand right   Ambulation independent   Transferring independent   Toileting independent   Bathing independent   Dressing independent   Eating independent   IADLs independent   Driving/Transportation    Communication understands/communicates without difficulty   Prior Level of Function Comment indepedent and active baseline, was jogging prior to recent bunionectomy surgery. drives. retired (had made clothes/uniforms)   Assistive Device Currently Used at Home grab bar        Prior Living Environment    Flowsheet Row Most Recent Value "   People in Home spouse   Current Living Arrangements home   Home Accessibility stairs within home (Group), stairs to enter home (Group)   Living Environment Comment lives w/ wife in [facility] with 1+1 LARRY no rail, has 1st floor bedroom and full bathroom w/ stall shower + built in bench + GB        VITALS AND PAIN     PT Vitals    Date/Time Pulse Pt Activity O2 Therapy BP BP Location BP Method Pt Position [facility]   01/23/24 1410 59 At rest None (Room air) 137/69 Left upper arm Automatic Sitting OC      PT Pain    Date/Time Pain Type Rating: Rest [facility]   01/23/24 1410 Pain Assessment 6 - moderate-severe pain OC           Objective   OBJECTIVE     Start time:  1348  End time:  1416  Session Length: 28 min  Mode of Treatment: co-treatment, physical therapy (progress mob with skilled Ax2)    General Observations  Patient received reclined, in chair. He was agreeable to therapy, no issues or concerns identified by nurse prior to session. pt did not have c-collar on, donned immediately for session with pt agreeable    Precautions: fall, brace worn at all times, weight bearing (C-collar)  Extremity Weight-Bearing Status: left lower extremity, right lower extremity  Left LE Weight-Bearing Status: weight-bearing as tolerated (WBAT)  Right LE Weight-Bearing Status: weight-bearing as tolerated (WBAT)           PT Eval and Treat - 01/23/24 1349        Cognition    Orientation Status oriented x 3;disoriented to;time     Affect/Mental Status confused     Follows Commands 50-74% accuracy;physical/tactile prompts required;repetition of directions required;verbal cues/prompting required;follows one-step commands     Cognitive Function executive function deficit;safety deficit     Executive Function Deficit moderate deficit;insight/awareness of deficits;self-monitoring/self-correction;organization/sequencing     Safety Deficit moderate deficit;safety precautions awareness;safety precautions follow-through/compliance;insight into  deficits/self-awareness     Comment, Cognition Pt is AAOx3, demos dec safey awareness and need for C-collar at all times, accepting of education provided. Pt requiring multi-modal cues for mobility and sequencing of task        Bed Mobility    Comment OOB t/o        Sit/Stand Transfer    Surface chair without arm rests     [place] moderate assist (50-74% patient effort);2 person assist     Safety/Cues moderate;verbal cues;hand placement;technique     Assistive Device walker, front-wheeled     Transfer Comments From recliner chair, rising to RW, sitting to recliner at end of session        Gait Training    [place], Gait minimum assist (75% or more patient effort);2 person assist     Safety/Cues moderate;verbal cues;tactile cues;sequencing;technique     Assistive Device walker, front-wheeled     Distance in Feet 10 feet   x2    Pattern step-to;step-through     Deviations/Abnormal Patterns weight shifting decreased;stride length decreased;step length decreased;gait speed decreased     Comment (Gait/Stairs) ambulating fwd 10ft with x1 turn back to chair 10ft. Pt requiring minAx2 to maintain upright balance. Assist for lateral weight shift, dec step length of RLE compared to LLE. Multimodal cues for RW sequencing, steps and weight shift.        Stairs Training    [place], Stairs not tested        Balance    Static Sitting Balance mild impairment     Dynamic Sitting Balance mild impairment     Sit to Stand Dynamic Balance moderate impairment     Static Standing Balance moderate impairment     Dynamic Standing Balance moderate impairment     Comment, Balance static standing ModA for balance, MinAx2 for all mobility with + balance deficits with RW        Impairments/Safety Issues    Impairments Affecting Function balance;endurance/activity tolerance;pain;strength;range of motion (ROM);cognition     Cognitive Impairments, Safety/Performance insight into deficits/self-awareness;problem-solving/reasoning;safety  precaution awareness;safety precaution follow-through;sequencing abilities     Safety Issues Affecting Function awareness of need for assistance;insight into deficits/self-awareness;problem-solving;positioning of assistive device;judgment;sequencing abilities;safety precautions follow-through/compliance                                Education Documentation  No documentation found.      Session Outcome  Patient upright, in chair at end of session, chair alarm on, personal items in reach, all needs met, call light in reach. Nursing notified about patient's performance, patient's position, and patient's response to therapy/activity.    AM-PAC™ - Mobility (Current Function)     Turning form your back to your side while in flat bed without using bedrails 2 - A Lot   Moving from lying on your back to sitting on the side of a flat bed without using bedrails 2 - A Lot   Moving to and from a bed to a chair 2 - A Lot   Standing up from a chair using your arms 2 - A Lot   To walk in a hospital room 2 - A Lot   Climbing 3-5 steps with a railing 2 - A Lot   AM-PAC™ Mobility Score 12      ASSESSMENT AND PLAN     Progress Summary  PT follow up completed. Pt requires mod Ax2 for STS txfs from recliner chair, minAx2 for ambulation of 10ft x2 with RW support and mod Ax1 for static standing balance. Pt continues to require multi modal cues for sequencing of task and safe mobility. Cont to rec d/c to SNF to maximize LOF and independence when medically stable    Patient/Family Therapy Goals Statement: have less pain    PT Plan    Flowsheet Row Most Recent Value   Rehab Potential good, to achieve stated therapy goals at 01/23/2024 1349   Therapy Frequency 5 times/wk at 01/23/2024 1349   Planned Therapy Interventions balance training, bed mobility training, gait training, home exercise program, postural re-education, strengthening, patient/family education, stair training, transfer training, neuromuscular re-education, ROM (range of  motion) at 01/23/2024 1349          PT Discharge Recommendations    Flowsheet Row Most Recent Value   PT Recommended Discharge Disposition acute rehab/Inpatient Rehab Facility at 01/23/2024 1349   Anticipated Equipment Needs if Discharged Home (PT) walker, front-wheeled, wheelchair, wheelchair cushion at 01/23/2024 1349               PT Goals    Flowsheet Row Most Recent Value   Bed Mobility Goal 1    Activity/Assistive Device bed mobility activities, all at 01/18/2024 0935   [place] supervision required at 01/18/2024 0935   Time Frame by discharge at 01/18/2024 0935   Progress/Outcome goal ongoing at 01/18/2024 0935   Transfer Goal 1    Activity/Assistive Device all transfers, walker, front-wheeled at 01/18/2024 0935   [place] minimum assist (75% or more patient effort) at 01/18/2024 0935   Time Frame by discharge at 01/18/2024 0935   Progress/Outcome goal ongoing at 01/18/2024 0935   Gait Training Goal 1    Activity/Assistive Device gait (walking locomotion), decrease fall risk, diminish gait deviation, increase endurance/gait distance, improve balance and speed at 01/18/2024 0935   [place] minimum assist (75% or more patient effort) at 01/18/2024 0935   Distance 40 at 01/18/2024 0935   Time Frame by discharge at 01/18/2024 0935   Progress/Outcome goal ongoing at 01/18/2024 0935

## 2024-01-23 NOTE — PLAN OF CARE
Plan of Care Review  Plan of Care Reviewed With: patient  Progress: improving  Outcome Evaluation: AAOx4. Received Tylenol for pain. Room air and lungs are diminished. NSR on heart monitor. Womack in place. Call bell within reach.    Problem: Adult Inpatient Plan of Care  Goal: Plan of Care Review  Outcome: Progressing  Flowsheets (Taken 1/23/2024 0409)  Progress: improving  Outcome Evaluation: AAOx4. Received Tylenol for pain. Room air and lungs are diminished. NSR on heart monitor. Womack in place. Call bell within reach.  Plan of Care Reviewed With: patient  Goal: Patient-Specific Goal (Individualized)  Outcome: Progressing  Goal: Absence of Hospital-Acquired Illness or Injury  Outcome: Progressing  Goal: Optimal Comfort and Wellbeing  Outcome: Progressing  Goal: Readiness for Transition of Care  Outcome: Progressing     Problem: Skin Injury Risk Increased  Goal: Skin Health and Integrity  Outcome: Progressing     Problem: Fall Injury Risk  Goal: Absence of Fall and Fall-Related Injury  Outcome: Progressing     Problem: Orthopaedic Fracture  Goal: Optimal Functional Ability  Outcome: Progressing     Problem: Self-Care Deficit  Goal: Improved Ability to Complete Activities of Daily Living  Outcome: Progressing

## 2024-01-23 NOTE — HOSPITAL COURSE
83 y.o. male with PMHX of BPH, HTN who presented to [facility] after a slip and fall on ice landing on his right side.  He was found to have multiple injuries including right 8-11 rib fractures, bilateral pleural effusion s/p IR thoracentesis 1/18 with drainage of 1.1L, pelvic fracture (non op mgmt), right adductor/obturator hematoma, questionable posttraumatic bladder injury and age-indeterminate cervical fracture.  Neurosurgery was consulted and offered c spinesurgery, but patient declined.  [place] Collar on at all times, except for meals to prevent aspiration.  Patient c/o difficulty coughing up mucus with collar on, but understands that he needs to wear the collar especially during therapy.  Urology was consulted.  CT cystogram showed possible extravasation of contrast into the prostatic urethra.  Womack catheter is currently draining clear urine.  Plan is to continue Womack catheter for 1 week.  CT cystogram in 1 week with possible removal at that time, but pt declining CT Cystogram.      At baseline, he is independent with mobility, transfers, ADLs.  He lives with his wife outside the [facility].  They live in a two-story house, first-floor set up.  He was in [place] visiting his grandchildren when he fell.       PM&R ASSESSMENT 1/19:  Fall with subsequent pelvic fractures, right rib fractures, questionable C2 fracture..  Fortunately, he is neurologically intact.  PT/OT functional assessments from this morning reviewed.  - Intermittent compliance with cervical collar.  However, this morning, when I assessed him, he was wearing his cervical collar.   Patient was mod assist x 2 to transfer and ambulating 5-10 feet  -Agree that functional assessments would be improved if patient was willing to take opioid analgesia.     Questionable prostatic urethral injury: Urology consulted.  Womack in place.     Right superior pubic rami fracture, abductor/obturator hematoma: Seen by orthopedics.  Weightbearing as  tolerated.        Rehab rec: Functional assessment was not significantly better today.  He still mod to max assist of 2 for sit to stand trial, ambulating about 10 feet.  Agree with recommendation for acute rehab upon discharge.     Patient requires 24-hour nursing and 3 times a week physiatry oversight given his medical complexity regarding unstable C2 fracture and prostatic urethral injury with new indwelling Womack in place.  Patient requires urology follow-up in 1 week.  Anticipate with therapy he will do quite well and anticipated length of stay 7 to 10 days.  He is motivated to return home soon as possible and his wife is willing to provide supervision and assist.

## 2024-01-23 NOTE — PROGRESS NOTES
TRAUMA SURGERY:  DAILY PROGRESS NOTE     PATIENT NAME:  Randolph Daley YOB: 1940    AGE:  83 y.o.  GENDER: male   MRN:  [id number]  PATIENT #: [id number]     SUBJECTIVE     No events.  Cspine fusion recommended by neurosurgery, the patient refuses intervention.  D/w patient this am, he understands he needs to keep the collar on at all times and that he is at risk of SCI if he falls again.   Denies CP/palps/SOB. Denies N/V.    VITAL SIGNS     Temp: Temp (24hrs), Av.4 °C (97.6 °F), Min:36.1 °C (97 °F), Max:36.7 °C (98.1 °F)     SBP:  Systolic (24hrs), Av , Min:123 , Max:146      DBP:  Diastolic (24hrs), Av, Min:61, Max:69    Recent:  Patient Vitals for the past 4 hrs:   BP Temp Temp src Pulse Resp SpO2   24 0300 (!) 146/69 36.6 °C (97.8 °F) Oral 60 18 97 %             INTAKE & OUTPUT     I/O last 3 completed shifts:  In: 2950 [P.O.:2940; I.V.:10]  Out: 5225 [Urine:5225]     Intake/Output Summary (Last 24 hours) at 2024 0641  Last data filed at 2024 0400  Gross per 24 hour   Intake 480 ml   Output 2925 ml   Net -2445 ml     I/O this shift:  In: 240 [P.O.:240]  Out: 500 [Urine:500]     MEDICATIONS     Infusions:             Scheduled:   • acetaminophen  975 mg oral q6h RUDY   • ascorbic acid  1,000 mg oral Daily   • bisacodyL  10 mg oral Daily   • docusate sodium  100 mg oral BID   • enoxaparin  30 mg subcutaneous q12h (6a, 6p)   • levothyroxine  25 mcg oral Daily before breakfast   • senna  3 tablet oral Nightly       Antibiotics:      PRN:     bisacodyL, 10 mg, Daily PRN  camphor-methyl salicyl-menthoL, , 2x daily PRN  dextrose 50 % in water (D50), 25 mL, PRN  guaiFENesin, 400 mg, q6h PRN  ondansetron, 4 mg, q6h PRN  traMADoL, 50 mg, q6h PRN         DIAGNOSTIC DATA     LABS:  Results from last 7 days   Lab Units 24  0707 24  0522 24  0028 24  1634 24  1119 24  1119   SODIUM mEQ/L 135* 137 135* 137  --  137   POTASSIUM mEQ/L 4.0 4.0 4.3  4.4  --  4.5   CHLORIDE mEQ/L 101 103 102 103  --  104   CO2 mEQ/L 25 22 24 24  --  24   BUN mg/dL 26* 29* 31* 33*  --  35*   CREATININE mg/dL 1.5* 1.6* 1.6* 1.6*  --  1.6*   CALCIUM mg/dL 9.4 9.4 9.7 9.9  --  9.5   GLUCOSE mg/dL 90 90 108* 108*  --  108*   MAGNESIUM mg/dL 1.9 1.9 1.9 1.9   < >  --    PHOSPHORUS mg/dL 3.4 3.9 4.2  --   --   --    ALBUMIN g/dL  --   --   --  3.3*  --  3.2*   BILIRUBIN TOTAL mg/dL  --   --   --  0.7  --  0.5   ALK PHOS IU/L  --   --   --  66  --  63   ALT IU/L  --   --   --  8  --  8   AST IU/L  --   --   --  16  --  22    < > = values in this interval not displayed.     Results from last 7 days   Lab Units 01/20/24  0707 01/19/24  0522 01/18/24  2318   WBC K/uL 7.13 7.54 8.48   HEMOGLOBIN g/dL 9.1* 8.9* 8.8*   HEMATOCRIT % 27.7* 27.2* 26.0*   PLATELETS K/uL 84* 88* 89*     Results from last 7 days   Lab Units 01/18/24  2318 01/18/24  0028 01/17/24  1634   PTT sec 36* 38* 37*   PROTIME sec 19.9* 20.3* 19.3*   INR  1.7 1.8 1.6     ABG Results    No lab values to display.       Serum creatinine: 1.5 mg/dL (H) 01/20/24 0707  Estimated creatinine clearance: 41.6 mL/min (A)  Microbiology Results     Procedure Component Value Units Date/Time    Urine culture Urine, Clean Catch [[id number]]  (Abnormal) Collected: 01/17/24 1416    Specimen: Urine, Clean Catch Updated: 01/18/24 1800     Urine Culture **Positive Culture**      1,000-4,000 cfu/mL Gram Positive Cocci    Narrative:      Please call Microbiology if clinical considerations warrant further testing. Isolate will be held for 7 days.            IMAGING:  No results found.   Radiology Imaging    XR SPINE CERVICAL COMPLETE 4 OR 5 VW    Narrative  CLINICAL HISTORY: Persistent neck pain with negative CT C-Spine   .    COMPARISON: 1/17/2024.    COMMENT: 4 views of cervical spine were obtained.    Odontoid process fracture better seen on recent CT. With flexion, there is  widening of the 1st-2nd cervical interspinous process space as well as  slight  anterior translocation of the superior fracture fragment, suspicious for  instability across the odontoid process fracture.    Impression  IMPRESSION: Please see comment.      PHYSICAL EXAM      Physical Exam   NAD  AOX3  HEENT- anterior portion of the collar taken off by patient while in bed  RRR  Normal respiratory effort  Abdomen soft/non distended/non tender  Extremities no edema        Lines, Drains, Airways, Wounds:     Peripheral IV (Adult) 01/17/24 Right Antecubital (Active)   Number of days: 6       Peripheral IV (Adult) 01/18/24 Anterior;Left;Proximal Forearm (Active)   Number of days: 5       Urethral Catheter 18 Fr (Active)   Number of days: 6       PROBLEM LIST     Patient Active Problem List   Diagnosis   • Fall due to slipping on ice or snow, initial encounter   • Closed fracture of multiple ribs of right side   • Closed fracture of ramus of right pubis (CMS/HCC)   • Pelvic hematoma in male   • Pleural effusion on left   • Normocytic anemia   • Stage 3a chronic kidney disease (CMS/HCC)   • Closed odontoid fracture (CMS/HCC)       IMPRESSION/PLAN     83 y.o. male HD#6 s/p Fall    Closed odontoid fracture (CMS/HCC)  Assessment & Plan  · Age-indeterminate, seen on prior scans  · Patient asymptomatic  · Flex-ex XR--widening of the 1st-2nd cervical interspinous process & anterior translocation of the superior fracture fragment  · Refusing MRI and surgery - NSU aware   · Maintain C-collar -> non compliance with collar intermittently despite pt being alert and oriented x 3 with competence to understand the risks of not wearing the collar  · I spent > 30 minutes with the patient, his son Anastacio and his wife Adry and we discussed the need for wearing the C collar and also the need for MRI to help clarify urgency and need for surgical fusion which the neurosurgery team has also discussed with the patient previously. The patient and the family will discuss and let us know when we can proceed with  scheduling the MRI C spine.       Stage 3a chronic kidney disease (CMS/HCC)  Assessment & Plan  · Baseline creatinine 1.4 to 1.5 per review of chart  · Avoid nephrotoxins     Normocytic anemia  Assessment & Plan  · Most recent Hgb 10 to 11 per review of chart  · Followed by hematology at [facility]     Pleural effusion on left  Assessment & Plan  · Incidental finding on CT imaging, loculated  · S/p IR thoracentesis 1/18 with drainage of 1.1L  · Breathing comfortably on room air    Pelvic hematoma in male  Assessment & Plan  · In setting of pubic ramus fracture, no active extravasation noted   · Causing mass effect on bladder. Avila placed with concern for prostatic urethral injury  · Urology consulted--recommended maintaining avila with CT Cystogram next week  · Serial Hgbs stable.    Closed fracture of ramus of right pubis (CMS/HCC)  Assessment & Plan  Orthopedic recs--> non-op management. WBAT BLE     Closed fracture of multiple ribs of right side  Assessment & Plan  Multimodal analgesia -->  APAP ATC, PRN opiate  IS/C&Db  Wean O2 as tolerated for goal POX > 93%     * Fall due to slipping on ice or snow, initial encounter  Assessment & Plan  · Fall precautions  · PT/OT/PMR eval --> AR    Add lidocaine patch to rib fx site. Obtain cardiac preop risk stratification in case he changes his mind about the surgery.    DVT PPX: SCDs & lovenox 30mg BID    DISPOSITION:  3S tele.    CODE STATUS: Full Code    Amadeo Valentin MD  1/23/2024  6:41 AM

## 2024-01-23 NOTE — PLAN OF CARE
Care Coordination Discharge Plan Note     Discharge Needs Assessment  Concerns to be Addressed: care coordination/care conferences, discharge planning  Current Discharge Risk: physical impairment    Anticipated Discharge Plan  Anticipated Discharge Disposition: acute rehab/Inpatient Rehab Facility       Patient Choice  Offered/Gave Vendor List: yes  Patient's Choice of Community Agency(s): [facility]    Patient and/or patient guardian/advocate was made aware of their right to choose a provider. A list of eligible providers was presented and reviewed with the patient and/or patient guardian/advocate in written and/or verbal form. The list delineates providers in the patient’s desired geographic area who are participating in the Medicare program and/or providers contracted with the patient’s primary insurance. The Medicare list and quality ratings were obtained from the Medicare.gov [medicare.gov] website.    ---------------------------------------------------------------------------------------------------------------------    Interdisciplinary Discharge Plan Review:  Participants:advanced practice provider, occupational therapy, , social work/services, dietitian/nutrition services, physician, nursing, physical therapy    Concerns Comments: Per trauma rounds, Pt is stable or transfer to [facility]. [facility] will submit eval today. Pt will need updated notes and insurance auth. CM will follow.     ADD: Pending plan by Urology for CT cysto date.     Discharge Plan:   Disposition/Destination:   /    Discharge Facility:    Community Resources:      Discharge Transportation:  Is Out of Hospital DNR needed at Discharge: no  Does patient need discharge transport? Yes

## 2024-01-23 NOTE — PROGRESS NOTES
SW met with pt at bedside. Informed pt that [facility] was reviewing clinicals for admission, will f/u with pt and his spouse once determination made. Pt verbalized understanding. If pt accepted, will need IBC auth for [facility] admission. DISPO: [facility] OTF: pending facility acceptance and insurance auth. SW to continue to coordinate care until discharge. VANESA Ball

## 2024-01-23 NOTE — PROGRESS NOTES
"   Occupational Therapy -  Daily Treatment/Progress Note     Patient: Randolph Daley  Location: [facility]  MRN: [id number]  Today's date: 1/23/2024    HISTORY OF PRESENT ILLNESS     Randolph is a 83 y.o. male admitted on 1/17/2024 with Fall due to slipping on ice or snow, initial encounter [W00.9XXA]. Principal problem is Fall due to slipping on ice or snow, initial encounter.    Past Medical History  Randolph has no past medical history on file.    History of Present Illness   Presented to [facility] 1/17 after slip and fall on ice  CT head negative for acute changes, \"Lateral ventriculomegaly, indeterminate though usually related to volume loss or normal pressure hydrocephalus\"  CT C/S:   Age-indeterminate odontoid process fracture -> pending further imaging per neurosurgery. Maintain c-collar AAT    + R pubic ramus fx with hematoma -> per ortho fx is stable, no intervention required -> WBAT B/L LE   +R posterior rib fx 8, 9, 10 , 11  +also bilateral pleural effusion left larger than right (history of RSV couple weeks ago, fluid is not hemorrhagic)  Txf to SICU for trauma service     urology is consulted for concerns for prostatic urethral injury , from pelvic hematoma -> avila catheter placed     1/18: bedside left thoracentesis    PRIOR LEVEL OF FUNCTION AND LIVING ENVIRONMENT     Prior Level of Function    Flowsheet Row Most Recent Value   Dominant Hand right   Ambulation independent   Transferring independent   Toileting independent   Bathing independent   Dressing independent   Eating independent   IADLs independent   Driving/Transportation    Communication understands/communicates without difficulty   Prior Level of Function Comment indepedent and active baseline, was jogging prior to recent bunionectomy surgery. drives. retired (had made clothes/uniforms)   Assistive Device Currently Used at Home grab bar        Prior Living Environment    Flowsheet Row Most Recent Value " "  People in Home spouse   Current Living Arrangements home   Home Accessibility stairs within home (Group), stairs to enter home (Group)   Living Environment Comment lives w/ wife in [facility] with 1+1 LARRY no rail, has 1st floor bedroom and full bathroom w/ stall shower + built in bench + GB        Occupational Profile    Flowsheet Row Most Recent Value   Successful Occupations retired, \"made womens clothes for 32 years   Environmental Supports and Barriers supportive wife        VITALS AND PAIN     OT Vitals    Date/Time Pulse Pt Activity O2 Therapy BP BP Location BP Method Pt Position [facility]   01/23/24 1410 59 At rest None (Room air) 137/69 Left upper arm Automatic Sitting OC      OT Pain    Date/Time Pain Type Rating: Rest [facility]   01/23/24 1410 Pain Assessment 6 - moderate-severe pain OC           Objective   OBJECTIVE     Start time:  1348  End time:  1415  Session Length: 27 min  Mode of Treatment: co-treatment, occupational therapy (progress mobility, A reqd, safety)    General Observations  Patient received reclined, in chair. He was agreeable to therapy, no issues or concerns identified by nurse prior to session. pt did not have c-collar on, donned immediately for session with pt agreeable    Precautions: fall, brace worn at all times, weight bearing (c-collar)  Right LE Weight-Bearing Status: weight-bearing as tolerated (WBAT)    Limitations/Impairments: safety/cognitive      OT Eval and Treat - 01/23/24 1348        Cognition    Orientation Status oriented x 3     Affect/Mental Status flat/blunted affect     Follows Commands follows one-step commands;75-90% accuracy;increased processing time needed;initiation impaired;delayed response/completion;repetition of directions required;verbal cues/prompting required;physical/tactile prompts required     Cognitive Function executive function deficit;safety deficit     Attention Deficit minimal deficit     Executive Function Deficit moderate deficit;information " "processing;insight/awareness of deficits;judgment;organization/sequencing;problem-solving/reasoning;self-monitoring/self-correction;planning/decision-making     Comment, Executive Function 1 step cues repeated for mobility (\"lean left and step with R\")     Safety Deficit moderate deficit;ability to follow commands;awareness of need for assistance;insight into deficits/self-awareness;problem-solving;safety precautions follow-through/compliance;safety precautions awareness     Comment, Cognition repeated education for c-collar. Pt is grossly appropriate though demonstrates decreased insight and limited cognitive processing for complex tasks and multi step commands. limited carryover of spinal px/c-collar education        Bed Mobility    Comment recd OOB this session        Mobility Belt    Mobility Belt Used for All Out of Bed Activity no     Reason Mobility Belt Not Used medical contraindication     Reason Mobility Belt Not Used rib fx, pelvic fracture        Sit/Stand Transfer    Surface chair with arm rests     [place] moderate assist (50-74% patient effort);2 person assist     Safety/Cues hand placement;proper use of assistive device     Assistive Device walker, front-wheeled     Transfer Comments from recliner chair, sit to recliner chair end of session        Functional Mobility    Distance in room/bathroom     Functional Mobility [place] minimum assist (75% or more patient effort);2 person assist     Safety/Cues verbal cues;maximal;sequencing;technique;proper use of assistive device     Assistive Device walker, front-wheeled     Functional Mobility Comments max cues for sequencing stepping and weightshifting for steps, slides RLE        Upper Body Dressing    Tasks don;pajama/robe;other (see comments)     [place] maximum assist (25-49% patient effort)     Position supported sitting;unsupported sitting     Adaptive Equipment orthosis     Comment MaxA for robe and MaxA for c-collar with education " provided        Grooming    Tasks other (see comments)     [place] other (see comments)     Position supported standing;other (see comments)     Comment pt engaged in pre-grooming prep task standing supported with ModA within RW at counter, is able to obtain object from counter height and pass it to therapist crossing midline + balance deficits, pain and cognition limiting further engagement in task        Toileting    Comment D via catheter        Balance    Static Sitting Balance mild impairment;sitting in chair     Dynamic Sitting Balance mild impairment;sitting in chair     Sit to Stand Dynamic Balance moderate impairment     Static Standing Balance moderate impairment;supported     Dynamic Standing Balance moderate impairment;supported     Comment, Balance static standing ModA for balance, MinAx2 for all mobility with + balance deficits with RW        Impairments/Safety Issues    Impairments Affecting Function balance;endurance/activity tolerance;pain;strength;range of motion (ROM);cognition     Cognitive Impairments, Safety/Performance insight into deficits/self-awareness;problem-solving/reasoning;safety precaution awareness;safety precaution follow-through;sequencing abilities     Safety Issues Affecting Function awareness of need for assistance;insight into deficits/self-awareness;problem-solving;positioning of assistive device;judgment;sequencing abilities;safety precautions follow-through/compliance                             Session Outcome  Patient upright, in chair at end of session, chair alarm on, all needs met, call light in reach, personal items in reach. Nursing notified about patient's performance, patient's response to therapy/activity, and patient's position.    AM-PAC™ - ADL (Current Function)     Putting on/taking off regular lower body clothing 2 - A Lot   Bathing 2 - A Lot   Toileting 1 - Total   Putting on/taking off regular upper body clothing 2 - A Lot   Help for taking care of  personal grooming 3 - A Little   Eating meals 3 - A Little   AM-PAC™ ADL Score 13      ASSESSMENT AND PLAN     Progress Summary  Pt seen for OT session, making progress towards functional goals req ModAx2 for tsfr and MinAx2 for mobility this session. Randolph req Mod>MaxA for BADL and is limited by problem solving and sequencing deficits during functional mobility, and limited safety awareness req repeated education. Continue OT plan, rec acute rehab    Patient/Family Therapy Goal Statement: to have less pain    OT Plan    Flowsheet Row Most Recent Value   Rehab Potential good, to achieve stated therapy goals at 01/19/2024 0830   Therapy Frequency 5 times/wk at 01/19/2024 0830   Planned Therapy Interventions activity tolerance training, adaptive equipment training, BADL retraining, functional balance retraining, occupation/activity based interventions, patient/caregiver education/training, transfer/mobility retraining at 01/19/2024 0830          OT Discharge Recommendations    Flowsheet Row Most Recent Value   OT Recommended Discharge Disposition acute rehab/Inpatient Rehab Facility at 01/19/2024 0830   Anticipated Equipment Needs if Discharged Home (OT) dressing equipment, commode, 3-in-1, shower chair at 01/19/2024 0830               OT Goals    Flowsheet Row Most Recent Value   Bed Mobility Goal 1    Activity/Assistive Device bed mobility activities, all at 01/18/2024 0931   [place] modified independence at 01/18/2024 0931   Time Frame by discharge at 01/18/2024 0931   Progress/Outcome goal ongoing at 01/18/2024 0931   Transfer Goal 1    Activity/Assistive Device all transfers at 01/18/2024 0931   [place] modified independence at 01/18/2024 0931   Time Frame by discharge at 01/18/2024 0931   Progress/Outcome goal ongoing at 01/18/2024 0931   Dressing Goal 1    Activity/Adaptive Equipment dressing skills, all at 01/18/2024 0931   [place] modified independence at 01/18/2024 0931   Time Frame by  discharge at 01/18/2024 0931   Progress/Outcome goal ongoing at 01/18/2024 0931   Grooming Goal 1    Activity/Assistive Device grooming skills, all at 01/18/2024 0931   [place] modified independence at 01/18/2024 0931   Time Frame by discharge at 01/18/2024 0931   Progress/Outcome goal ongoing at 01/18/2024 0931   Precaution Goal 1    Activity spinal precautions at 01/18/2024 0931   [place]/Cues independently at 01/18/2024 0931   Time Frame by discharge at 01/18/2024 0931   Progress/Outcome goal ongoing at 01/18/2024 0931

## 2024-01-24 ENCOUNTER — HOSPITAL ENCOUNTER (INPATIENT)
Facility: REHABILITATION | Age: 84
LOS: 14 days | Discharge: HOME HEALTH | DRG: 560 | End: 2024-02-07
Attending: INTERNAL MEDICINE | Admitting: INTERNAL MEDICINE
Payer: COMMERCIAL

## 2024-01-24 ENCOUNTER — APPOINTMENT (INPATIENT)
Dept: RADIOLOGY | Facility: HOSPITAL | Age: 84
DRG: 964 | End: 2024-01-24
Attending: NURSE PRACTITIONER
Payer: COMMERCIAL

## 2024-01-24 VITALS
RESPIRATION RATE: 18 BRPM | BODY MASS INDEX: 29.34 KG/M2 | WEIGHT: 193.56 LBS | TEMPERATURE: 97.5 F | OXYGEN SATURATION: 96 % | HEART RATE: 70 BPM | HEIGHT: 68 IN | DIASTOLIC BLOOD PRESSURE: 66 MMHG | SYSTOLIC BLOOD PRESSURE: 123 MMHG

## 2024-01-24 DIAGNOSIS — F43.23 ADJUSTMENT DISORDER WITH MIXED ANXIETY AND DEPRESSED MOOD: ICD-10-CM

## 2024-01-24 PROBLEM — T07.XXXA MULTIPLE INJURIES DUE TO TRAUMA: Status: ACTIVE | Noted: 2024-01-24

## 2024-01-24 PROCEDURE — 12800000 HC ROOM AND CARE SEMIPRIVATE REHAB

## 2024-01-24 PROCEDURE — G1004 CDSM NDSC: HCPCS

## 2024-01-24 PROCEDURE — 63600000 HC DRUGS/DETAIL CODE: Mod: JZ | Performed by: NURSE PRACTITIONER

## 2024-01-24 PROCEDURE — 63700000 HC SELF-ADMINISTRABLE DRUG: Performed by: NURSE PRACTITIONER

## 2024-01-24 PROCEDURE — 99238 HOSP IP/OBS DSCHRG MGMT 30/<: CPT | Performed by: SURGERY

## 2024-01-24 PROCEDURE — 63600000 HC DRUGS/DETAIL CODE: Mod: JZ

## 2024-01-24 PROCEDURE — 72194 CT PELVIS W/O & W/DYE: CPT | Mod: LT,MG

## 2024-01-24 PROCEDURE — 63700000 HC SELF-ADMINISTRABLE DRUG: Performed by: SURGERY

## 2024-01-24 RX ORDER — GUAIFENESIN 100 MG/5ML
400 SOLUTION ORAL EVERY 6 HOURS
Status: DISCONTINUED | OUTPATIENT
Start: 2024-01-25 | End: 2024-01-27

## 2024-01-24 RX ORDER — TRAMADOL HYDROCHLORIDE 50 MG/1
50 TABLET ORAL EVERY 6 HOURS PRN
Status: DISCONTINUED | OUTPATIENT
Start: 2024-01-24 | End: 2024-01-25

## 2024-01-24 RX ORDER — IBUPROFEN 200 MG
16-32 TABLET ORAL AS NEEDED
Status: DISCONTINUED | OUTPATIENT
Start: 2024-01-24 | End: 2024-01-25

## 2024-01-24 RX ORDER — ACETAMINOPHEN 500 MG
1000 TABLET ORAL EVERY 6 HOURS PRN
Start: 2024-01-24 | End: 2024-02-07 | Stop reason: HOSPADM

## 2024-01-24 RX ORDER — ONDANSETRON 4 MG/1
4 TABLET, ORALLY DISINTEGRATING ORAL EVERY 8 HOURS PRN
Status: DISCONTINUED | OUTPATIENT
Start: 2024-01-24 | End: 2024-02-05

## 2024-01-24 RX ORDER — TRAMADOL HYDROCHLORIDE 50 MG/1
50 TABLET ORAL EVERY 6 HOURS PRN
Start: 2024-01-24 | End: 2024-02-07 | Stop reason: HOSPADM

## 2024-01-24 RX ORDER — DEXTROSE 50 % IN WATER (D50W) INTRAVENOUS SYRINGE
25 AS NEEDED
Status: DISCONTINUED | OUTPATIENT
Start: 2024-01-24 | End: 2024-01-25

## 2024-01-24 RX ORDER — ASCORBIC ACID 500 MG
1000 TABLET ORAL DAILY
Status: DISCONTINUED | OUTPATIENT
Start: 2024-01-25 | End: 2024-01-25

## 2024-01-24 RX ORDER — ENOXAPARIN SODIUM 100 MG/ML
30 INJECTION SUBCUTANEOUS
Start: 2024-01-24 | End: 2024-02-07 | Stop reason: HOSPADM

## 2024-01-24 RX ORDER — GUAIFENESIN 100 MG/5ML
400 SOLUTION ORAL EVERY 6 HOURS PRN
Start: 2024-01-24 | End: 2024-02-07 | Stop reason: HOSPADM

## 2024-01-24 RX ORDER — DEXTROSE 40 %
15-30 GEL (GRAM) ORAL AS NEEDED
Status: DISCONTINUED | OUTPATIENT
Start: 2024-01-24 | End: 2024-01-25

## 2024-01-24 RX ORDER — BISACODYL 5 MG
10 TABLET, DELAYED RELEASE (ENTERIC COATED) ORAL DAILY
Start: 2024-01-25 | End: 2024-02-07 | Stop reason: HOSPADM

## 2024-01-24 RX ORDER — ACETAMINOPHEN 325 MG/1
650 TABLET ORAL EVERY 4 HOURS PRN
Status: DISCONTINUED | OUTPATIENT
Start: 2024-01-24 | End: 2024-01-25

## 2024-01-24 RX ORDER — BISACODYL 5 MG
10 TABLET, DELAYED RELEASE (ENTERIC COATED) ORAL DAILY
Status: DISCONTINUED | OUTPATIENT
Start: 2024-01-25 | End: 2024-02-05

## 2024-01-24 RX ORDER — LEVOTHYROXINE SODIUM 25 UG/1
25 TABLET ORAL
Status: DISCONTINUED | OUTPATIENT
Start: 2024-01-25 | End: 2024-02-07 | Stop reason: HOSPADM

## 2024-01-24 RX ORDER — AMOXICILLIN 250 MG
2 CAPSULE ORAL
Status: DISCONTINUED | OUTPATIENT
Start: 2024-01-25 | End: 2024-01-25

## 2024-01-24 RX ADMIN — ACETAMINOPHEN 975 MG: 325 TABLET ORAL at 00:14

## 2024-01-24 RX ADMIN — ACETAMINOPHEN 975 MG: 325 TABLET ORAL at 12:43

## 2024-01-24 RX ADMIN — GUAIFENESIN 400 MG: 100 SOLUTION ORAL at 23:25

## 2024-01-24 RX ADMIN — ACETAMINOPHEN 975 MG: 325 TABLET ORAL at 17:03

## 2024-01-24 RX ADMIN — ENOXAPARIN SODIUM 30 MG: 30 INJECTION SUBCUTANEOUS at 05:36

## 2024-01-24 RX ADMIN — Medication 1000 MG: at 08:49

## 2024-01-24 RX ADMIN — ACETAMINOPHEN 975 MG: 325 TABLET ORAL at 05:36

## 2024-01-24 RX ADMIN — DIATRIZOATE MEGLUMINE AND DIATRIZOATE SODIUM 30 ML: 660; 100 LIQUID ORAL; RECTAL at 14:57

## 2024-01-24 RX ADMIN — GUAIFENESIN 400 MG: 100 SOLUTION ORAL at 10:05

## 2024-01-24 RX ADMIN — LEVOTHYROXINE SODIUM 25 MCG: 0.03 TABLET ORAL at 05:36

## 2024-01-24 ASSESSMENT — PATIENT HEALTH QUESTIONNAIRE - PHQ9: SUM OF ALL RESPONSES TO PHQ9 QUESTIONS 1 & 2: 0

## 2024-01-24 ASSESSMENT — COGNITIVE AND FUNCTIONAL STATUS - GENERAL
CLIMB 3 TO 5 STEPS WITH RAILING: 2 - A LOT
MOVING TO AND FROM BED TO CHAIR: 2 - A LOT
WALKING IN HOSPITAL ROOM: 2 - A LOT
STANDING UP FROM CHAIR USING ARMS: 2 - A LOT

## 2024-01-24 NOTE — PLAN OF CARE
Plan of Care Review  Plan of Care Reviewed With: patient  Progress: improving  Outcome Evaluation: AAOx4. Received Tylenol for pain. Room air and lungs are diminished. Womack in place. Call bell within reach.    Problem: Adult Inpatient Plan of Care  Goal: Plan of Care Review  Outcome: Progressing  Flowsheets (Taken 1/24/2024 0326)  Progress: improving  Outcome Evaluation: AAOx4. Received Tylenol for pain. Room air and lungs are diminished. Womack in place. Call bell within reach.  Plan of Care Reviewed With: patient  Goal: Patient-Specific Goal (Individualized)  Outcome: Progressing  Goal: Absence of Hospital-Acquired Illness or Injury  Outcome: Progressing  Goal: Optimal Comfort and Wellbeing  Outcome: Progressing  Goal: Readiness for Transition of Care  Outcome: Progressing     Problem: Skin Injury Risk Increased  Goal: Skin Health and Integrity  Outcome: Progressing     Problem: Fall Injury Risk  Goal: Absence of Fall and Fall-Related Injury  Outcome: Progressing     Problem: Orthopaedic Fracture  Goal: Optimal Functional Ability  Outcome: Progressing     Problem: Self-Care Deficit  Goal: Improved Ability to Complete Activities of Daily Living  Outcome: Progressing

## 2024-01-24 NOTE — PROGRESS NOTES
TRAUMA SURGERY:  DAILY PROGRESS NOTE     PATIENT NAME:  Randolph Daley YOB: 1940    AGE:  83 y.o.  GENDER: male   MRN:  [id number]  PATIENT #: [id number]     SUBJECTIVE     No new events comfortable in bed still not relating C-spine collar did not agree with surgery plan for cystoscopy today possible removing Womack catheter.  If no extravasation tolerating p.o. diet denies CP/palps/SOB. Denies N/V.    VITAL SIGNS     Temp: Temp (24hrs), Av.7 °C (98 °F), Min:36.3 °C (97.4 °F), Max:36.9 °C (98.5 °F)     SBP:  Systolic (24hrs), Av , Min:121 , Max:146      DBP:  Diastolic (24hrs), Av, Min:58, Max:71    Recent:  No data found.          INTAKE & OUTPUT     I/O last 3 completed shifts:  In: 480 [P.O.:480]  Out: 3500 [Urine:3300; Stool:200]     Intake/Output Summary (Last 24 hours) at 2024 0811  Last data filed at 2024 0400  Gross per 24 hour   Intake 240 ml   Output 3000 ml   Net -2760 ml     No intake/output data recorded.     MEDICATIONS     Infusions:             Scheduled:   • acetaminophen  975 mg oral q6h RUDY   • ascorbic acid  1,000 mg oral Daily   • bisacodyL  10 mg oral Daily   • docusate sodium  100 mg oral BID   • enoxaparin  30 mg subcutaneous q12h (6a, 6p)   • levothyroxine  25 mcg oral Daily before breakfast   • senna  3 tablet oral Nightly       Antibiotics:      PRN:     bisacodyL, 10 mg, Daily PRN  camphor-methyl salicyl-menthoL, , 2x daily PRN  dextrose 50 % in water (D50), 25 mL, PRN  guaiFENesin, 400 mg, q6h PRN  ondansetron, 4 mg, q6h PRN  traMADoL, 50 mg, q6h PRN         DIAGNOSTIC DATA     LABS:  Results from last 7 days   Lab Units 24  0707 24  0522 24  0028 24  1634 24  1119 24  1119   SODIUM mEQ/L 135* 137 135* 137  --  137   POTASSIUM mEQ/L 4.0 4.0 4.3 4.4  --  4.5   CHLORIDE mEQ/L 101 103 102 103  --  104   CO2 mEQ/L 25  24 24  --  24   BUN mg/dL 26* 29* 31* 33*  --  35*   CREATININE mg/dL 1.5* 1.6* 1.6* 1.6*  --   1.6*   CALCIUM mg/dL 9.4 9.4 9.7 9.9  --  9.5   GLUCOSE mg/dL 90 90 108* 108*  --  108*   MAGNESIUM mg/dL 1.9 1.9 1.9 1.9   < >  --    PHOSPHORUS mg/dL 3.4 3.9 4.2  --   --   --    ALBUMIN g/dL  --   --   --  3.3*  --  3.2*   BILIRUBIN TOTAL mg/dL  --   --   --  0.7  --  0.5   ALK PHOS IU/L  --   --   --  66  --  63   ALT IU/L  --   --   --  8  --  8   AST IU/L  --   --   --  16  --  22    < > = values in this interval not displayed.     Results from last 7 days   Lab Units 01/20/24  0707 01/19/24  0522 01/18/24  2318   WBC K/uL 7.13 7.54 8.48   HEMOGLOBIN g/dL 9.1* 8.9* 8.8*   HEMATOCRIT % 27.7* 27.2* 26.0*   PLATELETS K/uL 84* 88* 89*     Results from last 7 days   Lab Units 01/18/24  2318 01/18/24  0028 01/17/24  1634   PTT sec 36* 38* 37*   PROTIME sec 19.9* 20.3* 19.3*   INR  1.7 1.8 1.6     ABG Results    No lab values to display.       Serum creatinine: 1.5 mg/dL (H) 01/20/24 0707  Estimated creatinine clearance: 41.6 mL/min (A)  Microbiology Results     Procedure Component Value Units Date/Time    Urine culture Urine, Clean Catch [[id number]]  (Abnormal) Collected: 01/17/24 1416    Specimen: Urine, Clean Catch Updated: 01/18/24 1800     Urine Culture **Positive Culture**      1,000-4,000 cfu/mL Gram Positive Cocci    Narrative:      Please call Microbiology if clinical considerations warrant further testing. Isolate will be held for 7 days.            IMAGING:  No results found.   Radiology Imaging    XR SPINE CERVICAL COMPLETE 4 OR 5 VW    Narrative  CLINICAL HISTORY: Persistent neck pain with negative CT C-Spine   .    COMPARISON: 1/17/2024.    COMMENT: 4 views of cervical spine were obtained.    Odontoid process fracture better seen on recent CT. With flexion, there is  widening of the 1st-2nd cervical interspinous process space as well as slight  anterior translocation of the superior fracture fragment, suspicious for  instability across the odontoid process fracture.    Impression  IMPRESSION: Please see  comment.      PHYSICAL EXAM      Physical Exam  Constitutional:       Appearance: Normal appearance.   HENT:      Head: Normocephalic.      Nose: Nose normal.      Mouth/Throat:      Mouth: Mucous membranes are moist.   Eyes:      Pupils: Pupils are equal, round, and reactive to light.   Pulmonary:      Effort: Pulmonary effort is normal.      Breath sounds: Normal breath sounds.   Abdominal:      General: Abdomen is flat. Bowel sounds are normal.      Palpations: Abdomen is soft.   Neurological:      Mental Status: He is alert.         Lines, Drains, Airways, Wounds:     Peripheral IV (Adult) 01/17/24 Right Antecubital (Active)   Number of days: 7       Peripheral IV (Adult) 01/18/24 Anterior;Left;Proximal Forearm (Active)   Number of days: 6       Urethral Catheter 18 Fr (Active)   Number of days: 7       PROBLEM LIST     Patient Active Problem List   Diagnosis   • Fall due to slipping on ice or snow, initial encounter   • Closed fracture of multiple ribs of right side   • Closed fracture of ramus of right pubis (CMS/HCC)   • Pelvic hematoma in male   • Pleural effusion on left   • Normocytic anemia   • Stage 3a chronic kidney disease (CMS/HCC)   • Closed odontoid fracture (CMS/HCC)       IMPRESSION/PLAN     83 y.o. male HD#7 s/p     Closed odontoid fracture (CMS/HCC)  Assessment & Plan  · Age-indeterminate, seen on prior scans - MRI done, appears chronic  · Patient asymptomatic  · Flex-ex XR--widening of the 1st-2nd cervical interspinous process & anterior translocation of the superior fracture fragment  · Maintain C-collar -> intermittent non compliance with collar despite pt being alert and oriented x 3 with competence to understand the risks of not wearing the collar  · Pt, son and wife aware of the necessity to wear collar and educated on risks.  · [facility] offered surgery but patient refusing at this time - Will follow up as outpatient in 4-6 weeks  · Collar AAT      Stage 3a chronic kidney disease  (CMS/[facility])  Assessment & Plan  · Baseline creatinine 1.4 to 1.5 per review of chart - returned to baseline at 1.5  · Avoid nephrotoxins     Normocytic anemia  Assessment & Plan  · Most recent Hgb 10 to 11 per review of chart  · Followed by hematology at [facility]   · Hgb sable in the 8-9 range    Pleural effusion on left  Assessment & Plan  · Incidental finding on CT imaging, loculated  · S/p IR thoracentesis 1/18 with drainage of 1.1L  · Breathing comfortably on room air     Pelvic hematoma in male  Assessment & Plan  · In setting of pubic ramus fracture, no active extravasation noted   · Causing mass effect on bladder. Avila placed with hematuria  · Urology consulted--recommended maintaining avila with CT Cystogram this week/prior to discharge - unclear if injury present  · Serial Hgbs stable.    Closed fracture of ramus of right pubis (CMS/[facility])  Assessment & Plan  Orthopedic recs--> non-op management. WBAT BLE    PT/OT - AR    Closed fracture of multiple ribs of right side  Assessment & Plan  Multimodal analgesia -->  APAP ATC, PRN ultram  IS/C&Db  Wean O2 as tolerated for goal POX > 93%      * Fall due to slipping on ice or snow, initial encounter  Assessment & Plan  · Fall precautions  · PT/OT/PMR eval --> AR         CODE STATUS: Full Code    Shelley Ruvalcaba MD  1/24/2024  8:11 AM

## 2024-01-24 NOTE — BMR PREADMISSION NOTE
[facility]  Preadmission Assessment    Patient Name:  Randolph Daley  YOB: 1940    Referral Date:  01/19/24  Evaluation Date:  01/24/24  Referring Facility Admission Date: 01/17/24  Referring Facility: [facility]   Referring Provider: Shelley Ruvalcaba MD     Reason for Referral: Randolph Daley is a 83 y.o. male whose primary indication for inpatient rehabilitation is Debility.     Pertinent History of Current Functional Problem:  83 y.o. male with PMHX of BPH, HTN who presented to [facility] after a slip and fall on ice landing on his right side.  He was found to have multiple injuries including right 8-11 rib fractures, bilateral pleural effusion s/p IR thoracentesis 1/18 with drainage of 1.1L, pelvic fracture (non op mgmt), right adductor/obturator hematoma, questionable posttraumatic bladder injury and age-indeterminate cervical fracture.  Neurosurgery was consulted and offered c spinesurgery, but patient declined.  [place] Collar on at all times, except for meals to prevent aspiration.  Patient c/o difficulty coughing up mucus with collar on, but understands that he needs to wear the collar especially during therapy.  Urology was consulted.  CT cystogram showed possible extravasation of contrast into the prostatic urethra.  Womack catheter is currently draining clear urine.  Plan is to continue Womack catheter for 1 week.  CT cystogram in 1 week with possible removal at that time, but pt declining CT Cystogram.      At baseline, he is independent with mobility, transfers, ADLs.  He lives with his wife outside the [facility].  They live in a two-story house, first-floor set up.  He was in [place] visiting his grandchildren when he fell.       PM&R ASSESSMENT 1/19:  Fall with subsequent pelvic fractures, right rib fractures, questionable C2 fracture..  Fortunately, he is neurologically intact.  PT/OT functional assessments from this morning reviewed.  - Intermittent compliance  with cervical collar.  However, this morning, when I assessed him, he was wearing his cervical collar.   Patient was mod assist x 2 to transfer and ambulating 5-10 feet  -Agree that functional assessments would be improved if patient was willing to take opioid analgesia.     Questionable prostatic urethral injury: Urology consulted.  Womack in place.     Right superior pubic rami fracture, abductor/obturator hematoma: Seen by orthopedics.  Weightbearing as tolerated.        Rehab rec: Functional assessment was not significantly better today.  He still mod to max assist of 2 for sit to stand trial, ambulating about 10 feet.  Agree with recommendation for acute rehab upon discharge.     Patient requires 24-hour nursing and 3 times a week physiatry oversight given his medical complexity regarding unstable C2 fracture and prostatic urethral injury with new indwelling Womack in place.  Patient requires urology follow-up in 1 week.  Anticipate with therapy he will do quite well and anticipated length of stay 7 to 10 days.  He is motivated to return home soon as possible and his wife is willing to provide supervision and assist.         Current DVT Prophylaxis:  Risk for DVT is high.  Current DVT Prophylaxis: enoxaparin (Lovenox)  Current DVT Prophylaxis Dose/Frequency/Route: Lovenox 30 mg    Active Medical Conditions:  Patient Active Problem List   Diagnosis   • Fall due to slipping on ice or snow, initial encounter   • Closed fracture of multiple ribs of right side   • Closed fracture of ramus of right pubis (CMS/HCC)   • Pelvic hematoma in male   • Pleural effusion on left   • Normocytic anemia   • Stage 3a chronic kidney disease (CMS/HCC)   • Closed odontoid fracture (CMS/HCC)       Active Medications:  Facility-Administered Medications Ordered in Other Visits   Medication Dose Route Frequency Provider Last Rate Last Admin   • acetaminophen (TYLENOL) tablet 975 mg  975 mg oral q6h Agueda Shen CRNP   975 mg  at 01/24/24 0536   • ascorbic acid (VITAMIN C) tablet 1,000 mg  1,000 mg oral Daily Agueda Willard CRNP   1,000 mg at 01/23/24 0813   • bisacodyL (DULCOLAX) 10 mg suppository 10 mg  10 mg rectal Daily PRN Agueda Willard CRNP       • bisacodyL (DULCOLAX) tablet,delayed release (DR/EC) 10 mg  10 mg oral Daily Wendy Helms CRNP   10 mg at 01/23/24 0813   • camphor-methyl salicyl-menthoL (MUSCLE RUB) cream   Topical 2x daily PRN Agueda Willard CRNP       • dextrose 50 % in water (D50) injection 12.5 g  25 mL intravenous PRN Agueda Willard CRNP       • docusate sodium (COLACE) capsule 100 mg  100 mg oral BID Wendy Helms CRNP   100 mg at 01/23/24 2034   • enoxaparin (LOVENOX) syringe 30 mg  30 mg subcutaneous q12h (6a, 6p) Barbara Easley CRNP   30 mg at 01/24/24 0536   • guaiFENesin (ROBITUSSIN) 100 mg/5 mL liquid 400 mg  400 mg oral q6h PRN Wendy Helms CRNP   400 mg at 01/23/24 2035   • levothyroxine (SYNTHROID) tablet 25 mcg  25 mcg oral Daily before breakfast Agueda Willard CRNP   25 mcg at 01/24/24 0536   • ondansetron (ZOFRAN) injection 4 mg  4 mg intravenous q6h PRN Agueda Willard CRNP       • senna (SENOKOT) tablet 3 tablet  3 tablet oral Nightly Wendy Helms CRNP   3 tablet at 01/23/24 2110   • traMADoL (ULTRAM) tablet 50 mg  50 mg oral q6h PRN Agueda Willard CRNP   50 mg at 01/22/24 2118   No medication comments found.    HISTORY:    No past medical history on file.  No past surgical history on file.  Tobacco Use as of 1/19/2024     Smoking Status Smoking Start Date Quit Date Smoking Frequency    Former -- --     Other Smoking Type Start Date Quit Date       Cigars -- --     Smokeless Status Smokeless Type Smokeless Quit Date    Never -- --    Source    Provider            Alcohol Use as of 1/19/2024     Alcohol Use Drinks/Week Alcohol/Week Comments Source    Not Currently   -- -- Provider            Drug Use as of 1/19/2024     Drug Use Types Frequency  "Comments Source    Never -- -- -- Provider            Socioeconomic as of 1/19/2024     Marital Status Spouse Name Number of Children Years Education Education Level Preferred Language Ethnicity Race Source     -- -- -- -- English Not , /a, or Dominican origin White --        Allergies  Allergies   Allergen Reactions   • Lidocaine Hives   • Prednisone Hives and Other (see comments)     hives to \"steroids\"  hives to \"steroids\"     • Other      Steroids  Steroids     • Polyethylene Glycol        Prior Level of Function    Flowsheet Row Most Recent Value   Dominant Hand right   Ambulation independent   Transferring independent   Toileting independent   Bathing independent   Dressing independent   Eating independent   IADLs independent   Driving/Transportation    Communication understands/communicates without difficulty   Swallowing swallows foods/liquids without difficulty   Baseline Diet/Method of Nutritional Intake regular, thin liquids   Past History of Dysphagia Denies hx   Prior Level of Function Comment Fully Independent, drives   Assistive Device Currently Used at Home grab bar        Prior Living Environment    Flowsheet Row Most Recent Value   People in Home spouse   Current Living Arrangements home   Living Environment Comment lives w/ wife in [facility] with 1+1 LARRY no rail, has 1st floor bedroom and full bathroom w/ stall shower + built in bench + GB          TEST RESULTS:  Chemistry (Up to last 3 results from the past 720 hours)      01/18 0028 01/19 0522 01/20 0707    Sodium       135            137            135         Potassium, Bld       4.3            4.0            4.0         BUN       31            29            26         Creatinine       1.6            1.6            1.5         Glucose       108            90            90         CO2       24            22            25         Chloride       102            103            101           Hepatic (Up to last 3 " results from the past 720 hours)      01/17 1119 01/17 1634    ALT (SGPT)       8            8         AST (SGOT)       22            16         Alkaline Phosphatase       63            66         Bilirubin, Direct       0.1            --         Bilirubin, Total       0.5            0.7           Metabolic (Up to last 3 results from the past 720 hours)    None      Hematologic (Up to last 3 results from the past 720 hours)      01/18 2318 01/19 0522 01/20 0707    WBC       8.48            7.54            7.13         Hemoglobin       8.8            8.9            9.1         Hematocrit       26.0            27.2            27.7         Platelets       89  Comment: ALL RESULTS HAVE BEEN RECHECKED            88  Comment: CONSISTENT WITH PREVIOUS RESULTS            84  Comment: CONSISTENT WITH PREVIOUS RESULTS         Protime       19.9            --            --         INR       1.7  Comment: Moderate Intensity Anticoagulation = 2.0 to 3.0, High Intensity = 2.5 to 3.5            --            --           01/17 1634 01/18 0028      WBC       --            --          Hemoglobin       --            --          Hematocrit       --            --          Platelets       --            --          Protime       19.3            20.3          INR       1.6  Comment: Moderate Intensity Anticoagulation = 2.0 to 3.0, High Intensity = 2.5 to 3.5            1.8  Comment: Moderate Intensity Anticoagulation = 2.0 to 3.0, High Intensity = 2.5 to 3.5            Other (Up to last 3 results from the past 720 hours)      01/17 1416    Urine Culture       **Positive Culture**                1,000-4,000 cfu/mL Gram Positive Cocci             Diagnostics: CT  CT Study Details: CT C Spine  CT Date: 01/17/24  CT Result: *  No evidence of acute posttraumatic intracranial injury.  *  Lateral ventriculomegaly, indeterminate though usually related to volume loss  or normal pressure hydrocephalus.  *  Age-indeterminate odontoid process  fracture which was not reported on outside  imaging.  MRI Study Details: MRI C Spine  MRI Date: 01/22/24  MRI Result: 1.  C2 fracture with no bone marrow edema to suggest acute fracture.  2.  No abnormal enhancement.  3.  No epidural collection or hematoma.  4.  Degenerative spondylosis worst at C3-C4 where there is also suggestion of  Mild right jose luis-cord signal abnormality.  Multilevel spondylosis otherwise noted  as described.  Other Study Details: US Kidneys  Other Date: 01/18/24  Other Result: Multiple right renal cortical and sinus cysts. Lesion seen in the right lower  pole on CT corresponds to an exophytic cyst with some marginal calcification.  Mild right hydronephrosis.     Small left renal cortical cysts, largest measured in the upper pole.     Mildly increased renal cortical echogenicity, likely related to underlying  chronic medical renal disease.    ASSESSMENT:  Vitals:  Temp:  [36.3 °C (97.3 °F)-36.9 °C (98.5 °F)] 36.3 °C (97.3 °F)  Heart Rate:  [59-74] 69  Resp:  [18-19] 18  BP: (121-146)/(58-71) 121/65    Lines/Drains/Airways:       Risk for Clinical Complications:  Constipation: High  Falls: High  Skin Breakdown: High    Precautions:  Existing Precautions/Restrictions:  (aspen collar aat except for meals per Neurosurgery)  Left LE Weight-Bearing Status: weight-bearing as tolerated (WBAT)  Right LE Weight-Bearing Status: weight-bearing as tolerated (WBAT)    Current Diet:   Diet: thin liquids, regular solids    Current Functional Status:   Current Function     Row Name 01/21/24 5697       Cognition    Orientation Status oriented x 3;disoriented to;time    Affect/Mental Status confused    Follows Commands follows one-step commands;50-74% accuracy;physical/tactile prompts required;repetition of directions required;verbal cues/prompting required    Cognitive Function memory deficit;executive function deficit    Executive Function Deficit moderate deficit;information processing;insight/awareness of  deficits;judgment;planning/decision-making    Memory Deficit episodic memory;recall, recent events    Comment, Cognition wife reports pt called her last night with some confusion       Motor Skills    Functional Endurance poor was I       Bed Mobility    Bed Mobility Activities sit to supine    [place] moderate assist (50-74% patient effort);2 person assist    Safety/Cues moderate;preparatory posture;technique    Assistive Device bed rails;draw sheet       Sit/Stand Transfer    Surface edge of bed;elevated bed    [place] minimum assist (75% or more patient effort);2 person assist    Safety/Cues increased time to complete;hand placement;proper use of assistive device;technique    Assistive Device walker, front-wheeled       Gait Training    [place], Gait minimum assist (75% or more patient effort);2 person assist    Safety/Cues verbal cues;gait deviations;hand placement;preparatory posture;proper use of assistive device;technique    Assistive Device walker, front-wheeled    Distance in Feet 5 feet    Pattern step-to    Deviations/Abnormal Patterns bilateral deviations;antalgic;ace decreased;stride length decreased    Comment (Gait/Stairs) ambulated forward from EOB to window. pt still requiring assist at trunk for L lateral weight shift. for first 2 steps pt requiring therapist to advance RLE. w/ max cues and encouragement pt able to clear R foot from floor surface to take steps (though still requiring assist for lateral weight shift and balance).       Impairments/Safety Issues    Impairments Affecting Function balance;endurance/activity tolerance;pain;strength;range of motion (ROM)    Safety Issues Affecting Function awareness of need for assistance;insight into deficits/self-awareness;problem-solving;positioning of assistive device;judgment    Row Name 01/22/24 1000       Bathing    Bathing/Skin Care bath, complete;dressed/undressed    Row Name 01/23/24 1125       Bathing    Bathing/Skin Care  "bath, complete;linen changed;hair washed;face shaved;dressed/undressed    Row Name 01/23/24 1348       Cognition    Orientation Status oriented x 3    Affect/Mental Status flat/blunted affect    Follows Commands follows one-step commands;75-90% accuracy;increased processing time needed;initiation impaired;delayed response/completion;repetition of directions required;verbal cues/prompting required;physical/tactile prompts required    Cognitive Function executive function deficit;safety deficit    Attention Deficit minimal deficit    Executive Function Deficit moderate deficit;information processing;insight/awareness of deficits;judgment;organization/sequencing;problem-solving/reasoning;self-monitoring/self-correction;planning/decision-making    Comment, Executive Function 1 step cues repeated for mobility (\"lean left and step with R\")    Safety Deficit moderate deficit;ability to follow commands;awareness of need for assistance;insight into deficits/self-awareness;problem-solving;safety precautions follow-through/compliance;safety precautions awareness    Comment, Cognition repeated education for c-collar. Pt is grossly appropriate though demonstrates decreased insight and limited cognitive processing for complex tasks and multi step commands. limited carryover of spinal px/c-collar education       Bed Mobility    Comment recd OOB this session       Sit/Stand Transfer    Surface chair with arm rests    [place] moderate assist (50-74% patient effort);2 person assist    Safety/Cues hand placement;proper use of assistive device    Assistive Device walker, front-wheeled    Transfer Comments from recliner chair, sit to recliner chair end of session       Upper Body Dressing    Tasks don;pajama/robe;other (see comments)    [place] maximum assist (25-49% patient effort)    Position supported sitting;unsupported sitting    Adaptive Equipment orthosis    Comment MaxA for robe and MaxA for c-collar with education " provided       Grooming    Tasks other (see comments)    [place] other (see comments)    Position supported standing;other (see comments)    Comment pt engaged in pre-grooming prep task standing supported with ModA within RW at counter, is able to obtain object from counter height and pass it to therapist crossing midline + balance deficits, pain and cognition limiting further engagement in task       Toileting    Comment D via catheter       Balance    Static Sitting Balance mild impairment;sitting in chair    Dynamic Sitting Balance mild impairment;sitting in chair    Sit to Stand Dynamic Balance moderate impairment    Static Standing Balance moderate impairment;supported    Dynamic Standing Balance moderate impairment;supported    Comment, Balance static standing ModA for balance, MinAx2 for all mobility with + balance deficits with RW       Impairments/Safety Issues    Impairments Affecting Function balance;endurance/activity tolerance;pain;strength;range of motion (ROM);cognition    Cognitive Impairments, Safety/Performance insight into deficits/self-awareness;problem-solving/reasoning;safety precaution awareness;safety precaution follow-through;sequencing abilities    Safety Issues Affecting Function awareness of need for assistance;insight into deficits/self-awareness;problem-solving;positioning of assistive device;judgment;sequencing abilities;safety precautions follow-through/compliance    Row Name 01/23/24 5467       Cognition    Orientation Status oriented x 3;disoriented to;time    Affect/Mental Status confused    Follows Commands 50-74% accuracy;physical/tactile prompts required;repetition of directions required;verbal cues/prompting required;follows one-step commands    Cognitive Function executive function deficit;safety deficit    Executive Function Deficit moderate deficit;insight/awareness of deficits;self-monitoring/self-correction;organization/sequencing    Safety Deficit moderate  deficit;safety precautions awareness;safety precautions follow-through/compliance;insight into deficits/self-awareness    Comment, Cognition Pt is AAOx3, demos dec safey awareness and need for C-collar at all times, accepting of education provided. Pt requiring multi-modal cues for mobility and sequencing of task       Bed Mobility    Comment OOB t/o       Sit/Stand Transfer    Surface chair without arm rests    [place] moderate assist (50-74% patient effort);2 person assist    Safety/Cues moderate;verbal cues;hand placement;technique    Assistive Device walker, front-wheeled    Transfer Comments From recliner chair, rising to RW, sitting to recliner at end of session       Gait Training    [place], Gait minimum assist (75% or more patient effort);2 person assist    Safety/Cues moderate;verbal cues;tactile cues;sequencing;technique    Assistive Device walker, front-wheeled    Distance in Feet 10 feet  x2    Pattern step-to;step-through    Deviations/Abnormal Patterns weight shifting decreased;stride length decreased;step length decreased;gait speed decreased    Comment (Gait/Stairs) ambulating fwd 10ft with x1 turn back to chair 10ft. Pt requiring minAx2 to maintain upright balance. Assist for lateral weight shift, dec step length of RLE compared to LLE. Multimodal cues for RW sequencing, steps and weight shift.       Stairs Training    [place], Stairs not tested       Balance    Static Sitting Balance mild impairment    Dynamic Sitting Balance mild impairment    Sit to Stand Dynamic Balance moderate impairment    Static Standing Balance moderate impairment    Dynamic Standing Balance moderate impairment    Comment, Balance static standing ModA for balance, MinAx2 for all mobility with + balance deficits with RW       Impairments/Safety Issues    Impairments Affecting Function balance;endurance/activity tolerance;pain;strength;range of motion (ROM);cognition    Cognitive Impairments,  Safety/Performance insight into deficits/self-awareness;problem-solving/reasoning;safety precaution awareness;safety precaution follow-through;sequencing abilities    Safety Issues Affecting Function awareness of need for assistance;insight into deficits/self-awareness;problem-solving;positioning of assistive device;judgment;sequencing abilities;safety precautions follow-through/compliance    Row Name 01/23/24 1600       Bathing    Bathing/Skin Care back care;bath, complete;linen changed                Support System:  Designated Primary Caregiver: [facility]      Patient/Family Goals:  Patient's Goals For Discharge: return home      Educational Background:      RECOMMENDATIONS / PLAN:  Special Needs:  Is an  Needed/Used?: N  Spiritual, Cultural Beliefs, Yarsani Practices, Values that Affect Care: no  DNR is current code status at referring facility?: No    Plan:  Identified Referral Needs: medical consultative services, physical therapy, occupational therapy  OT Frequency: 5-7 times per week  OT Intensity: 1.5 hours  PT Frequency: 5-7 times per week  PT Intensity: 1.5 hours    Therapy Intensity: Requires, can tolerate and will benefit from 3 hours of therapy at least 5 days per week  Projected Length of Stay (days): 14 days  Patient is willing to participate in rehab program: yes    Impairments to be addressed: mobility, motor dysfunction, safety, self-care  Medical Necessity Admission Criteria: abnormal labs, uncontrolled pain, orthostasis/unstable blood pressure, other active medical conditions (see comments) (odontoid process fracture with c collar,+ R pubic ramus fx with hematoma,+R posterior rib fx 8, 9, 10 , 11,bilateral pleural effusion s/p thoracentesis,prostatic urethral injury with avila)    Expected Level of Function at Discharge:  Self-Care: Independent  Transfers: Independent  Locomotion: Independent  Communication: Independent  Social Cognition: Independent      Post-Discharge  Needs:  Anticipated Discharge Disposition: home with home health  Type of Home Care Services: home OT, nursing, home PT

## 2024-01-24 NOTE — PLAN OF CARE
Care Coordination Discharge Plan Note     Discharge Needs Assessment  Concerns to be Addressed: care coordination/care conferences, discharge planning  Current Discharge Risk: physical impairment    Anticipated Discharge Plan  Anticipated Discharge Disposition: acute rehab/Inpatient Rehab Facility       Patient Choice  Offered/Gave Vendor List: yes  Patient's Choice of Community Agency(s): [facility]    Patient and/or patient guardian/advocate was made aware of their right to choose a provider. A list of eligible providers was presented and reviewed with the patient and/or patient guardian/advocate in written and/or verbal form. The list delineates providers in the patient’s desired geographic area who are participating in the Medicare program and/or providers contracted with the patient’s primary insurance. The Medicare list and quality ratings were obtained from the Medicare.gov [medicare.gov] website.    ---------------------------------------------------------------------------------------------------------------------    Interdisciplinary Discharge Plan Review:  Participants:advanced practice provider, , social work/services, physician, nursing, physical therapy, dietitian/nutrition services, occupational therapy, family/lay caregiver, patient    Concerns Comments: [facility] obtained [facility] authorization: [id number]  approved  acute rehab  nrd 1/31. Pt to d/c to [facility]. [facility] RN report #: [phone]. [facility] trip [facility]: [id number]. Transport time TBD-SW awaiting call back. Trauma NP, bedside RN, pt, pts spouse and facility liaison advised. VANESA Ball     ADDENDUM:  Pt to d/c to [facility]. [facility] RN report #: [phone]. [facility] trip #: [id number], transport for 5pm. Pt for CAT scan prior to d/c.Trauma NP, bedside RN, pt, pts spouse and facility liaison advised. Kunbi Oluwasusi, LSW    Discharge Plan:   Disposition/Destination: [facility] / Inpatient Rehab Facility  Discharge Facility:   Destination -  Admitted Since 1/17/2024     Service Provider Selected Services Address Phone Fax Patient Preferred Last Updated    [facility] [address] [address] [phone] -- Amanda Burdick, RN 1/24/2024 0918        Community Resources:      Discharge Transportation:  Is Out of Hospital DNR needed at Discharge: no  Does patient need discharge transport? Yes  Discharge Transportation Vendor: [facility] ([phone]) (Trip #: [id number])  Type of Transportation: basic life support  What day is the transport expected?: 01/24/24  What time is the transport expected?: 1700

## 2024-01-24 NOTE — DISCHARGE SUMMARY
" TRAUMA SURGERY:  DISCHARGE SUMMARY     PATIENT NAME:  Randolph Daley YOB: 1940    AGE:  83 y.o.  GENDER: male   MRN:  [id number]  PATIENT #: [id number]     Admission date: 1/17/2024    Discharge date: 1/24/2024     Admitting Physician: Shelley Ruvalcaba MD     Discharge Physician:  COURTNEY Nguyne Ma; Cody Natarajan MD     Primary Discharge Diagnoses:   1.  R post 8th-11th rib fractures  2.  R superior pubic rami fracture w hematoma  3.  Chronic unhealed odontoid fracture    Secondary Discharge Diagnoses:    Patient Active Problem List   Diagnosis   • Fall due to slipping on ice or snow, initial encounter   • Closed fracture of multiple ribs of right side   • Closed fracture of ramus of right pubis (CMS/HCC)   • Pelvic hematoma in male   • Pleural effusion on left   • Normocytic anemia   • Stage 3a chronic kidney disease (CMS/HCC)   • Closed odontoid fracture (CMS/HCC)   • Multiple injuries due to trauma     Consults:     IP CONSULT TO CASE MANAGEMENT  IP CONSULT TO SOCIAL WORK  IP CONSULT TO ORTHOPEDIC SURGERY  IP CONSULT TO PHYSICAL MEDICINE REHAB  IP CONSULT TO NEUROSURGERY     Hospital Course: 83 y.o. male who was transfer from [facility] on 1/17/2024 s/p slip on ice sustaining the above-listed injuries.    Presented to [facility] 1/17 after slip and fall on ice  CT head negative for acute changes, \"Lateral ventriculomegaly, indeterminate though usually related to volume loss or normal pressure hydrocephalus\"  CT C/S:   Age-indeterminate odontoid process fracture -> Collar AAT, decline surgery.  Outpt f/u in 4-6 weeks w xrays.     + R pubic ramus fx with hematoma -> per ortho fx is stable, no intervention required -> WBAT B/L LE   +R posterior rib fx 8, 9, 10 , 11  +also bilateral pleural effusion left larger than right (history of RSV couple weeks ago, fluid is not hemorrhagic)  Txf to SICU for trauma service      urology is consulted for concerns for prostatic urethral injury , from pelvic hematoma -> avila " catheter placed --> CT cysto 1/24 neg.  Womack d/c'd.     1/18: bedside left thoracentesis    PT/OT/PMR eval & recommended acute rehab.      Discharge Medications:     Medication List      START taking these medications    acetaminophen 500 mg tablet  Commonly known as: TYLENOL EXTRA STRENGTH  Take 2 tablets (1,000 mg total) by mouth every 6 (six) hours as needed for mild pain or headaches.  Dose: 1,000 mg     bisacodyL 5 mg EC tablet  Commonly known as: DULCOLAX  Start taking on: January 25, 2024  Take 2 tablets (10 mg total) by mouth daily.  Dose: 10 mg     enoxaparin 30 mg/0.3 mL syringe  Commonly known as: LOVENOX  Inject 0.3 mL (30 mg total) under the skin every 12 (twelve) hours Indications: deep vein thrombosis prevention.  Dose: 30 mg     guaiFENesin 100 mg/5 mL syrup  Commonly known as: ROBITUSSIN  Take 20 mL (400 mg total) by mouth every 6 (six) hours as needed for congestion or cough.  Dose: 400 mg     traMADoL 50 mg tablet  Commonly known as: ULTRAM  Take 1 tablet (50 mg total) by mouth every 6 (six) hours as needed for pain (pain).  Dose: 50 mg        CONTINUE taking these medications    ascorbic acid 500 mg tablet  Commonly known as: VITAMIN C  Take 1,000 mg by mouth daily.  Dose: 1,000 mg     levothyroxine 25 mcg tablet  Commonly known as: SYNTHROID  Take 25 mcg by mouth daily before breakfast.  Dose: 25 mcg     METANX ORAL  Take 1 tablet by mouth 2 (two) times a day.  Dose: 1 tablet     sildenafiL 100 mg tablet  Commonly known as: VIAGRA  Take 100 mg by mouth as needed for erectile dysfunction.  Dose: 100 mg             Home Medications:  •  ascorbic acid, Take 1,000 mg by mouth daily.  •  levothyroxine, Take 25 mcg by mouth daily before breakfast.  •  mecobal/levomefolat Ca/B6 phos (METANX ORAL), Take 1 tablet by mouth 2 (two) times a day.  •  sildenafiL, Take 100 mg by mouth as needed for erectile dysfunction.    Follow-Up Appointments:    · f/u PCP in 1 week   · f/u trauma clinic as needed   · f/u  Dr Sepulveda in 4-6 weeks with Cervical XRs  · f/u Dr Baer as needed    · f/u Urology in 3-4 weeks      Disposition:   [facility]    The patient's medication instructions, follow-up instructions, activity restrictions, and symptom management were explained to the patient and/or family prior to discharge and patient/family demonstrated a satisfactory understanding.    COURTNEY Tovar Ma  1/24/2024  3:47 PM

## 2024-01-24 NOTE — PLAN OF CARE
Care Coordination Discharge Plan Note     Discharge Needs Assessment  Concerns to be Addressed: care coordination/care conferences, discharge planning  Current Discharge Risk: physical impairment    Anticipated Discharge Plan  Anticipated Discharge Disposition: acute rehab/Inpatient Rehab Facility       Patient Choice  Offered/Gave Vendor List: yes  Patient's Choice of Community Agency(s): [facility]    Patient and/or patient guardian/advocate was made aware of their right to choose a provider. A list of eligible providers was presented and reviewed with the patient and/or patient guardian/advocate in written and/or verbal form. The list delineates providers in the patient’s desired geographic area who are participating in the Medicare program and/or providers contracted with the patient’s primary insurance. The Medicare list and quality ratings were obtained from the Medicare.gov [medicare.gov] website.    ---------------------------------------------------------------------------------------------------------------------    Interdisciplinary Discharge Plan Review:  Participants:advanced practice provider, , social work/services, physician, nursing, physical therapy, dietitian/nutrition services, occupational therapy    Concerns Comments: Per trauma rounds, Pt stable for transfer to [facility]. [facility] accepted Pt, auth is pending. OTF today or tomorrow. CM will follow.    Discharge Plan:   Disposition/Destination: [facility] / Inpatient Rehab Facility  Discharge Facility:   Destination - Admitted Since 1/17/2024     Service Provider Selected Services Address Phone Fax Patient Preferred Last Updated    [facility] [address] [address] [phone] -- Amanda Burdick, RN 1/24/2024 0918        Community Resources:      Discharge Transportation:  Is Out of Hospital DNR needed at Discharge: no  Does patient need discharge transport? Yes

## 2024-01-24 NOTE — NURSING NOTE
Pt received  this morning without aspen collar on. Pt states he cannot cough up mucus with the collar on and removed it. RN placed collar back on pt. RN educated pt on the importance of keeping the collar on. Pt verbalized understanding.

## 2024-01-25 ENCOUNTER — APPOINTMENT (INPATIENT)
Dept: PHYSICAL THERAPY | Facility: REHABILITATION | Age: 84
DRG: 560 | End: 2024-01-25
Payer: COMMERCIAL

## 2024-01-25 ENCOUNTER — APPOINTMENT (INPATIENT)
Dept: OCCUPATIONAL THERAPY | Facility: REHABILITATION | Age: 84
DRG: 560 | End: 2024-01-25
Payer: COMMERCIAL

## 2024-01-25 LAB
25(OH)D3 SERPL-MCNC: 44 NG/ML (ref 30–100)
ALBUMIN SERPL-MCNC: 3 G/DL (ref 3.5–5.7)
ALP SERPL-CCNC: 53 IU/L (ref 34–125)
ALT SERPL-CCNC: 10 IU/L (ref 7–52)
ANION GAP SERPL CALC-SCNC: 9 MEQ/L (ref 3–15)
AST SERPL-CCNC: 11 IU/L (ref 13–39)
BASOPHILS # BLD: 0.02 K/UL (ref 0.01–0.1)
BASOPHILS NFR BLD: 0.2 %
BILIRUB SERPL-MCNC: 0.9 MG/DL (ref 0.3–1.2)
BILIRUB UR QL STRIP.AUTO: NEGATIVE MG/DL
BUN SERPL-MCNC: 29 MG/DL (ref 7–25)
CALCIUM SERPL-MCNC: 9.1 MG/DL (ref 8.6–10.3)
CHLORIDE SERPL-SCNC: 100 MEQ/L (ref 98–107)
CLARITY UR REFRACT.AUTO: ABNORMAL
CO2 SERPL-SCNC: 25 MEQ/L (ref 21–31)
COLOR UR AUTO: YELLOW
CREAT SERPL-MCNC: 1.2 MG/DL (ref 0.7–1.3)
CRP SERPL-MCNC: 134 MG/L
DIFFERENTIAL METHOD BLD: ABNORMAL
EGFRCR SERPLBLD CKD-EPI 2021: >60 ML/MIN/1.73M*2
EOSINOPHIL # BLD: 0.03 K/UL (ref 0.04–0.54)
EOSINOPHIL NFR BLD: 0.3 %
ERYTHROCYTE [DISTWIDTH] IN BLOOD BY AUTOMATED COUNT: 15.4 % (ref 11.6–14.4)
FERRITIN SERPL-MCNC: 204 NG/ML (ref 24–250)
FLUAV RNA SPEC QL NAA+PROBE: NEGATIVE
FLUBV RNA SPEC QL NAA+PROBE: NEGATIVE
GLUCOSE SERPL-MCNC: 88 MG/DL (ref 70–99)
GLUCOSE UR STRIP.AUTO-MCNC: NEGATIVE MG/DL
HCT VFR BLD AUTO: 26.4 % (ref 40.1–51)
HGB BLD-MCNC: 8.8 G/DL (ref 13.7–17.5)
HGB UR QL STRIP.AUTO: ABNORMAL
IMM GRANULOCYTES # BLD AUTO: 0.04 K/UL (ref 0–0.08)
IMM GRANULOCYTES NFR BLD AUTO: 0.4 %
IRON SATN MFR SERPL: 12 % (ref 15–45)
IRON SERPL-MCNC: 20 UG/DL (ref 35–150)
KETONES UR STRIP.AUTO-MCNC: NEGATIVE MG/DL
LEUKOCYTE ESTERASE UR QL STRIP.AUTO: 3
LYMPHOCYTES # BLD: 1.51 K/UL (ref 1.2–3.5)
LYMPHOCYTES NFR BLD: 15.6 %
MCH RBC QN AUTO: 31.4 PG (ref 28–33.2)
MCHC RBC AUTO-ENTMCNC: 33.3 G/DL (ref 32.2–36.5)
MCV RBC AUTO: 94.3 FL (ref 83–98)
MONOCYTES # BLD: 0.62 K/UL (ref 0.3–1)
MONOCYTES NFR BLD: 6.4 %
NEUTROPHILS # BLD: 7.47 K/UL (ref 1.7–7)
NEUTS SEG NFR BLD: 77.1 %
NITRITE UR QL STRIP.AUTO: POSITIVE
NRBC BLD-RTO: 0 %
PDW BLD AUTO: 9.9 FL (ref 9.4–12.4)
PH UR STRIP.AUTO: 5.5 [PH]
PLATELET # BLD AUTO: 114 K/UL (ref 150–350)
POTASSIUM SERPL-SCNC: 3.9 MEQ/L (ref 3.5–5.1)
PROT SERPL-MCNC: 7.5 G/DL (ref 6–8.2)
PROT UR QL STRIP.AUTO: 1
RBC # BLD AUTO: 2.8 M/UL (ref 4.5–5.8)
RSV RNA SPEC QL NAA+PROBE: NEGATIVE
SARS-COV-2 RNA RESP QL NAA+PROBE: NEGATIVE
SODIUM SERPL-SCNC: 134 MEQ/L (ref 136–145)
SP GR UR REFRACT.AUTO: 1.02
TIBC SERPL-MCNC: 161 UG/DL (ref 270–460)
UIBC SERPL-MCNC: 141 UG/DL (ref 180–360)
UROBILINOGEN UR STRIP-ACNC: 0.2 EU/DL
WBC # BLD AUTO: 9.69 K/UL (ref 3.8–10.5)

## 2024-01-25 PROCEDURE — 80053 COMPREHEN METABOLIC PANEL: CPT | Performed by: SURGERY

## 2024-01-25 PROCEDURE — 83550 IRON BINDING TEST: CPT | Performed by: INTERNAL MEDICINE

## 2024-01-25 PROCEDURE — 25000000 HC PHARMACY GENERAL: Performed by: INTERNAL MEDICINE

## 2024-01-25 PROCEDURE — 36415 COLL VENOUS BLD VENIPUNCTURE: CPT | Performed by: SURGERY

## 2024-01-25 PROCEDURE — 97162 PT EVAL MOD COMPLEX 30 MIN: CPT | Mod: GP

## 2024-01-25 PROCEDURE — 87186 SC STD MICRODIL/AGAR DIL: CPT | Performed by: INTERNAL MEDICINE

## 2024-01-25 PROCEDURE — 12800000 HC ROOM AND CARE SEMIPRIVATE REHAB

## 2024-01-25 PROCEDURE — 82306 VITAMIN D 25 HYDROXY: CPT | Performed by: INTERNAL MEDICINE

## 2024-01-25 PROCEDURE — 63700000 HC SELF-ADMINISTRABLE DRUG: Performed by: INTERNAL MEDICINE

## 2024-01-25 PROCEDURE — 63700000 HC SELF-ADMINISTRABLE DRUG: Performed by: SURGERY

## 2024-01-25 PROCEDURE — 81003 URINALYSIS AUTO W/O SCOPE: CPT | Performed by: INTERNAL MEDICINE

## 2024-01-25 PROCEDURE — 87637 SARSCOV2&INF A&B&RSV AMP PRB: CPT | Performed by: INTERNAL MEDICINE

## 2024-01-25 PROCEDURE — 82728 ASSAY OF FERRITIN: CPT | Performed by: INTERNAL MEDICINE

## 2024-01-25 PROCEDURE — 85025 COMPLETE CBC W/AUTO DIFF WBC: CPT | Performed by: SURGERY

## 2024-01-25 PROCEDURE — 86140 C-REACTIVE PROTEIN: CPT | Performed by: INTERNAL MEDICINE

## 2024-01-25 PROCEDURE — 97167 OT EVAL HIGH COMPLEX 60 MIN: CPT | Mod: GO

## 2024-01-25 PROCEDURE — 83540 ASSAY OF IRON: CPT | Performed by: INTERNAL MEDICINE

## 2024-01-25 RX ORDER — DICLOFENAC SODIUM 10 MG/G
GEL TOPICAL 3 TIMES DAILY
Status: DISCONTINUED | OUTPATIENT
Start: 2024-01-25 | End: 2024-01-31

## 2024-01-25 RX ORDER — BACLOFEN 5 MG/1
5 TABLET ORAL 3 TIMES DAILY
Status: DISCONTINUED | OUTPATIENT
Start: 2024-01-25 | End: 2024-01-25

## 2024-01-25 RX ORDER — SODIUM CHLORIDE FOR INHALATION 3 %
3 VIAL, NEBULIZER (ML) INHALATION 3 TIMES DAILY
Status: DISCONTINUED | OUTPATIENT
Start: 2024-01-25 | End: 2024-02-05

## 2024-01-25 RX ORDER — MORPHINE SULFATE ORAL SOLUTION 10 MG/5ML
10 SOLUTION ORAL EVERY 4 HOURS PRN
Status: DISCONTINUED | OUTPATIENT
Start: 2024-01-25 | End: 2024-02-05

## 2024-01-25 RX ORDER — TAMSULOSIN HYDROCHLORIDE 0.4 MG/1
0.4 CAPSULE ORAL NIGHTLY
Status: DISCONTINUED | OUTPATIENT
Start: 2024-01-25 | End: 2024-02-07 | Stop reason: HOSPADM

## 2024-01-25 RX ORDER — TRAMADOL HYDROCHLORIDE 50 MG/1
50 TABLET ORAL 3 TIMES DAILY
Status: DISCONTINUED | OUTPATIENT
Start: 2024-01-25 | End: 2024-01-25

## 2024-01-25 RX ORDER — AMOXICILLIN 250 MG
2 CAPSULE ORAL 2 TIMES DAILY
Status: DISCONTINUED | OUTPATIENT
Start: 2024-01-25 | End: 2024-02-07 | Stop reason: HOSPADM

## 2024-01-25 RX ORDER — ASCORBIC ACID 500 MG
500 TABLET ORAL DAILY
Status: DISCONTINUED | OUTPATIENT
Start: 2024-01-26 | End: 2024-02-07 | Stop reason: HOSPADM

## 2024-01-25 RX ORDER — ACETAMINOPHEN 325 MG/1
650 TABLET ORAL EVERY 4 HOURS PRN
Status: DISCONTINUED | OUTPATIENT
Start: 2024-01-25 | End: 2024-02-07 | Stop reason: HOSPADM

## 2024-01-25 RX ORDER — CHOLECALCIFEROL (VITAMIN D3) 25 MCG
2500 TABLET ORAL
Status: DISCONTINUED | OUTPATIENT
Start: 2024-01-25 | End: 2024-02-07 | Stop reason: HOSPADM

## 2024-01-25 RX ORDER — TRAMADOL HYDROCHLORIDE 50 MG/1
25 TABLET ORAL 3 TIMES DAILY
Status: DISCONTINUED | OUTPATIENT
Start: 2024-01-25 | End: 2024-01-26

## 2024-01-25 RX ADMIN — SODIUM CHLORIDE SOLN NEBU 3% 3 ML: 3 NEBU SOLN at 21:45

## 2024-01-25 RX ADMIN — ALBUTEROL SULFATE: 2.5 SOLUTION RESPIRATORY (INHALATION) at 21:42

## 2024-01-25 RX ADMIN — GUAIFENESIN 400 MG: 100 SOLUTION ORAL at 05:41

## 2024-01-25 RX ADMIN — GUAIFENESIN 400 MG: 100 SOLUTION ORAL at 17:27

## 2024-01-25 RX ADMIN — ACETAMINOPHEN 650 MG: 325 TABLET, FILM COATED ORAL at 08:22

## 2024-01-25 RX ADMIN — OXYCODONE HYDROCHLORIDE AND ACETAMINOPHEN 1000 MG: 500 TABLET ORAL at 08:23

## 2024-01-25 RX ADMIN — GUAIFENESIN 400 MG: 100 SOLUTION ORAL at 12:39

## 2024-01-25 RX ADMIN — SENNOSIDES AND DOCUSATE SODIUM 2 TABLET: 8.6; 5 TABLET ORAL at 21:35

## 2024-01-25 RX ADMIN — ACETAMINOPHEN 650 MG: 325 TABLET, FILM COATED ORAL at 17:27

## 2024-01-25 RX ADMIN — DICLOFENAC SODIUM: 10 GEL TOPICAL at 18:25

## 2024-01-25 RX ADMIN — ACETAMINOPHEN 650 MG: 325 TABLET, FILM COATED ORAL at 04:18

## 2024-01-25 RX ADMIN — ACETAMINOPHEN 650 MG: 325 TABLET, FILM COATED ORAL at 12:39

## 2024-01-25 RX ADMIN — TRAMADOL HYDROCHLORIDE 25 MG: 50 TABLET, FILM COATED ORAL at 13:39

## 2024-01-25 RX ADMIN — Medication 2500 UNITS: at 17:27

## 2024-01-25 RX ADMIN — TAMSULOSIN HYDROCHLORIDE 0.4 MG: 0.4 CAPSULE ORAL at 21:35

## 2024-01-25 RX ADMIN — GUAIFENESIN 400 MG: 100 SOLUTION ORAL at 23:27

## 2024-01-25 RX ADMIN — ALBUTEROL SULFATE: 2.5 SOLUTION RESPIRATORY (INHALATION) at 17:01

## 2024-01-25 RX ADMIN — SODIUM CHLORIDE SOLN NEBU 3% 3 ML: 3 NEBU SOLN at 17:27

## 2024-01-25 RX ADMIN — ACETAMINOPHEN 650 MG: 325 TABLET, FILM COATED ORAL at 21:35

## 2024-01-25 RX ADMIN — APIXABAN 2.5 MG: 2.5 TABLET, FILM COATED ORAL at 21:36

## 2024-01-25 RX ADMIN — APIXABAN 2.5 MG: 2.5 TABLET, FILM COATED ORAL at 12:39

## 2024-01-25 RX ADMIN — SENNOSIDES AND DOCUSATE SODIUM 2 TABLET: 8.6; 5 TABLET ORAL at 12:39

## 2024-01-25 RX ADMIN — LEVOTHYROXINE SODIUM 25 MCG: 25 TABLET ORAL at 05:39

## 2024-01-25 SDOH — ECONOMIC STABILITY: FOOD INSECURITY: WITHIN THE PAST 12 MONTHS, THE FOOD YOU BOUGHT JUST DIDN'T LAST AND YOU DIDN'T HAVE MONEY TO GET MORE.: NEVER TRUE

## 2024-01-25 SDOH — ECONOMIC STABILITY: HOUSING INSECURITY
IN THE LAST 12 MONTHS, WAS THERE A TIME WHEN YOU DID NOT HAVE A STEADY PLACE TO SLEEP OR SLEPT IN A SHELTER (INCLUDING NOW)?: NO

## 2024-01-25 SDOH — ECONOMIC STABILITY: FOOD INSECURITY: WITHIN THE PAST 12 MONTHS, YOU WORRIED THAT YOUR FOOD WOULD RUN OUT BEFORE YOU GOT THE MONEY TO BUY MORE.: NEVER TRUE

## 2024-01-25 SDOH — ECONOMIC STABILITY: FOOD INSECURITY: HOW HARD IS IT FOR YOU TO PAY FOR THE VERY BASICS LIKE FOOD, HOUSING, MEDICAL CARE, AND HEATING?: NOT HARD AT ALL

## 2024-01-25 SDOH — ECONOMIC STABILITY: HOUSING INSECURITY: IN THE LAST 12 MONTHS, WAS THERE A TIME WHEN YOU WERE NOT ABLE TO PAY THE MORTGAGE OR RENT ON TIME?: NO

## 2024-01-25 SDOH — ECONOMIC STABILITY: TRANSPORTATION INSECURITY: IN THE PAST 12 MONTHS, HAS LACK OF TRANSPORTATION KEPT YOU FROM MEDICAL APPOINTMENTS OR FROM GETTING MEDICATIONS?: NO

## 2024-01-25 ASSESSMENT — ACTIVITIES OF DAILY LIVING (ADL): LACK_OF_TRANSPORTATION: NO

## 2024-01-25 ASSESSMENT — COGNITIVE AND FUNCTIONAL STATUS - GENERAL: AFFECT: CONFABULATORY

## 2024-01-25 NOTE — PROGRESS NOTES
Patient: Randolph Daley  Location: [facility] [address]  MRN: [id number]  Today's date: 1/25/2024    History of Present Illness  Pt is a 83 y.o. male admitted on 1/24/2024 with Multiple injuries due to trauma [T07.XXXA]. Principal problem is Multiple injuries due to trauma.  is a 83 y.o. male whose primary indication for inpatient rehabilitation is Debility.    Pt is a 83 y.o M adm to [facility] 1/24/24 who presented to [facility] after a slip and fall on ice landing on his right side.  He was found to have multiple injuries including right 8-11 rib fractures, bilateral pleural effusion s/p IR thoracentesis 1/18 with drainage of 1.1L, pelvic fracture (non op mgmt), right adductor/obturator hematoma, questionable posttraumatic bladder injury and age-indeterminate cervical fracture.  Neurosurgery was consulted and offered c spinesurgery, but patient declined.  [place] Collar on at all times, except for meals to prevent aspiration. Urology was consulted.  CT cystogram showed possible extravasation of contrast into the prostatic urethra.  Womack catheter is currently draining clear urine.  Plan is to continue Womack catheter for 1 week.  CT cystogram in 1 week with possible removal at that time, but pt declining CT Cystogram.        Past Medical History  PMH: BPH, HTN      PT Vitals    Date/Time Pulse HR Source BP BP Location BP Method Pt Position [facility]   01/25/24 0920 67 Monitor 106/58 Right upper arm Automatic Sitting SV   01/25/24 1023 71 Monitor 115/60 Right upper arm Automatic Sitting SV      PT Pain    Date/Time Pain Type Side/Orientation Location Rating: Rest Rating: Activity Description Interventions [facility]   01/25/24 1004 Pain Assessment right shoulder 5 -- burning premedicated for activity    01/25/24 1023 Pain Reassessment;Post Activity right shoulder 5 5 burning premedicated for activity SV             Prior Living Environment    Flowsheet Row Most Recent Value   People in Home spouse  "  Current Living Arrangements home   Home Accessibility stairs to enter home (Group), stairs within home (Group)   Living Environment Comment lives with wife in 2 SH home with first floor set up   Number of Stairs, Main Entrance 1   Surface of Stairs, Main Entrance hardwood   Stair Railings, Main Entrance none   Location, Patient Bedroom first (main) floor   Patient Bedroom Access Comment first floor set up   Location, Bathroom first (main) floor   Bathroom Access Comment First floor set up. Walk in shower. grab bar and shower \"bench\"   Stairs, Within Home, Primary 16   Number of Stairs, Within Home, Primary other (see comments)   Surface of Stairs, Within Home, Primary carpeting   Stair Railings, Within Home, Primary railings on both sides of stairs  [left side rail goes 1/2- then can hold the spindles]   Stairs Comment, Within Home, Primary pt reports wife office upstairs but master bed/bath 1st floor          Prior Level of Function    Flowsheet Row Most Recent Value   Dominant Hand right   Ambulation independent   Transferring independent   Toileting independent   Bathing independent   Dressing independent   Eating independent   IADLs independent   Driving/Transportation    Prior Level of Function Comment Ind PTA, previous cervical fx. (+)            IRF PT Evaluation and Treatment - 01/25/24 0900        PT Time Calculation    Start Time 0900     Stop Time 1030     Time Calculation (min) 90 min        Session Details    Document Type Initial Evaluation     Mode of Treatment physical therapy;individual therapy        General Information    Patient Profile Reviewed yes     General Observations of Patient SIGIFREDO [place] contributed to the care of this patient.     Existing Precautions/Restrictions fall;brace worn at all times;weight bearing;aspiration        Weight-Bearing Status    Left LE Weight-Bearing Status weight-bearing as tolerated (WBAT)     Right LE Weight-Bearing Status " "weight-bearing as tolerated (WBAT)        Mobility Belt    Mobility Belt Used for All Out of Bed Activity yes        Orthosis Neck Cervical Collar    Orthosis Properties Date Obtained: 24 Time Obtained: 1900 Location: Neck Type: Cervical Collar Features: Hard Therapeutic Indications: fracture immobilization Wearing Schedule: wear at all times (remove only for hygiene and skin inspection)       Prior Level of Function    IADLs independent     Driving/Transportation         Living Environment    Name(s) of People in Home wife     Primary Care Provided by self        Cognition/Psychosocial    Affect/Mental Status confabulatory     Orientation Status oriented x 4     Comment, Cognition Pt with poor adherence to cervical collar reporting \"I didn't wear it much at the other hospital\" Educated on C2 fracture and purpose of brace AAT        Sensory Assessment (Somatosensory)    Left LE Sensory Assessment light touch awareness;light touch localization;proprioception;intact   1st toe proprioception 5/5    Right LE Sensory Assessment light touch awareness;light touch localization;proprioception;intact   proprioception intact to 1st toe       Range of Motion (ROM)    Left Lower Extremity (ROM) left LE ROM is WFL     Right Lower Extremity (ROM) right LE ROM is WFL except;hip        Strength (Manual Muscle Testing)    Hip, Left (Strength) 4/5     Knee, Left (Strength) Ext: 5/5; Flex in sittin/5     Ankle, Left (Strength) DF: 5/5; PF: 4+/5; INV: 3+/5; EVR: 4/5     Hip, Right (Strength) 2/5     Knee, Right (Strength) Ext: 4+/5; Flex in sittin-/5; pain w/ resisted movements     Ankle, Right (Strength) DF: 5/5; PF: 4+/5; INV: 5/5; EVR: 4+/5        Bed Mobility    [place], Roll Left moderate assist (50-74% patient effort)     [place], Roll Right moderate assist (50-74% patient effort)     [place], Supine to Sit moderate assist (50-74% patient effort)     [place], Sit to Supine " moderate assist (50-74% patient effort)     Safety/Cues maximal;verbal cues;technique        Bed to Chair Transfer    [place], Bed to Chair moderate assist (50-74% patient effort)     Safety/Cues technique     Assistive Device walker, front-wheeled     Comment via SPT mod A for lateral wt shift and balance        Chair to Bed Transfer    [place], Chair to Bed moderate assist (50-74% patient effort)     Safety/Cues technique     Assistive Device walker, front-wheeled     Comment via SPT mod A for lateral wt shift and balance        Sit to Stand Transfer    [place], Sit to Stand Transfer moderate assist (50-74% patient effort)     Safety/Cues technique     Assistive Device walker, front-wheeled     Comment from wc/toilet, mod A for ant wt shift and pelvic rise        Stand to Sit Transfer    [place], Stand to Sit Transfer moderate assist (50-74% patient effort)     Safety/Cues technique     Assistive Device walker, front-wheeled     Comment to wc/toilet, mod A for eccentric control        Stand Pivot Transfer    [place], Stand Pivot/Stand Step Transfer moderate assist (50-74% patient effort)     Safety/Cues increased time to complete     Assistive Device walker, front-wheeled     Comment via amb approach with RW mod A for lateral wt shift and balance        Car Transfer    [place] unable to assess     Comment unsafe to assess d/t fatigue post toileting, plan to assess at tomorrow's session        Gait Training    [place], Gait moderate assist (50-74% patient effort)     Safety/Cues increased time to complete     Assistive Device walker, front-wheeled     Distance in Feet 20 feet     Pattern step-to;step-through     Deviations/Abnormal Patterns right sided deviations;base of support, narrow;ace decreased;antalgic;gait speed decreased;step length decreased;weight shifting decreased;stride length decreased     Right Sided Gait Deviations foot drop/toe drag     [place], Picking  "Up Object unable to assess     Comment (Gait/Stairs) 20' + 5' with RW mod A for improved lateral wt shift and balance        Curb Negotiation    [place] unable to assess     Comment unsafe to assess d/t fatigue post toileting, plan to assess at tomorrow's session        Rough/Uneven Surface Gait Skills    [place] unable to assess     Comment unsafe to assess d/t fatigue post toileting, plan to assess at tomorrow's session        Stairs Training    [place], Stairs unable to assess     Comment unsafe to assess d/t fatigue post toileting, plan to assess at tomorrow's session        Wheelchair Mobility/Management    [place], Forward Propulsion dependent (less than 25% patient effort)     [place], Steering Activities dependent (less than 25% patient effort)     [place], Turning Activities dependent (less than 25% patient effort)     Comment, Wheelchair Mobility not a LTG        Impairments/Safety Issues    Functional Endurance poor        Balance    Static Sitting Balance WFL     Dynamic Sitting Balance WFL     Sit to Stand Dynamic Balance moderate impairment     Static Standing Balance moderate impairment     Dynamic Standing Balance moderate impairment        Motor Skills    Coordination WFL;bilateral;lower extremity   alt toe taps    Muscle Tone left;right;clonus   1 beat w/ quick-stretch L and R LE TA; TA fires w/ calf quick-stretch    Comment, Motor Skills unable to complete R heel to shin d/t pain, weakness R hip        Postural Deviations    Postural Deviations head and neck;shoulder     Head and Neck forward head     Shoulder right shoulder elevation;right shoulder forward;left shoulder forward        Gait/Walking Locomotion Goal 1    Distance 25 feet        Gait/Walking Locomotion Goal 2    Distance 100 feet        Patient/Family Goals    Patient's Goals For Discharge --   \"get out of here by Monday\"       Therapy Assessment/Plan (PT)    Rehab Potential/Prognosis (PT) good, to " achieve stated therapy goals     Frequency of Treatment (PT) 5-7 times per week     Intensity of Treatment (PT) 60-90 minutes per day     Estimated Duration of Therapy (PT) 2 weeks     Problem List (PT) problems related to;balance;cognition;mobility;motor control;muscle tone;range of motion (ROM);sensation;strength;pain;postural control     Activity Limitations Related to Problem List unable to ambulate safely;unable to transfer safely     Planned Therapy Interventions balance training;bed mobility training;gait training;home exercise program;postural re-education;strengthening;patient/family education;stair training;transfer training;neuromuscular re-education;ROM (range of motion);joint mobilization;lumbar stabilization;manual therapy techniques;motor coordination training;orthotic fitting/training;stretching;wheelchair management/propulsion training     Comment, Therapy Assessment/Plan (PT) 83 y.o. male with PMHX of BPH, HTN who presented to [facility] after a slip and fall on ice landing on his right side.  He was found to have multiple injuries including right 8-11 rib fractures, bilateral pleural effusion s/p IR thoracentesis 1/18 with drainage of 1.1L, pelvic fracture (non op mgmt), right adductor/obturator hematoma, questionable posttraumatic bladder injury and age-indeterminate cervical fracture.  Neurosurgery was consulted and offered c spinesurgery, but patient declined.  [place] Collar on at all times, except for meals to prevent aspiration.  Patient c/o difficulty coughing up mucus with collar on, but understands that he needs to wear the collar especially during therapy. Primary impairments include pain, dec BLE strength, impaired balance, impaired functional mobility and dec activity tolerance. Pt is an appropriate inpt rehab candidate secondary to comprehensive (PT, OT, inpt psych) needs, likelihood of improvement with skilled interventions, and ability to tolerate 3-4 hours of progressive inpt  rehab program. ELOS pending first interdisciplinary team conference, DME assessment, and Pt/caregiver training and education. Goals per care plan, interventions per CPG. Initiate PT POC and progress as able.                      Education Documentation  Admission, Transition of Care, taught by Nellie Bui PT at 1/25/2024  8:39 AM.  Learner: Patient  Readiness: Acceptance  Method: Explanation  Response: Verbalizes Understanding  Comment: Reviewed role of PT, POC, goals, therapy scheduling, use of call bell          IRF PT Goals    Flowsheet Row Most Recent Value   Bed Mobility Goal 1    Activity/Assistive Device bed mobility activities, all at 01/25/2024 0900   [place] minimum assist (75% or more patient effort) at 01/25/2024 0900   Time Frame short-term goal (STG), 5 - 7 days at 01/25/2024 0900   Bed Mobility Goal 2    Activity/Assistive Device bed mobility activities, all at 01/25/2024 0900   [place] modified independence at 01/25/2024 0900   Time Frame long-term goal (LTG), 14 days or less at 01/25/2024 0900   Transfer Goal 1    Activity/Assistive Device sit-to-stand/stand-to-sit, bed-to-chair/chair-to-bed, stand pivot at 01/25/2024 0900   [place] minimum assist (75% or more patient effort) at 01/25/2024 0900   Time Frame short-term goal (STG), 5 - 7 days at 01/25/2024 0900   Transfer Goal 2    Activity/Assistive Device sit-to-stand/stand-to-sit, bed-to-chair/chair-to-bed, stand pivot at 01/25/2024 0900   [place] modified independence at 01/25/2024 0900   Time Frame long-term goal (LTG), 14 days or less at 01/25/2024 0900   Gait/Walking Locomotion Goal 1    Activity/Assistive Device gait (walking locomotion) at 01/25/2024 0900   Distance 25 feet at 01/25/2024 0900   [place] minimum assist (75% or more patient effort) at 01/25/2024 0900   Time Frame short-term goal (STG), 5 - 7 days at 01/25/2024 0900   Gait/Walking Locomotion Goal 2    Activity/Assistive Device gait (walking locomotion)  at 01/25/2024 0900   Distance 100 feet at 01/25/2024 0900   [place] supervision required at 01/25/2024 0900   Time Frame long-term goal (LTG), 14 days or less at 01/25/2024 0900

## 2024-01-25 NOTE — PROGRESS NOTES
01/25/24 0800   Contact Information   Permission Granted to Share Info With family/designee   Advance Directives (for Healthcare)   Does patient have advance directive? 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)   Employment/ Comments none      Current or Previous  Service active duty, past    Branch other (see comments)  ([employer])   Financial/Legal   Source of Income social security;pension/FPC   Values/Beliefs   Spiritual, Cultural Beliefs, Voodoo Practices, Values that Affect Care no  (Sabianist)   Discharge Needs Assessment   Concerns to be Addressed care coordination/care conferences;discharge planning   Patient/Family Anticipates Transition to home with family   Patient/Family Anticipated Services at Transition home health care   Anticipated Discharge Disposition home with home health   Discharge Planning   Type of Home Care Services home OT;home PT;nursing   Discharge Transportation   Does the patient need discharge transport arranged? No   Team Conference   Next Team Conference Date 01/29/24     Spoke with wife for education and provided cm contact info

## 2024-01-25 NOTE — PLAN OF CARE
"  Problem: Rehabilitation (IRF) Plan of Care  Goal: Plan of Care Review  Flowsheets (Taken 1/25/2024 1246)  Plan of Care Reviewed With: patient  Outcome Evaluation:   PT eval completed   POC established     Problem: Rehabilitation (IRF) Plan of Care  Goal: Patient-Specific Goal (Individualized)  Flowsheets (Taken 1/25/2024 1244)  Patient/Family-Specific Goals (Include Timeframe): \"get out of here by Monday\"     "

## 2024-01-25 NOTE — PROGRESS NOTES
Patient: Randolph Daley  Location: [facility] [address]  MRN: [id number]  Today's date: 1/25/2024    History of Present Illness  Pt is a 83 y.o. male admitted on 1/24/2024 with Multiple injuries due to trauma [T07.XXXA]. Principal problem is Multiple injuries due to trauma.  is a 83 y.o. male whose primary indication for inpatient rehabilitation is Debility.    Pt is a 83 y.o M adm to [facility] 1/24/24 who presented to [facility] after a slip and fall on ice landing on his right side.  He was found to have multiple injuries including right 8-11 rib fractures, bilateral pleural effusion s/p IR thoracentesis 1/18 with drainage of 1.1L, pelvic fracture (non op mgmt), right adductor/obturator hematoma, questionable posttraumatic bladder injury and age-indeterminate cervical fracture.  Neurosurgery was consulted and offered c spinesurgery, but patient declined.  [place] Collar on at all times, except for meals to prevent aspiration. Urology was consulted.  CT cystogram showed possible extravasation of contrast into the prostatic urethra.  [place]omack catheter is currently draining clear urine.  Plan is to continue Womack catheter for 1 week.  CT cystogram in 1 week with possible removal at that time, but pt declining CT Cystogram.        Past Medical History  PMH: BPH, HTN      OT Vitals    Date/Time Pulse HR Source Pt Activity O2 Therapy BP BP Location BP Method Pt Position [facility]   01/25/24 0702 62 Monitor At rest None (Room air) 118/66 Left upper arm Automatic Lying JLT   01/25/24 0824 68 Monitor At rest None (Room air) 113/58 Left upper arm Automatic Sitting JLT      OT Pain    Date/Time Pain Type Location Rating: Rest Interventions [facility]   01/25/24 0702 Pain Assessment -- 7 diversional activity provided JLT   01/25/24 0824 Pain Reassessment shoulder 6 -- JLT             Prior Living Environment    Flowsheet Row Most Recent Value   People in Home spouse   Current Living Arrangements home   Home  "Accessibility stairs to enter home (Group), stairs within home (Group)   Living Environment Comment lives with wife in 2 SH home with first floor set up   Number of Stairs, Main Entrance 1   Surface of Stairs, Main Entrance hardwood   Stair Railings, Main Entrance none   Location, Patient Bedroom first (main) floor   Patient Bedroom Access Comment first floor set up   Location, Bathroom first (main) floor   Bathroom Access Comment First floor set up. Walk in shower. grab bar and shower \"bench\"   Stairs, Within Home, Primary 16   Number of Stairs, Within Home, Primary other (see comments)   Surface of Stairs, Within Home, Primary carpeting   Stair Railings, Within Home, Primary railings on both sides of stairs  [left side rail goes 1/2- then can hold the spindles]   Stairs Comment, Within Home, Primary pt reports wife office upstairs but master bed/bath 1st floor          Prior Level of Function    Flowsheet Row Most Recent Value   Dominant Hand right   Ambulation independent   Transferring independent   Toileting independent   Bathing independent   Dressing independent   Eating independent   IADLs independent   Driving/Transportation    Prior Level of Function Comment Ind PTA, previous cervical fx. (+)           Occupational Profile    Flowsheet Row Most Recent Value   Successful Occupations retired. 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           IRF OT Evaluation and Treatment - 01/25/24 0629        OT Time Calculation    Start Time 0700     Stop Time 0830     Time Calculation (min) 90 min        Session Details    Document Type Initial Evaluation     Mode of Treatment individual therapy;occupational therapy        General Information    Patient Profile Reviewed yes     General Observations of Patient Pt received in room for OT IE. Pleasant and agreeable     Existing " "Precautions/Restrictions fall;brace worn at all times;weight bearing     Limitations/Impairments safety/cognitive        Weight-Bearing Status    Left LE Weight-Bearing Status weight-bearing as tolerated (WBAT)     Right LE Weight-Bearing Status weight-bearing as tolerated (WBAT)        Mobility Belt    Mobility Belt Used for All Out of Bed Activity yes        Orthosis Neck Cervical Collar    Orthosis Properties Date Obtained: 01/17/24 Time Obtained: 1900 Location: Neck Type: Cervical Collar Features: Hard Therapeutic Indications: fracture immobilization Wearing Schedule: wear at all times (remove only for hygiene and skin inspection)    Compliance/Wearing Issues other (see comments)   Compliant to wear during entirity of therapy session including during shower.        Services    Do You Speak a Language Other Than English at Home? no     Is an  Needed/Used? No        Prior Level of Function    IADLs independent     Driving/Transportation         Cognition/Psychosocial    Comment, Cognition Pt pleasant and appropriate throughout session. Frequently required cues for safety and purpose of wearing cervical collar. Cues to recall purpose of activities completed.        Cognitive Pattern Assessment    Cognitive Pattern Assessment Used BIMS        Brief Interview for Mental Status (BIMS)    Repetition of Three Words (First Attempt) 3     Temporal Orientation: Year Correct     Temporal Orientation: Month Accurate within 5 days     Temporal Orientation: Day Incorrect     Recall: \"Sock\" Yes, no cue required     Recall: \"Blue\" Yes, no cue required     Recall: \"Bed\" Yes, no cue required     BIMS Summary Score 14        Confusion Assessment Method (CAM)    Is there evidence of an acute change in mental status from the patient's baseline? Yes   Per spouse report, pt can be difficult at times. Throughout session, pt required frequent repetition of education, purpose of activities and short " term instructions. No report of STM deficit in chart or from caregiver report.    Inattention Behavior present, fluctuates (comes and goes, changes in severity)     Disorganized thinking Behavior present, fluctuates (comes and goes, changes in severity)     Altered level of consciousness Behavior not present        Hearing Assessment    Hearing Status WFL        Vision Assessment/Intervention    Vision Assessment corrective lenses for reading     Impact of Vision Impairment on Function Pt able to identify common objects in room and read signs on wall. Vision screen within functional limits. No acute changes to vision noted.        Sensory Assessment    Left UE Sensory Assessment light touch awareness;light touch localization;proprioception;intact     Right UE Sensory Assessment light touch awareness;light touch localization;proprioception;intact        Range of Motion (ROM)    Range of Motion ROM is WFL     Left Upper Extremity (ROM) left UE ROM is WFL     Right Upper Extremity (ROM) right UE ROM is WFL        Strength (Manual Muscle Testing)    Left Upper Extremity Strength other (see comments)   Pt able to move b/l UE through space to complete ADL tasks and functional reaching. Full assessment deffered due to spinal precautions.    Right Upper Extremity Strength other (see comments)   Pt able to move b/l UE through space to complete ADL tasks and functional reaching. Full assessment deffered due to spinal precautions.       Bed Mobility    Comment OT IE: Pt demonstrated rolling to L side at mod A with use of bed rail and max cues for log roll strategy.        Transfers    Transfers stand pivot transfer        Bed to Chair Transfer    Comment OT IE: Pt mod A to achieve seated position from supine with heavy reliance on bed rail and cues for log roll strategy. Min A SPT to w/c from EOB.        Sit to Stand Transfer    [place], Sit to Stand Transfer minimum assist (75% or more patient effort)     Safety/Cues  hand placement     Assistive Device none     Comment from w/c, EOB, toilet, and shower chair        Stand to Sit Transfer    [place], Stand to Sit Transfer minimum assist (75% or more patient effort)     Safety/Cues hand placement     Assistive Device none     Comment to w/c, toilet, shower chair and EOB.        Stand Pivot Transfer    [place], Stand Pivot/Stand Step Transfer minimum assist (75% or more patient effort)     Safety/Cues increased time to complete     Assistive Device none     Comment SPT to w/c with HHA.        Toilet Transfer    Transfer Technique stand pivot     [place] minimum assist (75% or more patient effort)     Safety/Cues hand placement;increased time to complete     Assistive Device grab bars/safety frame     Comment SPT to standard hospital height toilet with use of grab bars. Cues for sequencing.        Shower Transfer    Transfer Technique stand pivot     [place] minimum assist (75% or more patient effort)     Safety/Cues hand placement;increased time to complete     Assistive Device grab bars/tub rail;shower chair     Comment SPT to shower chair from w/c with tactile cues for hand placement and assistance to advance RLE forward. Cues for hand placement prior to sitting onto shower chair.        Balance    Comment, Balance OT IE: Pt maintained unsupported sitting balance on shower chair, reaching slightly outside ZACH at min A for needed items. Min A to steady in stance in supported standing position during LB dressing tasks, maintaining grasp on grab bar.        Bathing    Shower Provided? Yes     Tasks left arm;chest;right arm;abdomen;front perineal area;buttocks;left upper leg;right upper leg;left lower leg, including foot;right lower leg, including foot     [place] moderate assist (50-74% patient effort)     Safety/Cues increased time to complete;energy conservation;maintaining precautions     Position long sitting;supported sitting;supported standing     Setup  Assistance adaptive equipment setup;adjust water temperature/flow;obtain supplies;open containers     Adaptive Equipment hand-held shower spray hose;shower chair;grab bar/tub rail     Comment Pt completed full wet shower with the exception of head and neck to prevent [place] collar from becoming wet as pt does not have extra pads. Pt required assistance for b/l LE, back and aspects of chest due to pain and fatigue. Pt required moderate cues to avoid bending and maintain precautions throughout shower. Completed mainly seated on shower chair. Pt demonstrated pull to stand with use of grab bar at min A for max A pericare.        Upper Body Dressing    Tasks don;pull over garment     [place] moderate assist (50-74% patient effort)     Safety/Cues increased time to complete;energy conservation;problem-solving;maintaining precautions     Position supported sitting     Adaptive Equipment none     Comment Assistance for pulling down over collar, behind back and over R shoulder due to scapular shoulder pain.        Lower Body Dressing    Tasks don;pants/bottoms;socks;underwear     [place] maximum assist (25-49% patient effort)     Safety/Cues increased time to complete;energy conservation;maintaining precautions     Position supported sitting;supported standing     Adaptive Equipment none     [place], Footwear dependent (less than 25% patient effort)     Comment Pt required assistance for donning distally over feet. Able to thread LLE through pant leg. Assistance to pull over hips. D footwear management.        Grooming    Tasks washes, rinses and dries face;brushes/li hair     [place] close supervision     Safety/Cues increased time to complete     Position supported sitting     Setup Assistance adjust water temperature/flow;open containers     Adaptive Equipment none     [place], Oral Hygiene set up     Comment Pt completed light morning grooming routine seated in w/c at tray table.         "Toileting    Tasks adjust/manage clothing;perform bowel hygiene     [place] moderate assist (50-74% patient effort)     Safety/Cues problem-solving;attention     Position supported sitting;unsupported standing     Setup Assistance change pad/brief;adaptive equipment setup     Adaptive Equipment grab bar/safety frame     Comment Pt completed pericare in stance, assistance to manage clothing around waist and assistance to maintain balance in stance for pericare. Pt incontent of bladder, no bowel movement just smear.        Self-Feeding    Tasks scoop food onto utensil;utensil to mouth;liquids to mouth     [place] set up     Safety/Cues increased time to complete     Position supported sitting     Setup Assistance prepare tray/open items     Adaptive Equipment none     Comment Assistance to prepare items on tray including opening lids and containers. Pt reporting \"it's slipping\"        Therapy Assessment/Plan (OT)    OT Diagnosis multiple injuries due to injury     Rehab Potential/Prognosis (OT) good, to achieve stated therapy goals     Frequency of Treatment (OT) 5-7 times per week     Intensity of Treatment (OT) 60-90 minutes per day     Estimated Duration of Therapy (OT) 4 weeks     Problem List (OT) problems related to;balance;cognition;coordination;mobility;motor control;range of motion (ROM);strength;pain     Activity Limitations Related to Problem List unable to ambulate safely;unable to transfer safely;BADLs not performed adequately or safely;IADLs not performed adequately or safely;community activities not performed adequately or safely;home management activity not performed adequately or safely     Planned Therapy Interventions activity tolerance training;adaptive equipment training;BADL retraining;cognitive retraining;functional balance retraining;IADL retraining;occupation/activity based interventions;patient/caregiver education/training;ROM/therapeutic exercise;strengthening " exercise;transfer/mobility retraining     Comment, Therapy Assessment/Plan (OT) Pt is a 83 y.o M adm to [facility] 1/24/24 s/p fall resulting in  right 8-11 rib fractures, bilateral pleural effusion s/p IR thoracentesis 1/18 with drainage of 1.1L, pelvic fracture (non op mgmt), right adductor/obturator hematoma, questionable posttraumatic bladder injury and age-indeterminate cervical fracture. Pt was independent with ADLs and functional transfers PTA without use of AE. Pt is currently max A for LB dressing, dependent for footwear management, mod A for UB dressing, mod A for toileting, mod A for bathing and min A for functional transfers. Pt is limited by endurance, pain, safety awareness, balance and strength deficits. Continue with skilled OT services 5-7 day/wk to improve independence with ADLs and functional transfers and reduce caregiver burden.                      Education Documentation  Signs/Symptoms, taught by Erica Hester OT at 1/25/2024 12:43 PM.  Learner: Patient  Readiness: Acceptance  Method: Explanation  Response: Verbalizes Understanding  Comment: Pt educated on OT POC, safety and use of call bell/bed exit and chair exit alarms. Pt receptive to education.    Risk Factors, taught by Erica Hester OT at 1/25/2024 12:43 PM.  Learner: Patient  Readiness: Acceptance  Method: Explanation  Response: Verbalizes Understanding  Comment: Pt educated on OT POC, safety and use of call bell/bed exit and chair exit alarms. Pt receptive to education.    Call Light Use, taught by Erica Hester OT at 1/25/2024 12:43 PM.  Learner: Patient  Readiness: Acceptance  Method: Explanation  Response: Verbalizes Understanding  Comment: Pt educated on OT POC, safety and use of call bell/bed exit and chair exit alarms. Pt receptive to education.          IRF OT Goals    Flowsheet Row Most Recent Value   Transfer Goal 1    Activity/Assistive Device toilet at 01/25/2024 0629   [place] supervision required at 01/25/2024  0629   Time Frame short-term goal (STG), 5 - 7 days at 01/25/2024 0629   Transfer Goal 2    Activity/Assistive Device toilet at 01/25/2024 0629   [place] modified independence  [steady A] at 01/25/2024 0629   Time Frame long-term goal (LTG), 30 days or less at 01/25/2024 0629   Transfer Goal 3    Activity/Assistive Device shower at 01/25/2024 0629   [place] other (see comments)  [touch A] at 01/25/2024 0629   Time Frame short-term goal (STG), 5 - 7 days at 01/25/2024 0629   Transfer Goal 4    Activity/Assistive Device shower at 01/25/2024 0629   [place] supervision required at 01/25/2024 0629   Time Frame long-term goal (LTG), 30 days or less at 01/25/2024 0629   Bathing Goal 1    Activity/Assistive Device bathing skills, all at 01/25/2024 0629   [place] minimum assist (75% or more patient effort) at 01/25/2024 0629   Time Frame short-term goal (STG), 5 - 7 days at 01/25/2024 0629   Bathing Goal 2    Activity/Assistive Device bathing skills, all at 01/25/2024 0629   [place] supervision required at 01/25/2024 0629   Time Frame long-term goal (LTG), 30 days or less at 01/25/2024 0629   UB Dressing Goal 1    Activity/Assistive Device upper body dressing at 01/25/2024 0629   [place] set-up required at 01/25/2024 0629   Time Frame short-term goal (STG), 5 - 7 days at 01/25/2024 0629   UB Dressing Goal 2    Activity/Assistive Device upper body dressing at 01/25/2024 0629   [place] modified independence at 01/25/2024 0629   Time Frame long-term goal (LTG), 30 days or less at 01/25/2024 0629   LB Dressing Goal 1    Activity/Assistive Device lower body dressing at 01/25/2024 0629   [place] minimum assist (75% or more patient effort) at 01/25/2024 0629   Time Frame short-term goal (STG), 5 - 7 days at 01/25/2024 0629   LB Dressing Goal 2    Activity/Assistive Device lower body dressing at 01/25/2024 0629   [place] modified independence at 01/25/2024 0629   Time Frame long-term goal  (LTG), 30 days or less at 01/25/2024 0629   Grooming Goal 1    Activity/Assistive Device grooming skills, all at 01/25/2024 0629   [place] set-up required at 01/25/2024 0629   Time Frame short-term goal (STG), 5 - 7 days at 01/25/2024 0629   Grooming Goal 2    Activity/Assistive Device grooming skills, all at 01/25/2024 0629   [place] modified independence at 01/25/2024 0629   Time Frame long-term goal (LTG), 30 days or less at 01/25/2024 0629   Toileting Goal 1    Activity/Assistive Device toileting skills, all at 01/25/2024 0629   [place] minimum assist (75% or more patient effort) at 01/25/2024 0629   Time Frame short-term goal (STG), 5 - 7 days at 01/25/2024 0629   Toileting Goal 2    Activity/Assistive Device toileting skills, all at 01/25/2024 0629   [place] modified independence at 01/25/2024 0629   Time Frame long-term goal (LTG), 30 days or less at 01/25/2024 0629

## 2024-01-25 NOTE — PLAN OF CARE
"  Problem: Rehabilitation (IRF) Plan of Care  Goal: Plan of Care Review  Outcome: Progressing  Flowsheets (Taken 1/25/2024 1202)  Progress: improving  Plan of Care Reviewed With: patient  Outcome Evaluation: AAOx4, yet forgetful & repetitive. Pt conitnues on R/T/Meds whole with water. Pt incontinet of urine. He states he has trouble holding it in but \"I can feel it\". Awaiting BM. Pt refuses [place] collar except when doing therapy. Education reinforced. PRN Tylenol administered. Pt refuses Tramadol because \"It makes me loopy\". Call device within reach.   Plan of Care Review  Plan of Care Reviewed With: patient  Progress: improving  Outcome Evaluation: AAOx4, yet forgetful & repetitive. Pt conitnues on R/T/Meds whole with water. Pt incontinet of urine. He states he has trouble holding it in but \"I can feel it\". Awaiting BM. Pt refuses [place] collar except when doing therapy. Education reinforced. PRN Tylenol administered. Pt refuses Tramadol because \"It makes me loopy\". Call device within reach.  "

## 2024-01-25 NOTE — PLAN OF CARE
"  Problem: Rehabilitation (IRF) Plan of Care  Goal: Plan of Care Review  Outcome: Progressing  Flowsheets (Taken 1/25/2024 1240)  Progress: improving  Plan of Care Reviewed With: patient  Outcome Evaluation: OT IE completed. Pt forgetful throughout session. Repetition required frequently. Educated on OT POC with good immediate teachback.     Problem: Rehabilitation (IRF) Plan of Care  Goal: Patient-Specific Goal (Individualized)  Outcome: Progressing  Flowsheets (Taken 1/25/2024 1240)  Patient/Family-Specific Goals (Include Timeframe): \"to get rid of pain\"     Problem: BADL (Basic Activities of Daily Living) Impairment  Goal: Optimal Safe BADL Performance  Outcome: Progressing     "

## 2024-01-25 NOTE — H&P
Physical Medicine and Rehabilitation History and Physical          Admitting Diagnosis: Multiple injuries due to trauma [T07.XXXA]    HPI     Randolph Daley is a 83 y.o. male with past medical history of BPH, hypothyroid who initially presented to [facility] 1/17/24 after a slip and fall with right sided pain. No LOC, GCS 15 on arrival. Imaging showed right superior pubic ramus comminuted fracture with right adductor/obturator intramuscular hematomas, right perivascular hematoma associated with mild mass effect on the urinary bladder and perivesicular infiltration, right 8th-11th posterior acute rib fractures, large loculated left pleural effusion with near complete collapse left lower lobe, moderate right layering pleural effusion; infiltration of SMA, SMV with poorly seen pancreas, right lower pole renal cystic lesion, hepatosplenomegaly. CTH without acute intracranial lesion; CT cervical spine with age indeterminate odontoid process fracture. Patient was seen by orthopedic surgery - rec non operative management of right pubic ramus fracture - WBAT BLE. Patient had avila catheter placed due to concern for prostatic urethral injury.  CT cystogram without evidence of contrast extravasation into prostatic parenchyma or other areas of extravasation from urinary bladder. US kidneys showed multiple right renal cortical and sinus cysts with mild right hydronephrosis. Avila removed.  Patent underwent thoracentesis by IR on 1/18 for left pleural effusion with 1.1L removed. 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 He declined surgical intervention. He is to wear cervical collar at all times, and was informed if he has a fall he is at high risk for spinal cord injury. He is recommended for follow-up with neurosurgery in  4-6 weeks with repeat XRs.     Patient was functionally independent prior to admission for ADLs and ambulation. He is requiring moderate A for transfers and ambulation with RW. He is in cervical collar at all times. Patient transferred to [facility] on 1/24/24 for acute inpatient rehabilitation.    Patient reports pain in his right scapular area/area of rib fractures. He has some burning in that site at times. This is improved with positioning. He does have cervical collar on at time of evaluation, but reports taking it off at times. We reviewed that he is supposed to have it on AAT. Patient is repetitive during conversation, asking same questions. He does report using a condom catheter last night. He has been voiding on his own. Report urinary frequency and urgency at times, which he reports is not new.    He is full code. His wife is his emergency contact.     No past medical history on file.  No past surgical history on file.    Social History:   Social History     Social History Narrative   • Not on file     Prior Living Arrangements: [id number] Living Environment  Home Accessibility: stairs to enter home (Group), stairs within home (Group)  Living Environment Comment: lives with wife in 2  home with first floor set up  Prior Function Level: Prior Level of Function  Dominant Hand: right  Ambulation: independent  Transferring: independent  Toileting: independent  Bathing: independent  Dressing: independent  Eating: independent  IADLs: independent  Driving/Transportation:   Prior Level of Function Comment: Ind PTA, previous cervical fx. (+)     No family history on file.      Allergies: Lidocaine, Prednisone, Other, and Polyethylene glycol       Medication List      ASK your doctor about these medications    acetaminophen 500 mg tablet  Commonly known as: TYLENOL EXTRA STRENGTH  Take 2 tablets (1,000 mg total) by mouth every 6 (six) hours as needed for mild pain or headaches.  Dose: 1,000 mg      ascorbic acid 500 mg tablet  Commonly known as: VITAMIN C  Take 1,000 mg by mouth daily.  Dose: 1,000 mg     bisacodyL 5 mg EC tablet  Commonly known as: DULCOLAX  Take 2 tablets (10 mg total) by mouth daily.  Dose: 10 mg     enoxaparin 30 mg/0.3 mL syringe  Commonly known as: LOVENOX  Inject 0.3 mL (30 mg total) under the skin every 12 (twelve) hours Indications: deep vein thrombosis prevention.  Dose: 30 mg     guaiFENesin 100 mg/5 mL syrup  Commonly known as: ROBITUSSIN  Take 20 mL (400 mg total) by mouth every 6 (six) hours as needed for congestion or cough.  Dose: 400 mg     levothyroxine 25 mcg tablet  Commonly known as: SYNTHROID  Take 25 mcg by mouth daily before breakfast.  Dose: 25 mcg     METANX ORAL  Take 1 tablet by mouth 2 (two) times a day.  Dose: 1 tablet     sildenafiL 100 mg tablet  Commonly known as: VIAGRA  Take 100 mg by mouth as needed for erectile dysfunction.  Dose: 100 mg     traMADoL 50 mg tablet  Commonly known as: ULTRAM  Take 1 tablet (50 mg total) by mouth every 6 (six) hours as needed for pain (pain).  Dose: 50 mg           Review of Systems    Constitutional: Negative for chills and fever, significant weight change.  Eyes: negative for changes in vision  ENT: Negative for sore throat and congestion  Cardiovascular: Negative for chest pain, palpitations, lower extremity swelling  Respiratory: Negative for cough and shortness of breath  Gastrointestinal:Negative for abdominal pain, diarrhea, nausea and vomiting.   Genitourinary: Negative for frequency and dysuria.   Musculoskeletal: Negative for arthralgias and myalgias.  Integumentary (skin and/or breast): Negative for rashes  Neurological: Negative for dizziness, headaches, numbness; burning in right side of back  Psychiatric: Negative for depression and anxiety  Endocrine: Negative for increased thirst  Diet consistency: Regular     Objective    Vital Signs for the last 24 hours:  Temp:  [36.4 °C (97.5 °F)-36.9 °C (98.4 °F)] 36.6  °C (97.9 °F)  Heart Rate:  [62-71] 71  Resp:  [16-18] 16  BP: (106-151)/(58-67) 115/60    Physical Exam  General: NAD, sitting in WC at bedside   HEENT: Atraumatic, Normocephalic  Cardiovascular: RRR no m/r/g   Respiratory: CTAB no w/c/r  Abdomen/GI: NT ND + BS   Skin: Warm, Dry  Extremities/MSK: Non tender bilateral gastrocs    Neurologic:  AAOX3, follows commands, repeats questions     Strength:  Upper Extremities:  R Shoulder Abduction (C5): 3*/5 L Shoulder Abduction (C5): 3*/5  R Elbow Flexion (C5): 5/5 L Elbow Flexion (C5): 5/5  R Elbow Extension (C7): 5/5 L Elbow Extension (C7): 5/5  R Wrist Extension (C6): 5/5   L Wrist extension(C6): 5/5  R  Strength (C8): 5/5 L  Strength (C8): 5/5  *pain limited  Lower Extremities:  R Hip Flexion (L2): 2*/5 L Hip Flexion (L2): 5/5  R Knee Extension (L3): 4*/5 L Knee Extension (L3): 5/5  R Ankle Dorsiflexion (L4): 5/5 L Ankle Dorsiflexion (L4): 5/5  R Great Toe Extension (L5): 5/5 L Great Toe Extension (L5): 5/5  R Ankle Plantarflexion (S1): 5/5 L Ankle Plantarflexion (S1): 5/5    Sensory:  Intact to light touch bilaterally    Psych: Appropriate      Labs      Results from last 7 days   Lab Units 01/25/24  0558 01/20/24  0707 01/19/24  0522 01/18/24  2318   WBC K/uL 9.69 7.13 7.54 8.48   RBC M/uL 2.80* 2.90* 2.86* 2.77*   HEMOGLOBIN g/dL 8.8* 9.1* 8.9* 8.8*   HEMATOCRIT % 26.4* 27.7* 27.2* 26.0*   PLATELETS K/uL 114* 84* 88* 89*   MCV fL 94.3 95.5 95.1 93.9   RDW % 15.4* 15.6* 15.7* 15.7*   EOS ABS AUTO K/uL 0.03*  --   --   --        Results from last 7 days   Lab Units 01/25/24  0558 01/20/24  0707 01/19/24  0522   SODIUM mEQ/L 134* 135* 137   POTASSIUM mEQ/L 3.9 4.0 4.0   CHLORIDE mEQ/L 100 101 103   CO2 mEQ/L 25 25 22   BUN mg/dL 29* 26* 29*   CREATININE mg/dL 1.2 1.5* 1.6*   EGFR mL/min/1.73m*2 >60.0 46.5* 43.0*   ANION GAP mEQ/L 9 9 12     Results from last 7 days   Lab Units 01/25/24  0558 01/20/24  0707 01/19/24  0522   CALCIUM mg/dL 9.1 9.4 9.4   MAGNESIUM  mg/dL  --  1.9 1.9   PHOSPHORUS mg/dL  --  3.4 3.9       Results from last 7 days   Lab Units 01/25/24  0558   AST IU/L 11*   ALT IU/L 10   ALK PHOS IU/L 53   BILIRUBIN TOTAL mg/dL 0.9       Imaging    CT CYSTOGRAM WITHOUT IV CONTRAST    Result Date: 1/24/2024  Narrative: CLINICAL HISTORY:  f/u extravasation into the prostatic urethra. COMMENT: Comparison: CT cystogram 1/17/2024 Technique: CT cystogram was performed before and after the administration of diluted 30 mL of Gastrografin the patient's indwelling Womack catheter. Sagittal and coronal reconstructions were obtained. CT DOSE:  One or more dose reduction techniques (e.g. automated exposure control, adjustment of the mA and/or kV according to patient size, use of iterative reconstruction technique) was utilized for this examination. Findings: There is a Womack catheter within the urinary bladder with a moderate amount of intraluminal air. There is contrast seen along the catheter in the region of the prostatic urethra, however contrast is no longer identified extravasating into the prostatic parenchyma compared to 1/17/2024. No other areas of contrast extravasation are identified. The urinary bladder wall is thickened. Redemonstration of fractures involving the right superior and inferior pubic rami with extraperitoneal blood in the pelvis, which has slightly decreased compared to 1/17/2024. There is persistent mild enlargement of the right adductor muscle compartment, similar to 1/17/2024. 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 There is incompletely evaluated right renal cysts. Atherosclerotic calcifications are noted of the aortoiliac system.     Impression: IMPRESSION: No evidence of contrast extravasation into the prostatic parenchyma or other areas of contrast extravasation from the urinary bladder. Redemonstration of fractures involving the right superior and inferior pubic rami with extraperitoneal blood in the pelvis, which  has slightly decreased compared to 1/17/2024. Persistent mild enlargement of the right adductor muscle compartment, similar to 1/17/2024.    MRI CERVICAL SPINE WITH AND WITHOUT CONTRAST    Result Date: 1/22/2024  Narrative: Cervical spine MRI CLINICAL HISTORY: C2 fracture. COMPARISON: CT dated 1/17/2024. TECHNIQUE: Sagittal T1, T2, STIR, axial T1 postgadolinium fat sat sagittal weighted images of the spine acquired after the uneventful administration of 8 cc of Vueway IV contrast. COMMENT: There is near normal anatomic alignment.  There is odontoid fracture with no significant bone marrow edema, likely related to chronic fracture.  No other bone marrow edema appreciated.  Maintained vertebral body height.  No cord signal abnormality.  No epidural collection or hematoma.  Prevertebral soft tissues within normal limits.  Suspect right hemicord signal abnormality at C3-C4.  No abnormal enhancement.  Posterior fossa arachnoid cyst. C1-C2: Severe right uncovertebral hypertrophic changes, contributing to moderate right foraminal narrowing. C2-C3: Disc osteophyte complex and left facet arthrosis, contributing to flattening of the ventral thecal sac and spinal cord, mild left foraminal narrowing, mild canal stenosis. C3-C4: Disc osteophyte complex with facet and uncovertebral hypertrophic changes, contributing to severe bilateral foraminal narrowing, mild canal stenosis. C4-C5: Disc osteophyte complex, moderate to severe right and moderate left facet hypertrophy, contributing to moderate right foraminal narrowing and minimal flattening of the ventral thecal sac. C5-C6: Disc osteophyte complex with broad-based disc bulge, severe uncovertebral and facet hypertrophic changes, contributing to mild canal stenosis, severe left and moderate to severe right foraminal narrowing. C6-C7: Right facet arthrosis without consequence. C7-T1: Right facet arthrosis and minimal disc bulge without consequence.     Impression: IMPRESSION: 1.   C2 fracture with no bone marrow edema to suggest acute fracture. 2.  No abnormal enhancement. 3.  No epidural collection or hematoma. 4.  Degenerative spondylosis worst at C3-C4 where there is also suggestion of Mild right jose luis-cord signal abnormality.  Multilevel spondylosis otherwise noted as described.    IR THORACENTESIS    Result Date: 1/18/2024  Narrative: CLINICAL HISTORY: Fall on ice with multiple rib fractures, shortness of breath. COMMENT: The patient was referred for ultrasound-guided therapeutic thoracentesis on the left side.  Informed consent was obtained.  With the patient sitting and at the patient's bedside, ultrasound imaging was performed and a large size pleural effusion was identified.  The fluid was localized and a site was selected on the skin with ultrasound.  The site was marked.  Using sterile technique, under local anesthesia, a 4-Malian valved lingoking [employer] catheter was inserted into the pleural space. 1.1 L of blood-tinged clear obdulia pleural fluid were removed.  The catheter was removed and Dermabond was applied to the skin. A small pleural effusion remained post procedure. The fluid was discarded.  The patient tolerated the procedure well. An erect frontal chest film was obtained immediately following the procedure and will be separately reported. This procedure was performed by Humaira Marley PA-C under the supervision of Neil Atkinson M.D..     Impression: IMPRESSION: Successful bedside therapeutic ultrasound-guided left thoracentesis with 1.1 L pleural fluid removed and discarded.  All components of the time-out, debriefing, and handling of the specimen were conducted as per the ASAP Specimen Protocol. I certify that I have personally reviewed this examination and agree with Humaira Marley's report. Neil Atkinson M.D.    X-RAY CHEST 1 VIEW    Result Date: 1/18/2024  Narrative: CLINICAL HISTORY: s/p L thoracentesis please perform around 10:30 COMMENT: Frontal view of the chest obtained and  compared with prior the same day. LINES/TUBES: None. OTHER: No definite pneumothorax. Improved pleural effusions. No other significant change. Recommend follow-up radiographs or chest CT in 6-8 weeks.     Impression: IMPRESSION: Please see comment.    X-RAY CERVICAL SPINE COMPLETE 4 OR 5 VIEWS    Result Date: 1/18/2024  Narrative: CLINICAL HISTORY: Persistent neck pain with negative CT C-Spine   . COMPARISON: 1/17/2024. COMMENT: 4 views of cervical spine were obtained. Odontoid process fracture better seen on recent CT. With flexion, there is widening of the 1st-2nd cervical interspinous process space as well as slight anterior translocation of the superior fracture fragment, suspicious for instability across the odontoid process fracture.     Impression: IMPRESSION: Please see comment.    ULTRASOUND KIDNEYS    Result Date: 1/18/2024  Narrative: EXAM: US KIDNEYS CLINICAL HISTORY: Assess R renal cyst COMPARISON: CT abdomen and pelvis January 17, 2024 PROCEDURE: Ultrasound of the kidneys was performed. COMMENT: RIGHT KIDNEY: Size/contour: Normal size. Echogenicity: Increased parenchymal echogenicity. Stones: No stones large enough to cause acoustic shadowing. Lesions: Multiple renal sinus and renal cortical cysts. Lesion in question from the CT corresponds to a 9.2 x 8.1 x 9.2 cm cyst with some marginal echogenic shadowing calcification.. Additional minimally septated 1.7 x 2.1 x 1.7 cm cyst also measured. Hydronephrosis: Mild hydronephrosis. Renal dimensions: 6.8 cm x  6.4 cm x 13.0 cm LEFT KIDNEY: Size/contour: Normal size. Echogenicity: Increased parenchymal echogenicity. Stones: No stones large enough to cause acoustic shadowing. Lesions: Upper pole cyst measuring 2.8 x 3.5 x 2.5 cm. Hydronephrosis: No hydronephrosis. Renal dimensions: 5.7 cm x  5.6 cm x 11.6 cm OTHER: Bladder decompressed around a Womack catheter     Impression: IMPRESSION: Multiple right renal cortical and sinus cysts. Lesion seen in the right  lower pole on CT corresponds to an exophytic cyst with some marginal calcification. Mild right hydronephrosis. Small left renal cortical cysts, largest measured in the upper pole. Mildly increased renal cortical echogenicity, likely related to underlying chronic medical renal disease.     X-RAY CHEST 1 VIEW    Result Date: 1/18/2024  Narrative: CLINICAL HISTORY: right rib fractures, L pleural effusion COMMENT: Frontal view of the chest obtained and compared with 1/17/2024. LINES/TUBES: None. OTHER: Worsening left greater than right pleural effusions with overlying atelectasis. Worsening pulmonary edema. No definite pneumothorax. Stable cardiomediastinal silhouette.     Impression: IMPRESSION: Please see comment.    X-RAY ABDOMEN 1 VIEW    Result Date: 1/18/2024  Narrative: CLINICAL HISTORY: evaluate for residual contrast COMMENT: Supine views of the abdomen obtained and compared with 1/17/2024. No radiographically opaque contrast material, noting contrast material from CT studies 1/17/2024 would be radiographically radiolucent due to density. Extensive vascular calcifications. Left basilar pleural effusion/atelectasis/opacity.     Impression: IMPRESSION: Please see comment.    CT CYSTOGRAM WITHOUT IV CONTRAST    Result Date: 1/17/2024  Narrative: CLINICAL HISTORY: Hematoma with pelvic fracture COMMENT: CT cystogram of the pelvis was performed prior to and following the injection of contrast into the bladder using gravity.  Coronal and sagittal reformats were generated. CT DOSE:  One or more dose reduction techniques (e.g. automated exposure control, adjustment of the mA and/or kV according to patient size, use of iterative reconstruction technique) utilized for this examination. Comparison is made to prior study from earlier the same day. Again seen are right-sided pelvic fractures including the superior pubic ramus with associated hematomas.  There is some mass effect on the bladder with some displacement of the  bladder to the left.  There is a Womack catheter in place with contrast seen within the Womack.  On the postcontrast images, there is increased hyperdensity within the prostate gland which may suggest some urethral injury and extravasation of contrast from the urethra in this region.  The bladder is collapsed around a Womack catheter.  There is some reflux of contrast into the ureters.  Partially imaged is a right renal cyst.     Impression: IMPRESSION: Findings suggestive of extravasation of contrast from the urethra in the region of the prostate gland with increased hyperdensity in the prostate gland following the injection of contrast through the Womack catheter.    X-RAY PELVIS 1 OR 2 VIEWS    Result Date: 1/17/2024  Narrative: CLINICAL HISTORY: PELVIS FRACTURE COMPARISON:None COMMENT: Single view of the pelvis is obtained. Acute comminuted fracture of the right superior pubic ramus. There are degenerative changes of the hips bilaterally. Bowel gas pattern is nonspecific but appears nonobstructive.     Impression: IMPRESSION: See comment.     CT CERVICAL SPINE WITHOUT IV CONTRAST    Result Date: 1/17/2024  Narrative: STUDY:   CT examinations of the head and cervical spine performed without intravenous contrast following the [facility] standard protocols. Images reconstructed/reformatted in the axial, coronal and sagittal planes. CT DOSE:  One or more dose reduction techniques (e.g. automated exposure control, adjustment of the mA and/or kV according to patient size, use of iterative reconstruction technique) utilized for this examination. CLINICAL HISTORY: Neck trauma (Age >= 65y) COMPARISON: None. CT angiogram report from 9/28/2021 and 3/16/2019; unfortunately, images are not available for review.     Impression: IMPRESSION: *  No evidence of acute posttraumatic intracranial injury. *  Lateral ventriculomegaly, indeterminate though usually related to volume loss or normal pressure hydrocephalus. *   Age-indeterminate odontoid process fracture which was not reported on outside imaging. The results were critically read back with DANIEL CHAN on 1/17/2024 1:05 PM. COMMENT: BRAIN: Brain parenchyma:  Age related involution and  ischemic small vessel changes. Gray-white matter differentiation is normal.  No evidence of intracranial hemorrhage, midline shift or other mass effect. Ventricles, cisterns, and sulci:  Symmetric lateral ventriculomegaly. Cranial carotid arteries: Mural calcification. Calvarium and extra cranial soft tissues:  No evidence of a displaced calvarial fracture.   Scalp soft tissue within normal limits. Visualized paranasal sinuses and mastoid air cells:  Partial opacification of the right mastoid air cells. CERVICAL SPINE: Cervicothoracic alignment: Craniocervical junction is normal in appearance. Atlantodental distance is not widened. Odontoid process fracture just at the inferior margin of the atlantodens articulation with a 1.3 cm Doppler process fracture which does not appear to extend into the C2 base. Prevertebral soft tissues: Normal in thickness. Vertebral bodies and intervertebral discs: Severe osseous demineralization. Cortical thickening and coarsening/thickening of the trabeculae due to osseous demineralization or pagetoid changes. Vertebral body heights and alignment are maintained.  There are multilevel degenerative changes with intervertebral disc space narrowing, endplate spurring, facet hypertrophy, and uncovertebral hypertrophy, most pronounced at C4-5. Cervical and upper thoracic spinal canal: There are varying degrees of central canal and neural foraminal stenosis, however there is no high-grade osseous encroachment on the central canal. Extra vertebral soft tissues: Vascular calcifications. Partially imaged left pleural effusion is more completely characterized on the contemporaneous CT chest.     CT CHEST WITH IV CONTRAST, CT ABDOMEN PELVIS WITH IV CONTRAST    Result  Date: 1/17/2024  Narrative: CLINICAL HISTORY: Abdominal trauma, blunt Additional history from chart: 83 year old male presents to the ER for evaluation after a slip and fall. Pt slipped on the ice approx 1 hour ago. He landed on his right side. He has significant pain to his right ribs, worse with moving, breathing. He also has pain in the R groin area. No head injury or LOC. No neck or back pain. No extremity pain. Pt is not on any blood thinners. He is not SOB COMPARISON: None. Outside CT abdomen and pelvis report from 3/20/2021 was reviewed; unfortunately, images are not available for review. COMMENT: TECHNIQUE: CT of the chest, abdomen and pelvis was performed after the uneventful administration of intravenous contrast with the patient in the supine position. Images reconstructed/reformatted in the axial, coronal and  sagittal planes. CT DOSE:  One or more dose reduction techniques (e.g. automated exposure control, adjustment of the mA and/or kV according to patient size, use of iterative reconstruction technique) utilized for this examination. ADMINISTERED CONTRAST: 100mL of iopamidoL ([id number]) 370 mg iodine /mL (76 %) injection 100 mL CHEST CHEST WALL AND LOWER NECK: Minimal diffuse subcutaneous soft tissue infiltration. MEDIASTINUM AND JUAN M: within normal limits HEART: Cardiomegaly. No pericardial effusion. VESSELS: No evidence of acute posttraumatic aortic injury. Vascular calcifications, including coronary artery calcifications. Aortic and mitral annulus/valvular calcifications. Ascending aortic aneurysm measuring 4.6 cm at the sinus of Valsalva and up to 5 cm within the mid ascending aorta. Descending thoracic aortic caliber is within normal limits, measuring up to 2.9 cm (all measurements on coronal sequences). Enlarged main pulmonary artery at up to 4.3 cm, finding associated with lateral hypertension. Extensive abdominal vascular calcifications. Abdominal aortic caliber is normal. Infiltration of the  fat about the SMA/SMV without evidence of vascular injury. LUNG , LARGE AIRWAYS AND PLEURA: No evidence of a pneumothorax or pneumomediastinum. Moderate right layering pleural effusion with concomitant right lower lobe basilar compressive volume loss. Moderate to large left pleural effusion with possible areas of loculation in concomitant near-complete collapse of the left lower lobe. BONES: Right posterior eighth rib minimally displaced acute fracture. Right posterior ninth rib nondisplaced acute fracture. Right posterior 10th rib minimally displaced acute fracture. Right posterior 11th rib nondisplaced acute fracture. Severe diffuse osseous demineralization. Multilevel degenerative disc disease. Diffuse coarsening of the osseous trabeculae with areas of cortical thickening, which can be seen with pagetoid changes. Right superior pubic ramus parasymphyseal comminuted fracture, acute. Concomitant right adductor and obturator intramuscular hematomas, severely involving the obturator internus. Perivesicular hematomas and right inguinal hematomas related to the fracture, or also seen. Right perivesicular hematomas cause mass effect on the adjacent urinary bladder. Diffuse infiltration of the perivesicular space. Degenerative changes of the sacroiliac joints, more severe on the left, no evidence of sacral joint widening. No evidence of cysts parasymphyseal widening. Multilevel Schmorl's nodes, most notable involving the L2-3 vertebral bodies. ABDOMEN AND PELVIS LIVER: Liver measures 21.8 cm sagittal dimension. BILE DUCTS: normal caliber GALLBLADDER: Normal PANCREAS: Limited assessment. SPLEEN: Splenomegaly at 15.3 cm maximum sagittal dimension. ADRENALS: within normal limits KIDNEYS/URETERS/BLADDER: Multiple renal cysts. Additionally, there is a posterior right lower pole partially rim calcified 8.2 cm mostly exophytic cystic lesion with indeterminate attenuation characteristics. Kidneys enhance excrete contrast  symmetrically. No hydroureteronephrosis. REPRODUCTIVE ORGANS: no pelvic masses BOWEL: Moderate stool burden. Normal bowel caliber. no ascites or free air, no fluid collection. ABDOMINAL LYMPH NODES: within normal limits. ABDOMINAL WALL: Right hip posttraumatic changes described in the bone section above. Infiltration throughout the subcutaneous soft tissue, most notably involving the right lower quadrant ventral abdominal subcutaneous soft tissue. Gluteal subcutaneous calcifications demonstrated on the right. Bilateral inguinal postsurgical changes.     Impression: IMPRESSION: 1.  Right superior pubic ramus parasymphyseal acute, comminuted fracture with concomitant right adductor/obturator intramuscular hematomas and perivesicular hematomas. Right perivesicular hematomas associated with mild mass effect on the urinary bladder and perivesicular infiltration. CT cystogram can be considered if there is concern for urinary bladder posttraumatic injury. 2.  Right eighth through 11th posterior acute rib fractures. 3.  Large, loculated left pleural effusion with near-complete collapse left lower lobe. Moderate right layering pleural effusion. 4.  Infiltration about SMA and SMV noting a poorly seen pancreas, of indeterminate etiology. Correlation with lipase exclude pancreatitis can be considered. Follow-up elective MRI abdomen without and with intravenous contrast can be considered if further imaging evaluation of the pancreas is desired. 5.  Hepatosplenomegaly. 6.  Incompletely characterized right lower pole renal cystic lesion measuring 8.2 cm. Elective renal ultrasound can be considered. Alternatively, this lesion can be followed with an MRI if that study is performed for the pancreatic findings described above. The results were critically read back with Lizbeth Marino PA C on 1/17/2024 12:57 PM.    CT HEAD WITHOUT IV CONTRAST    Result Date: 1/17/2024  Narrative: CLINICAL HISTORY:  Injury, trauma, minor (Age >=  65y) COMMENT:   CT imaging of the brain and cervical spine was performed without intravenous contrast administration. Multiplanar reformats were reviewed. Administered contrast and dose: [<None>] CT DOSE:   One or more dose reduction techniques (e.g. automated exposure control, adjustment of the mA and/or kV according to patient size, use of iterative reconstruction technique) utilized for this examination. Comparison: None. Brain: [Motion degraded examination. The ventricular system and sulci reflect mild to moderate global volume loss. No findings of midline shift, mass effect, acute hemorrhage, or acute transcortical infarct.  Posterior fossa probable arachnoid cyst.  Mild nonspecific periventricular and subcortical white matter hypoattenuation. Intracranial vascular consultations.  Mild right mastoid air cell effusion.     Impression: IMPRESSION: Motion degraded examination. No acute intracranial lesion. Atrophy, nonspecific white matter changes probably secondary to chronic small vessel ischemia and atherosclerotic vascular calcification.     X-RAY CHEST 1 VIEW    Result Date: 1/17/2024  Narrative: CLINICAL HISTORY: r/o PTX COMPARISON: None COMMENT: TECHNIQUE: Single frontal view of the chest was performed. LINES/TUBES/HARDWARE: None LUNGS AND PLEURA: See Impression. CARDIOMEDIASTINUM: Cardiomegaly. Aortic atherosclerosis. SKELETON/OTHER: Degenerative changes of the spine and shoulders.     Impression: IMPRESSION: Chin overlies chest, limiting evaluation of the thoracic inlet. Cardiomegaly with pulmonary vascular congestion. Moderate left and small right pleural effusions with underlying atelectasis/airspace disease no pneumothorax seen.        Assessment/Plan    Plan of care was discussed with patient  Patient is being admitted to [facility] for comprehensive inpatient rehabilitation to address functional deficits in mobility, transfers, ADL's and safety. Patient will receive the following services: physical  therapy, occupational therapy, speech therapy, neuropsychology, , nursing and aide.  Physiatry oversight & Internal Medicine coverage will be provided for the following comorbidities: ADL and ambulatory dysfunction, pain, C2 fracture, pubic ramus fracture, BPH.  I have reviewed the pre-admission screening and there are no relevant changes.  Expected length of stay: 2 week(s)      Code Status: Full Code    Assessment and Plan    Randolph Daley is a 83 y.o. male with a PMHx of BPH, hypothyroid, cervical spine fracture, who presents with ADL/mobility dysfunction secondary to multi trauma after fall, found to have right pubic ramus fracture, right posterior rib fractures, chronic odontoid fracture.    #ADL/Ambulatory dysfunction: 2/2 multi trauma. Continue acute inpatient rehabilitation with PT/OT/SLP/SW/Rehab RN/Neuropsych to increase independence with ADLs, improve balance, coordination, endurance, strength, mobility, community reintegration, decreased burden of care on others and family education.  - internal medicine consulted for medical co-management  -Fall precautions  -Pain control    #Ortho  -right superior pubic ramus comminuted fracture with right adductor/obturator intramuscular hematomas  - right 8th-11th posterior acute rib fractures  -Weight Bearing: WBAT BLE  -Precautions: falls  -Pain Control: tylenol, morphine PRN, voltaren gel, muscle rub cream PRN  - follow-up orthopedics    #C2 fracture  -MRI with C2 fracture without bone marrow edema to suggest acute fracture; degenerative spndylosis worst at C3-4 with mild reight jose luis-cord signal abnormality  - chronic  - evaluated by neurosurgery in acute care  - decline operative management at this time; continue hard cervical collar at all times  - follow-up neurosurgery in 4-6 weeks    #right perivascular hematoma associated with mild mass effect on the urinary bladder and perivesicular infiltration  - monitor bladder scans, CIC PRN  - CT  cystogram without evidence of contrast extravasation into prostatic parenchyma or other areas of extravasation from urinary bladder  - had avila catheter - removed     #BPH  - continue flomax    #Pleural effusion  - s/p thoracentesis  - monitor O2    #Immobility  -DVT prophylaxis with apixiban    # Follow-up  - PCP  - neurosurgery  - orthopedic surgery     #Disposition: TBD    Amelia Larose MD  Physical Medicine and Rehabilitation        Post Admission Physician Evaluation    Randolph Daley is admitted to [facility] for comprehensive inpatient rehabilitation for Debility with functional deficits in mobility; motor dysfunction; safety; self-care. Patient is receiving the following services: medical consultative services; physical therapy; occupational therapy.    Active medical management is required for   Patient Active Problem List   Diagnosis   • Fall due to slipping on ice or snow, initial encounter   • Closed fracture of multiple ribs of right side   • Closed fracture of ramus of right pubis (CMS/HCC)   • Pelvic hematoma in male   • Pleural effusion on left   • Normocytic anemia   • Stage 3a chronic kidney disease (CMS/HCC)   • Closed odontoid fracture (CMS/HCC)   • Multiple injuries due to trauma       Premorbid Function  Dominant Hand: right  Ambulation: independent  Transferring: independent  Toileting: independent  Bathing: independent  Dressing: independent  Eating: independent  IADLs: independent  Driving/Transportation:   Communication: understands/communicates without difficulty  Swallowing: swallows foods/liquids without difficulty  Baseline Diet/Method of Nutritional Intake: regular; thin liquids  Past History of Dysphagia: Denies hx  Assistive Device/Animal Currently Used at Home: grab bar  Prior Level of Function Comment: Ind PTA, previous cervical fx. (+)       Current Function  Mobility  Gait  [place], Gait: moderate assist (50-74% patient  effort)  Assistive Device: walker, front-wheeled  Comment (Gait/Stairs): 20' + 5' with RW mod A for improved lateral wt shift and balance    Stairs  [place], Stairs: unable to assess  Comment: unsafe to assess d/t fatigue post toileting, plan to assess at tomorrow's session    Wheelchair  [place], Forward Propulsion: dependent (less than 25% patient effort)  [place], Steering Activities: dependent (less than 25% patient effort)  [place], Turning Activities: dependent (less than 25% patient effort)  Comment, Wheelchair Mobility: not a LTG    Transfers  Bed Mobility  Bed Mobility Activities: sit to supine  [place], Roll Left: moderate assist (50-74% patient effort)  [place], Roll Right: moderate assist (50-74% patient effort)  [place], Supine to Sit: moderate assist (50-74% patient effort)  [place], Sit to Supine: moderate assist (50-74% patient effort)  Safety/Cues: maximal; verbal cues; technique  Assistive Device: bed rails; draw sheet  Comment: OT IE: Pt demonstrated rolling to L side at mod A with use of bed rail and max cues for log roll strategy.    Bed to Chair Transfer  [place], Bed to Chair: moderate assist (50-74% patient effort)  Safety/Cues: technique  Assistive Device: walker, front-wheeled  Comment: via SPT mod A for lateral wt shift and balance    Chair to Bed Transfer  [place], Chair to Bed: moderate assist (50-74% patient effort)  Safety/Cues: technique  Assistive Device: walker, front-wheeled  Comment: via SPT mod A for lateral wt shift and balance    Sit to Stand Transfer  [place], Sit to Stand Transfer: moderate assist (50-74% patient effort)  Safety/Cues: technique  Assistive Device: walker, front-wheeled  Comment: from wc/toilet, mod A for ant wt shift and pelvic rise    Stand to Sit Transfer  [place], Stand to Sit Transfer: moderate assist (50-74% patient effort)  Safety/Cues: technique  Assistive Device: walker,  front-wheeled  Comment: to wc/toilet, mod A for eccentric control    Stand Pivot Transfer  [place], Stand Pivot/Stand Step Transfer: moderate assist (50-74% patient effort)  Safety/Cues: increased time to complete  Assistive Device: walker, front-wheeled  Comment: via amb approach with RW mod A for lateral wt shift and balance    Car Transfer  [place]: unable to assess  Comment: unsafe to assess d/t fatigue post toileting, plan to assess at tomorrow's session      Toilet Transfer  Transfer Technique: stand pivot  [place]: minimum assist (75% or more patient effort)  Safety/Cues: hand placement; increased time to complete  Assistive Device: grab bars/safety frame  Comment: SPT to standard hospital height toilet with use of grab bars. Cues for sequencing.    Shower Transfer  Transfer Technique: stand pivot  [place]: minimum assist (75% or more patient effort)  Safety/Cues: hand placement; increased time to complete  Assistive Device: grab bars/tub rail; shower chair  Comment: SPT to shower chair from w/c with tactile cues for hand placement and assistance to advance RLE forward. Cues for hand placement prior to sitting onto shower chair.        Self Care  Bathing  Tasks: left arm; chest; right arm; abdomen; front perineal area; buttocks; left upper leg; right upper leg; left lower leg, including foot; right lower leg, including foot  [place]: moderate assist (50-74% patient effort)  Safety/Cues: increased time to complete; energy conservation; maintaining precautions  Setup Assistance: adaptive equipment setup; adjust water temperature/flow; obtain supplies; open containers  Adaptive Equipment: hand-held shower spray hose; shower chair; grab bar/tub rail  Comment: Pt completed full wet shower with the exception of head and neck to prevent aspen collar from becoming wet as pt does not have extra pads. Pt required assistance for b/l LE, back and aspects of chest due to pain and fatigue. Pt  required moderate cues to avoid bending and maintain precautions throughout shower. Completed mainly seated on shower chair. Pt demonstrated pull to stand with use of grab bar at min A for max A pericare.    Upper Body Dressing  Tasks: don; pull over garment  [place]: moderate assist (50-74% patient effort)  Safety/Cues: increased time to complete; energy conservation; problem-solving; maintaining precautions  Adaptive Equipment: none  Comment: Assistance for pulling down over collar, behind back and over R shoulder due to scapular shoulder pain.    Lower Body Dressing  Tasks: don; pants/bottoms; socks; underwear  [place]: maximum assist (25-49% patient effort)  Safety/Cues: increased time to complete; energy conservation; maintaining precautions  Adaptive Equipment: none  Comment: Pt required assistance for donning distally over feet. Able to thread LLE through pant leg. Assistance to pull over hips. D footwear management.    Grooming  Tasks: washes, rinses and dries face; brushes/li hair  [place]: close supervision  Safety/Cues: increased time to complete  Setup Assistance: adjust water temperature/flow; open containers  Adaptive Equipment: none  Comment: Pt completed light morning grooming routine seated in w/c at tray table.    Toileting  Tasks: adjust/manage clothing; perform bowel hygiene  [place]: moderate assist (50-74% patient effort)  Safety/Cues: problem-solving; attention  Setup Assistance: change pad/brief; adaptive equipment setup  Adaptive Equipment: grab bar/safety frame  Comment: Pt completed pericare in stance, assistance to manage clothing around waist and assistance to maintain balance in stance for pericare. Pt incontent of bladder, no bowel movement just smear.    Self-Feeding  Tasks: scoop food onto utensil; utensil to mouth; liquids to mouth  [place]: set up  Safety/Cues: increased time to complete  Setup Assistance: prepare tray/open items  Adaptive Equipment:  "none  Comment: Assistance to prepare items on tray including opening lids and containers. Pt reporting \"it's slipping\"    Cognition  Affect/Mental Status: confabulatory  Behavioral Issues: difficulty managing stress; overwhelmed easily  Orientation Status: oriented x 4  Follows Commands: 50-74% accuracy; physical/tactile prompts required; repetition of directions required; verbal cues/prompting required; follows one-step commands  Cognitive Function: executive function deficit; safety deficit  Attention Deficit: minimal deficit  Executive Function Deficit: moderate deficit; insight/awareness of deficits; self-monitoring/self-correction; organization/sequencing  Comment, Executive Function: 1 step cues repeated for mobility (\"lean left and step with R\")  Memory Deficit: episodic memory; recall, recent events  Safety Deficit: moderate deficit; safety precautions awareness; safety precautions follow-through/compliance; insight into deficits/self-awareness  Comment, Cognition: Pt with poor adherence to cervical collar reporting \"I didn't wear it much at the other hospital\" Educated on C2 fracture and purpose of brace AAT    Communication       Risk for Complications  Constipation: High  Falls: High  Skin Breakdown: High      Expected Level of Function  Self-Care: Independent  Transfers: Independent  Locomotion: Independent  Communication: Independent  Social Cognition: Independent      Anticipated Discharge Plan  home with home health    I have reviewed the pre-admission screening and there are no relevant changes.    Expected length of stay: 14 days               "

## 2024-01-25 NOTE — PLAN OF CARE
Plan of Care Review  Plan of Care Reviewed With: patient  Progress: no change  Outcome Evaluation: Patient received at 2000 via stretcher . AAOx4 , oriented to the new envirornment . Patient verbalized understanding. patient is resting at this time . C/O R shoulder pain, PRN tramadol offered but patient denied.  safety precautions maintained. call bell within reach and bed alarm is on.

## 2024-01-25 NOTE — CONSULTS
Consult Note    Reason For Referral: Medical comanagements  Referring Provider: , Amelia Tamez MD  Outside records reviewed.    CC:S/p fall, sustained multi trauma: R post 8th-11th rib fractures,  R superior pubic rami fracture w hematoma, age undetermined C2 fracture; non surgically managed; ADL and ambulatory dysfunction         83 y.o. male with PMH of BPH, HTN, hypothyroidism, who presented and was [place] to [facility] [place]17/24 after a slip and fall on ice landing on his right side. He was found to have multiple injuries including right 8-11 rib fractures, bilateral pleural effusion s/p IR thoracentesis 1/18 with drainage of 1.1L, pelvic fracture (non op mgmt), right adductor/obturator hematoma, questionable posttraumatic bladder injury and age-indeterminate cervical fracture.  Neurosurgery was consulted and offered c spinesurgery, but patient declined.  [place] Collar on at all times, except for meals to prevent aspiration.  Patient c/o difficulty coughing up mucus with collar on, but understands that he needs to wear the collar especially during therapy.  Urology was consulted.  CT cystogram showed possible extravasation of contrast into the prostatic urethra.  Womack catheter is currently draining clear urine.  Plan is to continue Womack catheter for 1 week.  CT cystogram in 1 week with possible removal at that time, but pt declining CT Cystogram.  Functionally, he has significant physical econditioning with ADL and ambulatory dysfunction, requiring inpt acute rehab, was admitted to [facility] on 1/24/24.       Repeated   CT cystography 1/24/24:  IMPRESSION:  No evidence of contrast extravasation into the prostatic parenchyma or other areas of contrast extravasation from the urinary bladder. Redemonstration of fractures involving the right superior and inferior pubic rami with extraperitoneal blood in the pelvis, which has slightly decreased compared to 1/17/2024. Persistent mild enlargement of the right  "adductor muscle compartment, similar to 1/17/2024.  His avila was removed.     Denies nausea, vomiting, abdominal pain or discomfort, dysuria, cough/sputum, running nose, sore throat, chest pain, palpitation, SOB or orthopnea, dizziness or LH,  HA.    Tolerating Diet: regular thin liquid diet       Allergies:    Allergies   Allergen Reactions   • Lidocaine Hives   • Prednisone Hives and Other (see comments)     hives to \"steroids\"  hives to \"steroids\"     • Other      Steroids  Steroids     • Polyethylene Glycol        Current medication list:  Current Facility-Administered Medications   Medication Dose Route Frequency Provider Last Rate Last Admin   • acetaminophen (TYLENOL) tablet 650 mg  650 mg oral q4h PRN Neville Arndt MD       • albuterol 1.25 mg, ipratropium (ATROVENT) 0.5 mg   nebulization TID Neville Arndt MD       • apixaban (ELIQUIS) tablet 2.5 mg  2.5 mg oral BID Neville Arndt MD   2.5 mg at 01/25/24 1239   • [START ON 1/26/2024] ascorbic acid (VITAMIN C) tablet 500 mg  500 mg oral Daily Neville Arndt MD       • baclofen (LIORESAL) tablet 5 mg  5 mg oral TID Neville Arndt MD       • bisacodyL (DULCOLAX) tablet,delayed release (DR/EC) 10 mg  10 mg oral Daily Abilio Lopez MD       • camphor-methyl salicyl-menthoL (MUSCLE RUB) cream 1 Application  1 Application Topical 2x daily PRN Amelia Larose MD       • cholecalciferol (vitamin D3) tablet 2,500 Units  2,500 Units oral Daily with dinner Neville Arndt MD       • diclofenac sodium (VOLTAREN) 1 % topical gel   Topical TID Neville Arndt MD       • guaiFENesin (ROBITUSSIN) 100 mg/5 mL liquid 400 mg  400 mg oral q6h RUDY Abilio Lopez MD   400 mg at 01/25/24 1239   • levothyroxine (SYNTHROID) tablet 25 mcg  25 mcg oral Daily (6:30a) Abilio Lopez MD   25 mcg at 01/25/24 0539   • morphine 10 mg/5 mL solution 10 mg  10 mg oral q4h PRN Neville Arndt MD       • ondansetron ODT (ZOFRAN-ODT) disintegrating tablet 4 mg  4 mg oral q8h PRN " Abilio Lopez MD       • sennosides-docusate sodium (SENOKOT-S) 8.6-50 mg per tablet 2 tablet  2 tablet oral BID Neville Arndt MD       • sodium chloride 3 % nebulizer solution 3 mL  3 mL nebulization TID Neville Arndt MD       • tamsulosin (FLOMAX) 24 hr ER capsule 0.4 mg  0.4 mg oral Nightly Neville Arndt MD       • traMADoL (ULTRAM) tablet 50 mg  50 mg oral TID Neville Arndt MD           Past Medical History:   See HPI    Past Surgical History:   No past surgical history on file.    Social History:   Social History     Socioeconomic History   • Marital status:      Spouse name: Not on file   • Number of children: Not on file   • Years of education: Not on file   • Highest education level: Not on file   Occupational History   • Not on file   Tobacco Use   • Smoking status: Former     Types: Cigarettes, Cigars   • Smokeless tobacco: Never   Substance and Sexual Activity   • Alcohol use: Not Currently   • Drug use: Never   • Sexual activity: Not on file   Other Topics Concern   • Not on file   Social History Narrative   • Not on file     Social Determinants of Health     Financial Resource Strain: Low Risk  (1/25/2024)    Overall Financial Resource Strain (CARDIA)    • Difficulty of Paying Living Expenses: Not hard at all   Food Insecurity: No Food Insecurity (1/25/2024)    Hunger Vital Sign    • Worried About Running Out of Food in the Last Year: Never true    • Ran Out of Food in the Last Year: Never true   Transportation Needs: No Transportation Needs (1/25/2024)    PRAPARE - Transportation    • Lack of Transportation (Medical): No    • Lack of Transportation (Non-Medical): No   Physical Activity: Not on file   Stress: Not on file   Social Connections: Not on file   Intimate Partner Violence: Not on file   Housing Stability: Low Risk  (1/25/2024)    Housing Stability Vital Sign    • Unable to Pay for Housing in the Last Year: No    • Number of Places Lived in the Last Year: 1    • Unstable Housing  "in the Last Year: No       Family History:   Family history reviewed, not contributory to current HPI    ROS  Complete Review of Systems:  All other systems reviewed and not remarkable except as noted in the HPI.    Vital signs:  Visit Vitals  /60 (BP Location: Right upper arm, Patient Position: Sitting)   Pulse 71   Temp 36.6 °C (97.9 °F) (Oral)   Resp 16   Ht 1.753 m (5' 9\")   Wt 83.5 kg (184 lb)   SpO2 93%   BMI 27.17 kg/m²       Physical Examination:  Head/Ear/Nose/Throat: normocephalic; atraumatic; moisture mouth mm, no oropharyngeal thrush noted.   Eyes: anicteric sclera, EOMI; PERRL.   Neck : supple, no JVD, no carotid bruits appeciated.   Respiratory: no evidence of labored breathing, bibasilar lung BS diminished area, no remarkable w/r/c.   Cardiovascular: RRR; normal S1, S2; no m/r/g; no S3 or S4.   Gastrointestinal: soft; NT; BS normal; mildly distended; no CVAT b/l.   Genitourinary: off avila.   Extremities : no c/c/e .   Neurological: AO x 3, fluent speeches, following commands, CNS II-XII grossly intact; no focal neurologic deficits.   Behavior/Emotional: in NAD, appropriate; cooperative.   Skin: clean, dry and intact.    Labs:  Lab Results   Component Value Date    WBC 9.69 01/25/2024    HGB 8.8 (L) 01/25/2024    HCT 26.4 (L) 01/25/2024    MCV 94.3 01/25/2024     (L) 01/25/2024     Lab Results   Component Value Date    GLUCOSE 88 01/25/2024    CALCIUM 9.1 01/25/2024     (L) 01/25/2024    K 3.9 01/25/2024    CO2 25 01/25/2024     01/25/2024    BUN 29 (H) 01/25/2024    CREATININE 1.2 01/25/2024       Imaging  OSH imaging study reports reviewed:    CT cystography 1/24/24:  IMPRESSION:  No evidence of contrast extravasation into the prostatic parenchyma or other areas of contrast extravasation from the urinary bladder. Redemonstration of fractures involving the right superior and inferior pubic rami with extraperitoneal blood in the pelvis, which has slightly decreased compared to " 1/17/2024. Persistent mild enlargement of the right adductor muscle compartment, similar to 1/17/2024.  His avila was removed.     MRI cervical spine 1/22/24  IMPRESSION:  1.  C2 fracture with no bone marrow edema to suggest acute fracture.  2.  No abnormal enhancement.  3.  No epidural collection or hematoma.  4.  Degenerative spondylosis worst at C3-C4 where there is also suggestion of  Mild right jose luis-cord signal abnormality.  Multilevel spondylosis otherwise noted  as described.        Assessment    CC:S/p fall, sustained multi trauma: R post 8th-11th rib fractures,  R superior pubic rami fracture w hematoma, age undetermined C2 fracture; non surgically managed; ADL and ambulatory dysfunction         83 y.o. male with PMH of BPH, HTN, hypothyroidism, who presented and was admitted to [facility] on 1/17/24 after a slip and fall on ice landing on his right side. He was found to have multiple injuries including right 8-11 rib fractures, bilateral pleural effusion s/p IR thoracentesis 1/18 with drainage of 1.1L, pelvic fracture (non op mgmt), right adductor/obturator hematoma, questionable posttraumatic bladder injury and age-indeterminate cervical fracture.  Neurosurgery was consulted and offered c spinesurgery, but patient declined.  [place] Collar on at all times, except for meals to prevent aspiration.  Patient c/o difficulty coughing up mucus with collar on, but understands that he needs to wear the collar especially during therapy.  Urology was consulted.  CT cystogram showed possible extravasation of contrast into the prostatic urethra.  Avila catheter is currently draining clear urine.  Plan is to continue Avila catheter for 1 week.  CT cystogram in 1 week with possible removal at that time, but pt declining CT Cystogram.  Functionally, he has significant physical econditioning with ADL and ambulatory dysfunction, requiring inpt acute rehab, was admitted to [facility] on 1/24/24.       Repeated   CT cystography  1/24/24:  IMPRESSION:  No evidence of contrast extravasation into the prostatic parenchyma or other areas of contrast extravasation from the urinary bladder. Redemonstration of fractures involving the right superior and inferior pubic rami with extraperitoneal blood in the pelvis, which has slightly decreased compared to 1/17/2024. Persistent mild enlargement of the right adductor muscle compartment, similar to 1/17/2024.  His avila was removed.     A/P:  # S/p fall, sustained multi trauma: R post 8th-11th rib fractures,  R superior pubic rami fracture w hematoma, age undetermined C2 fracture; non surgically managed; ADL and ambulatory dysfunction  -Inpt acute rehab, pain and medical managements, DVT prophylaxis, dermal defense, fall precaution     # R post 8th-11th rib fractures, atelectasis, chest congestion, possible pleu effusion  -Monitor SO2, prn NC O2, to keep SO2>92%, incentive spirometry, bronchodilator nebulizer, mucolytic agent, pulm toileting.  -Check CXR, chest PT  - Chest wall pain management    # Urinary retention, BPH, r/o bladder extravasation  -Off avila, timed voiding, condom cath in the evening, monitor PVR with prn cic, adequate oral hydration, check UA and UCX, consider to add flomax 0.4 mg qhs.    # Hypothyroidism   -cont home dose of levothyroxine    # DVT prophylaxis   -Eliquis 2.5 mg bid     # Insomnia, anxiety   - address his pain   - prn ativan and Ambien  - psychology CBT         Billing code: [id number]  Diagnoses:  Patient Active Problem List   Diagnosis   • Fall due to slipping on ice or snow, initial encounter   • Closed fracture of multiple ribs of right side   • Closed fracture of ramus of right pubis (CMS/HCC)   • Pelvic hematoma in male   • Pleural effusion on left   • Normocytic anemia   • Stage 3a chronic kidney disease (CMS/HCC)   • Closed odontoid fracture (CMS/HCC)   • Multiple injuries due to trauma               Neville Arndt MD  1/25/2024

## 2024-01-25 NOTE — PROGRESS NOTES
83 year old [place] [facility] [place]p [place] with pelvic rami fracture, rib fratures, odontoid fracrure.  He currently has no complaints.    Afebrile VSS  Chest CTA B  CV rRR no murmur    Admission orders placed full HP to follow

## 2024-01-25 NOTE — HOSPITAL COURSE
History of Present Illness  Pt is a 83 y.o. male admitted on 1/24/2024 with Multiple injuries due to trauma [T07.XXXA]. Principal problem is Multiple injuries due to trauma.  is a 83 y.o. male whose primary indication for inpatient rehabilitation is Debility.    Pt is a 83 y.o M adm to [facility] 1/24/24 who presented to [facility] after a slip and fall on ice landing on his right side.  He was found to have multiple injuries including right 8-11 rib fractures, bilateral pleural effusion s/p IR thoracentesis 1/18 with drainage of 1.1L, pelvic fracture (non op mgmt), right adductor/obturator hematoma, questionable posttraumatic bladder injury and age-indeterminate cervical fracture.  Neurosurgery was consulted and offered c spinesurgery, but patient declined.  [place] Collar on at all times, except for meals to prevent aspiration. Urology was consulted.  CT cystogram showed possible extravasation of contrast into the prostatic urethra.  Womack catheter is currently draining clear urine.  Plan is to continue Womack catheter for 1 week.  CT cystogram in 1 week with possible removal at that time, but pt declining CT Cystogram.        Past Medical History  PMH: BPH, HTN

## 2024-01-25 NOTE — PLAN OF CARE
Plan of Care Review  Plan of Care Reviewed With: patient  Progress: no change  Outcome Evaluation: Patient is aaox4 , takes med whole. on regular thin diet. C/O right shoulder pain, Tramadol offered X2 , but patient refused. PRN Tylenol accepted. Non compliant with wearing aspen collar .  Refused SCDs. Safety precautions maintained . Call bell within reach and bed alarm is on.

## 2024-01-25 NOTE — SIGNIFICANT EVENT
Paged by nursing due to temperature of 100F. Patient had chills. Came to evaluate the patient at bedside. He had received tylenol and robitussin. He reports feeling a little bit better. He did have cough this morning, but overall reports it is improving. Wife at bedside who also reports cough has been improving. During hospitalization he did have pleural effusion with thoracentesis on left.  He did have recent avila catheter.     Vitals 155/75, SPO2 88 HR 89, RR 18  General: NAD, sitting in bed   HEENT: Atraumatic, Normocephalic  Cardiovascular: RRR  Respiratory: CTAB,   Abdomen/GI: NT ND + BS   Skin: Warm, Dry  Neurologic: Awake and alert, follows commands    Patient placed on O2 via NC, titrate to keep above 90%. Ordered COVID/Flu swab. CXR. UA/UCx. Labs ordered. Nursing and patient, patient's wife aware of plan and in agreement.     Also, patient did not have cervical collar on while in bed - reiterated reasons he should have collar on and why it is recommended. Patient continues to decline.     Maggie Larose MD  PM&R

## 2024-01-25 NOTE — CONSULTS
Nutrition Note  115/[facility]    Clinical Course: Patient is a 83 y.o. male who was admitted on 1/24/2024 with a diagnosis of Multiple injuries due to trauma [T07.XXXA].     Nutrition Interventions/ Recommendations:   1. Continue Regular diet  2. Will send vanilla Boost daily with lunch  3. Alternative foods list provided  4. Encouraged nutrition for healing and recovery  5. Obtain weekly weight and update in Epic  At nutrition risk level 2      No past medical history on file.  No past surgical history on file.         Dietary Orders   (From admission, onward)             Start     Ordered    01/24/24 2139  Adult Diet Regular; RD/LDN may NOT adjust order  Diet effective now        References:    IDDSI Diet reference   Question Answer Comment   Diet Texture Regular    Delegation of Authority. Diet orders written by PA/Cuong may not be adjusted by RD/LDNs. RD/LDN may NOT adjust order        01/24/24 2142                Reason for Assessment  Reason For Assessment: identified at risk by screening criteria  Identified At Risk by Screening Criteria: unintentional loss of 10 lbs or more in the past 2 mos    [facility] Nutrition Screen Tool  Has patient lost weight without trying?: 1-->Yes, 2-13 lbs  Has patient been eating poorly due to decreased appetite?: 0-->No  MST Nutrition Screen Score: 1    Nutrition/Diet History  Typical Food/Fluid Intake: Regular diet  Diet Prior to Admission: Regular  Intake (%): 100%  Food Preferences: None provided  Cultural/Faith Preferences: None  Supplemental Drinks/Foods/Additives: Ensure compact in acut care - agreed to have vanilla Boost while here  Vitamin/Mineral/Herbal Supplements: Vit C, D3  Food Allergies: no known food allergies  Factors Affecting Nutritional Intake:  (None)    Physical Findings  Overall Physical Appearance:  (appears well-nourished)  Gastrointestinal:  (WNL)  Last Bowel Movement: 01/24/24  Skin: intact    Nutrition Order  Nutrition Order: meets nutritional  "requirements  Nutrition Order Comments: Regular/thins    Anthropometrics  Height: 175.3 cm (5' 9\")    Wt Readings from Last 3 Encounters:   01/24/24 83.5 kg (184 lb)   01/17/24 87.8 kg (193 lb 9 oz)   01/17/24 87.1 kg (192 lb)       Weights (last 7 days)     Date/Time Weight    01/24/24 2100 83.5 kg (184 lb)        Current Weight  Weight Method: Bed scale  Weight: 83.5 kg (184 lb)    Ideal Body Weight (IBW)  Ideal Body Weight (IBW) (kg): 73.69  % Ideal Body Weight: 113.27    Usual Body Weight (UBW)  Usual Body Weight: 87.1 kg (192 lb)  Weight Loss: unintentional  Time Frame: 1 Week (8# (4%))    Body Mass Index (BMI)  BMI (Calculated): 27.2  BMI Assessment: BMI 18.5-24.9: normal  Nutritional Status/Malnutrition: Does not meet criteria for malnutrition    Labs/Procedures/Meds  Lab Results Reviewed: reviewed, pertinent  Lab Results Comments: 1/25 Na 134L, BUN 29H, H/H 8.8L/26.4L    CMP Results       01/25/24 01/20/24 01/19/24     0558 0707 0522     135 137    K 3.9 4.0 4.0    Cl 100 101 103    CO2 25 25 22    Glucose 88 90 90    BUN 29 26 29    Creatinine 1.2 1.5 1.6    Calcium 9.1 9.4 9.4    Anion Gap 9 9 12    AST 11 -- --    ALT 10 -- --    Albumin 3.0 -- --    EGFR >60.0 46.5 43.0         Comment for EGFR at 0558 on 01/25/24: Calculation based on the Chronic Kidney Disease Epidemiology Collaboration (CKD-EPI) equation refit without adjustment for race.    Comment for EGFR at 0707 on 01/20/24: Calculation based on the Chronic Kidney Disease Epidemiology Collaboration (CKD-EPI) equation refit without adjustment for race.    Comment for EGFR at 0522 on 01/19/24: Calculation based on the Chronic Kidney Disease Epidemiology Collaboration (CKD-EPI) equation refit without adjustment for race.        Lab Results   Component Value Date    ALT 10 01/25/2024    AST 11 (L) 01/25/2024    ALKPHOS 53 01/25/2024    BILITOT 0.9 01/25/2024     No results found for: \"[id number]\"  Lab Results   Component Value Date    CALCIUM " "9.1 01/25/2024    PHOS 3.4 01/20/2024     Lab Results   Component Value Date    WBC 9.69 01/25/2024    HGB 8.8 (L) 01/25/2024    HCT 26.4 (L) 01/25/2024    MCV 94.3 01/25/2024     (L) 01/25/2024     No results found for: \"IRON\", \"TIBC\", \"FERRITIN\"  No results found for: \"CHOL\"  No results found for: \"HDL\"  No results found for: \"LDLCALC\"  No results found for: \"TRIG\"  No results found for: \"CHOLHDL\"  Glucose Results    No lab values to display.           • albuterol 1.25 mg, ipratropium (ATROVENT) 0.5 mg   nebulization TID   • apixaban  2.5 mg oral BID   • [START ON 1/26/2024] ascorbic acid  500 mg oral Daily   • bisacodyL  10 mg oral Daily   • cholecalciferol (vitamin D3)  2,500 Units oral Daily with dinner   • diclofenac sodium   Topical TID   • guaiFENesin  400 mg oral q6h RUDY   • levothyroxine  25 mcg oral Daily (6:30a)   • sennosides-docusate sodium  2 tablet oral BID   • sodium chloride  3 mL nebulization TID   • tamsulosin  0.4 mg oral Nightly   • traMADoL  25 mg oral TID     Medications  Pertinent Medications Reviewed: reviewed, pertinent  Pertinent Medications Comments: dulcolax, levothyroxine, senokot-s    Estimated/Assessed Needs  Additional Documentation: Calorie Requirements (Group), Fluid Requirements (Group), Protein Requirements (Group)    Calorie Requirements  Estimated kCal Needs: Actual Body Weight  Estimated Calorie Need Method: kcal/kg  Calorie/kg Recommended: 22-25  Calorie Recommendations: 8578-9171    Protein Requirements  Recommended Dosing Weight (Estimated Protein Needs): Actual Body Weight  Est Protein Requirement Amount (gms/kg):  (1.2-1.5g/kg)  Protein Recommendations: 101-126    Fluid Requirements  Fluid Recommendation (mL): 20-25ml  Recommended Fluid Needs Dosing Weight: Actual Body Weight  Fluid Requirements (mL/day): 8863-0279  Adama-Kei Method (over 20 kg): 3169.24    PES  Statement: PES Statement  Nutrition Diagnosis: Unintended Weight Loss  Related To:: Decreased " "ability to consume sufficient energy  As Evidenced By:: 8# (4%) x 1 week  Nutritional Needs Met?:  (Monitoring)                                 Clinical Comments:     Patient was very sleepy when I visited.  He was able to answer a few questions before falling back to sleep.  He reports \"ok\" appetite, RN states he ate 50% of his breakfast.  He does appear to have lost ~ 8# over the past week (which is significant), and admits to ~30# weight loss x 4 years (which is not significant). He agreed to try vanilla Boost. Alternative foods list provided.  Noted hyponatremia and elevated BUN.    Goals:  1. Adequate po intake to meet > 75% of nutritional needs  2. Maintain current weight of 184#  3.  Lytes/Labs WNL    Monitor and Evaluation:   PO intake, acceptance of supplement, weight, labs    Discussed with: Patient  Date: 01/25/24  Signature: Caty Lomeli RD  "

## 2024-01-25 NOTE — PLAN OF CARE
Problem: Spinal Cord Injury  Goal: Optimal Nutrition Intake  Outcome: Progressing   Nutrition Interventions/ Recommendations:   1. Continue Regular diet  2. Will send vanilla Boost daily with lunch  3. Alternative foods list provided  4. Encouraged nutrition for healing and recovery  5. Obtain weekly weight and update in Epic

## 2024-01-26 ENCOUNTER — APPOINTMENT (OUTPATIENT)
Dept: PSYCHOLOGY | Facility: CLINIC | Age: 84
End: 2024-01-26
Payer: COMMERCIAL

## 2024-01-26 ENCOUNTER — APPOINTMENT (INPATIENT)
Dept: RADIOLOGY | Facility: REHABILITATION | Age: 84
DRG: 560 | End: 2024-01-26
Attending: INTERNAL MEDICINE
Payer: COMMERCIAL

## 2024-01-26 ENCOUNTER — APPOINTMENT (INPATIENT)
Dept: SPEECH THERAPY | Facility: REHABILITATION | Age: 84
DRG: 560 | End: 2024-01-26
Payer: COMMERCIAL

## 2024-01-26 ENCOUNTER — HOSPITAL ENCOUNTER (EMERGENCY)
Facility: HOSPITAL | Age: 84
Discharge: HOME | End: 2024-01-27
Attending: EMERGENCY MEDICINE
Payer: COMMERCIAL

## 2024-01-26 DIAGNOSIS — N39.0 URINARY TRACT INFECTION WITH HEMATURIA, SITE UNSPECIFIED: Primary | ICD-10-CM

## 2024-01-26 DIAGNOSIS — R31.9 URINARY TRACT INFECTION WITH HEMATURIA, SITE UNSPECIFIED: Primary | ICD-10-CM

## 2024-01-26 LAB
ABO + RH BLD: NORMAL
ABO + RH BLD: NORMAL
ALBUMIN SERPL-MCNC: 3 G/DL (ref 3.5–5.7)
ALP SERPL-CCNC: 55 IU/L (ref 34–125)
ALT SERPL-CCNC: 10 IU/L (ref 7–52)
AMORPH CRY #/AREA URNS HPF: ABNORMAL /HPF
ANION GAP SERPL CALC-SCNC: 11 MEQ/L (ref 3–15)
ANION GAP SERPL CALC-SCNC: 6 MEQ/L (ref 3–15)
APTT PPP: 37 SEC (ref 23–35)
AST SERPL-CCNC: 12 IU/L (ref 13–39)
BACTERIA URNS QL MICRO: 3 /HPF
BACTERIA URNS QL MICRO: ABNORMAL /HPF
BASOPHILS # BLD: 0.01 K/UL (ref 0.01–0.1)
BASOPHILS # BLD: 0.02 K/UL (ref 0.01–0.1)
BASOPHILS NFR BLD: 0.1 %
BASOPHILS NFR BLD: 0.2 %
BILIRUB SERPL-MCNC: 0.8 MG/DL (ref 0.3–1.2)
BILIRUB UR QL STRIP.AUTO: NEGATIVE MG/DL
BLD GP AB SCN SERPL QL: NEGATIVE
BLD GP AB SCN SERPL QL: NEGATIVE
BLOOD BANK CMNT PATIENT-IMP: NORMAL
BNP SERPL-MCNC: 242 PG/ML
BUN SERPL-MCNC: 31 MG/DL (ref 7–25)
BUN SERPL-MCNC: 35 MG/DL (ref 7–25)
CALCIUM SERPL-MCNC: 8.9 MG/DL (ref 8.6–10.3)
CALCIUM SERPL-MCNC: 9 MG/DL (ref 8.6–10.3)
CAOX CRY URNS QL MICRO: 3 /HPF
CHLORIDE SERPL-SCNC: 97 MEQ/L (ref 98–107)
CHLORIDE SERPL-SCNC: 99 MEQ/L (ref 98–107)
CLARITY UR REFRACT.AUTO: ABNORMAL
CO2 SERPL-SCNC: 23 MEQ/L (ref 21–31)
CO2 SERPL-SCNC: 24 MEQ/L (ref 21–31)
COLOR UR AUTO: YELLOW
CREAT SERPL-MCNC: 1.3 MG/DL (ref 0.7–1.3)
CREAT SERPL-MCNC: 1.3 MG/DL (ref 0.7–1.3)
CROSSMATCH: NORMAL
D AG BLD QL: POSITIVE
D AG BLD QL: POSITIVE
DIFFERENTIAL METHOD BLD: ABNORMAL
DIFFERENTIAL METHOD BLD: ABNORMAL
EGFRCR SERPLBLD CKD-EPI 2021: 55.2 ML/MIN/1.73M*2
EGFRCR SERPLBLD CKD-EPI 2021: 55.2 ML/MIN/1.73M*2
EOSINOPHIL # BLD: 0 K/UL (ref 0.04–0.54)
EOSINOPHIL # BLD: 0.01 K/UL (ref 0.04–0.54)
EOSINOPHIL NFR BLD: 0 %
EOSINOPHIL NFR BLD: 0.1 %
ERYTHROCYTE [DISTWIDTH] IN BLOOD BY AUTOMATED COUNT: 15.4 % (ref 11.6–14.4)
ERYTHROCYTE [DISTWIDTH] IN BLOOD BY AUTOMATED COUNT: 15.5 % (ref 11.6–14.4)
GLUCOSE SERPL-MCNC: 107 MG/DL (ref 70–99)
GLUCOSE SERPL-MCNC: 98 MG/DL (ref 70–99)
GLUCOSE UR STRIP.AUTO-MCNC: NEGATIVE MG/DL
HCT VFR BLD AUTO: 21.3 % (ref 40.1–51)
HCT VFR BLD AUTO: 24.1 % (ref 40.1–51)
HGB BLD-MCNC: 7.2 G/DL (ref 13.7–17.5)
HGB BLD-MCNC: 8.1 G/DL (ref 13.7–17.5)
HGB UR QL STRIP.AUTO: 1
HYALINE CASTS #/AREA URNS LPF: ABNORMAL /LPF
HYALINE CASTS #/AREA URNS LPF: ABNORMAL /LPF
IMM GRANULOCYTES # BLD AUTO: 0.04 K/UL (ref 0–0.08)
IMM GRANULOCYTES # BLD AUTO: 0.06 K/UL (ref 0–0.08)
IMM GRANULOCYTES NFR BLD AUTO: 0.5 %
IMM GRANULOCYTES NFR BLD AUTO: 0.6 %
INR PPP: 1.7
ISBT CODE: 6200
KETONES UR STRIP.AUTO-MCNC: NEGATIVE MG/DL
LABORATORY COMMENT REPORT: NORMAL
LABORATORY COMMENT REPORT: NORMAL
LEUKOCYTE ESTERASE UR QL STRIP.AUTO: 3
LYMPHOCYTES # BLD: 1.18 K/UL (ref 1.2–3.5)
LYMPHOCYTES # BLD: 1.54 K/UL (ref 1.2–3.5)
LYMPHOCYTES NFR BLD: 12.4 %
LYMPHOCYTES NFR BLD: 17.5 %
MCH RBC QN AUTO: 31.5 PG (ref 28–33.2)
MCH RBC QN AUTO: 31.7 PG (ref 28–33.2)
MCHC RBC AUTO-ENTMCNC: 33.6 G/DL (ref 32.2–36.5)
MCHC RBC AUTO-ENTMCNC: 33.8 G/DL (ref 32.2–36.5)
MCV RBC AUTO: 93.8 FL (ref 83–98)
MCV RBC AUTO: 93.8 FL (ref 83–98)
MONOCYTES # BLD: 0.72 K/UL (ref 0.3–1)
MONOCYTES # BLD: 0.75 K/UL (ref 0.3–1)
MONOCYTES NFR BLD: 7.9 %
MONOCYTES NFR BLD: 8.2 %
MUCOUS THREADS URNS QL MICRO: ABNORMAL /LPF
MUCOUS THREADS URNS QL MICRO: ABNORMAL /LPF
NEUTROPHILS # BLD: 6.46 K/UL (ref 1.7–7)
NEUTROPHILS # BLD: 7.55 K/UL (ref 1.7–7)
NEUTS SEG NFR BLD: 73.5 %
NEUTS SEG NFR BLD: 79 %
NITRITE UR QL STRIP.AUTO: NEGATIVE
NRBC BLD-RTO: 0 %
NRBC BLD-RTO: 0 %
PDW BLD AUTO: 9.5 FL (ref 9.4–12.4)
PDW BLD AUTO: 9.9 FL (ref 9.4–12.4)
PH UR STRIP.AUTO: 5.5 [PH]
PLATELET # BLD AUTO: 118 K/UL (ref 150–350)
PLATELET # BLD AUTO: 124 K/UL (ref 150–350)
POTASSIUM SERPL-SCNC: 4 MEQ/L (ref 3.5–5.1)
POTASSIUM SERPL-SCNC: 4.2 MEQ/L (ref 3.5–5.1)
PRODUCT CODE: NORMAL
PRODUCT STATUS: NORMAL
PROT SERPL-MCNC: 7.5 G/DL (ref 6–8.2)
PROT UR QL STRIP.AUTO: 1
PROTHROMBIN TIME: 19.8 SEC (ref 12.2–14.5)
RBC # BLD AUTO: 2.27 M/UL (ref 4.5–5.8)
RBC # BLD AUTO: 2.57 M/UL (ref 4.5–5.8)
RBC #/AREA URNS HPF: ABNORMAL /HPF
RBC #/AREA URNS HPF: ABNORMAL /HPF
SODIUM SERPL-SCNC: 128 MEQ/L (ref 136–145)
SODIUM SERPL-SCNC: 132 MEQ/L (ref 136–145)
SP GR UR REFRACT.AUTO: 1.01
SPECIMEN EXP DATE BLD: NORMAL
SQUAMOUS URNS QL MICRO: ABNORMAL /HPF
SQUAMOUS URNS QL MICRO: ABNORMAL /HPF
UNIT ABO: NORMAL
UNIT ID: NORMAL
UNIT RH: POSITIVE
UROBILINOGEN UR STRIP-ACNC: 0.2 EU/DL
WBC # BLD AUTO: 8.79 K/UL (ref 3.8–10.5)
WBC # BLD AUTO: 9.55 K/UL (ref 3.8–10.5)
WBC #/AREA URNS HPF: ABNORMAL /HPF
WBC #/AREA URNS HPF: ABNORMAL /HPF

## 2024-01-26 PROCEDURE — 81003 URINALYSIS AUTO W/O SCOPE: CPT | Performed by: REGISTERED NURSE

## 2024-01-26 PROCEDURE — 86901 BLOOD TYPING SEROLOGIC RH(D): CPT

## 2024-01-26 PROCEDURE — 63700000 HC SELF-ADMINISTRABLE DRUG: Performed by: SURGERY

## 2024-01-26 PROCEDURE — 99284 EMERGENCY DEPT VISIT MOD MDM: CPT | Mod: 25

## 2024-01-26 PROCEDURE — 80048 BASIC METABOLIC PNL TOTAL CA: CPT | Performed by: INTERNAL MEDICINE

## 2024-01-26 PROCEDURE — 63700000 HC SELF-ADMINISTRABLE DRUG: Performed by: INTERNAL MEDICINE

## 2024-01-26 PROCEDURE — 85610 PROTHROMBIN TIME: CPT | Performed by: REGISTERED NURSE

## 2024-01-26 PROCEDURE — 82310 ASSAY OF CALCIUM: CPT | Performed by: REGISTERED NURSE

## 2024-01-26 PROCEDURE — 71046 X-RAY EXAM CHEST 2 VIEWS: CPT

## 2024-01-26 PROCEDURE — 85730 THROMBOPLASTIN TIME PARTIAL: CPT | Performed by: REGISTERED NURSE

## 2024-01-26 PROCEDURE — 25800000 HC PHARMACY IV SOLUTIONS: Performed by: REGISTERED NURSE

## 2024-01-26 PROCEDURE — 93005 ELECTROCARDIOGRAM TRACING: CPT | Performed by: EMERGENCY MEDICINE

## 2024-01-26 PROCEDURE — 3E0337Z INTRODUCTION OF ELECTROLYTIC AND WATER BALANCE SUBSTANCE INTO PERIPHERAL VEIN, PERCUTANEOUS APPROACH: ICD-10-PCS | Performed by: EMERGENCY MEDICINE

## 2024-01-26 PROCEDURE — 86900 BLOOD TYPING SEROLOGIC ABO: CPT

## 2024-01-26 PROCEDURE — 80053 COMPREHEN METABOLIC PANEL: CPT | Performed by: REGISTERED NURSE

## 2024-01-26 PROCEDURE — 87086 URINE CULTURE/COLONY COUNT: CPT | Performed by: REGISTERED NURSE

## 2024-01-26 PROCEDURE — 36415 COLL VENOUS BLD VENIPUNCTURE: CPT | Performed by: INTERNAL MEDICINE

## 2024-01-26 PROCEDURE — 86922 COMPATIBILITY TEST ANTIGLOB: CPT

## 2024-01-26 PROCEDURE — 85025 COMPLETE CBC W/AUTO DIFF WBC: CPT | Performed by: REGISTERED NURSE

## 2024-01-26 PROCEDURE — 85025 COMPLETE CBC W/AUTO DIFF WBC: CPT | Performed by: INTERNAL MEDICINE

## 2024-01-26 PROCEDURE — 83880 ASSAY OF NATRIURETIC PEPTIDE: CPT | Performed by: INTERNAL MEDICINE

## 2024-01-26 PROCEDURE — 96360 HYDRATION IV INFUSION INIT: CPT

## 2024-01-26 PROCEDURE — 63700000 HC SELF-ADMINISTRABLE DRUG: Performed by: REGISTERED NURSE

## 2024-01-26 PROCEDURE — 18000000 HC LEAVE OF ABSENCE

## 2024-01-26 PROCEDURE — 90791 PSYCH DIAGNOSTIC EVALUATION: CPT | Performed by: PSYCHOLOGIST

## 2024-01-26 PROCEDURE — 92523 SPEECH SOUND LANG COMPREHEN: CPT | Mod: GN

## 2024-01-26 RX ORDER — SODIUM CHLORIDE 9 MG/ML
5 INJECTION, SOLUTION INTRAVENOUS AS NEEDED
Status: DISCONTINUED | OUTPATIENT
Start: 2024-01-26 | End: 2024-01-31

## 2024-01-26 RX ORDER — CEFUROXIME AXETIL 250 MG/1
250 TABLET ORAL ONCE
Status: COMPLETED | OUTPATIENT
Start: 2024-01-26 | End: 2024-01-26

## 2024-01-26 RX ORDER — SODIUM CHLORIDE 9 MG/ML
1000 INJECTION, SOLUTION INTRAVENOUS
Status: COMPLETED | OUTPATIENT
Start: 2024-01-27 | End: 2024-01-29

## 2024-01-26 RX ORDER — ACETAMINOPHEN 325 MG/1
650 TABLET ORAL ONCE
Status: ACTIVE | OUTPATIENT
Start: 2024-01-26 | End: 2024-01-26

## 2024-01-26 RX ORDER — GUAIFENESIN 100 MG/5ML
400 SOLUTION ORAL ONCE
Status: COMPLETED | OUTPATIENT
Start: 2024-01-26 | End: 2024-01-26

## 2024-01-26 RX ORDER — DOXYCYCLINE HYCLATE 100 MG
100 TABLET ORAL EVERY 12 HOURS
Qty: 10 TABLET | Refills: 0 | Status: DISPENSED | OUTPATIENT
Start: 2024-01-26 | End: 2024-01-31

## 2024-01-26 RX ORDER — SODIUM CHLORIDE 9 MG/ML
INJECTION, SOLUTION INTRAVENOUS CONTINUOUS
Status: DISCONTINUED | OUTPATIENT
Start: 2024-01-26 | End: 2024-01-27

## 2024-01-26 RX ORDER — DIPHENHYDRAMINE HCL 25 MG
25 CAPSULE ORAL ONCE
Status: ACTIVE | OUTPATIENT
Start: 2024-01-26 | End: 2024-01-26

## 2024-01-26 RX ORDER — CEFUROXIME AXETIL 250 MG/1
250 TABLET ORAL 2 TIMES DAILY
Qty: 14 TABLET | Refills: 0 | Status: SHIPPED | OUTPATIENT
Start: 2024-01-26 | End: 2024-02-02

## 2024-01-26 RX ADMIN — OXYCODONE HYDROCHLORIDE AND ACETAMINOPHEN 500 MG: 500 TABLET ORAL at 09:26

## 2024-01-26 RX ADMIN — CEFUROXIME AXETIL 250 MG: 250 TABLET ORAL at 17:00

## 2024-01-26 RX ADMIN — SODIUM CHLORIDE 500 ML: 9 INJECTION, SOLUTION INTRAVENOUS at 16:24

## 2024-01-26 RX ADMIN — ACETAMINOPHEN 650 MG: 325 TABLET, FILM COATED ORAL at 09:29

## 2024-01-26 RX ADMIN — DICLOFENAC SODIUM: 10 GEL TOPICAL at 09:30

## 2024-01-26 RX ADMIN — GUAIFENESIN 400 MG: 100 SOLUTION ORAL at 05:17

## 2024-01-26 RX ADMIN — GUAIFENESIN 400 MG: 100 SOLUTION ORAL at 17:34

## 2024-01-26 RX ADMIN — LEVOTHYROXINE SODIUM 25 MCG: 25 TABLET ORAL at 05:17

## 2024-01-26 ASSESSMENT — COGNITIVE AND FUNCTIONAL STATUS - GENERAL
EST. PREMORBID INTELLIGENCE: ABOVE AVERAGE
IMPULSE CONTROL: INTACT
CONCENTRATION: WNL
THOUGHT_CONTENT: APPROPRIATE
THOUGHT_PROCESS: WORRY
PSYCHOMOTOR FUNCTIONING: WNL
ATTENTION: WNL
REMOTE MEMORY: WNL
SLEEP_WAKE_CYCLE: DECREASED
SPEECH: REGULAR;SOFT
DELUSIONS: NONE OR AGE APPROPRIATE
INSIGHT: INTACT
MOOD: DEPRESSED
RECENT MEMORY: WNL
AROUSAL LEVEL: ALERT
APPEARANCE: WELL GROOMED
ORIENTATION: FULLY ORIENTED
AFFECT: FLAT
PERCEPTUAL FUNCTION: PAIN
EYE_CONTACT: WNL

## 2024-01-26 NOTE — PROGRESS NOTES
Patient: Randolph Daley  Location: [facility]  MRN:  [id number]  Today's date:  1/26/2024    Attempted to see patient for therapy. Unable due to medical hold (awaiting blood transfusion).

## 2024-01-26 NOTE — PROGRESS NOTES
Patient: Randolph Daley  Location: [facility]  MRN:  [id number]  Today's date:  1/26/2024    Attempted to see patient for therapy. Unable due to patient at test or procedure.    Daily Outcome Statement: Pt sent out to ED due to low Hgb and diminished breath sounds

## 2024-01-26 NOTE — PROGRESS NOTES
Patient: Randolph Daley  Location: [facility] [address]  MRN: [id number]  Today's date: 1/26/2024    History of Present Illness  Pt is a 83 y.o. male admitted on 1/24/2024 with Multiple injuries due to trauma [T07.XXXA]. Principal problem is Multiple injuries due to trauma.  is a 83 y.o. male whose primary indication for inpatient rehabilitation is Debility.    Pt is a 83 y.o M adm to [facility] 1/24/24 who presented to [facility] after a slip and fall on ice landing on his right side.  He was found to have multiple injuries including right 8-11 rib fractures, bilateral pleural effusion s/p IR thoracentesis 1/18 with drainage of 1.1L, pelvic fracture (non op mgmt), right adductor/obturator hematoma, questionable posttraumatic bladder injury and age-indeterminate cervical fracture.  Neurosurgery was consulted and offered c spinesurgery, but patient declined.  [place] Collar on at all times, except for meals to prevent aspiration. Urology was consulted.  CT cystogram showed possible extravasation of contrast into the prostatic urethra.  Womack catheter is currently draining clear urine.  Plan is to continue Womack catheter for 1 week.  CT cystogram in 1 week with possible removal at that time, but pt declining CT Cystogram.        Past Medical History  PMH: BPH, HTN      SLP Pain    Date/Time Pain Type Location Rating: Rest Interventions [facility]   01/26/24 0905 Pain Assessment -- ribs 4 - moderate pain diversional activity provided CWB   01/26/24 0929 Pain Assessment -- 6 - moderate-severe pain -- AMW   01/26/24 0955 Pain Reassessment -- 0 - no pain -- CWB          Prior Living Environment    Flowsheet Row Most Recent Value   People in Home spouse   Current Living Arrangements home   Home Accessibility stairs to enter home (Group), stairs within home (Group)   Living Environment Comment lives with wife in 2  home with first floor set up   Number of Stairs, Main Entrance 1   Surface of Stairs, Main  "Entrance hardwood   Stair Railings, Main Entrance none   Location, Patient Bedroom first (main) floor   Patient Bedroom Access Comment first floor set up   Location, Bathroom first (main) floor   Bathroom Access Comment First floor set up. Walk in shower. grab bar and shower \"bench\"   Stairs, Within Home, Primary 16   Number of Stairs, Within Home, Primary other (see comments)   Surface of Stairs, Within Home, Primary carpeting   Stair Railings, Within Home, Primary railings on both sides of stairs  [left side rail goes 1/2- then can hold the spindles]   Stairs Comment, Within Home, Primary pt reports wife office upstairs but master bed/bath 1st floor          Prior Level of Function    Flowsheet Row Most Recent Value   Dominant Hand right   Ambulation independent   Transferring independent   Toileting independent   Bathing independent   Dressing independent   Eating independent   IADLs independent   Driving/Transportation    Prior Level of Function Comment Ind PTA, previous cervical fx. 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,  reports manages bill paying, medications, calendar at home           IRF SLP Evaluation and Treatment - 01/26/24 0922        SLP Time Calculation    Start Time 0900     Stop Time 1000     Time Calculation (min) 60 min        Session Details    Document Type Initial Evaluation     Mode of Treatment speech language pathology;individual therapy        General Information    Patient Profile Reviewed yes     General Observations of Patient pt alert and participating throughout     Existing Precautions/Restrictions fall;brace worn at all times;weight bearing;aspiration     Limitations/Impairments safety/cognitive         Services    Do You Speak a Language Other Than English at Home? no     Is an  Needed/Used? No        Cognition/Psychosocial    Comment, Attention attn faded as tasks progressed; pt " "relates this to fatigue from lack of sleep last night; will monitor     Executive Function Deficit moderate deficit;abstract thinking;insight/awareness of deficits;judgment;organization/sequencing;problem-solving/reasoning     Comment, Executive Function Pt benefitted from mod cues to define personal goals for rehab stay ; located call bell with min cues and described 2/3 reasons to use it [place] and 3/3 with min cues; Nursing reports pt with decreased safety awarenes/decreased use of recommended cervical collar. Pt answered convergent reasonsing questions x 4/6; In divergent naming/reasoning task, pt listed 10 items in concrete category (animals) and 3 items in abstract category (m-words) given one minute time constraint. Pt had difficulty describing similarities and differences between common items. EX. Pt stated the similarity between an watch and calendar as: \"watch tells the days; calendar tells the days\" and the difference as: \"watch runs automatic; calendar you can carry yourself\". Pt stating \"I'm not thinking too well today\" Perseverating on disorganized/tangential  topics of conversation throughout     Memory Deficit minimal deficit;short-term memory;semantic memory     Comment, Short Term Memory Pt oriented x 4 with fair recall of details from earlier today as well as yesterday's sessions. Immediate recall for 7 digits forward; working memory for 4 digits in reverse; delayed recall for 2/3 words after 5 min delay and 3/3 with choice cue        Orientation Log    City 1-->multiple choice, phonemic cuing     Kind of Place 3-->spontaneous/free recall     Name of [facility] 3-->spontaneous/free recall     Month 3-->spontaneous/free recall     Date 3-->spontaneous/free recall     Year 3-->spontaneous/free recall     Day of Week 3-->spontaneous/free recall     Clock Time 3-->spontaneous/free recall     Etiology/Event 3-->spontaneous/free recall     Pathology Deficits 3-->spontaneous/free recall     Total Score 28     "    Hearing Assessment    Hearing Status WFL        Dentition (Oral Motor)    Dentition (Oral Motor) natural dentition        Facial Symmetry (Oral Motor)    Facial Symmetry (Oral Motor) WNL        Lip Function (Oral Motor)    Comment, Lip Function (Oral Motor) WFLs        Tongue Function (Oral Motor)    Comment, Tongue Function (Oral Motor) WFLs        Motor Speech    Breath Support (Motor Speech) minimal impairment     Comment, Motor Speech Assessment Pt with reduced breath support for sustained phonation (5 secs for /a/); Voice quality with reduced volume/mild raspiness; improves with cues. Other parameters of speech WFLs. Pt reports voice has changed s/p fall and sounds weaker, pt relates this to feelings of depression.        Auditory Comprehension    Follows Commands (Auditory Comprehension) 2-step commands     2 Step, Follows Commands (Auditory Comprehension) intact     Yes/No Questions (Auditory Comprehension) complex questions     Complex Questions (Auditory Comprehension) intact     Comment, Assessment (Auditory Comprehension) grossly functional; suspect decreased processing/decreased organizaiton may interfere with more complex receptive langugae demands        Verbal Expression    Comment, Assessment (Verbal Expression) Language fluent without obvious dysnomia; Decreased organization, perseveration noted in conversation        Reading Comprehension    Comment, Assessment (Reading Comprehension) Oral reading of functional math word problem related to time intact; Pt answered these problem solving questions x 2/4 accuracy        Written Language    Comment, Assessment (Written Language) pt wrote name address and 5 things he likes to do with dominant R hand; legibility was fair initally and poor as continued secondary to fading attn. Spelling appeared intact        General Swallowing Observations    Comment, General Swallowing Observations Pt on Regular and thins diet; no c/o swallowing difficulty reported  by pt or nurse; observed pt taking meds with multiple/consecutive straw sips of thins when observed taking medications wihtout sxs of aspiration. Formal swallow eval not indicated.        Functional Communication Measures    FCM: Memory 5-->Level 5     FCM: Problem Solving 4-->Level 4     FCM: Voice 4-->Level 4        Therapy Assessment/Plan (SLP)    SLP Diagnosis mild dysphonia; mild-moderate cogntive-linguistic deficits characterized by decreased STM for details; decreased reasoning/problem solving for mod complex tasks; Communication intact for basic needs; cogntive-linguistic deficits interfere with communication at more complex level     Rehab Potential/Prognosis (SLP) good, to achieve stated therapy goals     Frequency of Treatment (SLP) 5-7 times per week     Intensity of Treatment (SLP) 30 minutes per day     Planned Therapy Interventions cognitive therapy;patient/family education;voice therapy        Daily Progress Summary (SLP)    Daily Outcome Statement initial eval complted and POC established; obtain baseline use of incentive spirometer; consider BCAT in upcoming sessions                 Education Documentation  Communication Techniques, taught by Angelika Garcia CCC-SLP at 1/26/2024 12:33 PM.  Learner: Patient  Readiness: Acceptance  Method: Explanation  Response: Verbalizes Understanding  Comment: educated pt re: goals identified in Speech POC; pt in agreement to participate    Cognitive Support Measures, taught by Angelika Garcia CCC-SLP at 1/26/2024 12:33 PM.  Learner: Patient  Readiness: Acceptance  Method: Explanation  Response: Verbalizes Understanding  Comment: educated pt re: goals identified in Speech POC; pt in agreement to participate    Signs/Symptoms, taught by Angelika Garcia CCC-SLP at 1/26/2024 12:33 PM.  Learner: Patient  Readiness: Acceptance  Method: Explanation  Response: Verbalizes Understanding  Comment: educated pt re: goals identified in Speech POC; pt in  agreement to participate    Risk Factors, taught by Angelika Garcia CCC-SLP at 1/26/2024 12:33 PM.  Learner: Patient  Readiness: Acceptance  Method: Explanation  Response: Verbalizes Understanding  Comment: educated pt re: goals identified in Speech POC; pt in agreement to participate          IRF SLP Goals    Flowsheet Row Most Recent Value   Motor Speech/Voice Goal 1    Motor Speech/Voice Goal 1 breath support exercises (incentive spirometer, sustained phonation),  vocal function exercises for improved volume and voice quality x 80% mod cues. at 01/26/2024 0922   Time Frame short-term goal (STG), 2 weeks at 01/26/2024 0922   Motor Speech/Voice Goal 2    Motor Speech/Voice Goal 2 Functinal breath support and voice quality for complex convesational needs x 90% with I use of strategies at 01/26/2024 0922   Time Frame long-term goal (LTG), 3 weeks at 01/26/2024 0922   Executive Function Goal 1    Activity executive function tasks, information processing tasks, organization/sequencing tasks, planning/decision-making tasks, problem-solving/reasoning tasks, self-monitoring/self-correction  [convergent/divergent reasoning tasks,  mod level functional tasks related to return to I managing bills, meds, calendar, safety] at 01/26/2024 0922   [place]/Accuracy with 80% accuracy, with moderate, verbal cues/redirection at 01/26/2024 0922   Time Frame short-term goal (STG), 2 weeks at 01/26/2024 0922   Executive Function Goal 2    Activity executive function tasks, information processing tasks, organization/sequencing tasks, planning/decision-making tasks, problem-solving/reasoning tasks, self-monitoring/self-correction at 01/26/2024 0922   [place]/Accuracy with 90% accuracy, with minimum, verbal cues/redirection at 01/26/2024 0922   Time Frame long-term goal (LTG), 3 weeks at 01/26/2024 0922

## 2024-01-26 NOTE — NURSING NOTE
Pt c/o chills and shaking.PO temperature reads 100.0F PO. Oxygen sat on RA 87%. Nebulizer and robitussin administered. Dr Oreilly.Dr. Larose at bedside. Pt denies SOB. Pt saturation reads 87% on RA. 2L NC applied per Dr Larose. Pt reluctant to wear oxygen r/t claustrophobia. Pt educated about the importance of keeping oxygen above level and complaint to wear oxygen. Awaiting urine for sample. Report passed to ABBEY Hernandez.

## 2024-01-26 NOTE — ED PROVIDER NOTES
Emergency Medicine Note  HPI   HISTORY OF PRESENT ILLNESS     Patient is a 83-year-old male with a significant medical history of BPH, thyroid disease, hypertension, rib fractures, pelvic fracture, pleural effusion.  Presents emergency department after being sent by [facility] for a hemoglobin that originally was 8.8 that is dropped to 7.2 as well as a enlarged pleural effusion.  Patient was seen at [facility] after a slip and fall with right sided pain.  Causing a right superior pubic ramus comminuted fracture with right adductor/obturator intramuscular hematomas, right perivascular hematoma associated with mild mass effect on the urinary bladder and perivesicular infiltration, right 8th-11th posterior acute rib fractures, large loculated left pleural effusion with near complete collapse left lower lobe, moderate right layering pleural effusion; infiltration of SMA, SMV with poorly seen pancreas, right lower pole renal cystic lesion, hepatosplenomegaly. CTH without acute intracranial lesion; CT cervical spine with age indeterminate odontoid process fracture. Patient was seen by orthopedic surgery - rec non operative management of right pubic ramus fracture - WBAT BLE. Patient had avila catheter placed due to concern for prostatic urethral injury.  CT cystogram without evidence of contrast extravasation into prostatic parenchyma or other areas of extravasation from urinary bladder. US kidneys showed multiple right renal cortical and sinus cysts with mild right hydronephrosis. Avila removed.  Patent underwent thoracentesis by IR on 1/18 for left pleural effusion with 1.1L removed. 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 He is to wear cervical collar at all times, and was informed if he has a fall he is at high  risk for spinal cord injury.     Patient denying chest pain, shortness of breath, dizziness/lightheadedness, dysuria, blood in vomit or stool, headaches, abdominal pain, or any neurological symptoms.  Patient refusing to wear his cervical collar in the emergency department despite educating the patient as to why the cervical collar is required.            Patient History   PAST HISTORY     Reviewed from Nursing Triage:       Past Medical History:   Diagnosis Date   • BPH (benign prostatic hyperplasia)    • Disease of thyroid gland    • Hypertension    • Multiple rib fractures 01/17/2024   • Pelvic fracture (CMS/HCC) 01/17/2024   • Pleural effusion        Past Surgical History:   Procedure Laterality Date   • HERNIA REPAIR         History reviewed. No pertinent family history.    Social History     Tobacco Use   • Smoking status: Former     Types: Cigarettes, Cigars   • Smokeless tobacco: Never   Substance Use Topics   • Alcohol use: Not Currently   • Drug use: Never         Review of Systems   REVIEW OF SYSTEMS     Review of Systems      VITALS     ED Vitals    Date/Time Temp Pulse Resp BP SpO2 [facility]   01/26/24 1610 -- 80 18 109/60 94 % SDB   01/26/24 1453 -- 68 20 116/62 96 % SLS   01/26/24 1334 36.7 °C (98 °F) 71 20 131/63 98 % MC        Pulse Ox %: 96 % (01/26/24 1557)  Pulse Ox Interpretation: Normal (01/26/24 1557)  Heart Rate: 72 (01/26/24 1557)  Rhythm Strip Interpretation: Other (see comments) (Sinus rhythm) (01/26/24 1557)     Physical Exam   PHYSICAL EXAM     Physical Exam  Exam conducted with a chaperone present (DANIEL Jackson ).   Constitutional:       Appearance: He is well-developed.   HENT:      Head: Normocephalic and atraumatic.   Eyes:      Extraocular Movements: Extraocular movements intact.   Neck:      Comments: Patient refusing to wear cervical collar, will not assess range of motion due to patient requiring to be in C-collar  Cardiovascular:      Rate and Rhythm: Normal rate and regular rhythm.    Pulmonary:      Effort: Pulmonary effort is normal.      Breath sounds: Examination of the left-lower field reveals decreased breath sounds. Decreased breath sounds (slight ) present.   Chest:      Chest wall: No tenderness.   Abdominal:      General: Bowel sounds are normal.      Palpations: Abdomen is soft.   Genitourinary:     Rectum: Guaiac result negative. External hemorrhoid present.   Musculoskeletal:      Cervical back: Neck supple.   Skin:         Neurological:      General: No focal deficit present.      Mental Status: He is alert and oriented to person, place, and time.           PROCEDURES     Procedures     DATA     Results     Procedure Component Value Units Date/Time    UA w/ reflex culture (ED Only) [[id number]]  (Abnormal) Collected: 01/26/24 1535    Specimen: Urine, Clean Catch Updated: 01/26/24 1615    Narrative:      The following orders were created for panel order UA w/ reflex culture (ED Only).  Procedure                               Abnormality         Status                     ---------                               -----------         ------                     UA Reflex to Culture (Ma...[[id number]]  Abnormal            Final result               UA Microscopic[[id number]]               Abnormal            Final result                 Please view results for these tests on the individual orders.    UA Microscopic [[id number]]  (Abnormal) Collected: 01/26/24 1535    Specimen: Urine, Clean Catch Updated: 01/26/24 1615     RBC, Urine 10 TO 19 /HPF      WBC, Urine Too Numerous To Count /HPF      Squamous Epithelial None Seen /hpf      Hyaline Cast 3 TO 9 /lpf      Bacteria, Urine +3 /HPF      AMORPHOUS CRYSTALS Rare /hpf      Mucus Rare /LPF     UA Reflex to Culture (Macroscopic) [[id number]]  (Abnormal) Collected: 01/26/24 1535    Specimen: Urine, Clean Catch Updated: 01/26/24 1606     Color, Urine Yellow     Clarity, Urine Cloudy     Specific Gravity, Urine 1.013     pH, Urine 5.5      Leukocyte Esterase +3     Nitrite, Urine Negative     Protein, Urine +1     Glucose, Urine Negative mg/dL      Ketones, Urine Negative mg/dL      Urobilinogen, Urine 0.2 EU/dL      Bilirubin, Urine Negative mg/dL      Blood, Urine +1     Comment: The sensitivity of the occult blood test is equivalent to approximately 4 intact RBC/HPF.       Type and screen [[id number]] Collected: 01/26/24 1452    Specimen: Blood, Venous Updated: 01/26/24 1604     Specimen Expiration 01/29/2024     Antibody Screen Negative     ABO A     Rh Factor Positive     History Check Previous type on file    Protime-INR [[id number]]  (Abnormal) Collected: 01/26/24 1452    Specimen: Blood, Venous Updated: 01/26/24 1528     PT 19.8 sec      INR 1.7     Comment: Moderate Intensity Anticoagulation = 2.0 to 3.0, High Intensity = 2.5 to 3.5       APTT [[id number]]  (Abnormal) Collected: 01/26/24 1452    Specimen: Blood, Venous Updated: 01/26/24 1528     PTT 37 sec      Comment: The Standard Therapeutic Range for Heparin is 74 to 106 seconds.       Comprehensive metabolic panel [[id number]]  (Abnormal) Collected: 01/26/24 1452    Specimen: Blood, Venous Updated: 01/26/24 1525     Sodium 128 mEQ/L      Potassium 4.0 mEQ/L      Comment: Results obtained on plasma. Plasma Potassium values may be up to 0.4 mEQ/L less than serum values. The differences may be greater for patients with high platelet or white cell counts.        Chloride 99 mEQ/L      CO2 23 mEQ/L      BUN 35 mg/dL      Creatinine 1.3 mg/dL      Glucose 107 mg/dL      Calcium 8.9 mg/dL      AST (SGOT) 12 IU/L      ALT (SGPT) 10 IU/L      Alkaline Phosphatase 55 IU/L      Total Protein 7.5 g/dL      Comment: Test performed on plasma which typically contains approximately 0.4 g/dL more protein than serum.        Albumin 3.0 g/dL      Bilirubin, Total 0.8 mg/dL      eGFR 55.2 mL/min/1.73m*2      Comment: Calculation based on the Chronic Kidney Disease Epidemiology Collaboration (CKD-EPI) equation  refit without adjustment for race.        Anion Gap 6 mEQ/L     CBC and differential [[id number]]  (Abnormal) Collected: 01/26/24 1452    Specimen: Blood, Venous Updated: 01/26/24 1514     WBC 8.79 K/uL      RBC 2.57 M/uL      Hemoglobin 8.1 g/dL      Hematocrit 24.1 %      MCV 93.8 fL      MCH 31.5 pg      MCHC 33.6 g/dL      RDW 15.5 %      Platelets 118 K/uL      MPV 9.5 fL      Differential Type Auto     nRBC 0.0 %      Immature Granulocytes 0.5 %      Neutrophils 73.5 %      Lymphocytes 17.5 %      Monocytes 8.2 %      Eosinophils 0.1 %      Basophils 0.2 %      Immature Granulocytes, Absolute 0.04 K/uL      Neutrophils, Absolute 6.46 K/uL      Lymphocytes, Absolute 1.54 K/uL      Monocytes, Absolute 0.72 K/uL      Eosinophils, Absolute 0.01 K/uL      Basophils, Absolute 0.02 K/uL     Extra Tube Red [[id number]] Collected: 01/26/24 1452    Specimen: Blood, Venous Updated: 01/26/24 1458    Extra Tube Gold [[id number]] Collected: 01/26/24 1452    Specimen: Blood, Venous Updated: 01/26/24 1458    Extra Tubes [[id number]] Collected: 01/26/24 1452    Specimen: Blood, Venous Updated: 01/26/24 1458    Narrative:      The following orders were created for panel order Extra Tubes.  Procedure                               Abnormality         Status                     ---------                               -----------         ------                     Extra Tube Red[[id number]]                                   In process                 Extra Tube Lt Green[[id number]]                              In process                 Extra Tube Gold[[id number]]                                  In process                   Please view results for these tests on the individual orders.    Extra Tube Lt Green [[id number]] Collected: 01/26/24 1452    Specimen: Blood, Venous Updated: 01/26/24 1458          Imaging Results    None         ECG 12 lead   Independent Interpretation by ED Provider   Independently interpreted with   Sammy:  Rhythm: Sinus rhythm with marked sinus arrhythmia with occasional PVCs  Rate: 72  ST Segments: no obvious ST elevation or ischemia              Scoring tools                                  ED Course & MDM   MDM / ED COURSE / CLINICAL IMPRESSION / DISPO     Medical Decision Making  Impression: 83-year-old male presenting to the emergency department from [facility] for a hemoglobin that has decreased from 8.8-7.2 and possible enlarged pleural effusion.  Plan: CBC, CMP, type and screen and coags.  UA and Hemoccult exam.  Hemoglobin 8.1 in the emergency department and patient is asymptomatic, no need to transfuse.  Dr. Christianson also looked at the chest x-ray obtained today compared to his previous chest x-ray and does not note changes requiring acute intervention at this time.  UA did show white blood cell counts will start on oral dose of Ceftin.  Will also give fluids as patient's sodium is 128.  [facility].  Patient and family agreeable amenable to plan.  Called Glenis and Rani to discuss the plan of discharge back to [facility] and to start Ceftin for UTI.  They are also agreeable amenable to plan.  Educated patient and family:  You have been evaluated in the emergency department for possible low hemoglobin.  Your hemoglobin improved from this morning.  Your urine however did show bacteria so we are going to treat you for urinary tract infection.  Please take your antibiotics as prescribed.    Please follow-up with your primary care physician. I have enclosed your imaging and lab results.  Bring these to your next doctor's appointment for further testing and evaluation as needed.        Return to the emergency department if the experience fevers of 100.4 or greater, worsening or uncontrolled pain, vomiting, flank pain, feeling lightheaded or faint, or any concerning symptoms to you.      Urinary tract infection with hematuria, site unspecified: acute illness or  injury  Amount and/or Complexity of Data Reviewed  Labs: ordered. Decision-making details documented in ED Course.  ECG/medicine tests: ordered and independent interpretation performed.      Risk  OTC drugs.  Prescription drug management.          ED Course as of 01/26/24 1741   Fri Jan 26, 2024   1447 Patient is hemodynamically stable, alert, resting comfortably during evaluation.  The care and work-up of this patient was discussed with attending physician Dr. Christianson   [CF]   1604 Spoke with Kiersten at [facility] and they will accept patient back. [CF]   1655 WBC, Urine(!): Too Numerous To Count  Spoke with Dr. Christianson and will start patient on Ceftin.  Called back [facility] and notified Rani that we will be sending a prescription with him for Ceftin. [CF]   1724 Per nurse, patient requesting guaifenesin will order patient's prescribed dose at [facility]. [CF]   1739 Hemoglobin(!): 8.1  Improved from this morning. [CF]      ED Course User Index  [CF] Nadja Rodriguez CRNP     Clinical Impression      None               Nadja Rodriguez CRNP  01/26/24 2309       Nadja Rodriguez CRNP  01/26/24 2310

## 2024-01-26 NOTE — PROGRESS NOTES
Inpatient Psychology Initial Intake    Duration:  25 minutes    Purpose: Randolph Daley, : 1940, a 83 y.o. male, for initial evaluation visit to discuss Adjustment to Disability.    HPI: Pt is a 83 y.o., male, with a previous medical history of hypothyroidism, hypertension, normocytic anemia, chronic kidney disease, and benign prostatic hyperplasia (BPH), shingles (2007), fall with head strike (), fracture of the base of the odontoid process (found on imaging in  age indeterminate). Pt presented to [facility] ED on 2023 with a one-week history of productive cough. Chest X-ray showed bilateral pleural effusions and pt tested positive for RSV. EKG was normal. Pt was prescribed a cough suppressant and instructed to follow up with his PCP regarding pleural effusions; he discharged home the same day in stable condition. Pt then saw his family doctor on 2023 who recommended to continue cough suppressant and to increase fluids.     More recently, pt presented to [facility] on 2024 after a fall. Pt reportedly slipped on ice and fell on his right side. There was no head injury or loss of consciousness during this event. On eval, GCS was 15. Imaging revealed bilateral pleural effusion (L > R) and multiple fractures including ribs (right 8, 9, 10, 11) and right pubic ramus with pelvic hematoma that was concerning for traumatic injury to prostate/urethra/bladder. Of note, pt declined analgesics except for Tylenol in ED (chart indicates a history of visual hallucinations with oxycodone). Pt was transferred to [facility] for escalation of care.     At [facility], pt underwent thoracentesis on 2024. Urology was consulted and pt underwent cystogram. No acute urologic interventions were recommended and pt was given avila catheter. Orthopedic Surgery was consulted and recommended non-operative management of pelvic fracture. Pt was made weightbearing as tolerated to bilateral legs.  Neurosurgery was consulted; CT head showed C2 fracture that was determined to likely be from a previous injury. Flexion-extension x-rays of the cervical spine disclose widening of the first and second cervical interspinous process concerning for instability. MRI was recommended and as well as pt wearing hard cervical collar. Pt initially declined MRI of cervical neck due to claustrophobia; was later agreeable with anxiolytics. MRI of C-spine confirmed chronic C2 fracture, along with degenerative spondylosis at C3-C4. Pt was recommended for surgical fusion; however, pt also declined surgical intervention. Pt was counseled at length about recommendations for surgery and wearing hard collar at all times. Pt understands that he is a high fall risk and the risks of future spinal injury from fall (potential paralysis, paresthesia, or death) and continued to decline cervical interventions throughout hospitalization. Pt was recommended for outpatient neurosurgery follow up in 4- 6 weeks with updated imaging. Pt was stabilized and transferred to [facility] on 1/24/2024 for inpatient rehab therapies.     History:   No past medical history on file.  No past surgical history on file.  No family history on file.  Social History     Socioeconomic History   • Marital status:    Tobacco Use   • Smoking status: Former     Types: Cigarettes, Cigars   • Smokeless tobacco: Never   Substance and Sexual Activity   • Alcohol use: Not Currently   • Drug use: Never     Social Determinants of Health     Financial Resource Strain: Low Risk  (1/25/2024)    Overall Financial Resource Strain (CARDIA)    • Difficulty of Paying Living Expenses: Not hard at all   Food Insecurity: No Food Insecurity (1/25/2024)    Hunger Vital Sign    • Worried About Running Out of Food in the Last Year: Never true    • Ran Out of Food in the Last Year: Never true   Transportation Needs: No Transportation Needs (1/25/2024)    PRAPARE -  Transportation    • Lack of Transportation (Medical): No    • Lack of Transportation (Non-Medical): No   Housing Stability: Low Risk  (1/25/2024)    Housing Stability Vital Sign    • Unable to Pay for Housing in the Last Year: No    • Number of Places Lived in the Last Year: 1    • Unstable Housing in the Last Year: No       Current Facility-Administered Medications   Medication Dose Route Frequency Provider Last Rate Last Admin   • [MAR Hold - Suspended Admission] acetaminophen (TYLENOL) tablet 650 mg  650 mg oral q4h PRN Neville Arndt MD   650 mg at 01/26/24 0929   • [MAR Hold - Suspended Admission] acetaminophen (TYLENOL) tablet 650 mg  650 mg oral Once Neville Arndt MD       • [MAR Hold - Suspended Admission] albuterol 1.25 mg, ipratropium (ATROVENT) 0.5 mg   nebulization TID Neville Arndt MD   Given at 01/25/24 2142   • [MAR Hold - Suspended Admission] apixaban (ELIQUIS) tablet 2.5 mg  2.5 mg oral BID Neville Arndt MD   2.5 mg at 01/25/24 2136   • [MAR Hold - Suspended Admission] ascorbic acid (VITAMIN C) tablet 500 mg  500 mg oral Daily Neville Arndt MD   500 mg at 01/26/24 0926   • [MAR Hold - Suspended Admission] bisacodyL (DULCOLAX) tablet,delayed release (DR/EC) 10 mg  10 mg oral Daily Abilio Lopez MD       • [MAR Hold - Suspended Admission] camphor-methyl salicyl-menthoL (MUSCLE RUB) cream 1 Application  1 Application Topical 2x daily PRN Amelia Larose MD       • [MAR Hold - Suspended Admission] cholecalciferol (vitamin D3) tablet 2,500 Units  2,500 Units oral Daily with dinner Neville Arndt MD   2,500 Units at 01/25/24 1727   • [MAR Hold - Suspended Admission] diclofenac sodium (VOLTAREN) 1 % topical gel   Topical TID Neville Arndt MD   Given at 01/26/24 0930   • [MAR Hold - Suspended Admission] diphenhydrAMINE (BENADRYL) capsule 25 mg  25 mg oral Once Neville Arndt MD       • [MAR Hold - Suspended Admission] doxycycline hyclate (VIBRA-TABS) tablet 100 mg  100 mg oral q12h RUDY  Neville Arndt MD       • [MAR Hold - Suspended Admission] ferric gluconate (FERRLECIT) 125 mg in sodium chloride 0.9 % 100 mL IVPB  125 mg intravenous Daily (6p) Neville Arndt MD       • [MAR Hold - Suspended Admission] guaiFENesin (ROBITUSSIN) 100 mg/5 mL liquid 400 mg  400 mg oral q6h RUDY Abilio Lopez MD   400 mg at 01/26/24 0517   • [MAR Hold - Suspended Admission] levothyroxine (SYNTHROID) tablet 25 mcg  25 mcg oral Daily (6:30a) Abilio Lopez MD   25 mcg at 01/26/24 0517   • [MAR Hold - Suspended Admission] morphine 10 mg/5 mL solution 10 mg  10 mg oral q4h PRN Neville Arndt MD       • [MAR Hold - Suspended Admission] ondansetron ODT (ZOFRAN-ODT) disintegrating tablet 4 mg  4 mg oral q8h PRN Abilio Lopez MD       • [MAR Hold - Suspended Admission] sennosides-docusate sodium (SENOKOT-S) 8.6-50 mg per tablet 2 tablet  2 tablet oral BID Neville Arndt MD   2 tablet at 01/25/24 2135   • [MAR Hold - Suspended Admission] sodium chloride 0.9 % infusion 1,000 mL  1,000 mL intravenous Daily (6p) Neville Arndt MD       • [MAR Hold - Suspended Admission] sodium chloride 0.9 % infusion  5 mL/hr intravenous PRN Neville Arndt MD       • [MAR Hold - Suspended Admission] sodium chloride 0.9 % infusion   intravenous Continuous Neville Arndt MD       • [MAR Hold - Suspended Admission] sodium chloride 3 % nebulizer solution 3 mL  3 mL nebulization TID Neville Arndt MD   3 mL at 01/25/24 2145   • [MAR Hold - Suspended Admission] tamsulosin (FLOMAX) 24 hr ER capsule 0.4 mg  0.4 mg oral Nightly Neville Arndt MD   0.4 mg at 01/25/24 2135     Current Outpatient Medications   Medication Sig Dispense Refill   • acetaminophen (TYLENOL EXTRA STRENGTH) 500 mg tablet Take 2 tablets (1,000 mg total) by mouth every 6 (six) hours as needed for mild pain or headaches.     • ascorbic acid (VITAMIN C) 500 mg tablet Take 1,000 mg by mouth daily.     • bisacodyL (DULCOLAX) 5 mg EC tablet Take 2 tablets (10 mg total) by mouth  daily.     • enoxaparin (LOVENOX) 30 mg/0.3 mL syringe Inject 0.3 mL (30 mg total) under the skin every 12 (twelve) hours Indications: deep vein thrombosis prevention.     • guaiFENesin (ROBITUSSIN) 100 mg/5 mL syrup Take 20 mL (400 mg total) by mouth every 6 (six) hours as needed for congestion or cough.     • levothyroxine (SYNTHROID) 25 mcg tablet Take 25 mcg by mouth daily before breakfast.     • mecobal/levomefolat Ca/B6 phos (METANX ORAL) Take 1 tablet by mouth 2 (two) times a day.     • traMADoL (ULTRAM) 50 mg tablet Take 1 tablet (50 mg total) by mouth every 6 (six) hours as needed for pain (pain).     • sildenafiL (VIAGRA) 100 mg tablet Take 100 mg by mouth as needed for erectile dysfunction.           Previous Mental Health History:   Previous Mental Health History:  Claustrophobia   Previous Psychotropics Medications: N/A  Psychiatric Hospitalizations: N/A   Previous Suicidal Behavior:  N/A  Previous Self-Injurious Behavior:  N/A  Previous Homicidal Behavior:  N/A  Previous Substance Abuse Treatment: N/A      Current Evaluation:   Mental Status Exam:  Arousal Level: Alert  Appearance: Well Groomed  Speech: Regular, Soft  Psychomotor Functioning: WNL  Eye Contact: WNL  Est. Premorbid Intelligence: Above average  Orientation: Fully oriented  Attention: WNL  Concentration: WNL  Recent Memory: WNL  Remote Memory: WNL  Thought Content: Appropriate  Thought Process: Worry  Insight: Intact  Perceptual Function: Pain  Delusions: None or age appropriate  Sleeping: Decreased (due to pain)  Affect: Flat  Mood: Depressed    Assessments done this visit:  Clinical Interview   [place] Suicide Severity Rating Scale:  Not indicated       [place] Suicide Severity Rating Scale  1. Within the past month, have you wished you were dead or wished you could go to sleep and not wake up?: No  2. Within the past month, have you actually had any thoughts of killing yourself?: No  6. Have you ever done anything, started to do  anything, or prepared to do anything to end your life?: No  Safe-T Assessment:  Not indicated       Risk Assessment:   Suicidal Ideation: Suicide Risk Assessment Not indicated for this patient  Self Injurious Behavior:  Not Present  Irritability:  Not Present  Homicidal Behavior: Not Present  Estimate of Risk:  None  If risk identified have suicide precautions been implemented? No     Goals Addressed                 This Visit's Progress    • Increase adjustment to rehabilitation facility.             Interventions  Goal Setting  Monitoring of Symptoms    Recommendations:  Individual Therapy   30 minutes weekly      Visit Diagnosis:     1. Adjustment disorder with mixed anxiety and depressed mood        Diagnostic Impression:   Weekly Progress Summary: progressing toward goals as expected    Randolph Daley, 1940, is a 83 y.o. old., right handed, male, who was admitted to inpatient rehab after fall resulting in multiple fractures to right rib and pubic ramus; pt was also found to have chronic C2 fracture. Pt was seen for an initial evaluation for psychology services on 01/26/24. Pt was seated in wheelchair on arrival. Pt was alert and engaged in interview. He was oriented to time, place and person. Eye contact was WNL. Speech was soft and clear. Thought processes were logical. Mood was sad; affect was flat.    Pt is  and lives with his wife in the [facility]. 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 Pt has 3 adult children and small grandchildren. Psychiatric history includes claustrophobia.     Pt rated his sleep as poor due to pain. Pt noted he is primarily getting tylenol, in part because he does not want opioid medications due to a previous negative experience developing hallucinations. Pt reported his mood was down. He acknowledged having difficulty being hospitalized and away from his loved ones and uncontrolled pain. Pt reported goals of returning to his  former level of independence. Pt would like to return home as soon as possible. Supportive therapy was offered and pt's feelings were validated and normalized. He was receptive to support and open to continued follow up during his inpatient rehabilitation stay. Psychology will continue to follow.     Anna Henry, PhD @ 1:01 PM

## 2024-01-26 NOTE — PROGRESS NOTES
[facility] Internal Medicine Progress Note          Patient was seen and examined at bedside.    Subjective:   Reviewed night shift RN note:  Pt alert and oriented. Refused Aspen collar, encouraged to wear.  Refused SCDs. Condom cath on, drained 600cc dark clear yellow urine this shift. No BM this shift. Medicated for pain with tylenol, refused schedualed tramadol. Slept poorly overnight. Bed alarm on, call bell within reach.     Critical medical issues:  1. Acute anemia with Hb dropped 8.8-7.2, transfuse 1 unit PRBC, hold Eliquis, closely watch any potential source of bleeding; EVA, iv iron infusion will be provided  2. Multiple R posterior ribs frac with atelectasis and possible pleu effusion, high risk of PNA due poor respiration, CXR today, cont nebulizer treatments, IS, empiric abx   3. Dehydration with high BUN/Cr ratio, dark urine: IVF hydration with NS  4. Possible UTI , possible bladder injury even though repeated CT cystography, no extravasation, requested UCX with the same urine sample, empiric doxycyline which will prophy both  and pulm infections    critical illness, d/w PMR attending, RN supervisor, and the team.  Keep low threshold to send him for inpt evaluation and management if deteriorating            Addendum:  CXR increased R pleural effusion, acute anemia, Hb dropped from 8.8 to 7.2 in 24 hours, quite painful of right posterior chest with diminished BS, suspect hemothorax, also h/o R superior pubic rami fracture w hematoma could be another source of bleeding.    Plan: send him to [facility] ED for inpt evaluation and managements.   Personally notified [facility] ED PA and PMR attending, our RN supervisor will arrange transportation and will notify his family.         Objective   Vital signs in last 24 hours:  Temp:  [36.3 °C (97.3 °F)-37.8 °C (100 °F)] 37.5 °C (99.5 °F)  Heart Rate:  [67-91] 67  Resp:  [16-18] 16  BP: (114-155)/(59-75) 114/59      Intake/Output Summary (Last 24 hours) at 1/26/2024  1116  Last data filed at 1/26/2024 0716  Gross per 24 hour   Intake --   Output 900 ml   Net -900 ml     Intake/Output this shift:  I/O this shift:  In: -   Out: 300 [Urine:300]   Review of Systems:  All other systems reviewed and negative except as noted in the HPI.   Objective      Labs  reviewed his labs thoroughly   Lab Results   Component Value Date    WBC 9.55 01/26/2024    HGB 7.2 (L) 01/26/2024    HCT 21.3 (L) 01/26/2024    MCV 93.8 01/26/2024     (L) 01/26/2024     Lab Results   Component Value Date    GLUCOSE 98 01/26/2024    CALCIUM 9.0 01/26/2024     (L) 01/26/2024    K 4.2 01/26/2024    CO2 24 01/26/2024    CL 97 (L) 01/26/2024    BUN 31 (H) 01/26/2024    CREATININE 1.3 01/26/2024       Imaging  OSH imaging study reports reviewed:     CT cystography 1/24/24:  IMPRESSION:  No evidence of contrast extravasation into the prostatic parenchyma or other areas of contrast extravasation from the urinary bladder. Redemonstration of fractures involving the right superior and inferior pubic rami with extraperitoneal blood in the pelvis, which has slightly decreased compared to 1/17/2024. Persistent mild enlargement of the right adductor muscle compartment, similar to 1/17/2024.  His avila was removed.      MRI cervical spine 1/22/24  IMPRESSION:  1.  C2 fracture with no bone marrow edema to suggest acute fracture.  2.  No abnormal enhancement.  3.  No epidural collection or hematoma.  4.  Degenerative spondylosis worst at C3-C4 where there is also suggestion of  Mild right jose luis-cord signal abnormality.  Multilevel spondylosis otherwise noted  as described.      Full Code    Physical Exam:  Head/Ear/Nose/Throat: normocephalic; atraumatic; moisture mouth mm, no oropharyngeal thrush noted.   Eyes: anicteric sclera, EOMI; PERRL.   Neck : supple, no JVD, no carotid bruits appeciated.   Respiratory: no evidence of labored breathing, bibasilar lung BS diminished area, no remarkable w/r/c.   Cardiovascular:  RRR; normal S1, S2; no m/r/g; no S3 or S4.   Gastrointestinal: soft; NT; BS normal; mildly distended; no CVAT b/l.   Genitourinary: off avila.   Extremities : no c/c/e .   Neurological: AO x 3, fluent speeches, following commands, CNS II-XII grossly intact; no focal neurologic deficits.   Behavior/Emotional: in NAD, appropriate; cooperative.   Skin: clean, dry and intact.     Plan of care was discussed with patient, RN, and PMR attending     Assessment   CC:S/p fall, sustained multi trauma: R post 8th-11th rib fractures,  R superior pubic rami fracture w hematoma, age undetermined C2 fracture; non surgically managed; ADL and ambulatory dysfunction          83 y.o. male with PMH of BPH, HTN, hypothyroidism, who presented and was admitted to [facility] on 1/17/24 after a slip and fall on ice landing on his right side. He was found to have multiple injuries including right 8-11 rib fractures, bilateral pleural effusion s/p IR thoracentesis 1/18 with drainage of 1.1L, pelvic fracture (non op mgmt), right adductor/obturator hematoma, questionable posttraumatic bladder injury and age-indeterminate cervical fracture.  Neurosurgery was consulted and offered c spinesurgery, but patient declined.  [place] Collar on at all times, except for meals to prevent aspiration.  Patient c/o difficulty coughing up mucus with collar on, but understands that he needs to wear the collar especially during therapy.  Urology was consulted.  CT cystogram showed possible extravasation of contrast into the prostatic urethra.  Avila catheter is currently draining clear urine.  Plan is to continue Avila catheter for 1 week.  CT cystogram in 1 week with possible removal at that time, but pt declining CT Cystogram.  Functionally, he has significant physical econditioning with ADL and ambulatory dysfunction, requiring inpt acute rehab, was admitted to [facility] on 1/24/24.        Repeated   CT cystography 1/24/24:  IMPRESSION:  No evidence of contrast extravasation  into the prostatic parenchyma or other areas of contrast extravasation from the urinary bladder. Redemonstration of fractures involving the right superior and inferior pubic rami with extraperitoneal blood in the pelvis, which has slightly decreased compared to 1/17/2024. Persistent mild enlargement of the right adductor muscle compartment, similar to 1/17/2024.  His avila was removed.      A/P:  # S/p fall, sustained multi trauma: R post 8th-11th rib fractures,  R superior pubic rami fracture w hematoma, age undetermined C2 fracture; non surgically managed; ADL and ambulatory dysfunction  -Inpt acute rehab, pain and medical managements, DVT prophylaxis, dermal defense, fall precaution      # R post 8th-11th rib fractures, atelectasis, chest congestion, possible pleu effusion  -Monitor SO2, prn NC O2, to keep SO2>92%, incentive spirometry, bronchodilator nebulizer, mucolytic agent, pulm toileting.  -Check CXR, chest PT  - Chest wall pain management     # Urinary retention, BPH, r/o bladder extravasation  -Off avila, timed voiding, condom cath in the evening, monitor PVR with prn cic, adequate oral hydration, check UA and UCX, consider to add flomax 0.4 mg qhs.     # Hypothyroidism   -cont home dose of levothyroxine     # DVT prophylaxis   -Eliquis 2.5 mg bid      # Insomnia, anxiety   - address his pain   - prn ativan and Ambien  - psychology CBT            Billing code: [id number]  Diagnoses:  Patient Active Problem List   Diagnosis   • Fall due to slipping on ice or snow, initial encounter   • Closed fracture of multiple ribs of right side   • Closed fracture of ramus of right pubis (CMS/HCC)   • Pelvic hematoma in male   • Pleural effusion on left   • Normocytic anemia   • Stage 3a chronic kidney disease (CMS/HCC)   • Closed odontoid fracture (CMS/HCC)   • Multiple injuries due to trauma   complicated case with multiple comorbidities as mentioned in subjective section, spent 35 min to manage the case, >50% of the time  consulting the patient about current medical condition, existing comorbidities, new findings/concerns and care/management plan.                Neville Arndt MD  1/26/2024

## 2024-01-26 NOTE — ED ATTESTATION NOTE
Procedures  Physical Exam  Review of Systems    1/26/20243:22 PM  I have personally seen and examined the patient.  I personally performed the key   components of the encounter and provided a substantive portion of the care and   medical decision making for this patient.     I have reviewed and agree with the PA/NP/Resident's assessment and ED plan of care, with any exceptions as documented below.  My examination, assessment and plan of care of Randolph Daley is as follows:    Patient is a 83-year-old male who presents with shortness of breath, patient is currently at [facility] after a fall with traumatic injuries, patient was found to have a low hemoglobin and he has also had small pleural effusions which they have been following, no fevers reported    Exam:   Vital signs have been reviewed, pulse ox is 96% on room air, normal  Heart: RRR, normal S1/S2  Lungs: CTA bilaterally  Abdo: soft, non-tender    Plan/Medical Decision Making: Patient is a 83-year-old male who presents with shortness of breath, checking labs and will review recent imaging, reevaluate afterwards      This document was created using Dragon Dictation software. There might be some typographical errors due to this technology.          Godshall, Duane K, MD  01/26/24 [id number]

## 2024-01-26 NOTE — PROGRESS NOTES
Patient: Randolph Daley  Location: [facility] [address]  MRN:  [id number]  Today's date:  1/26/2024    Attempted to see patient for therapy. Unable due to medical hold.    Daily Outcome Statement: Pt on med hold 2* symptomatic anemia. Missed 30 minutes of PT

## 2024-01-26 NOTE — PLAN OF CARE
Plan of Care Review  Plan of Care Reviewed With: patient  Progress: improving  Outcome Evaluation: Pt alert and oriented. Refused Aspen collar, encouraged to wear.  Refused SCDs. Condom cath on, drained 600cc dark clear yellow urine this shift. No BM this shift. Medicated for pain with tylenol, refused schedualed tramadol. Slept poorly overnight. Bed alarm on, call bell within reach.

## 2024-01-26 NOTE — PROGRESS NOTES
Daily Progress Note     Patient was seen and examined.   Attestation Notes: Face to face encounter completed        Subjective       Interval History: Patient without recurrent elevated temperature last night. UA suggestive of UTI, also has decreased O2 sat. CXR this morning showing small pleural effusions, increasing since 1/18. Patient also noted with Hgb decreased from 8.8 to 7.2. Given overall clinical picture, decision made to send patient to [place] ED; recommending for blood transfusion and evaluation for bleeding.    Patient and wife aware of plan and in agreement.     Continued to provide education on reason to wear C-collar; patient did agree to don for transport.     Review of Systems:  Negative except as per HPI    Current Functional Status:   Bed mobility:   [place], Supine to Sit: moderate assist (50-74% patient effort)   [place], Sit to Supine: moderate assist (50-74% patient effort)   Transfers:    [place], Sit to Stand Transfer: moderate assist (50-74% patient effort)  [place], Stand to Sit Transfer: moderate assist (50-74% patient effort)   [place], Stand Pivot/Stand Step Transfer: moderate assist (50-74% patient effort)   Gait:   [place], Gait: moderate assist (50-74% patient effort)  Assistive Device: walker, front-wheeled   Distance in Feet: 20 feet    Bathing:   [place]: moderate assist (50-74% patient effort)   Toileting:   [place]: moderate assist (50-74% patient effort)   Upper body dressing:   [place]: moderate assist (50-74% patient effort)   Lower body dressing:   [place]: maximum assist (25-49% patient effort)       Functional Progress:    Functional status reviewed. Overall, patient's functional status is being assessed      Objective     Objective   Vitals and Hemodynamics  Patient Vitals for the past 24 hrs:   BP Temp Temp src Pulse Resp SpO2   01/26/24 0716 (!) 114/59 37.5 °C (99.5 °F) Oral 67 16 94 %   01/25/24 2332 -- 37.3 °C (99.2  °F) Oral 78 18 (!) 90 %   01/25/24 2043 127/65 36.9 °C (98.5 °F) Oral 79 18 92 %   01/25/24 1830 -- -- -- -- -- (!) 87 %   01/25/24 1810 (!) 155/75 -- -- 89 -- (!) 88 %   01/25/24 1727 -- 36.9 °C (98.4 °F) -- -- -- --   01/25/24 1707 -- 37.8 °C (100 °F) Oral 91 -- --   01/25/24 1600 (!) 150/66 36.3 °C (97.3 °F) Oral 70 18 93 %         Physical Exam    General: sitting in WC at bedside   HEENT: Atraumatic, Normocephalic; c collar off  Cardiovascular: RRR  Respiratory: CTAB with breath sounds diminished at the bases   Abdomen/GI: NT ND  Skin: Warm, Dry  Extremities/MSK: Non tender bilateral gastrocs.  Neuro: AAOx3, follows commands, repetitive at times       Labs      Results from last 7 days   Lab Units 01/26/24  0548 01/25/24  0558 01/20/24  0707   WBC K/uL 9.55 9.69 7.13   RBC M/uL 2.27* 2.80* 2.90*   HEMOGLOBIN g/dL 7.2* 8.8* 9.1*   HEMATOCRIT % 21.3* 26.4* 27.7*   PLATELETS K/uL 124* 114* 84*   MCV fL 93.8 94.3 95.5   RDW % 15.4* 15.4* 15.6*   EOS ABS AUTO K/uL 0.00* 0.03*  --        Results from last 7 days   Lab Units 01/26/24  0547 01/25/24  0558 01/20/24  0707   SODIUM mEQ/L 132* 134* 135*   POTASSIUM mEQ/L 4.2 3.9 4.0   CHLORIDE mEQ/L 97* 100 101   CO2 mEQ/L 24 25 25   BUN mg/dL 31* 29* 26*   CREATININE mg/dL 1.3 1.2 1.5*   EGFR mL/min/1.73m*2 55.2* >60.0 46.5*   ANION GAP mEQ/L 11 9 9     Results from last 7 days   Lab Units 01/26/24  0547 01/25/24  0558 01/20/24  0707   CALCIUM mg/dL 9.0 9.1 9.4   MAGNESIUM mg/dL  --   --  1.9   PHOSPHORUS mg/dL  --   --  3.4       Results from last 7 days   Lab Units 01/25/24  0558   AST IU/L 11*   ALT IU/L 10   ALK PHOS IU/L 53   BILIRUBIN TOTAL mg/dL 0.9       Imaging      Assessment and Plan     Randolph Daley is a 83 y.o. male with a PMHx of BPH, hypothyroid, cervical spine fracture, who presents with ADL/mobility dysfunction secondary to multi trauma after fall, found to have right pubic ramus fracture, right posterior rib fractures, chronic odontoid  fracture.     #ADL/Ambulatory dysfunction: 2/2 multi trauma. Continue acute inpatient rehabilitation with PT/OT/SLP/SW/Rehab RN/Neuropsych to increase independence with ADLs, improve balance, coordination, endurance, strength, mobility, community reintegration, decreased burden of care on others and family education.  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 with mild reight jose luis-cord signal abnormality  - chronic  - evaluated by neurosurgery in acute care  - decline operative management at this time; continue hard cervical collar at all times  - follow-up neurosurgery in 4-6 weeks     #right perivascular hematoma associated with mild mass effect on the urinary bladder and perivesicular infiltration  - monitor bladder scans, CIC PRN  - CT cystogram without evidence of contrast extravasation into prostatic parenchyma or other areas of extravasation from urinary bladder  - had avila catheter - removed      #BPH  - continue flomax     #Pleural effusion  - s/p thoracentesis  - monitor O2     #Immobility  -DVT prophylaxis with apixiban     # Follow-up  - PCP  - neurosurgery  - orthopedic surgery      #Disposition: Elizabeth Larose MD  Physical Medicine and Rehabilitation

## 2024-01-27 ENCOUNTER — APPOINTMENT (INPATIENT)
Dept: SPEECH THERAPY | Facility: REHABILITATION | Age: 84
DRG: 560 | End: 2024-01-27
Payer: COMMERCIAL

## 2024-01-27 ENCOUNTER — APPOINTMENT (INPATIENT)
Dept: OCCUPATIONAL THERAPY | Facility: REHABILITATION | Age: 84
DRG: 560 | End: 2024-01-27
Payer: COMMERCIAL

## 2024-01-27 ENCOUNTER — APPOINTMENT (INPATIENT)
Dept: PHYSICAL THERAPY | Facility: REHABILITATION | Age: 84
DRG: 560 | End: 2024-01-27
Payer: COMMERCIAL

## 2024-01-27 VITALS
BODY MASS INDEX: 27.4 KG/M2 | HEIGHT: 69 IN | OXYGEN SATURATION: 95 % | TEMPERATURE: 98 F | WEIGHT: 185 LBS | HEART RATE: 72 BPM | SYSTOLIC BLOOD PRESSURE: 120 MMHG | RESPIRATION RATE: 16 BRPM | DIASTOLIC BLOOD PRESSURE: 60 MMHG

## 2024-01-27 LAB
ATRIAL RATE: 72
ERYTHROCYTE [DISTWIDTH] IN BLOOD BY AUTOMATED COUNT: 15.2 % (ref 11.6–14.4)
HCT VFR BLD AUTO: 24.5 % (ref 40.1–51)
HGB BLD-MCNC: 8 G/DL (ref 13.7–17.5)
MCH RBC QN AUTO: 31 PG (ref 28–33.2)
MCHC RBC AUTO-ENTMCNC: 32.7 G/DL (ref 32.2–36.5)
MCV RBC AUTO: 95 FL (ref 83–98)
P AXIS: 53
PDW BLD AUTO: 9.9 FL (ref 9.4–12.4)
PLATELET # BLD AUTO: 122 K/UL (ref 150–350)
PR INTERVAL: 184
QRS DURATION: 108
QT INTERVAL: 404
QTC CALCULATION(BAZETT): 442
R AXIS: -20
RBC # BLD AUTO: 2.58 M/UL (ref 4.5–5.8)
T WAVE AXIS: 15
VENTRICULAR RATE: 72
WBC # BLD AUTO: 6.3 K/UL (ref 3.8–10.5)

## 2024-01-27 PROCEDURE — 97110 THERAPEUTIC EXERCISES: CPT | Mod: GP,59

## 2024-01-27 PROCEDURE — 63700000 HC SELF-ADMINISTRABLE DRUG

## 2024-01-27 PROCEDURE — 63700000 HC SELF-ADMINISTRABLE DRUG: Performed by: PHYSICIAN ASSISTANT

## 2024-01-27 PROCEDURE — 25000000 HC PHARMACY GENERAL

## 2024-01-27 PROCEDURE — 97530 THERAPEUTIC ACTIVITIES: CPT | Mod: GO,59

## 2024-01-27 PROCEDURE — 12800000 HC ROOM AND CARE SEMIPRIVATE REHAB

## 2024-01-27 PROCEDURE — 92507 TX SP LANG VOICE COMM INDIV: CPT | Mod: GN

## 2024-01-27 PROCEDURE — 97530 THERAPEUTIC ACTIVITIES: CPT | Mod: GP,59

## 2024-01-27 PROCEDURE — 25800000 HC PHARMACY IV SOLUTIONS

## 2024-01-27 PROCEDURE — 36415 COLL VENOUS BLD VENIPUNCTURE: CPT

## 2024-01-27 PROCEDURE — 85027 COMPLETE CBC AUTOMATED: CPT

## 2024-01-27 PROCEDURE — 63700000 HC SELF-ADMINISTRABLE DRUG: Performed by: INTERNAL MEDICINE

## 2024-01-27 PROCEDURE — 97535 SELF CARE MNGMENT TRAINING: CPT | Mod: GO

## 2024-01-27 PROCEDURE — 63600000 HC DRUGS/DETAIL CODE: Mod: JZ

## 2024-01-27 RX ORDER — GUAIFENESIN 100 MG/5ML
400 SOLUTION ORAL EVERY 6 HOURS PRN
Status: DISCONTINUED | OUTPATIENT
Start: 2024-01-27 | End: 2024-01-27 | Stop reason: HOSPADM

## 2024-01-27 RX ORDER — SODIUM CHLORIDE 9 MG/ML
INJECTION, SOLUTION INTRAVENOUS
Status: DISCONTINUED | OUTPATIENT
Start: 2024-01-27 | End: 2024-01-27

## 2024-01-27 RX ORDER — FUROSEMIDE 20 MG/1
30 TABLET ORAL
Status: DISCONTINUED | OUTPATIENT
Start: 2024-01-27 | End: 2024-01-29

## 2024-01-27 RX ORDER — GUAIFENESIN 100 MG/5ML
400 SOLUTION ORAL 4 TIMES DAILY
Status: DISCONTINUED | OUTPATIENT
Start: 2024-01-27 | End: 2024-02-01

## 2024-01-27 RX ORDER — SODIUM CHLORIDE 1000 MG
2 TABLET, SOLUBLE MISCELLANEOUS 2 TIMES DAILY
Status: DISCONTINUED | OUTPATIENT
Start: 2024-01-27 | End: 2024-01-29

## 2024-01-27 RX ADMIN — ACETAMINOPHEN 650 MG: 325 TABLET, FILM COATED ORAL at 14:25

## 2024-01-27 RX ADMIN — SODIUM CHLORIDE 2 G: 1 TABLET ORAL at 14:24

## 2024-01-27 RX ADMIN — DOXYCYCLINE HYCLATE 100 MG: 100 TABLET, FILM COATED ORAL at 09:41

## 2024-01-27 RX ADMIN — BISACODYL 10 MG: 5 TABLET, COATED ORAL at 09:05

## 2024-01-27 RX ADMIN — LEVOTHYROXINE SODIUM 25 MCG: 25 TABLET ORAL at 05:39

## 2024-01-27 RX ADMIN — SODIUM CHLORIDE 125 MG: 9 INJECTION, SOLUTION INTRAVENOUS at 18:27

## 2024-01-27 RX ADMIN — TAMSULOSIN HYDROCHLORIDE 0.4 MG: 0.4 CAPSULE ORAL at 21:38

## 2024-01-27 RX ADMIN — GUAIFENESIN 400 MG: 100 SOLUTION ORAL at 21:39

## 2024-01-27 RX ADMIN — SODIUM CHLORIDE 1000 ML: 9 INJECTION, SOLUTION INTRAVENOUS at 18:28

## 2024-01-27 RX ADMIN — SENNOSIDES AND DOCUSATE SODIUM 2 TABLET: 8.6; 5 TABLET ORAL at 21:38

## 2024-01-27 RX ADMIN — GUAIFENESIN 400 MG: 100 SOLUTION ORAL at 05:38

## 2024-01-27 RX ADMIN — DICLOFENAC SODIUM: 10 GEL TOPICAL at 21:46

## 2024-01-27 RX ADMIN — DICLOFENAC SODIUM: 10 GEL TOPICAL at 16:21

## 2024-01-27 RX ADMIN — ALBUTEROL SULFATE: 2.5 SOLUTION RESPIRATORY (INHALATION) at 16:20

## 2024-01-27 RX ADMIN — OXYCODONE HYDROCHLORIDE AND ACETAMINOPHEN 500 MG: 500 TABLET ORAL at 09:05

## 2024-01-27 RX ADMIN — DICLOFENAC SODIUM: 10 GEL TOPICAL at 09:07

## 2024-01-27 RX ADMIN — FUROSEMIDE 30 MG: 20 TABLET ORAL at 14:24

## 2024-01-27 RX ADMIN — SENNOSIDES AND DOCUSATE SODIUM 2 TABLET: 8.6; 5 TABLET ORAL at 09:05

## 2024-01-27 RX ADMIN — GUAIFENESIN 400 MG: 100 SOLUTION ORAL at 12:38

## 2024-01-27 RX ADMIN — ALBUTEROL SULFATE: 2.5 SOLUTION RESPIRATORY (INHALATION) at 21:40

## 2024-01-27 RX ADMIN — GUAIFENESIN 400 MG: 100 SOLUTION ORAL at 00:47

## 2024-01-27 RX ADMIN — GUAIFENESIN 400 MG: 100 SOLUTION ORAL at 16:20

## 2024-01-27 RX ADMIN — Medication 2500 UNITS: at 16:21

## 2024-01-27 RX ADMIN — SODIUM CHLORIDE SOLN NEBU 3% 3 ML: 3 NEBU SOLN at 16:20

## 2024-01-27 RX ADMIN — SODIUM CHLORIDE SOLN NEBU 3% 3 ML: 3 NEBU SOLN at 09:06

## 2024-01-27 RX ADMIN — SODIUM CHLORIDE SOLN NEBU 3% 3 ML: 3 NEBU SOLN at 21:44

## 2024-01-27 RX ADMIN — SODIUM CHLORIDE 2 G: 1 TABLET ORAL at 21:37

## 2024-01-27 RX ADMIN — ALBUTEROL SULFATE: 2.5 SOLUTION RESPIRATORY (INHALATION) at 09:05

## 2024-01-27 RX ADMIN — DOXYCYCLINE HYCLATE 100 MG: 100 TABLET, FILM COATED ORAL at 21:37

## 2024-01-27 NOTE — PLAN OF CARE
Plan of Care Review  Plan of Care Reviewed With: patient  Progress: improving  Outcome Evaluation: Pt came back from ER at 0330 via [facility]. Pt is AAo X 3, non compliant with ASPEN collar, education provided but refused to wear. Verbalize discomfort rt shoulder but refused to take pain medicine. Incontinent for bladder, No BM during this shift. Frequent productive cough. On robitussin. Slept between the care. SAfety precautions maintained.

## 2024-01-27 NOTE — PROGRESS NOTES
Patient: Randolph Daley  Location: [facility] [address]  MRN: [id number]  Today's date: 1/27/2024    History of Present Illness  Pt is a 83 y.o. male admitted on 1/24/2024 with Multiple injuries due to trauma [T07.XXXA]. Principal problem is Multiple injuries due to trauma.  is a 83 y.o. male whose primary indication for inpatient rehabilitation is Debility.    Pt is a 83 y.o M adm to [facility] 1/24/24 who presented to [facility] after a slip and fall on ice landing on his right side.  He was found to have multiple injuries including right 8-11 rib fractures, bilateral pleural effusion s/p IR thoracentesis 1/18 with drainage of 1.1L, pelvic fracture (non op mgmt), right adductor/obturator hematoma, questionable posttraumatic bladder injury and age-indeterminate cervical fracture.  Neurosurgery was consulted and offered c spinesurgery, but patient declined.  [place] Collar on at all times, except for meals to prevent aspiration. Urology was consulted.  CT cystogram showed possible extravasation of contrast into the prostatic urethra.  Womack catheter is currently draining clear urine.  Plan is to continue Womack catheter for 1 week.  CT cystogram in 1 week with possible removal at that time, but pt declining CT Cystogram.        Past Medical History  PMH: BPH, HTN      PT Vitals    Date/Time Pulse HR Source SpO2 Pt Activity O2 Therapy BP BP Location BP Method Pt Position [facility]   01/27/24 1538 63 Monitor 95 % At rest None (Room air) 110/56 Right upper arm Automatic Sitting SC   01/27/24 1559 89 Monitor 95 % At rest None (Room air) -- -- -- -- SC      PT Pain    Date/Time Pain Type Location Rating: Rest Interventions [facility]   01/27/24 1538 Pain Assessment pelvic 4 position adjusted SC   01/27/24 1559 Pain Reassessment pelvic 5 position adjusted SC             Prior Living Environment    Flowsheet Row Most Recent Value   People in Home spouse   Current Living Arrangements home   Home Accessibility  "stairs to enter home (Group), stairs within home (Group)   Living Environment Comment lives with wife in 2 SH home with first floor set up   Number of Stairs, Main Entrance 1   Surface of Stairs, Main Entrance hardwood   Stair Railings, Main Entrance none   Location, Patient Bedroom first (main) floor   Patient Bedroom Access Comment first floor set up   Location, Bathroom first (main) floor   Bathroom Access Comment First floor set up. Walk in shower. grab bar and shower \"bench\"   Stairs, Within Home, Primary 16   Number of Stairs, Within Home, Primary other (see comments)   Surface of Stairs, Within Home, Primary carpeting   Stair Railings, Within Home, Primary railings on both sides of stairs  [left side rail goes 1/2- then can hold the spindles]   Stairs Comment, Within Home, Primary pt reports wife office upstairs but master bed/bath 1st floor          Prior Level of Function    Flowsheet Row Most Recent Value   Dominant Hand right   Ambulation independent   Transferring independent   Toileting independent   Bathing independent   Dressing independent   Eating independent   IADLs independent   Driving/Transportation    Prior Level of Function Comment Ind PTA, previous cervical fx. 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,  reports manages bill paying, medications, calendar at home           IRF PT Evaluation and Treatment - 01/27/24 1543        PT Time Calculation    Start Time 1530     Stop Time 1600     Time Calculation (min) 30 min        Session Details    Document Type Daily Treatment/Progress Note     Mode of Treatment physical therapy;individual therapy        General Information    General Observations of Patient Pt received seated upright in w/c in room with family; aspen collar doffed; donned aspen collar & educated pt & family regarding importance of wearing aspen collar AAT        Mobility Belt    Mobility Belt Used for All Out of " Bed Activity yes        Orthotics    Orthotics (Trigger Row) --   Aspen collar donned t/o duration of session       Orthosis Neck Cervical Collar    Orthosis Properties Date Obtained: 01/17/24 Time Obtained: 1900 Location: Neck Type: Cervical Collar Features: Hard Therapeutic Indications: fracture immobilization Wearing Schedule: wear at all times (remove only for hygiene and skin inspection)    Skin Assessment intact     Compliance/Wearing Issues needs reinforcement   Pt reluctant to kenzie aspen collar per MD orders d/t reported discomfort. Patient was educated extensively on importance of healing cervical fracture. Following education pt agreeable to wear. Adjusted for comfort.       Transfers    Comment Mobility belt donned t/o duration of session        Sit to Stand Transfer    [place], Sit to Stand Transfer moderate assist (50-74% patient effort)     Safety/Cues increased time to complete;verbal cues     Assistive Device walker, front-wheeled     Comment from w/c; modA at pelvis for BL hip ext, upright postural stability, & balance support        Stand to Sit Transfer    [place], Stand to Sit Transfer moderate assist (50-74% patient effort)     Safety/Cues increased time to complete;verbal cues;hand placement;maintaining center of gravity over base of support     Assistive Device walker, front-wheeled     Comment to w/c; modA at pelvis for eccentric controlled descent        Balance    Balance Interventions standing     Comment, Balance completed in standing w RW; 1) fwd toe taps, 1x10, unable to complete 2nd set 2/2 fatigue & drowsiness        Lower Extremity (Therapeutic Exercise)    Exercise Position/Type seated     Reps and Sets 2x15     Comment completed in sitting 1) alternating LAQ, 3s eccenric lower 2) hip add ball squeeze 3) AP/heel raises 4) hip abd w pink TB 5) altnerating marches        Therapeutic Interventions    Comment, Therapeutic Intervention Education provided to pt & family  regarding importance of aspen collar use AAT & reasoning for wearing schedule. Pt agitated with education provided, stating it was hard for him to breathe when wearing it, & removing it several times t/o session.        Daily Progress Summary (PT)    Daily Outcome Statement Focus of session on LE strengthening & standing balance. Session limited by drowsiness & fatigue 2/2 recent hospital visit, requiring increased rest breaks b/w activities. Pt refusing to wear aspen collar when outside of PT, provided education regarding importance of wearing schedule & pt was agreeable to kenzie after with multiple re-adjustments for comfort. Plan to further assess ambulation & functional mobility next session to update status.                      Education Documentation  Rehabilitation Therapy, taught by Nellie Min, PT at 1/27/2024  4:36 PM.  Learner: Family, Patient  Readiness: Acceptance  Method: Explanation  Response: Needs Reinforcement  Comment: Educated pt & family regarding importance of wearing Aspen collar AAT, reasoning for wearing schedule, & safety with completing activity OOB.          IRF PT Goals    Flowsheet Row Most Recent Value   Bed Mobility Goal 1    Activity/Assistive Device bed mobility activities, all at 01/25/2024 0900   [place] minimum assist (75% or more patient effort) at 01/25/2024 0900   Time Frame short-term goal (STG), 5 - 7 days at 01/25/2024 0900   Bed Mobility Goal 2    Activity/Assistive Device bed mobility activities, all at 01/25/2024 0900   [place] modified independence at 01/25/2024 0900   Time Frame long-term goal (LTG), 14 days or less at 01/25/2024 0900   Transfer Goal 1    Activity/Assistive Device sit-to-stand/stand-to-sit, bed-to-chair/chair-to-bed, stand pivot at 01/25/2024 0900   [place] minimum assist (75% or more patient effort) at 01/25/2024 0900   Time Frame short-term goal (STG), 5 - 7 days at 01/25/2024 0900   Transfer Goal 2    Activity/Assistive Device  sit-to-stand/stand-to-sit, bed-to-chair/chair-to-bed, stand pivot at 01/25/2024 0900   [place] modified independence at 01/25/2024 0900   Time Frame long-term goal (LTG), 14 days or less at 01/25/2024 0900   Gait/Walking Locomotion Goal 1    Activity/Assistive Device gait (walking locomotion) at 01/25/2024 0900   Distance 25 feet at 01/25/2024 0900   [place] minimum assist (75% or more patient effort) at 01/25/2024 0900   Time Frame short-term goal (STG), 5 - 7 days at 01/25/2024 0900   Gait/Walking Locomotion Goal 2    Activity/Assistive Device gait (walking locomotion) at 01/25/2024 0900   Distance 100 feet at 01/25/2024 0900   [place] supervision required at 01/25/2024 0900   Time Frame long-term goal (LTG), 14 days or less at 01/25/2024 0900

## 2024-01-27 NOTE — PROGRESS NOTES
Patient: Randolph Daley  Location: [facility]  MRN: [id number]  Today's date: 1/27/2024    History of Present Illness  Pt is a 83 y.o. male admitted on 1/24/2024 with Multiple injuries due to trauma [T07.XXXA]. Principal problem is Multiple injuries due to trauma.  is a 83 y.o. male whose primary indication for inpatient rehabilitation is Debility.    Pt is a 83 y.o M adm to [facility] 1/24/24 who presented to [facility] after a slip and fall on ice landing on his right side.  He was found to have multiple injuries including right 8-11 rib fractures, bilateral pleural effusion s/p IR thoracentesis 1/18 with drainage of 1.1L, pelvic fracture (non op mgmt), right adductor/obturator hematoma, questionable posttraumatic bladder injury and age-indeterminate cervical fracture.  Neurosurgery was consulted and offered c spinesurgery, but patient declined.  [place] Collar on at all times, except for meals to prevent aspiration. Urology was consulted.  CT cystogram showed possible extravasation of contrast into the prostatic urethra.  Womack catheter is currently draining clear urine.  Plan is to continue Womack catheter for 1 week.  CT cystogram in 1 week with possible removal at that time, but pt declining CT Cystogram.        Past Medical History  PMH: BPH, HTN      SLP Pain    Date/Time Pain Type Rating: Rest Who   01/27/24 0940 Pain Assessment 2 - mild pain HL   01/27/24 0957 Pain Reassessment 2 - mild pain HL          Prior Living Environment    Flowsheet Row Most Recent Value   People in Home spouse   Current Living Arrangements home   Home Accessibility stairs to enter home (Group), stairs within home (Group)   Living Environment Comment lives with wife in 2  home with first floor set up   Number of Stairs, Main Entrance 1   Surface of Stairs, Main Entrance hardwood   Stair Railings, Main Entrance none   Location, Patient Bedroom first (main) floor   Patient Bedroom Access Comment  "first floor set up   Location, Bathroom first (main) floor   Bathroom Access Comment First floor set up. Walk in shower. grab bar and shower \"bench\"   Stairs, Within Home, Primary 16   Number of Stairs, Within Home, Primary other (see comments)   Surface of Stairs, Within Home, Primary carpeting   Stair Railings, Within Home, Primary railings on both sides of stairs  [left side rail goes 1/2- then can hold the spindles]   Stairs Comment, Within Home, Primary pt reports wife office upstairs but master bed/bath 1st floor          Prior Level of Function    Flowsheet Row Most Recent Value   Dominant Hand right   Ambulation independent   Transferring independent   Toileting independent   Bathing independent   Dressing independent   Eating independent   IADLs independent   Driving/Transportation    Prior Level of Function Comment Ind PTA, previous cervical fx. 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 Pleasant and cooperative, verbally agreeable to tx.        Cognition/Psychosocial    Comment, Cognition Focus of session surrounded extensive edu r/t rationale for goals, ST services, and what cognition is. Pt continued to state that he does not need speech therapy services and he wants to work on walking. Pt falling asleep in between explanations. Continued to be resistive to education. Recommend completing BCAT during next session to objectively determine cognitive level and improve " insight/buy in.        Daily Progress Summary (SLP)    Daily Outcome Statement Session focus on education r/t limited insight into need for ST. Recommend completing BCAT during next session to objectively determine cognitive level and improve insight/buy in.                      IRF SLP Goals    Flowsheet Row Most Recent Value   Motor Speech/Voice Goal 1    Motor Speech/Voice Goal 1 breath support exercises (incentive spirometer, sustained phonation),  vocal function exercises for improved volume and voice quality x 80% mod cues. at 01/26/2024 0922   Time Frame short-term goal (STG), 2 weeks at 01/26/2024 0922   Motor Speech/Voice Goal 2    Motor Speech/Voice Goal 2 Functinal breath support and voice quality for complex convesational needs x 90% with I use of strategies at 01/26/2024 0922   Time Frame long-term goal (LTG), 3 weeks at 01/26/2024 0922   Executive Function Goal 1    Activity executive function tasks, information processing tasks, organization/sequencing tasks, planning/decision-making tasks, problem-solving/reasoning tasks, self-monitoring/self-correction  [convergent/divergent reasoning tasks,  mod level functional tasks related to return to I managing bills, meds, calendar, safety] at 01/26/2024 0922   [place]/Accuracy with 80% accuracy, with moderate, verbal cues/redirection at 01/26/2024 0922   Time Frame short-term goal (STG), 2 weeks at 01/26/2024 0922   Executive Function Goal 2    Activity executive function tasks, information processing tasks, organization/sequencing tasks, planning/decision-making tasks, problem-solving/reasoning tasks, self-monitoring/self-correction at 01/26/2024 0922   [place]/Accuracy with 90% accuracy, with minimum, verbal cues/redirection at 01/26/2024 0922   Time Frame long-term goal (LTG), 3 weeks at 01/26/2024 0922

## 2024-01-27 NOTE — PROGRESS NOTES
This note reflects care provided on 1/19/2024.     TRAUMA ATTENDING     Patient had hallucinations with oxycodone. His pain regimen was adjusted to tramadol.     He was sent for MRI of his cervical spine overnight to assess for ligamentous injury given an abnormal flexion-extension Xray in the setting of a likely old odontoid fracture. He was unable to complete the MRI due to claustrophobia. He adamantly states he will not obtain the study. He also tells me a Neruologist or Neurosurgeon told him he didn't need to wear his collar. I explained that was not the recommendation we were given so he will have to utilize the collar until this can be clarified     He also had a thoracentesis yesterday and 1 L of effusion was removed. Studies are pending. Gram stain is negative     He appears comfortable   VSS   He is on RA and his IS effort is 1 L.   LE - no edema, 5/5 strength

## 2024-01-27 NOTE — PROGRESS NOTES
[facility] Progress Note          Patient was seen and examined at bedside.    Subjective:   1/26/24  Reviewed night shift RN note:  Pt alert and oriented. Refused Aspen collar, encouraged to wear.  Refused SCDs. Condom cath on, drained 600cc dark clear yellow urine this shift. No BM this shift. Medicated for pain with tylenol, refused schedualed tramadol. Slept poorly overnight. Bed alarm on, call bell within reach.     Critical medical issues:  1. Acute anemia with Hb dropped 8.8-7.2, transfuse 1 unit PRBC, hold Eliquis, closely watch any potential source of bleeding; EVA, iv iron infusion will be provided  2. Multiple R posterior ribs frac with atelectasis and possible pleu effusion, high risk of PNA due poor respiration, CXR today, cont nebulizer treatments, IS, empiric abx   3. Dehydration with high BUN/Cr ratio, dark urine: IVF hydration with NS  4. Possible UTI , possible bladder injury even though repeated CT cystography, no extravasation, requested UCX with the same urine sample, empiric doxycyline which will prophy both  and pulm infections    critical illness, d/w PMR attending, RN supervisor, and the team.  Keep low threshold to send him for inpt evaluation and management if deteriorating            Addendum:  CXR increased R pleural effusion, acute anemia, Hb dropped from 8.8 to 7.2 in 24 hours, quite painful of right posterior chest with diminished BS, suspect hemothorax, also h/o R superior pubic rami fracture w hematoma could be another source of bleeding.    Plan: send him to [facility] ED for inpt evaluation and managements.   Personally notified [facility] ED PA and PMR attending, our RN supervisor will arrange transportation and will notify his family.       1/27/24  Sent to [facility] ED yesterday morning:  Repeated CBC with H/H 8.0/24.5, no PRBC transfusion given, provided IVF NS, he was sent back.  Important medical issues:  1. Hyponatremia, Na 134-132-128, likely SIADH: free water  restriction, salt supplements,  Po low dose lasix, monitor his Na level, no interval AMS or other related symptoms  2. Anemia, Hb 7.2-8.0, hold off PRBC for now; iron deficiency, provide iv iron infusion  3. R multiple Rib frac with increased R pleural effusion: pain control, IS, encourage pulm toileting    Repeat labs on Monday     Objective   Vital signs in last 24 hours:  Temp:  [36.7 °C (98 °F)-37 °C (98.6 °F)] 36.9 °C (98.5 °F)  Heart Rate:  [64-80] 64  Resp:  [16-20] 18  BP: (106-131)/(55-65) 121/58      Intake/Output Summary (Last 24 hours) at 1/27/2024 1332  Last data filed at 1/26/2024 1724  Gross per 24 hour   Intake 500 ml   Output --   Net 500 ml     Intake/Output this shift:  No intake/output data recorded.   Review of Systems:  All other systems reviewed and negative except as noted in the HPI.   Objective      Labs  reviewed his labs thoroughly   Lab Results   Component Value Date    WBC 6.30 01/27/2024    HGB 8.0 (L) 01/27/2024    HCT 24.5 (L) 01/27/2024    MCV 95.0 01/27/2024     (L) 01/27/2024     Lab Results   Component Value Date    GLUCOSE 107 (H) 01/26/2024    CALCIUM 8.9 01/26/2024     (L) 01/26/2024    K 4.0 01/26/2024    CO2 23 01/26/2024    CL 99 01/26/2024    BUN 35 (H) 01/26/2024    CREATININE 1.3 01/26/2024       Imaging  OSH imaging study reports reviewed:     CT cystography 1/24/24:  IMPRESSION:  No evidence of contrast extravasation into the prostatic parenchyma or other areas of contrast extravasation from the urinary bladder. Redemonstration of fractures involving the right superior and inferior pubic rami with extraperitoneal blood in the pelvis, which has slightly decreased compared to 1/17/2024. Persistent mild enlargement of the right adductor muscle compartment, similar to 1/17/2024.  His avila was removed.      MRI cervical spine 1/22/24  IMPRESSION:  1.  C2 fracture with no bone marrow edema to suggest acute fracture.  2.  No abnormal enhancement.  3.  No  epidural collection or hematoma.  4.  Degenerative spondylosis worst at C3-C4 where there is also suggestion of  Mild right jose luis-cord signal abnormality.  Multilevel spondylosis otherwise noted  as described.      Prior    Physical Exam:  Head/Ear/Nose/Throat: normocephalic; atraumatic; moisture mouth mm, no oropharyngeal thrush noted.   Eyes: anicteric sclera, EOMI; PERRL.   Neck : supple, no JVD, no carotid bruits appeciated.   Respiratory: no evidence of labored breathing, bibasilar lung BS diminished area, no remarkable w/r/c.   Cardiovascular: RRR; normal S1, S2; no m/r/g; no S3 or S4.   Gastrointestinal: soft; NT; BS normal; mildly distended; no CVAT b/l.   Genitourinary: off avila.   Extremities : no c/c/e .   Neurological: AO x 3, fluent speeches, following commands, CNS II-XII grossly intact; no focal neurologic deficits.   Behavior/Emotional: in NAD, appropriate; cooperative.   Skin: clean, dry and intact.     Plan of care was discussed with patient, RN, and PMR attending     Assessment   CC:S/p fall, sustained multi trauma: R post 8th-11th rib fractures,  R superior pubic rami fracture w hematoma, age undetermined C2 fracture; non surgically managed; ADL and ambulatory dysfunction          83 y.o. male with PMH of BPH, HTN, hypothyroidism, who presented and was admitted to [facility] on 1/17/24 after a slip and fall on ice landing on his right side. He was found to have multiple injuries including right 8-11 rib fractures, bilateral pleural effusion s/p IR thoracentesis 1/18 with drainage of 1.1L, pelvic fracture (non op mgmt), right adductor/obturator hematoma, questionable posttraumatic bladder injury and age-indeterminate cervical fracture.  Neurosurgery was consulted and offered c spinesurgery, but patient declined.  [place] Collar on at all times, except for meals to prevent aspiration.  Patient c/o difficulty coughing up mucus with collar on, but understands that he needs to wear the collar especially during  therapy.  Urology was consulted.  CT cystogram showed possible extravasation of contrast into the prostatic urethra.  Avila catheter is currently draining clear urine.  Plan is to continue Avila catheter for 1 week.  CT cystogram in 1 week with possible removal at that time, but pt declining CT Cystogram.  Functionally, he has significant physical econditioning with ADL and ambulatory dysfunction, requiring inpt acute rehab, was admitted to [facility] on 1/24/24.        Repeated   CT cystography 1/24/24:  IMPRESSION:  No evidence of contrast extravasation into the prostatic parenchyma or other areas of contrast extravasation from the urinary bladder. Redemonstration of fractures involving the right superior and inferior pubic rami with extraperitoneal blood in the pelvis, which has slightly decreased compared to 1/17/2024. Persistent mild enlargement of the right adductor muscle compartment, similar to 1/17/2024.  His avila was removed.      A/P:  # S/p fall, sustained multi trauma: R post 8th-11th rib fractures,  R superior pubic rami fracture w hematoma, age undetermined C2 fracture; non surgically managed; ADL and ambulatory dysfunction  -Inpt acute rehab, pain and medical managements, DVT prophylaxis, dermal defense, fall precaution      # R post 8th-11th rib fractures, atelectasis, chest congestion, possible pleu effusion  -Monitor SO2, prn NC O2, to keep SO2>92%, incentive spirometry, bronchodilator nebulizer, mucolytic agent, pulm toileting.  -Check CXR, chest PT  - Chest wall pain management     # Urinary retention, BPH, r/o bladder extravasation  -Off avila, timed voiding, condom cath in the evening, monitor PVR with prn cic, adequate oral hydration, check UA and UCX, consider to add flomax 0.4 mg qhs.     # Hypothyroidism   -cont home dose of levothyroxine     # DVT prophylaxis   -Eliquis 2.5 mg bid      # Insomnia, anxiety   - address his pain   - prn ativan and Ambien  - psychology CBT            Billing code:  [id number]  Diagnoses:  Patient Active Problem List   Diagnosis   • Fall due to slipping on ice or snow, initial encounter   • Closed fracture of multiple ribs of right side   • Closed fracture of ramus of right pubis (CMS/HCC)   • Pelvic hematoma in male   • Pleural effusion on left   • Normocytic anemia   • Stage 3a chronic kidney disease (CMS/HCC)   • Closed odontoid fracture (CMS/HCC)   • Multiple injuries due to trauma   complicated case with multiple comorbidities as mentioned in subjective section, spent 35 min to manage the case, >50% of the time consulting the patient about current medical condition, existing comorbidities, new findings/concerns and care/management plan.                Neville Arndt MD  1/27/2024

## 2024-01-27 NOTE — PLAN OF CARE
Problem: Rehabilitation (IRF) Plan of Care  Goal: Plan of Care Review  Outcome: Progressing  Flowsheets (Taken 1/27/2024 1603)  Progress: improving  Plan of Care Reviewed With: patient  Outcome Evaluation: Pt is A/O x4, easily agitated, non-compliant with non-medical interventions, refusing to wear Aspen collar at all times. Transfer is stand and pivot with mod assist. Right sided pelvic pain is managed with rest and Tylenol, vital signs benign, therapy not well tolerated. All other safety precautions maintained, call light in reach.   Plan of Care Review  Plan of Care Reviewed With: patient  Progress: improving  Outcome Evaluation: Pt is A/O x4, easily agitated, non-compliant with non-medical interventions, refusing to wear Aspen collar at all times. Transfer is stand and pivot with mod assist. Right sided pelvic pain is managed with rest and Tylenol, vital signs benign, therapy not well tolerated. All other safety precautions maintained, call light in reach.

## 2024-01-27 NOTE — PROGRESS NOTES
Patient: Randolph Daley  Location: [facility]  MRN: [id number]  Today's date: 1/27/2024    History of Present Illness  Pt is a 83 y.o. male admitted on 1/24/2024 with Multiple injuries due to trauma [T07.XXXA]. Principal problem is Multiple injuries due to trauma.  is a 83 y.o. male whose primary indication for inpatient rehabilitation is Debility.    Pt is a 83 y.o M adm to [facility] 1/24/24 who presented to [facility] after a slip and fall on ice landing on his right side.  He was found to have multiple injuries including right 8-11 rib fractures, bilateral pleural effusion s/p IR thoracentesis 1/18 with drainage of 1.1L, pelvic fracture (non op mgmt), right adductor/obturator hematoma, questionable posttraumatic bladder injury and age-indeterminate cervical fracture.  Neurosurgery was consulted and offered c spinesurgery, but patient declined.  [place] Collar on at all times, except for meals to prevent aspiration. Urology was consulted.  CT cystogram showed possible extravasation of contrast into the prostatic urethra.  [place] catheter is currently draining clear urine.  Plan is to continue [place]omack catheter for 1 week.  CT cystogram in 1 week with possible removal at that time, but pt declining CT Cystogram.        Past Medical History  PMH: BPH, HTN      OT Vitals    Date/Time Pulse HR Source Pt Activity BP BP Location BP Method Pt Position [facility]   01/27/24 1335 67 Monitor At rest 115/60 Left upper arm Automatic Sitting RC      OT Pain    Date/Time Pain Type Side/Orientation Location Rating: Rest Rating: Activity Interventions [facility]   01/27/24 1335 Pain Assessment right pelvic 4 4 medication administered    01/27/24 1340 Pain Reassessment right pelvic 4 4 position adjusted RC                       Prior Living Environment    Flowsheet Row Most Recent Value   People in Home spouse   Current Living Arrangements home   Home Accessibility stairs to enter home (Group), stairs within  "home (Group)   Living Environment Comment lives with wife in 2  home with first floor set up   Number of Stairs, Main Entrance 1   Surface of Stairs, Main Entrance hardwood   Stair Railings, Main Entrance none   Location, Patient Bedroom first (main) floor   Patient Bedroom Access Comment first floor set up   Location, Bathroom first (main) floor   Bathroom Access Comment First floor set up. Walk in shower. grab bar and shower \"bench\"   Stairs, Within Home, Primary 16   Number of Stairs, Within Home, Primary other (see comments)   Surface of Stairs, Within Home, Primary carpeting   Stair Railings, Within Home, Primary railings on both sides of stairs  [left side rail goes 1/2- then can hold the spindles]   Stairs Comment, Within Home, Primary pt reports wife office upstairs but master bed/bath 1st floor          Prior Level of Function    Flowsheet Row Most Recent Value   Dominant Hand right   Ambulation independent   Transferring independent   Toileting independent   Bathing independent   Dressing independent   Eating independent   IADLs independent   Driving/Transportation    Prior Level of Function Comment Ind PTA, previous cervical fx. 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           IRF OT Evaluation and Treatment - 01/27/24 1335        OT Time Calculation    Start Time 1330     Stop Time 1400     Time Calculation (min) 30 min        Session Details    Document Type Daily Treatment/Progress Note     Mode of Treatment individual therapy;occupational therapy        General Information    General Observations of Patient Pt " "received seated at EOB. With assist of RN x1 and PCT x1, patient attempting to transfer OOB to w/c. Hard cervical collar and gait belt were not present upon clinician arrival. Gait belt was donned prior to transfer.        Weight-Bearing Status    Right LE Weight-Bearing Status weight-bearing as tolerated (WBAT)        Mobility Belt    Mobility Belt Used for All Out of Bed Activity yes        Orthosis Neck Cervical Collar    Orthosis Properties Date Obtained: 01/17/24 Time Obtained: 1900 Location: Neck Type: Cervical Collar Features: Hard Therapeutic Indications: fracture immobilization Wearing Schedule: wear at all times (remove only for hygiene and skin inspection)    Compliance/Wearing Issues needs reinforcement   Patient was reluctant to don hard cervical collar per MD orders d/t reported discomfort and \"choking\" sensation. Patient was educated extensively on importance of healing cervical fracture. Following education pt agreeable to wear. Adjusted for comfort.       Sit to Stand Transfer    [place], Sit to Stand Transfer moderate assist (50-74% patient effort)     Safety/Cues hand placement;technique;preparatory posture     Assistive Device railing;wheelchair     Comment Blocked trials at bed rail, then completes for clothing management. Mod assist for ascent due to weakness, fatigue, and pain levels.        Stand to Sit Transfer    [place], Stand to Sit Transfer moderate assist (50-74% patient effort)     Safety/Cues hand placement;sequencing     Assistive Device wheelchair;railing     Comment Mod assist for slow, controlled ascent. Verbal cues for posterior reach.        Stand Pivot Transfer    [place], Stand Pivot/Stand Step Transfer dependent (less than 25% patient effort)     Safety/Cues increased time to complete;preparatory posture     Assistive Device none     Comment D/t recent return from hospital, fatigue, and increased pain levels, mod assist x1 and steadying assist x1 for SPT EOB to " reclining w/c. Patient benefitted from cuing for hand position and sequencing. Observing fatigue and balance deficits.        Lower Body Dressing    Tasks don;pants/bottoms;socks;shoes/slippers     [place] maximum assist (25-49% patient effort)     Safety/Cues increased time to complete;maintaining precautions     Position supported sitting     Adaptive Equipment none     [place], Footwear dependent (less than 25% patient effort)     Comment Pt required total assist for socks and shoes d/t spinal precautions. Patient was able to assist minimally with hiking in supported standing.        Daily Progress Summary (OT)    Daily Outcome Statement Pt tolerated session well, however c/o pain and fatigue. Communicated with RN to pre-medicate for upcoming PT session. Patient would benefit from trialing AE for dressing, transfer training, and reinforcement regarding wearing hard collar.                           IRF OT Goals    Flowsheet Row Most Recent Value   Transfer Goal 1    Activity/Assistive Device toilet at 01/25/2024 0629   [place] supervision required at 01/25/2024 0629   Time Frame short-term goal (STG), 5 - 7 days at 01/25/2024 0629   Transfer Goal 2    Activity/Assistive Device toilet at 01/25/2024 0629   [place] modified independence  [steady A] at 01/25/2024 0629   Time Frame long-term goal (LTG), 30 days or less at 01/25/2024 0629   Transfer Goal 3    Activity/Assistive Device shower at 01/25/2024 0629   [place] other (see comments)  [touch A] at 01/25/2024 0629   Time Frame short-term goal (STG), 5 - 7 days at 01/25/2024 0629   Transfer Goal 4    Activity/Assistive Device shower at 01/25/2024 0629   [place] supervision required at 01/25/2024 0629   Time Frame long-term goal (LTG), 30 days or less at 01/25/2024 0629   Bathing Goal 1    Activity/Assistive Device bathing skills, all at 01/25/2024 0629   [place] minimum assist (75% or more patient effort) at 01/25/2024 0629    Time Frame short-term goal (STG), 5 - 7 days at 01/25/2024 0629   Bathing Goal 2    Activity/Assistive Device bathing skills, all at 01/25/2024 0629   [place] supervision required at 01/25/2024 0629   Time Frame long-term goal (LTG), 30 days or less at 01/25/2024 0629   UB Dressing Goal 1    Activity/Assistive Device upper body dressing at 01/25/2024 0629   [place] set-up required at 01/25/2024 0629   Time Frame short-term goal (STG), 5 - 7 days at 01/25/2024 0629   UB Dressing Goal 2    Activity/Assistive Device upper body dressing at 01/25/2024 0629   [place] modified independence at 01/25/2024 0629   Time Frame long-term goal (LTG), 30 days or less at 01/25/2024 0629   LB Dressing Goal 1    Activity/Assistive Device lower body dressing at 01/25/2024 0629   [place] minimum assist (75% or more patient effort) at 01/25/2024 0629   Time Frame short-term goal (STG), 5 - 7 days at 01/25/2024 0629   LB Dressing Goal 2    Activity/Assistive Device lower body dressing at 01/25/2024 0629   [place] modified independence at 01/25/2024 0629   Time Frame long-term goal (LTG), 30 days or less at 01/25/2024 0629   Grooming Goal 1    Activity/Assistive Device grooming skills, all at 01/25/2024 0629   [place] set-up required at 01/25/2024 0629   Time Frame short-term goal (STG), 5 - 7 days at 01/25/2024 0629   Grooming Goal 2    Activity/Assistive Device grooming skills, all at 01/25/2024 0629   [place] modified independence at 01/25/2024 0629   Time Frame long-term goal (LTG), 30 days or less at 01/25/2024 0629   Toileting Goal 1    Activity/Assistive Device toileting skills, all at 01/25/2024 0629   [place] minimum assist (75% or more patient effort) at 01/25/2024 0629   Time Frame short-term goal (STG), 5 - 7 days at 01/25/2024 0629   Toileting Goal 2    Activity/Assistive Device toileting skills, all at 01/25/2024 0629   [place] modified independence at 01/25/2024 0629   Time  Frame long-term goal (LTG), 30 days or less at 01/25/2024 [id number]

## 2024-01-28 ENCOUNTER — APPOINTMENT (INPATIENT)
Dept: PHYSICAL THERAPY | Facility: REHABILITATION | Age: 84
DRG: 560 | End: 2024-01-28
Payer: COMMERCIAL

## 2024-01-28 ENCOUNTER — APPOINTMENT (INPATIENT)
Dept: OCCUPATIONAL THERAPY | Facility: REHABILITATION | Age: 84
DRG: 560 | End: 2024-01-28
Payer: COMMERCIAL

## 2024-01-28 LAB
BACTERIA UR CULT: ABNORMAL

## 2024-01-28 PROCEDURE — 97530 THERAPEUTIC ACTIVITIES: CPT | Mod: GO

## 2024-01-28 PROCEDURE — 63700000 HC SELF-ADMINISTRABLE DRUG

## 2024-01-28 PROCEDURE — 97535 SELF CARE MNGMENT TRAINING: CPT | Mod: GO

## 2024-01-28 PROCEDURE — 63700000 HC SELF-ADMINISTRABLE DRUG: Performed by: INTERNAL MEDICINE

## 2024-01-28 PROCEDURE — 97530 THERAPEUTIC ACTIVITIES: CPT | Mod: GP

## 2024-01-28 PROCEDURE — 25000000 HC PHARMACY GENERAL

## 2024-01-28 PROCEDURE — 97116 GAIT TRAINING THERAPY: CPT | Mod: GP

## 2024-01-28 PROCEDURE — 97110 THERAPEUTIC EXERCISES: CPT | Mod: GP

## 2024-01-28 PROCEDURE — 12800000 HC ROOM AND CARE SEMIPRIVATE REHAB

## 2024-01-28 PROCEDURE — 63600000 HC DRUGS/DETAIL CODE: Mod: JZ

## 2024-01-28 PROCEDURE — 25800000 HC PHARMACY IV SOLUTIONS

## 2024-01-28 RX ADMIN — GUAIFENESIN 400 MG: 100 SOLUTION ORAL at 07:47

## 2024-01-28 RX ADMIN — GUAIFENESIN 400 MG: 100 SOLUTION ORAL at 20:10

## 2024-01-28 RX ADMIN — ALBUTEROL SULFATE: 2.5 SOLUTION RESPIRATORY (INHALATION) at 07:47

## 2024-01-28 RX ADMIN — ALBUTEROL SULFATE: 2.5 SOLUTION RESPIRATORY (INHALATION) at 20:14

## 2024-01-28 RX ADMIN — SODIUM CHLORIDE SOLN NEBU 3% 3 ML: 3 NEBU SOLN at 20:15

## 2024-01-28 RX ADMIN — FUROSEMIDE 30 MG: 20 TABLET ORAL at 10:53

## 2024-01-28 RX ADMIN — SODIUM CHLORIDE 125 MG: 9 INJECTION, SOLUTION INTRAVENOUS at 18:22

## 2024-01-28 RX ADMIN — ALBUTEROL SULFATE: 2.5 SOLUTION RESPIRATORY (INHALATION) at 15:44

## 2024-01-28 RX ADMIN — LEVOTHYROXINE SODIUM 25 MCG: 25 TABLET ORAL at 05:34

## 2024-01-28 RX ADMIN — DICLOFENAC SODIUM: 10 GEL TOPICAL at 15:45

## 2024-01-28 RX ADMIN — DOXYCYCLINE HYCLATE 100 MG: 100 TABLET, FILM COATED ORAL at 21:29

## 2024-01-28 RX ADMIN — TAMSULOSIN HYDROCHLORIDE 0.4 MG: 0.4 CAPSULE ORAL at 20:12

## 2024-01-28 RX ADMIN — SENNOSIDES AND DOCUSATE SODIUM 2 TABLET: 8.6; 5 TABLET ORAL at 20:13

## 2024-01-28 RX ADMIN — GUAIFENESIN 400 MG: 100 SOLUTION ORAL at 15:42

## 2024-01-28 RX ADMIN — DOXYCYCLINE HYCLATE 100 MG: 100 TABLET, FILM COATED ORAL at 10:51

## 2024-01-28 RX ADMIN — SODIUM CHLORIDE SOLN NEBU 3% 3 ML: 3 NEBU SOLN at 15:44

## 2024-01-28 RX ADMIN — BISACODYL 10 MG: 5 TABLET, COATED ORAL at 07:46

## 2024-01-28 RX ADMIN — DICLOFENAC SODIUM: 10 GEL TOPICAL at 07:48

## 2024-01-28 RX ADMIN — DICLOFENAC SODIUM: 10 GEL TOPICAL at 20:21

## 2024-01-28 RX ADMIN — Medication 2500 UNITS: at 15:43

## 2024-01-28 RX ADMIN — SODIUM CHLORIDE SOLN NEBU 3% 3 ML: 3 NEBU SOLN at 07:46

## 2024-01-28 RX ADMIN — SODIUM CHLORIDE 1000 ML: 9 INJECTION, SOLUTION INTRAVENOUS at 18:23

## 2024-01-28 RX ADMIN — OXYCODONE HYDROCHLORIDE AND ACETAMINOPHEN 500 MG: 500 TABLET ORAL at 07:46

## 2024-01-28 RX ADMIN — SODIUM CHLORIDE 2 G: 1 TABLET ORAL at 20:14

## 2024-01-28 RX ADMIN — SODIUM CHLORIDE 2 G: 1 TABLET ORAL at 07:46

## 2024-01-28 RX ADMIN — ACETAMINOPHEN 650 MG: 325 TABLET, FILM COATED ORAL at 15:43

## 2024-01-28 RX ADMIN — GUAIFENESIN 400 MG: 100 SOLUTION ORAL at 12:17

## 2024-01-28 RX ADMIN — SENNOSIDES AND DOCUSATE SODIUM 2 TABLET: 8.6; 5 TABLET ORAL at 07:46

## 2024-01-28 NOTE — PLAN OF CARE
Problem: Rehabilitation (IRF) Plan of Care  Goal: Plan of Care Review  Outcome: Progressing  Flowsheets (Taken 1/28/2024 1414)  Progress: improving  Plan of Care Reviewed With: patient  Outcome Evaluation: Pt is A/O x4, calm and cooperative, continent of bladder and bowel. Transfer is stand and pivot with mod assist. 22 ga PIV in left anterior forearm is patent and flushes easily. No complaints of pain, vital signs benign, therapy tolerated. Pt refusing to wear Aspen collar at all times but will wear during therapy. Call light in reach.   Plan of Care Review  Plan of Care Reviewed With: patient  Progress: improving  Outcome Evaluation: Pt is A/O x4, calm and cooperative, continent of bladder and bowel. Transfer is stand and pivot with mod assist. 22 ga PIV in left anterior forearm is patent and flushes easily. No complaints of pain, vital signs benign, therapy tolerated. Pt refusing to wear Aspen collar at all times but will wear during therapy. Call light in reach.

## 2024-01-28 NOTE — PROGRESS NOTES
Patient: Randolph Daley  Location: [facility]  MRN: [id number]  Today's date: 1/28/2024    History of Present Illness  Pt is a 83 y.o. male admitted on 1/24/2024 with Multiple injuries due to trauma [T07.XXXA]. Principal problem is Multiple injuries due to trauma.  is a 83 y.o. male whose primary indication for inpatient rehabilitation is Debility.    Pt is a 83 y.o M adm to [facility] 1/24/24 who presented to [facility] after a slip and fall on ice landing on his right side.  He was found to have multiple injuries including right 8-11 rib fractures, bilateral pleural effusion s/p IR thoracentesis 1/18 with drainage of 1.1L, pelvic fracture (non op mgmt), right adductor/obturator hematoma, questionable posttraumatic bladder injury and age-indeterminate cervical fracture.  Neurosurgery was consulted and offered c spinesurgery, but patient declined.  [place] Collar on at all times, except for meals to prevent aspiration. Urology was consulted.  CT cystogram showed possible extravasation of contrast into the prostatic urethra.  Womack catheter is currently draining clear urine.  Plan is to continue Womack catheter for 1 week.  CT cystogram in 1 week with possible removal at that time, but pt declining CT Cystogram.        Past Medical History  PMH: BPH, HTN      OT Vitals    Date/Time Pulse HR Source SpO2 Pt Activity O2 Therapy BP BP Location BP Method Pt Position Observations [facility]   01/28/24 1135 78 Monitor 99 % At rest None (Room air) 121/62 Left upper arm Automatic Sitting OT pre LAF   01/28/24 1204 64 Monitor -- -- -- 141/68 Left upper arm Automatic Sitting OT post LAF      OT Pain    Date/Time Pain Type Location Rating: Rest Rating: Activity Interventions [facility]   01/28/24 1135 Pain Assessment pelvic 4 -- position adjusted LAF   01/28/24 1204 Pain Reassessment;Post Activity pelvic 4 4 position adjusted LAF             Prior Living Environment    Flowsheet Row Most Recent Value   People  "in Home spouse   Current Living Arrangements home   Home Accessibility stairs to enter home (Group), stairs within home (Group)   Living Environment Comment lives with wife in 2 SH home with first floor set up   Number of Stairs, Main Entrance 1   Surface of Stairs, Main Entrance hardwood   Stair Railings, Main Entrance none   Location, Patient Bedroom first (main) floor   Patient Bedroom Access Comment first floor set up   Location, Bathroom first (main) floor   Bathroom Access Comment First floor set up. Walk in shower. grab bar and shower \"bench\"   Stairs, Within Home, Primary 16   Number of Stairs, Within Home, Primary other (see comments)   Surface of Stairs, Within Home, Primary carpeting   Stair Railings, Within Home, Primary railings on both sides of stairs  [left side rail goes 1/2- then can hold the spindles]   Stairs Comment, Within Home, Primary pt reports wife office upstairs but master bed/bath 1st floor          Prior Level of Function    Flowsheet Row Most Recent Value   Dominant Hand right   Ambulation independent   Transferring independent   Toileting independent   Bathing independent   Dressing independent   Eating independent   IADLs independent   Driving/Transportation    Prior Level of Function Comment Ind PTA, previous cervical fx. 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           IRF OT Evaluation and Treatment - 01/28/24 1135        OT Time Calculation    Start Time 1135     Stop Time 1210     Time Calculation (min) 35 min        Session Details    Document Type Daily Treatment/Progress Note     Mode of " Treatment occupational therapy;individual therapy        General Information    Patient Profile Reviewed yes     General Observations of Patient received as handoff from transport - not wearing [place] collar.  OT retrieved [place] collar and placed on pt for remainder of session.  Aspen in place upon hanoff to PCT and pt wife for lunch.        Mobility Belt    Mobility Belt Used for All Out of Bed Activity yes        Orthosis Neck Cervical Collar    Orthosis Properties Date Obtained: 01/17/24 Time Obtained: 1900 Location: Neck Type: Cervical Collar Features: Hard Therapeutic Indications: fracture immobilization Wearing Schedule: wear at all times (remove only for hygiene and skin inspection)    Compliance/Wearing Issues needs reinforcement;other (see comments)   Reeducated pt re: wear aspen AAT       Sit to Stand Transfer    [place], Sit to Stand Transfer minimum assist (75% or more patient effort)     Assistive Device walker, front-wheeled     Comment from wc.  Mod A from lower sofa.        Stand to Sit Transfer    [place], Stand to Sit Transfer minimum assist (75% or more patient effort)     Assistive Device walker, front-wheeled     Comment to wc, mod A to lower sofa        Stand Pivot Transfer    [place], Stand Pivot/Stand Step Transfer minimum assist (75% or more patient effort)     Assistive Device walker, front-wheeled        Impairments/Safety Issues    Comment, Safety Issues/Impairments OT:  HHM min A RW for few steps length of couch to sit at far end from parked wc.        Balance    Comment, Balance OT:  sit/stand practice x 5 to improve consistency c hand placement.  3 x 5 reps each LE marching in stance prior to couch transfer.        Daily Progress Summary (OT)    Daily Outcome Statement Improved level of alertness this session.  Pt arrived s [place]en collar - OT retrieved and replaced on pt for remainder of session and return to unit post OT.  Reeducation provided re: collar AAT per orders.   Improved sit/stand to very min A from elevated wc, and good recall hand placement p initial reminder.  Cont c POC.     Recommendations (OT) Dressing, fx txs                           IRF OT Goals    Flowsheet Row Most Recent Value   Transfer Goal 1    Activity/Assistive Device toilet at 01/25/2024 0629   [place] supervision required at 01/25/2024 0629   Time Frame short-term goal (STG), 5 - 7 days at 01/25/2024 0629   Transfer Goal 2    Activity/Assistive Device toilet at 01/25/2024 0629   [place] modified independence  [steady A] at 01/25/2024 0629   Time Frame long-term goal (LTG), 30 days or less at 01/25/2024 0629   Transfer Goal 3    Activity/Assistive Device shower at 01/25/2024 0629   [place] other (see comments)  [touch A] at 01/25/2024 0629   Time Frame short-term goal (STG), 5 - 7 days at 01/25/2024 0629   Transfer Goal 4    Activity/Assistive Device shower at 01/25/2024 0629   [place] supervision required at 01/25/2024 0629   Time Frame long-term goal (LTG), 30 days or less at 01/25/2024 0629   Bathing Goal 1    Activity/Assistive Device bathing skills, all at 01/25/2024 0629   [place] minimum assist (75% or more patient effort) at 01/25/2024 0629   Time Frame short-term goal (STG), 5 - 7 days at 01/25/2024 0629   Bathing Goal 2    Activity/Assistive Device bathing skills, all at 01/25/2024 0629   [place] supervision required at 01/25/2024 0629   Time Frame long-term goal (LTG), 30 days or less at 01/25/2024 0629   UB Dressing Goal 1    Activity/Assistive Device upper body dressing at 01/25/2024 0629   [place] set-up required at 01/25/2024 0629   Time Frame short-term goal (STG), 5 - 7 days at 01/25/2024 0629   UB Dressing Goal 2    Activity/Assistive Device upper body dressing at 01/25/2024 0629   [place] modified independence at 01/25/2024 0629   Time Frame long-term goal (LTG), 30 days or less at 01/25/2024 0629   LB Dressing Goal 1    Activity/Assistive Device  lower body dressing at 01/25/2024 0629   [place] minimum assist (75% or more patient effort) at 01/25/2024 0629   Time Frame short-term goal (STG), 5 - 7 days at 01/25/2024 0629   LB Dressing Goal 2    Activity/Assistive Device lower body dressing at 01/25/2024 0629   [place] modified independence at 01/25/2024 0629   Time Frame long-term goal (LTG), 30 days or less at 01/25/2024 0629   Grooming Goal 1    Activity/Assistive Device grooming skills, all at 01/25/2024 0629   [place] set-up required at 01/25/2024 0629   Time Frame short-term goal (STG), 5 - 7 days at 01/25/2024 0629   Grooming Goal 2    Activity/Assistive Device grooming skills, all at 01/25/2024 0629   [place] modified independence at 01/25/2024 0629   Time Frame long-term goal (LTG), 30 days or less at 01/25/2024 0629   Toileting Goal 1    Activity/Assistive Device toileting skills, all at 01/25/2024 0629   [place] minimum assist (75% or more patient effort) at 01/25/2024 0629   Time Frame short-term goal (STG), 5 - 7 days at 01/25/2024 0629   Toileting Goal 2    Activity/Assistive Device toileting skills, all at 01/25/2024 0629   [place] modified independence at 01/25/2024 0629   Time Frame long-term goal (LTG), 30 days or less at 01/25/2024 0629

## 2024-01-28 NOTE — PROGRESS NOTES
Patient: Randolph Daley  Location: [facility]  MRN: [id number]  Today's date: 1/28/2024    History of Present Illness  Pt is a 83 y.o. male admitted on 1/24/2024 with Multiple injuries due to trauma [T07.XXXA]. Principal problem is Multiple injuries due to trauma.  is a 83 y.o. male whose primary indication for inpatient rehabilitation is Debility.    Pt is a 83 y.o M adm to [facility] 1/24/24 who presented to [facility] after a slip and fall on ice landing on his right side.  He was found to have multiple injuries including right 8-11 rib fractures, bilateral pleural effusion s/p IR thoracentesis 1/18 with drainage of 1.1L, pelvic fracture (non op mgmt), right adductor/obturator hematoma, questionable posttraumatic bladder injury and age-indeterminate cervical fracture.  Neurosurgery was consulted and offered c spinesurgery, but patient declined.  [place] Collar on at all times, except for meals to prevent aspiration. Urology was consulted.  CT cystogram showed possible extravasation of contrast into the prostatic urethra.  Womack catheter is currently draining clear urine.  Plan is to continue Womack catheter for 1 week.  CT cystogram in 1 week with possible removal at that time, but pt declining CT Cystogram.        Past Medical History  PMH: BPH, HTN      PT Vitals    Date/Time Pulse HR Source Pt Activity BP BP Location BP Method Pt Position [facility]   01/28/24 1321 66 Monitor At rest 112/66 Left upper arm Automatic Sitting [facility]      PT Pain    Date/Time Pain Type Side/Orientation Location Rating: Rest Rating: Activity Interventions [facility]   01/28/24 1321 Pain Assessment right other (see comments) ribs and pelvis 3 -- premedicated for activity [facility]   01/28/24 1425 Pain Reassessment right pelvic 3 4 cold applied [facility]             Prior Living Environment    Flowsheet Row Most Recent Value   People in Home spouse   Current Living Arrangements home   Home Accessibility stairs to enter home  "(Group), stairs within home (Group)   Living Environment Comment lives with wife in 2  home with first floor set up   Number of Stairs, Main Entrance 1   Surface of Stairs, Main Entrance hardwood   Stair Railings, Main Entrance none   Location, Patient Bedroom first (main) floor   Patient Bedroom Access Comment first floor set up   Location, Bathroom first (main) floor   Bathroom Access Comment First floor set up. Walk in shower. grab bar and shower \"bench\"   Stairs, Within Home, Primary 16   Number of Stairs, Within Home, Primary other (see comments)   Surface of Stairs, Within Home, Primary carpeting   Stair Railings, Within Home, Primary railings on both sides of stairs  [left side rail goes 1/2- then can hold the spindles]   Stairs Comment, Within Home, Primary pt reports wife office upstairs but master bed/bath 1st floor          Prior Level of Function    Flowsheet Row Most Recent Value   Dominant Hand right   Ambulation independent   Transferring independent   Toileting independent   Bathing independent   Dressing independent   Eating independent   IADLs independent   Driving/Transportation    Prior Level of Function Comment Ind PTA, previous cervical fx. 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,  reports manages bill paying, medications, calendar at home           IRF PT Evaluation and Treatment - 01/28/24 1304        PT Time Calculation    Start Time 1300     Stop Time 1430     Time Calculation (min) 90 min        Session Details    Document Type Daily Treatment/Progress Note     Mode of Treatment physical therapy;individual therapy        General Information    Patient Profile Reviewed yes     General Observations of Patient Pt received in room in  with Aspen collar doffed with wife present assisting with donning of shoes.  PT applied Aspen c/s collar and reinforced use of collar AAT per MD order        Mobility Belt    Mobility " Belt Used for All Out of Bed Activity yes        Orthotics    Orthotics (Trigger Row) Add Orthosis LDA        Orthosis Neck Cervical Collar    Orthosis Properties Date Obtained: 01/17/24 Time Obtained: 1900 Location: Neck Type: Cervical Collar Features: Hard Therapeutic Indications: fracture immobilization Wearing Schedule: wear at all times (remove only for hygiene and skin inspection)       Sit to Stand Transfer    [place], Sit to Stand Transfer minimum assist (75% or more patient effort)     Safety/Cues increased time to complete;technique;hand placement     Assistive Device walker, front-wheeled     Comment from WC to stand to RW min A for control to stand        Stand to Sit Transfer    [place], Stand to Sit Transfer minimum assist (75% or more patient effort)     Safety/Cues increased time to complete;technique;hand placement     Assistive Device walker, front-wheeled     Comment sit to WC min A to control descent to WC to sit        Stand Pivot Transfer    [place], Stand Pivot/Stand Step Transfer minimum assist (75% or more patient effort)     Safety/Cues increased time to complete;technique;proper use of assistive device     Assistive Device walker, front-wheeled     Comment amb approach        Car Transfer    Transfer Technique stand pivot     [place] moderate assist (50-74% patient effort)     Safety/Cues maximal;verbal cues;hand placement;preparatory posture;proper use of assistive device;technique     Assistive Device walker, front-wheeled     Comment SPT with RW increased time in / out of simulated car Mod A for assistance with RLE in / out of car and with sit / stand        Gait Training    [place], Gait moderate assist (50-74% patient effort)     Safety/Cues increased time to complete;gait deviations;technique     Assistive Device walker, front-wheeled     Distance in Feet 60 feet     Pattern step-to;swing-through     Deviations/Abnormal Patterns base of support, narrow;gait  "speed decreased;step length decreased     Advanced Gait Activity 10 Meter Walk Test (Self-Selected Velocity)     [place], Picking Up Object minimum assist (75% or more patient effort)   with use of a reacher    Comment (Gait/Stairs) 60', 20' with RW Mod A for WS / upright posture        Rough/Uneven Surface Gait Skills    [place] moderate assist (50-74% patient effort)     Assistive Device walker, front-wheeled     Distance in Feet 25 feet     Comment with RW Mod A for balance / weight shift        10 Meter Walk Test (Self-Selected Velocity)    Trial One: Ten Meter Walk Test (sec) 52.4 seconds     Assistive Device walker, front-wheeled     Trial One: Gait Speed (m/s) 0.11 m/s        Stairs Training    [place], Stairs moderate assist (50-74% patient effort)     Safety/Cues increased time to complete;moderate;verbal cues;hand placement;proper use of assistive device;technique;sequencing     Assistive Device railing     Handrail Location (Stairs) both sides     Number of Stairs 6     Stair Height 4 inches     Ascending Stairs Technique step-to-step   up with the L    Descending Stairs Technique step-to-step   down with the R    Comment up/down 6-4\" steps with BHR Mod A for lifting / eccentric control of R quad        Lower Extremity (Therapeutic Exercise)    Exercise Position/Type seated     General Exercise ankle pumps;LAQ (long arc quad)     Reps and Sets 2 x 15     Comment seated active warm up        Modality Interventions Comprehensive    Modality Interventions cryotherapy        Cryotherapy    Location 1 neck/back treatment area   R pelvic area    Indications inflammation;pain;edema     Comment Provided ice to R pelvic region x 10 min while resting skin intact post ice        Daily Progress Summary (PT)    Daily Outcome Statement Pt requires increased rest breaks to manage fatigue from activity.  Continued education regarding [place] collar compliance.  Progressed gait distance without significant " increase in pain and improving consistency with step through pattern.  Self selected gait speed assessed via 10 MWT with slow speed of 0.11 m/s correlating with household distance ambulation and slower than age / sex matched speed.  Will benefit from continued use of ice to pelvic fracture as pt reported good pain relief.  Goals established for stairs. Cont POC.                      Education Documentation  Mobility Aids/Assistive Devices, taught by Elda Osorio, PT at 1/28/2024  3:47 PM.  Learner: Patient  Readiness: Acceptance  Method: Explanation  Response: Verbalizes Understanding, Needs Reinforcement  Comment: use of RW with functional mobility; continued education on compliance with [place] C/S collar          IRF PT Goals    Flowsheet Row Most Recent Value   Bed Mobility Goal 1    Activity/Assistive Device bed mobility activities, all at 01/25/2024 0900   [place] minimum assist (75% or more patient effort) at 01/25/2024 0900   Time Frame short-term goal (STG), 5 - 7 days at 01/25/2024 0900   Bed Mobility Goal 2    Activity/Assistive Device bed mobility activities, all at 01/25/2024 0900   [place] modified independence at 01/25/2024 0900   Time Frame long-term goal (LTG), 14 days or less at 01/25/2024 0900   Transfer Goal 1    Activity/Assistive Device sit-to-stand/stand-to-sit, bed-to-chair/chair-to-bed, stand pivot at 01/25/2024 0900   [place] minimum assist (75% or more patient effort) at 01/25/2024 0900   Time Frame short-term goal (STG), 5 - 7 days at 01/25/2024 0900   Transfer Goal 2    Activity/Assistive Device sit-to-stand/stand-to-sit, bed-to-chair/chair-to-bed, stand pivot at 01/25/2024 0900   [place] modified independence at 01/25/2024 0900   Time Frame long-term goal (LTG), 14 days or less at 01/25/2024 0900   Gait/Walking Locomotion Goal 1    Activity/Assistive Device gait (walking locomotion) at 01/25/2024 0900   Distance 25 feet at 01/25/2024 0900   [place]  minimum assist (75% or more patient effort) at 01/25/2024 0900   Time Frame short-term goal (STG), 5 - 7 days at 01/25/2024 0900   Gait/Walking Locomotion Goal 2    Activity/Assistive Device gait (walking locomotion) at 01/25/2024 0900   Distance 100 feet at 01/25/2024 0900   [place] supervision required at 01/25/2024 0900   Time Frame long-term goal (LTG), 14 days or less at 01/25/2024 0900   Stairs Goal 1    Activity/Assistive Device stairs, all skills, using handrail, left, using handrail, right at 01/28/2024 1304   Number of Stairs 12 at 01/28/2024 1304   [place] minimum assist (75% or more patient effort) at 01/28/2024 1304   Time Frame 5 - 7 days, short-term goal (STG) at 01/28/2024 1304   Stairs Goal 2    Activity/Assistive Device stairs, all skills, using handrail, left, cane, straight at 01/28/2024 1304   Number of Stairs 12 at 01/28/2024 1304   [place] supervision required at 01/28/2024 1304   Time Frame 14 days or less at 01/28/2024 1304

## 2024-01-28 NOTE — PLAN OF CARE
Plan of Care Review  Plan of Care Reviewed With: patient  Progress: improving  Outcome Evaluation: Patient is aaox4, takes meds whole with thin liquids . Non compliant with aspen collar. Denied any pain and respiratory discomfort. Left  arm peripheral line intact , patent and easy to flush.  Slept well. Callbell within reach and bed alarm is on.

## 2024-01-29 ENCOUNTER — APPOINTMENT (INPATIENT)
Dept: OCCUPATIONAL THERAPY | Facility: REHABILITATION | Age: 84
DRG: 560 | End: 2024-01-29
Payer: COMMERCIAL

## 2024-01-29 ENCOUNTER — APPOINTMENT (OUTPATIENT)
Dept: PSYCHOLOGY | Facility: CLINIC | Age: 84
End: 2024-01-29
Payer: COMMERCIAL

## 2024-01-29 ENCOUNTER — APPOINTMENT (INPATIENT)
Dept: SPEECH THERAPY | Facility: REHABILITATION | Age: 84
DRG: 560 | End: 2024-01-29
Payer: COMMERCIAL

## 2024-01-29 ENCOUNTER — APPOINTMENT (INPATIENT)
Dept: PHYSICAL THERAPY | Facility: REHABILITATION | Age: 84
DRG: 560 | End: 2024-01-29
Payer: COMMERCIAL

## 2024-01-29 LAB
ANION GAP SERPL CALC-SCNC: 9 MEQ/L (ref 3–15)
BUN SERPL-MCNC: 42 MG/DL (ref 7–25)
CALCIUM SERPL-MCNC: 8.8 MG/DL (ref 8.6–10.3)
CHLORIDE SERPL-SCNC: 106 MEQ/L (ref 98–107)
CO2 SERPL-SCNC: 23 MEQ/L (ref 21–31)
CREAT SERPL-MCNC: 1.2 MG/DL (ref 0.7–1.3)
EGFRCR SERPLBLD CKD-EPI 2021: >60 ML/MIN/1.73M*2
GLUCOSE SERPL-MCNC: 98 MG/DL (ref 70–99)
POTASSIUM SERPL-SCNC: 4.1 MEQ/L (ref 3.5–5.1)
SODIUM SERPL-SCNC: 138 MEQ/L (ref 136–145)

## 2024-01-29 PROCEDURE — 25800000 HC PHARMACY IV SOLUTIONS

## 2024-01-29 PROCEDURE — 97530 THERAPEUTIC ACTIVITIES: CPT | Mod: GP,CQ,59

## 2024-01-29 PROCEDURE — 97110 THERAPEUTIC EXERCISES: CPT | Mod: GP,59

## 2024-01-29 PROCEDURE — 90832 PSYTX W PT 30 MINUTES: CPT | Performed by: PSYCHOLOGIST

## 2024-01-29 PROCEDURE — 92507 TX SP LANG VOICE COMM INDIV: CPT | Mod: GN

## 2024-01-29 PROCEDURE — 63700000 HC SELF-ADMINISTRABLE DRUG

## 2024-01-29 PROCEDURE — 97116 GAIT TRAINING THERAPY: CPT | Mod: GP,CQ

## 2024-01-29 PROCEDURE — 97116 GAIT TRAINING THERAPY: CPT | Mod: GP

## 2024-01-29 PROCEDURE — 25000000 HC PHARMACY GENERAL

## 2024-01-29 PROCEDURE — 36415 COLL VENOUS BLD VENIPUNCTURE: CPT | Performed by: INTERNAL MEDICINE

## 2024-01-29 PROCEDURE — 97112 NEUROMUSCULAR REEDUCATION: CPT | Mod: GP,59

## 2024-01-29 PROCEDURE — 63600000 HC DRUGS/DETAIL CODE: Mod: JZ

## 2024-01-29 PROCEDURE — 97530 THERAPEUTIC ACTIVITIES: CPT | Mod: GP,59

## 2024-01-29 PROCEDURE — 80048 BASIC METABOLIC PNL TOTAL CA: CPT | Performed by: INTERNAL MEDICINE

## 2024-01-29 PROCEDURE — 97530 THERAPEUTIC ACTIVITIES: CPT | Mod: GO,59

## 2024-01-29 PROCEDURE — 12800000 HC ROOM AND CARE SEMIPRIVATE REHAB

## 2024-01-29 PROCEDURE — 63700000 HC SELF-ADMINISTRABLE DRUG: Performed by: INTERNAL MEDICINE

## 2024-01-29 PROCEDURE — 97535 SELF CARE MNGMENT TRAINING: CPT | Mod: GO

## 2024-01-29 RX ORDER — SODIUM CHLORIDE 1000 MG
1 TABLET, SOLUBLE MISCELLANEOUS 2 TIMES DAILY
Status: DISCONTINUED | OUTPATIENT
Start: 2024-01-29 | End: 2024-02-01

## 2024-01-29 RX ADMIN — SENNOSIDES AND DOCUSATE SODIUM 2 TABLET: 8.6; 5 TABLET ORAL at 21:15

## 2024-01-29 RX ADMIN — Medication 2500 UNITS: at 16:33

## 2024-01-29 RX ADMIN — APIXABAN 2.5 MG: 2.5 TABLET, FILM COATED ORAL at 21:15

## 2024-01-29 RX ADMIN — SODIUM CHLORIDE SOLN NEBU 3% 3 ML: 3 NEBU SOLN at 16:32

## 2024-01-29 RX ADMIN — GUAIFENESIN 400 MG: 100 SOLUTION ORAL at 16:31

## 2024-01-29 RX ADMIN — OXYCODONE HYDROCHLORIDE AND ACETAMINOPHEN 500 MG: 500 TABLET ORAL at 08:27

## 2024-01-29 RX ADMIN — GUAIFENESIN 400 MG: 100 SOLUTION ORAL at 08:27

## 2024-01-29 RX ADMIN — LEVOTHYROXINE SODIUM 25 MCG: 25 TABLET ORAL at 05:25

## 2024-01-29 RX ADMIN — GUAIFENESIN 400 MG: 100 SOLUTION ORAL at 12:41

## 2024-01-29 RX ADMIN — SODIUM CHLORIDE 2 G: 1 TABLET ORAL at 08:27

## 2024-01-29 RX ADMIN — SODIUM CHLORIDE 1000 ML: 9 INJECTION, SOLUTION INTRAVENOUS at 19:52

## 2024-01-29 RX ADMIN — ACETAMINOPHEN 650 MG: 325 TABLET, FILM COATED ORAL at 11:13

## 2024-01-29 RX ADMIN — SODIUM CHLORIDE SOLN NEBU 3% 3 ML: 3 NEBU SOLN at 08:28

## 2024-01-29 RX ADMIN — SODIUM CHLORIDE 1 G: 1 TABLET ORAL at 21:15

## 2024-01-29 RX ADMIN — ALBUTEROL SULFATE: 2.5 SOLUTION RESPIRATORY (INHALATION) at 16:32

## 2024-01-29 RX ADMIN — SODIUM CHLORIDE 125 MG: 9 INJECTION, SOLUTION INTRAVENOUS at 18:08

## 2024-01-29 RX ADMIN — DOXYCYCLINE HYCLATE 100 MG: 100 TABLET, FILM COATED ORAL at 21:15

## 2024-01-29 RX ADMIN — TAMSULOSIN HYDROCHLORIDE 0.4 MG: 0.4 CAPSULE ORAL at 21:17

## 2024-01-29 RX ADMIN — DOXYCYCLINE HYCLATE 100 MG: 100 TABLET, FILM COATED ORAL at 11:14

## 2024-01-29 RX ADMIN — GUAIFENESIN 400 MG: 100 SOLUTION ORAL at 21:17

## 2024-01-29 RX ADMIN — ALBUTEROL SULFATE: 2.5 SOLUTION RESPIRATORY (INHALATION) at 08:28

## 2024-01-29 NOTE — PROGRESS NOTES
[facility] Progress Note          Patient was seen and examined at bedside.    Subjective:   1/26/24  Reviewed night shift RN note:  Pt alert and oriented. Refused [place] collar, encouraged to wear.  Refused SCDs. Condom cath on, drained 600cc dark clear yellow urine this shift. No BM this shift. Medicated for pain with tylenol, refused schedualed tramadol. Slept poorly overnight. Bed alarm on, call bell within reach.     Critical medical issues:  1. Acute anemia with Hb dropped 8.8-7.2, transfuse 1 unit PRBC, hold Eliquis, closely watch any potential source of bleeding; EVA, iv iron infusion will be provided  2. Multiple R posterior ribs frac with atelectasis and possible pleu effusion, high risk of PNA due poor respiration, CXR today, cont nebulizer treatments, IS, empiric abx   3. Dehydration with high BUN/Cr ratio, dark urine: IVF hydration with NS  4. Possible UTI , possible bladder injury even though repeated CT cystography, no extravasation, requested UCX with the same urine sample, empiric doxycyline which will prophy both  and pulm infections    critical illness, d/w PMR attending, RN supervisor, and the team.  Keep low threshold to send him for inpt evaluation and management if deteriorating            Addendum:  CXR increased R pleural effusion, acute anemia, Hb dropped from 8.8 to 7.2 in 24 hours, quite painful of right posterior chest with diminished BS, suspect hemothorax, also h/o R superior pubic rami fracture w hematoma could be another source of bleeding.    Plan: send him to [facility] ED for inpt evaluation and managements.   Personally notified [facility] ED PA and PMR attending, our RN supervisor will arrange transportation and will notify his family.       1/27/24  Sent to [facility] ED yesterday morning:  Repeated CBC with H/H 8.0/24.5, no PRBC transfusion given, provided IVF NS, he was sent back.  Important medical issues:  1. Hyponatremia, Na 134-132-128, likely SIADH: free water  restriction, salt supplements,  Po low dose lasix, monitor his Na level, no interval AMS or other related symptoms  2. Anemia, Hb 7.2-8.0, hold off PRBC for now; iron deficiency, provide iv iron infusion  3. R multiple Rib frac with increased R pleural effusion: pain control, IS, encourage pulm toileting    Repeat labs on Monday 1/29/24  Saw and evaluated him in am:  Very pleasant, cooperative, conversant, has been using IS frequently, his R basal lung BS is diminished, R posterior rib fracs still painful but manageable, His VS and respiratory status and evening SO2 are fine. Reviewed his am BMP, hyponatremia resolved, Na 128-138, liberate fluid intake, d/edu po lasix and lower the salt tablets.      Objective   Vital signs in last 24 hours:  Temp:  [36.3 °C (97.4 °F)-36.7 °C (98 °F)] 36.7 °C (98 °F)  Heart Rate:  [60-74] 60  Resp:  [16-18] 16  BP: (101-124)/(57-66) 116/61      Intake/Output Summary (Last 24 hours) at 1/29/2024 1207  Last data filed at 1/29/2024 0830  Gross per 24 hour   Intake --   Output 700 ml   Net -700 ml     Intake/Output this shift:  I/O this shift:  In: -   Out: 700 [Urine:700]   Review of Systems:  All other systems reviewed and negative except as noted in the HPI.   Objective      Labs  reviewed his labs thoroughly   Lab Results   Component Value Date    WBC 6.30 01/27/2024    HGB 8.0 (L) 01/27/2024    HCT 24.5 (L) 01/27/2024    MCV 95.0 01/27/2024     (L) 01/27/2024     Lab Results   Component Value Date    GLUCOSE 98 01/29/2024    CALCIUM 8.8 01/29/2024     01/29/2024    K 4.1 01/29/2024    CO2 23 01/29/2024     01/29/2024    BUN 42 (H) 01/29/2024    CREATININE 1.2 01/29/2024       Imaging  OSH imaging study reports reviewed:     CT cystography 1/24/24:  IMPRESSION:  No evidence of contrast extravasation into the prostatic parenchyma or other areas of contrast extravasation from the urinary bladder. Redemonstration of fractures involving the right superior and  inferior pubic rami with extraperitoneal blood in the pelvis, which has slightly decreased compared to 1/17/2024. Persistent mild enlargement of the right adductor muscle compartment, similar to 1/17/2024.  His avila was removed.      MRI cervical spine 1/22/24  IMPRESSION:  1.  C2 fracture with no bone marrow edema to suggest acute fracture.  2.  No abnormal enhancement.  3.  No epidural collection or hematoma.  4.  Degenerative spondylosis worst at C3-C4 where there is also suggestion of  Mild right jose luis-cord signal abnormality.  Multilevel spondylosis otherwise noted  as described.      Full Code    Physical Exam:  Head/Ear/Nose/Throat: normocephalic; atraumatic; moisture mouth mm, no oropharyngeal thrush noted.   Eyes: anicteric sclera, EOMI; PERRL.   Neck : supple, no JVD, no carotid bruits appeciated.   Respiratory: no evidence of labored breathing, bibasilar lung BS diminished area, no remarkable w/r/c.   Cardiovascular: RRR; normal S1, S2; no m/r/g; no S3 or S4.   Gastrointestinal: soft; NT; BS normal; mildly distended; no CVAT b/l.   Genitourinary: off avila.   Extremities : no c/c/e .   Neurological: AO x 3, fluent speeches, following commands, CNS II-XII grossly intact; no focal neurologic deficits.   Behavior/Emotional: in NAD, appropriate; cooperative.   Skin: clean, dry and intact.     Plan of care was discussed with patient, RN, and PMR attending     Assessment   CC:S/p fall, sustained multi trauma: R post 8th-11th rib fractures,  R superior pubic rami fracture w hematoma, age undetermined C2 fracture; non surgically managed; ADL and ambulatory dysfunction          83 y.o. male with PMH of BPH, HTN, hypothyroidism, who presented and was admitted to [facility] on 1/17/24 after a slip and fall on ice landing on his right side. He was found to have multiple injuries including right 8-11 rib fractures, bilateral pleural effusion s/p IR thoracentesis 1/18 with drainage of 1.1L, pelvic fracture (non op mgmt),  right adductor/obturator hematoma, questionable posttraumatic bladder injury and age-indeterminate cervical fracture.  Neurosurgery was consulted and offered c spinesurgery, but patient declined.  [place] Collar on at all times, except for meals to prevent aspiration.  Patient c/o difficulty coughing up mucus with collar on, but understands that he needs to wear the collar especially during therapy.  Urology was consulted.  CT cystogram showed possible extravasation of contrast into the prostatic urethra.  Avila catheter is currently draining clear urine.  Plan is to continue Avila catheter for 1 week.  CT cystogram in 1 week with possible removal at that time, but pt declining CT Cystogram.  Functionally, he has significant physical econditioning with ADL and ambulatory dysfunction, requiring inpt acute rehab, was admitted to [facility] on 1/24/24.        Repeated   CT cystography 1/24/24:  IMPRESSION:  No evidence of contrast extravasation into the prostatic parenchyma or other areas of contrast extravasation from the urinary bladder. Redemonstration of fractures involving the right superior and inferior pubic rami with extraperitoneal blood in the pelvis, which has slightly decreased compared to 1/17/2024. Persistent mild enlargement of the right adductor muscle compartment, similar to 1/17/2024.  His avila was removed.      A/P:  # S/p fall, sustained multi trauma: R post 8th-11th rib fractures,  R superior pubic rami fracture w hematoma, age undetermined C2 fracture; non surgically managed; ADL and ambulatory dysfunction  -Inpt acute rehab, pain and medical managements, DVT prophylaxis, dermal defense, fall precaution      # R post 8th-11th rib fractures, atelectasis, chest congestion, possible pleu effusion  -Monitor SO2, prn NC O2, to keep SO2>92%, incentive spirometry, bronchodilator nebulizer, mucolytic agent, pulm toileting.  -Check CXR, chest PT  - Chest wall pain management     # Urinary retention, BPH, r/o  bladder extravasation  -Off avila, timed voiding, condom cath in the evening, monitor PVR with prn cic, adequate oral hydration, check UA and UCX, consider to add flomax 0.4 mg qhs.     # Hypothyroidism   -cont home dose of levothyroxine     # DVT prophylaxis   -Eliquis 2.5 mg bid      # Insomnia, anxiety   - address his pain   - prn ativan and Ambien  - psychology CBT            Billing code: [id number]  Diagnoses:  Patient Active Problem List   Diagnosis   • Fall due to slipping on ice or snow, initial encounter   • Closed fracture of multiple ribs of right side   • Closed fracture of ramus of right pubis (CMS/HCC)   • Pelvic hematoma in male   • Pleural effusion on left   • Normocytic anemia   • Stage 3a chronic kidney disease (CMS/HCC)   • Closed odontoid fracture (CMS/HCC)   • Multiple injuries due to trauma   complicated case with multiple comorbidities as mentioned in subjective section, spent 35 min to manage the case, >50% of the time consulting the patient about current medical condition, existing comorbidities, new findings/concerns and care/management plan.                Neville Arndt MD  1/29/2024

## 2024-01-29 NOTE — PROGRESS NOTES
Patient: Randolph Daley  Location: [facility] [address]  MRN: [id number]  Today's date: 1/29/2024    History of Present Illness  Pt is a 83 y.o. male admitted on 1/24/2024 with Multiple injuries due to trauma [T07.XXXA]. Principal problem is Multiple injuries due to trauma.  is a 83 y.o. male whose primary indication for inpatient rehabilitation is Debility.    Pt is a 83 y.o M adm to [facility] 1/24/24 who presented to [facility] after a slip and fall on ice landing on his right side.  He was found to have multiple injuries including right 8-11 rib fractures, bilateral pleural effusion s/p IR thoracentesis 1/18 with drainage of 1.1L, pelvic fracture (non op mgmt), right adductor/obturator hematoma, questionable posttraumatic bladder injury and age-indeterminate cervical fracture.  Neurosurgery was consulted and offered c spinesurgery, but patient declined.  [place] Collar on at all times, except for meals to prevent aspiration. Urology was consulted.  CT cystogram showed possible extravasation of contrast into the prostatic urethra.  [place] catheter is currently draining clear urine.  Plan is to continue [place] catheter for 1 week.  CT cystogram in 1 week with possible removal at that time, but pt declining CT Cystogram.        Past Medical History  PMH: BPH, HTN       PT Vitals    Date/Time Pulse HR Source BP BP Location BP Method Pt Position [facility]   01/29/24 0906 65 Monitor 111/58 Right upper arm Automatic Sitting JR      PT Pain    Date/Time Pain Type Side/Orientation Location Rating: Rest Rating: Activity Interventions [facility]   01/29/24 0906 Pain Assessment right flank 4 - moderate pain -- prescribed exercises encouraged    01/29/24 0930 Pain Reassessment right flank -- 4 - moderate pain position adjusted JR             Prior Living Environment    Flowsheet Row Most Recent Value   People in Home spouse   Current Living Arrangements home   Home Accessibility stairs to enter home (Group),  "stairs within home (Group)   Living Environment Comment lives with wife in 2 SH home with first floor set up   Number of Stairs, Main Entrance 1   Surface of Stairs, Main Entrance hardwood   Stair Railings, Main Entrance none   Location, Patient Bedroom first (main) floor   Patient Bedroom Access Comment first floor set up   Location, Bathroom first (main) floor   Bathroom Access Comment First floor set up. Walk in shower. grab bar and shower \"bench\"   Stairs, Within Home, Primary 16   Number of Stairs, Within Home, Primary other (see comments)   Surface of Stairs, Within Home, Primary carpeting   Stair Railings, Within Home, Primary railings on both sides of stairs  [left side rail goes 1/2- then can hold the spindles]   Stairs Comment, Within Home, Primary pt reports wife office upstairs but master bed/bath 1st floor          Prior Level of Function    Flowsheet Row Most Recent Value   Dominant Hand right   Ambulation independent   Transferring independent   Toileting independent   Bathing independent   Dressing independent   Eating independent   IADLs independent   Driving/Transportation    Prior Level of Function Comment Ind PTA, previous cervical fx. 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,  reports manages bill paying, medications, calendar at home           IRF PT Evaluation and Treatment - 01/29/24 0903        PT Time Calculation    Start Time 0900     Stop Time 0930     Time Calculation (min) 30 min        Session Details    Document Type Daily Treatment/Progress Note     Mode of Treatment individual therapy;physical therapy        General Information    General Observations of Patient pt received in , without cervical collar donned; agreeable to don w/ edu        Mobility Belt    Mobility Belt Used for All Out of Bed Activity yes        Orthosis Neck Cervical Collar    Orthosis Properties Date Obtained: 01/17/24 Time Obtained: 1900 " "Location: Neck Type: Cervical Collar Features: Hard Therapeutic Indications: fracture immobilization Wearing Schedule: wear at all times (remove only for hygiene and skin inspection)    Skin Assessment intact   donned prior to mobility w/ edu and encouragement       Sit to Stand Transfer    [place], Sit to Stand Transfer minimum assist (75% or more patient effort)     Safety/Cues increased time to complete     Assistive Device walker, front-wheeled     Comment from WC        Stand to Sit Transfer    [place], Stand to Sit Transfer minimum assist (75% or more patient effort)     Safety/Cues increased time to complete     Assistive Device walker, front-wheeled     Comment to WC, min A for eccentric control        Stand Pivot Transfer    [place], Stand Pivot/Stand Step Transfer minimum assist (75% or more patient effort)     Assistive Device walker, front-wheeled     Comment via amb approach        Gait Training    [place], Gait minimum assist (75% or more patient effort)     Safety/Cues increased time to complete     Assistive Device walker, front-wheeled     Distance in Feet 50 feet     Pattern step-through     Deviations/Abnormal Patterns antalgic;ace decreased;step length decreased;weight shifting decreased;gait speed decreased     Right Sided Gait Deviations foot drop/toe drag   decr step length, decr R wt shift    Comment (Gait/Stairs) 50''x 1, 25'x1 w/ min A for balance, slow paced and impaired R        Curb Negotiation    [place] minimum assist (75% or more patient effort)     Assistive Device walker, front-wheeled     Curb Height 6 inches     Comment 6\" curb step w/ min A for balance, VC for technique and sequencing        Balance    Comment, Balance 1) standing w/ UUE support during toileting and clothing mgmt x 2-3 min, steaying A        Lower Extremity (Therapeutic Exercise)    Comment assisted seated hamstring/gastroc stretch 60 sec x2 BL        Daily Progress Summary (PT)    " Daily Outcome Statement Pt received without aspen collar donned, agreeable to don prior to initiating therapy w/ education and encouagement.Initiated curb step w/ RW this session, will benefit from ongoing standing tolerance activity and RLE strenghtening as pt fatigues quickly w/ walking.                      Education Documentation  Harm Prevention, taught by Ly Garcia, PT at 1/29/2024 10:31 AM.  Learner: Patient  Readiness: Acceptance  Method: Explanation  Response: Needs Reinforcement  Comment: Edu on importance of compliance w/ cervical collar use. Rec ongoing reinforcement.    Equipment/Home Supplies, taught by Ly Garcia, PT at 1/29/2024 10:31 AM.  Learner: Patient  Readiness: Acceptance  Method: Explanation  Response: Needs Reinforcement  Comment: Edu on importance of compliance w/ cervical collar use. Rec ongoing reinforcement.          IRF PT Goals    Flowsheet Row Most Recent Value   Bed Mobility Goal 1    Activity/Assistive Device bed mobility activities, all at 01/25/2024 0900   [place] minimum assist (75% or more patient effort) at 01/25/2024 0900   Time Frame short-term goal (STG), 5 - 7 days at 01/25/2024 0900   Progress/Outcome goal ongoing at 01/29/2024 0839   Bed Mobility Goal 2    Activity/Assistive Device bed mobility activities, all at 01/25/2024 0900   [place] modified independence at 01/25/2024 0900   Time Frame long-term goal (LTG), 14 days or less at 01/25/2024 0900   Progress/Outcome goal ongoing at 01/29/2024 0839   Transfer Goal 1    Activity/Assistive Device sit-to-stand/stand-to-sit, stand pivot at 01/29/2024 0839   [place] --  [steadying A] at 01/29/2024 0839   Time Frame short-term goal (STG), 5 - 7 days at 01/29/2024 0839   Progress/Outcome goal met, goal revised this date at 01/29/2024 0839   Transfer Goal 2    Activity/Assistive Device sit-to-stand/stand-to-sit, bed-to-chair/chair-to-bed, stand pivot at 01/25/2024 0900   [place] modified  independence at 01/25/2024 0900   Time Frame long-term goal (LTG), 14 days or less at 01/25/2024 0900   Progress/Outcome goal ongoing at 01/29/2024 0839   Gait/Walking Locomotion Goal 1    Activity/Assistive Device gait (walking locomotion) at 01/25/2024 0900   Distance 25 feet at 01/25/2024 0900   [place] minimum assist (75% or more patient effort) at 01/25/2024 0900   Time Frame short-term goal (STG), 5 - 7 days at 01/25/2024 0900   Progress/Outcome goal ongoing at 01/29/2024 0839   Gait/Walking Locomotion Goal 2    Activity/Assistive Device gait (walking locomotion), assistive device use at 01/29/2024 0839   Distance 150 feet at 01/29/2024 0839   [place] modified independence at 01/29/2024 0839   Time Frame long-term goal (LTG), 14 days or less at 01/29/2024 0839   Progress/Outcome goal revised this date at 01/29/2024 0839   Stairs Goal 1    Activity/Assistive Device stairs, all skills, using handrail, left, using handrail, right at 01/28/2024 1304   Number of Stairs 12 at 01/28/2024 1304   [place] minimum assist (75% or more patient effort) at 01/28/2024 1304   Time Frame 5 - 7 days, short-term goal (STG) at 01/28/2024 1304   Progress/Outcome goal ongoing at 01/29/2024 0839   Stairs Goal 2    Activity/Assistive Device stairs, all skills, using handrail, left, cane, straight at 01/28/2024 1304   Number of Stairs 12 at 01/28/2024 1304   [place] supervision required at 01/28/2024 1304   Time Frame 14 days or less at 01/28/2024 1304   Progress/Outcome goal ongoing at 01/29/2024 0839

## 2024-01-29 NOTE — PROGRESS NOTES
Daily Progress Note     Patient was seen and examined.   Attestation Notes: Face to face encounter completed, discussed in team conference         Subjective       Interval History: Hemoglobin is 8 this morning. Patient is tolerating therapies. He does have cervical collar on at time of evaluation today. He is seated in the recliner, which he reports is comfortable. He does continue to have posterior right rib pain, although notes some improvement with cough being less pronounced.      Continued to provide education on reason to wear C-collar    Review of Systems:  Negative except as per HPI    Current Functional Status:   Bed mobility:   [place], Supine to Sit: moderate assist (50-74% patient effort)   [place], Sit to Supine: moderate assist (50-74% patient effort)   Transfers:    [place], Sit to Stand Transfer: minimum assist (75% or more patient effort)  [place], Stand to Sit Transfer: minimum assist (75% or more patient effort)   [place], Stand Pivot/Stand Step Transfer: minimum assist (75% or more patient effort)   Gait:   [place], Gait: minimum assist (75% or more patient effort)  Assistive Device: walker, front-wheeled   Distance in Feet: 75 feet    Bathing:   [place]: minimum assist (75% or more patient effort)   Toileting:   [place]: moderate assist (50-74% patient effort) (per most recent performance)   Upper body dressing:   [place]: moderate assist (50-74% patient effort)   Lower body dressing:   [place]: maximum assist (25-49% patient effort)     Mild to moderate cognitive deficits    Functional Progress:    Functional status reviewed. Overall, patient's functional status is being assessed      Objective     Objective   Vitals and Hemodynamics  Patient Vitals for the past 24 hrs:   BP Temp Temp src Pulse Resp SpO2   01/29/24 0906 (!) 111/58 -- -- 65 -- --   01/29/24 0710 118/65 36.7 °C (98 °F) Oral 66 16 94 %   01/28/24 2050 (!) 124/58 36.3 °C (97.4 °F)  Oral 74 17 94 %   01/28/24 1522 (!) 116/57 36.6 °C (97.8 °F) Oral 64 18 98 %   01/28/24 1321 112/66 -- -- 66 -- --   01/28/24 1204 (!) 141/68 -- -- 64 -- --   01/28/24 1135 121/62 -- -- 78 -- 99 %         Physical Exam    General: sitting in recliner at bedside  HEENT: Atraumatic, Normocephalic; c collar donned  Cardiovascular: RRR  Respiratory: CTAB with breath sounds diminished at the bases   Abdomen/GI: NT ND  Skin: Warm, Dry  Extremities/MSK: Non tender bilateral gastrocs.  Neuro: AAOx3, follows commands, repetitive at times       Labs      Results from last 7 days   Lab Units 01/27/24  0626 01/26/24  1452 01/26/24  0548 01/25/24  0558   WBC K/uL 6.30 8.79 9.55 9.69   RBC M/uL 2.58* 2.57* 2.27* 2.80*   HEMOGLOBIN g/dL 8.0* 8.1* 7.2* 8.8*   HEMATOCRIT % 24.5* 24.1* 21.3* 26.4*   PLATELETS K/uL 122* 118* 124* 114*   MCV fL 95.0 93.8 93.8 94.3   RDW % 15.2* 15.5* 15.4* 15.4*   EOS ABS AUTO K/uL  --  0.01* 0.00* 0.03*       Results from last 7 days   Lab Units 01/29/24  0534 01/26/24  1452 01/26/24  0547 01/25/24  0558   SODIUM mEQ/L 138 128* 132* 134*   POTASSIUM mEQ/L 4.1 4.0 4.2 3.9   CHLORIDE mEQ/L 106 99 97* 100   CO2 mEQ/L 23 23 24 25   BUN mg/dL 42* 35* 31* 29*   CREATININE mg/dL 1.2 1.3 1.3 1.2   EGFR mL/min/1.73m*2 >60.0 55.2* 55.2* >60.0   ANION GAP mEQ/L 9 6 11 9     Results from last 7 days   Lab Units 01/29/24  0534 01/26/24  1452 01/26/24  0547 01/25/24  0558   CALCIUM mg/dL 8.8 8.9 9.0 9.1       Results from last 7 days   Lab Units 01/26/24  1452 01/25/24  0558   AST IU/L 12* 11*   ALT IU/L 10 10   ALK PHOS IU/L 55 53   BILIRUBIN TOTAL mg/dL 0.8 0.9       Imaging      Assessment and Plan     Randolph Skeltonlizetteia is a 83 y.o. male with a PMHx of BPH, hypothyroid, cervical spine fracture, who presents with ADL/mobility dysfunction secondary to multi trauma after fall, found to have right pubic ramus fracture, right posterior rib fractures, chronic odontoid fracture.     #ADL/Ambulatory dysfunction: 2/2 multi  trauma. Continue acute inpatient rehabilitation with PT/OT/SLP/SW/Rehab RN/Neuropsych to increase independence with ADLs, improve balance, coordination, endurance, strength, mobility, community reintegration, decreased burden of care on others and family education.  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 with mild reight jose luis-cord signal abnormality  - chronic  - evaluated by neurosurgery in acute care  - decline operative management at this time; continue hard cervical collar at all times  - follow-up neurosurgery in 4-6 weeks     #right perivascular hematoma associated with mild mass effect on the urinary bladder and perivesicular infiltration  - monitor bladder scans, CIC PRN  - CT cystogram without evidence of contrast extravasation into prostatic parenchyma or other areas of extravasation from urinary bladder  - had avila catheter - removed      #BPH  - continue flomax     #Pleural effusion  - s/p thoracentesis  - monitor O2    #hyponatremia  - Na 138 1/29  - continue salt tabs, fluid restriction, lasix      #Immobility  -DVT prophylaxis with apixiban     # Follow-up  - PCP  - neurosurgery  - orthopedic surgery      #Disposition: 2/7/2024, home      Amelia Larose MD  Physical Medicine and Rehabilitation

## 2024-01-29 NOTE — PLAN OF CARE
Problem: Rehabilitation (IRF) Plan of Care  Goal: Plan of Care Review  Outcome: Progressing  Flowsheets (Taken 1/29/2024 1922)  Progress: improving  Outcome Evaluation: OT POC and goals reviewed and updated     Problem: BADL (Basic Activities of Daily Living) Impairment  Goal: Optimal Safe BADL Performance  Outcome: Progressing

## 2024-01-29 NOTE — PROGRESS NOTES
Patient: Randolph Daley  Location: [facility] [address]  MRN: [id number]  Today's date: 1/29/2024    History of Present Illness  Pt is a 83 y.o. male admitted on 1/24/2024 with Multiple injuries due to trauma [T07.XXXA]. Principal problem is Multiple injuries due to trauma.  is a 83 y.o. male whose primary indication for inpatient rehabilitation is Debility.    Pt is a 83 y.o M adm to [facility] 1/24/24 who presented to [facility] after a slip and fall on ice landing on his right side.  He was found to have multiple injuries including right 8-11 rib fractures, bilateral pleural effusion s/p IR thoracentesis 1/18 with drainage of 1.1L, pelvic fracture (non op mgmt), right adductor/obturator hematoma, questionable posttraumatic bladder injury and age-indeterminate cervical fracture.  Neurosurgery was consulted and offered c spinesurgery, but patient declined.  [place] Collar on at all times, except for meals to prevent aspiration. Urology was consulted.  CT cystogram showed possible extravasation of contrast into the prostatic urethra.  [place] catheter is currently draining clear urine.  Plan is to continue Womack catheter for 1 week.  CT cystogram in 1 week with possible removal at that time, but pt declining CT Cystogram.        Past Medical History  PMH: BPH, HTN       PT Vitals    Date/Time Pulse HR Source BP BP Location BP Method Pt Position [facility]   01/29/24 1135 60 Monitor 116/61 Right upper arm Automatic Sitting JR      PT Pain    Date/Time Pain Type Side/Orientation Location Description [facility]   01/29/24 1135 Pain Assessment right flank sore JR   01/29/24 1159 Pain Reassessment right flank sore JR             Prior Living Environment    Flowsheet Row Most Recent Value   People in Home spouse   Current Living Arrangements home   Home Accessibility stairs to enter home (Group), stairs within home (Group)   Living Environment Comment lives with wife in 2  home with first floor set up   Number  "of Stairs, Main Entrance 1   Surface of Stairs, Main Entrance hardwood   Stair Railings, Main Entrance none   Location, Patient Bedroom first (main) floor   Patient Bedroom Access Comment first floor set up   Location, Bathroom first (main) floor   Bathroom Access Comment First floor set up. Walk in shower. grab bar and shower \"bench\"   Stairs, Within Home, Primary 16   Number of Stairs, Within Home, Primary other (see comments)   Surface of Stairs, Within Home, Primary carpeting   Stair Railings, Within Home, Primary railings on both sides of stairs  [left side rail goes 1/2- then can hold the spindles]   Stairs Comment, Within Home, Primary pt reports wife office upstairs but master bed/bath 1st floor          Prior Level of Function    Flowsheet Row Most Recent Value   Dominant Hand right   Ambulation independent   Transferring independent   Toileting independent   Bathing independent   Dressing independent   Eating independent   IADLs independent   Driving/Transportation    Prior Level of Function Comment Ind PTA, previous cervical fx. 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,  reports manages bill paying, medications, calendar at home           IRF PT Evaluation and Treatment - 01/29/24 1142        PT Time Calculation    Start Time 1130     Stop Time 1200     Time Calculation (min) 30 min        Session Details    Document Type Daily Treatment/Progress Note     Mode of Treatment individual therapy;physical therapy        General Information    General Observations of Patient pt received in room, w/ cervical collar donned        Mobility Belt    Mobility Belt Used for All Out of Bed Activity no     Reason Mobility Belt Not Used seated activity only        Orthosis Neck Cervical Collar    Orthosis Properties Date Obtained: 01/17/24 Time Obtained: 1900 Location: Neck Type: Cervical Collar Features: Hard Therapeutic Indications: fracture " immobilization Wearing Schedule: wear at all times (remove only for hygiene and skin inspection)    Skin Assessment intact        Wheelchair Mobility/Management    Comment, Wheelchair Mobility leg rests adjusted for mroe appropriate LE support/position        Lower Extremity (Therapeutic Exercise)    Comment BLACK green 10 x 3, hip add on pillow 10x 3 w/ 3-5 sec hold        Therapeutic Interventions    Comment, Therapeutic Intervention 1) ongoing edu regarding importance of cervical collar use and role of rehab therapy to maximize functional independence; 2)practiced using incentive spirometer- cues for technique and reps/frequency, practiced 10x w/ pt reaching between 500-1000mL ; edu on purpose and benefit of regular use w/ pt was motivated to continue w/        Cryotherapy    Comment CP to low back/SI joint at end of session        Daily Progress Summary (PT)    Daily Outcome Statement Focused on gentle seated strengthening this session, pt reports decr pain w/ incr reps of BLACK lyons on R. Encouraged to complete regular incentive spirometer reps, which pt is motivated to do.                      Education Documentation  Treatment Plan, taught by Ly Garcia, PT at 1/29/2024 12:42 PM.  Learner: Patient  Readiness: Acceptance  Method: Explanation  Response: Verbalizes Understanding, Needs Reinforcement  Comment: See note for details.    Harm Prevention, taught by Ly Garcia, PT at 1/29/2024 10:31 AM.  Learner: Patient  Readiness: Acceptance  Method: Explanation  Response: Needs Reinforcement  Comment: Edu on importance of compliance w/ cervical collar use. Rec ongoing reinforcement.    Equipment/Home Supplies, taught by Ly Garcia, PT at 1/29/2024 10:31 AM.  Learner: Patient  Readiness: Acceptance  Method: Explanation  Response: Needs Reinforcement  Comment: Edu on importance of compliance w/ cervical collar use. Rec ongoing reinforcement.          IRF PT Goals    Flowsheet Row Most  Recent Value   Bed Mobility Goal 1    Activity/Assistive Device bed mobility activities, all at 01/25/2024 0900   [place] minimum assist (75% or more patient effort) at 01/25/2024 0900   Time Frame short-term goal (STG), 5 - 7 days at 01/25/2024 0900   Progress/Outcome goal ongoing at 01/29/2024 0839   Bed Mobility Goal 2    Activity/Assistive Device bed mobility activities, all at 01/25/2024 0900   [place] modified independence at 01/25/2024 0900   Time Frame long-term goal (LTG), 14 days or less at 01/25/2024 0900   Progress/Outcome goal ongoing at 01/29/2024 0839   Transfer Goal 1    Activity/Assistive Device sit-to-stand/stand-to-sit, stand pivot at 01/29/2024 0839   [place] --  [steadying A] at 01/29/2024 0839   Time Frame short-term goal (STG), 5 - 7 days at 01/29/2024 0839   Progress/Outcome goal met, goal revised this date at 01/29/2024 0839   Transfer Goal 2    Activity/Assistive Device sit-to-stand/stand-to-sit, bed-to-chair/chair-to-bed, stand pivot at 01/25/2024 0900   [place] modified independence at 01/25/2024 0900   Time Frame long-term goal (LTG), 14 days or less at 01/25/2024 0900   Progress/Outcome goal ongoing at 01/29/2024 0839   Gait/Walking Locomotion Goal 1    Activity/Assistive Device gait (walking locomotion) at 01/25/2024 0900   Distance 25 feet at 01/25/2024 0900   [place] minimum assist (75% or more patient effort) at 01/25/2024 0900   Time Frame short-term goal (STG), 5 - 7 days at 01/25/2024 0900   Progress/Outcome goal ongoing at 01/29/2024 0839   Gait/Walking Locomotion Goal 2    Activity/Assistive Device gait (walking locomotion), assistive device use at 01/29/2024 0839   Distance 150 feet at 01/29/2024 0839   [place] modified independence at 01/29/2024 0839   Time Frame long-term goal (LTG), 14 days or less at 01/29/2024 0839   Progress/Outcome goal revised this date at 01/29/2024 0839   Stairs Goal 1    Activity/Assistive Device stairs, all skills,  using handrail, left, using handrail, right at 01/28/2024 1304   Number of Stairs 12 at 01/28/2024 1304   [place] minimum assist (75% or more patient effort) at 01/28/2024 1304   Time Frame 5 - 7 days, short-term goal (STG) at 01/28/2024 1304   Progress/Outcome goal ongoing at 01/29/2024 0839   Stairs Goal 2    Activity/Assistive Device stairs, all skills, using handrail, left, cane, straight at 01/28/2024 1304   Number of Stairs 12 at 01/28/2024 1304   [place] supervision required at 01/28/2024 1304   Time Frame 14 days or less at 01/28/2024 1304   Progress/Outcome goal ongoing at 01/29/2024 0839

## 2024-01-29 NOTE — PLAN OF CARE
Plan of Care Review  Plan of Care Reviewed With: patient  Progress: improving  Outcome Evaluation: Patient is alert and orient , takes meds whole, continent of bowel and bladder. Denied any pain nad discomfort. Left forearm peripheral line is patent and easy to flush. Non compliant with [place] collar. Slept well throughout the night. Callbell within reach and bed alarm is on.

## 2024-01-29 NOTE — PROGRESS NOTES
Inpatient Psychology Progress Note    Date: 2024   Duration: 20 minutes  Patient: Randolph Daley : 1940, a 83 y.o. male, was seen for follow up visit.  Reason: Adjustment to Disability.    HPI: Pt is a 83 y.o., male, with a previous medical history of hypothyroidism, hypertension, normocytic anemia, chronic kidney disease, and benign prostatic hyperplasia (BPH), shingles (2007), fall with head strike (), fracture of the base of the odontoid process (found on imaging in  age indeterminate). Pt presented to [facility] ED on 2023 with a one-week history of productive cough. Chest X-ray showed bilateral pleural effusions and pt tested positive for RSV. EKG was normal. Pt was prescribed a cough suppressant and instructed to follow up with his PCP regarding pleural effusions; he discharged home the same day in stable condition. Pt then saw his family doctor on 2023 who recommended to continue cough suppressant and to increase fluids.     More recently, pt presented to [facility] on 2024 after a fall. Pt reportedly slipped on ice and fell on his right side. There was no head injury or loss of consciousness during this event. On eval, GCS was 15. Imaging revealed bilateral pleural effusion (L > R) and multiple fractures including ribs (right 8, 9, 10, 11) and right pubic ramus with pelvic hematoma that was concerning for traumatic injury to prostate/urethra/bladder. Of note, pt declined analgesics except for Tylenol in ED (chart indicates a history of visual hallucinations with oxycodone). Pt was transferred to [facility] for escalation of care.     At [facility], pt underwent thoracentesis on 2024. Urology was consulted and pt underwent cystogram. No acute urologic interventions were recommended and pt was given avila catheter. Orthopedic Surgery was consulted and recommended non-operative management of pelvic fracture. Pt was made weightbearing as tolerated to  bilateral legs. Neurosurgery was consulted; CT head showed C2 fracture that was determined to likely be from a previous injury. Flexion-extension x-rays of the cervical spine disclose widening of the first and second cervical interspinous process concerning for instability. MRI was recommended and as well as pt wearing hard cervical collar. Pt initially declined MRI of cervical neck due to claustrophobia; was later agreeable with anxiolytics. MRI of C-spine confirmed chronic C2 fracture, along with degenerative spondylosis at C3-C4. Pt was recommended for surgical fusion; however, pt also declined surgical intervention. Pt was counseled at length about recommendations for surgery and wearing hard collar at all times. Pt understands that he is a high fall risk and the risks of future spinal injury from fall (potential paralysis, paresthesia, or death) and continued to decline cervical interventions throughout hospitalization. Pt was recommended for outpatient neurosurgery follow up in 4- 6 weeks with updated imaging. Pt was stabilized and transferred to [facility] on 1/24/2024 for inpatient rehab therapies.      Current Evaluation:     Mental Status Evaluation:  Arousal Level: Alert  Appearance: Well Groomed  Speech: Regular, Soft  Psychomotor Functioning: WNL  Eye Contact: WNL  Est. Premorbid Intelligence: Above average  Orientation: Fully oriented  Attention: WNL  Concentration: WNL  Recent Memory: WNL  Remote Memory: WNL  Thought Content: Appropriate  Thought Process: Worry  Insight: Intact  Perceptual Function: Pain  Delusions: None or age appropriate  Sleeping: Decreased (due to pain)  Affect: Flat  Mood: Depressed       Goals Addressed                 This Visit's Progress    • Increase adjustment to rehabilitation facility.             Interventions  Monitoring of Symptoms     Recommendations:   Individual Therapy    30 minutes weekly      Visit Diagnosis:     1. Adjustment disorder with  mixed anxiety and depressed mood        Diagnostic Impression:   Weekly Progress Summary: progressing toward goals as expected    Randolph Daley, 1940, 83 y.o., was seen for follow-up. Pt was seated in seated on arrival. Pt was alert and engaged in session. He was oriented to time, place and person. Eye contact was WNL. Speech was soft and WNL. Thought processes were logical. Mood was euthymic; affect was mood-congruent.    Pt reported his mood was fair. Sleep was rated as improved. He continued to report soreness in his side and pelvis where fractures are located. Ice and Tylenol have been helpful managing pain. Pt was mildly receptive to a reflection exercise about his rehab progress. He stated that he has been working hard with PT, and wanted to give it more time before making an assessment. Pt's plan was supported. He was receptive to support and open to continued follow up during his inpatient rehabilitation stay. Psychology will continue to follow. Psychological condition is generally stable.    Anna Henry, PhD @ 10:50 AM

## 2024-01-29 NOTE — PROGRESS NOTES
Patient: Randolph Daley  Location: [facility]  MRN: [id number]  Today's date: 1/29/2024    History of Present Illness  Pt is a 83 y.o. male admitted on 1/24/2024 with Multiple injuries due to trauma [T07.XXXA]. Principal problem is Multiple injuries due to trauma.  is a 83 y.o. male whose primary indication for inpatient rehabilitation is Debility.    Pt is a 83 y.o M adm to [facility] 1/24/24 who presented to [facility] after a slip and fall on ice landing on his right side.  He was found to have multiple injuries including right 8-11 rib fractures, bilateral pleural effusion s/p IR thoracentesis 1/18 with drainage of 1.1L, pelvic fracture (non op mgmt), right adductor/obturator hematoma, questionable posttraumatic bladder injury and age-indeterminate cervical fracture.  Neurosurgery was consulted and offered c spinesurgery, but patient declined.  [place] Collar on at all times, except for meals to prevent aspiration. Urology was consulted.  CT cystogram showed possible extravasation of contrast into the prostatic urethra.  [place]omack catheter is currently draining clear urine.  Plan is to continue Womack catheter for 1 week.  CT cystogram in 1 week with possible removal at that time, but pt declining CT Cystogram.        Past Medical History  PMH: BPH, HTN       PT Vitals    Date/Time Pulse HR Source SpO2 Pt Activity O2 Therapy BP BP Location BP Method Pt Position [facility]   01/29/24 1302 63 Monitor 97 % At rest None (Room air) 105/55 Right upper arm Automatic Sitting KP      PT Pain    Date/Time Pain Type Side/Orientation Location Rating: Rest Rating: Activity Description Interventions [facility]   01/29/24 1302 Pain Assessment right flank 2 3 sore -- KP   01/29/24 1330 Post Activity right flank 3 4 sore position adjusted KP             Prior Living Environment    Flowsheet Row Most Recent Value   People in Home spouse   Current Living Arrangements home   Home Accessibility stairs to enter home  "(Group), stairs within home (Group)   Living Environment Comment lives with wife in 2 SH home with first floor set up   Number of Stairs, Main Entrance 1   Surface of Stairs, Main Entrance hardwood   Stair Railings, Main Entrance none   Location, Patient Bedroom first (main) floor   Patient Bedroom Access Comment first floor set up   Location, Bathroom first (main) floor   Bathroom Access Comment First floor set up. Walk in shower. grab bar and shower \"bench\"   Stairs, Within Home, Primary 16   Number of Stairs, Within Home, Primary other (see comments)   Surface of Stairs, Within Home, Primary carpeting   Stair Railings, Within Home, Primary railings on both sides of stairs  [left side rail goes 1/2- then can hold the spindles]   Stairs Comment, Within Home, Primary pt reports wife office upstairs but master bed/bath 1st floor          Prior Level of Function    Flowsheet Row Most Recent Value   Dominant Hand right   Ambulation independent   Transferring independent   Toileting independent   Bathing independent   Dressing independent   Eating independent   IADLs independent   Driving/Transportation    Prior Level of Function Comment Ind PTA, previous cervical fx. 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,  reports manages bill paying, medications, calendar at home           IRF PT Evaluation and Treatment - 01/29/24 1302        PT Time Calculation    Start Time 1302     Stop Time 1332     Time Calculation (min) 30 min        Session Details    Document Type Daily Treatment/Progress Note     Mode of Treatment physical therapy;individual therapy        General Information    General Observations of Patient Pt received sitting upright in w/c, cervical collar donned but unfastened. Pt agreeable to don fully with edu.        Weight-Bearing Status    Left LE Weight-Bearing Status weight-bearing as tolerated (WBAT)     Right LE Weight-Bearing Status " weight-bearing as tolerated (WBAT)        Mobility Belt    Mobility Belt Used for All Out of Bed Activity yes        Orthosis Neck Cervical Collar    Orthosis Properties Date Obtained: 01/17/24 Time Obtained: 1900 Location: Neck Type: Cervical Collar Features: Hard Therapeutic Indications: fracture immobilization Wearing Schedule: wear at all times (remove only for hygiene and skin inspection)       Transfers    Transfers stand pivot transfer        Sit to Stand Transfer    [place], Sit to Stand Transfer minimum assist (75% or more patient effort)     Safety/Cues increased time to complete;hand placement     Assistive Device walker, front-wheeled     Comment from w/c to RW, Min A for anterior WS over ZACH, hip ext and balance        Stand to Sit Transfer    [place], Stand to Sit Transfer minimum assist (75% or more patient effort)     Safety/Cues increased time to complete;hand placement     Assistive Device walker, front-wheeled     Comment to w/c and recliner with Min A for eccentric control        Stand Pivot Transfer    [place], Stand Pivot/Stand Step Transfer minimum assist (75% or more patient effort)     Safety/Cues increased time to complete;proper use of assistive device;hand placement     Assistive Device walker, front-wheeled     Comment to/from w/c and recliner via amb approach, Min A for balance and postural control        Gait Training    [place], Gait minimum assist (75% or more patient effort)     Safety/Cues increased time to complete;proper use of assistive device;technique     Assistive Device walker, front-wheeled     Distance in Feet 60 feet     Pattern step-through     Deviations/Abnormal Patterns antalgic;gait speed decreased;ace decreased;step length decreased;stride length decreased;weight shifting decreased     Comment (Gait/Stairs) Amb 60' with RW and Min A at pelvis for controlled weight shift and balance. VC/TC for trunk ext and forward gaze to improve posture and  address pt concern of UE fatigue with prolonged activity. Slow, antalgic step pattern observed.        Daily Progress Summary (PT)    Daily Outcome Statement Pt progressing with gait distance this session, continues to demo slow and effortful gait pattern. Agreeable to have cervical collar donned t/o session and upon PTA exit, but requires VC/encoureagement/education to do so. Grossly Min A for standing mobility this session. Continue per POC with emphasis on gait training, promotion of improved endurance, standing balance, and consistent compliance with c-collar use.                      Education Documentation  Orthopedic equipment / braces / splints, taught by Betzy Orlando PTA at 1/29/2024  1:54 PM.  Learner: Patient  Readiness: Acceptance  Method: Explanation  Response: Needs Reinforcement  Comment: Pt edu on use of cervical collar AAT for unstable fx stabilization          IRF PT Goals    Flowsheet Row Most Recent Value   Bed Mobility Goal 1    Activity/Assistive Device bed mobility activities, all at 01/25/2024 0900   [place] minimum assist (75% or more patient effort) at 01/25/2024 0900   Time Frame short-term goal (STG), 5 - 7 days at 01/25/2024 0900   Progress/Outcome goal ongoing at 01/29/2024 0839   Bed Mobility Goal 2    Activity/Assistive Device bed mobility activities, all at 01/25/2024 0900   [place] modified independence at 01/25/2024 0900   Time Frame long-term goal (LTG), 14 days or less at 01/25/2024 0900   Progress/Outcome goal ongoing at 01/29/2024 0839   Transfer Goal 1    Activity/Assistive Device sit-to-stand/stand-to-sit, stand pivot at 01/29/2024 0839   [place] --  [steadying A] at 01/29/2024 0839   Time Frame short-term goal (STG), 5 - 7 days at 01/29/2024 0839   Progress/Outcome goal met, goal revised this date at 01/29/2024 0839   Transfer Goal 2    Activity/Assistive Device sit-to-stand/stand-to-sit, bed-to-chair/chair-to-bed, stand pivot at 01/25/2024 0900    [place] modified independence at 01/25/2024 0900   Time Frame long-term goal (LTG), 14 days or less at 01/25/2024 0900   Progress/Outcome goal ongoing at 01/29/2024 0839   Gait/Walking Locomotion Goal 1    Activity/Assistive Device gait (walking locomotion) at 01/25/2024 0900   Distance 25 feet at 01/25/2024 0900   [place] minimum assist (75% or more patient effort) at 01/25/2024 0900   Time Frame short-term goal (STG), 5 - 7 days at 01/25/2024 0900   Progress/Outcome goal ongoing at 01/29/2024 0839   Gait/Walking Locomotion Goal 2    Activity/Assistive Device gait (walking locomotion), assistive device use at 01/29/2024 0839   Distance 150 feet at 01/29/2024 0839   [place] modified independence at 01/29/2024 0839   Time Frame long-term goal (LTG), 14 days or less at 01/29/2024 0839   Progress/Outcome goal revised this date at 01/29/2024 0839   Stairs Goal 1    Activity/Assistive Device stairs, all skills, using handrail, left, using handrail, right at 01/28/2024 1304   Number of Stairs 12 at 01/28/2024 1304   [place] minimum assist (75% or more patient effort) at 01/28/2024 1304   Time Frame 5 - 7 days, short-term goal (STG) at 01/28/2024 1304   Progress/Outcome goal ongoing at 01/29/2024 0839   Stairs Goal 2    Activity/Assistive Device stairs, all skills, using handrail, left, cane, straight at 01/28/2024 1304   Number of Stairs 12 at 01/28/2024 1304   [place] supervision required at 01/28/2024 1304   Time Frame 14 days or less at 01/28/2024 1304   Progress/Outcome goal ongoing at 01/29/2024 0839

## 2024-01-29 NOTE — PLAN OF CARE
Problem: Rehabilitation (IRF) Plan of Care  Goal: Plan of Care Review  Flowsheets (Taken 1/29/2024 3845)  Plan of Care Reviewed With:   patient   spouse  Outcome Evaluation: met with pt to give team report. he supports goals, plan and elos of 2/7 pending ins updates and weekly team. explained cm would talk to wife to set up trng. he reports she went back to their home near the [facility] and should be back mid week. pt reported some stiffness today. support given. spoke with wife and set up trng for 2/1 1-3. will need to find a hc agency that services [facility]. cm faxed update  this am. cm to follow. retverna mon.

## 2024-01-29 NOTE — PROGRESS NOTES
Patient: Randolph Daley  Location: [facility]  MRN: [id number]  Today's date: 1/29/2024    History of Present Illness  Pt is a 83 y.o. male admitted on 1/24/2024 with Multiple injuries due to trauma [T07.XXXA]. Principal problem is Multiple injuries due to trauma.  is a 83 y.o. male whose primary indication for inpatient rehabilitation is Debility.    Pt is a 83 y.o M adm to [facility] 1/24/24 who presented to [facility] after a slip and fall on ice landing on his right side.  He was found to have multiple injuries including right 8-11 rib fractures, bilateral pleural effusion s/p IR thoracentesis 1/18 with drainage of 1.1L, pelvic fracture (non op mgmt), right adductor/obturator hematoma, questionable posttraumatic bladder injury and age-indeterminate cervical fracture.  Neurosurgery was consulted and offered c spinesurgery, but patient declined.  [place] Collar on at all times, except for meals to prevent aspiration. Urology was consulted.  CT cystogram showed possible extravasation of contrast into the prostatic urethra.  Womack catheter is currently draining clear urine.  Plan is to continue Womack catheter for 1 week.  CT cystogram in 1 week with possible removal at that time, but pt declining CT Cystogram.        Past Medical History  PMH: BPH, HTN       OT Vitals    Date/Time Pulse HR Source BP BP Location BP Method Pt Position [facility]   01/29/24 1405 64 Monitor 101/55 Right upper arm Automatic Sitting    01/29/24 1408 69 Monitor 111/55 Right upper arm Automatic Standing TS      OT Pain    Date/Time Pain Type Side/Orientation Location Rating: Rest Rating: Activity Description Interventions [facility]   01/29/24 1405 Pain Assessment right flank 1 4 sore diversional activity provided;position adjusted    01/29/24 1429 Pain Reassessment right flank 1 -- sore position adjusted;pillow support provided TS             Prior Living Environment    Flowsheet Row Most Recent Value   People in  "Home spouse   Current Living Arrangements home   Home Accessibility stairs to enter home (Group), stairs within home (Group)   Living Environment Comment lives with wife in 2 SH home with first floor set up   Number of Stairs, Main Entrance 1   Surface of Stairs, Main Entrance hardwood   Stair Railings, Main Entrance none   Location, Patient Bedroom first (main) floor   Patient Bedroom Access Comment first floor set up   Location, Bathroom first (main) floor   Bathroom Access Comment First floor set up. Walk in shower. grab bar and shower \"bench\"   Stairs, Within Home, Primary 16   Number of Stairs, Within Home, Primary other (see comments)   Surface of Stairs, Within Home, Primary carpeting   Stair Railings, Within Home, Primary railings on both sides of stairs  [left side rail goes 1/2- then can hold the spindles]   Stairs Comment, Within Home, Primary pt reports wife office upstairs but master bed/bath 1st floor          Prior Level of Function    Flowsheet Row Most Recent Value   Dominant Hand right   Ambulation independent   Transferring independent   Toileting independent   Bathing independent   Dressing independent   Eating independent   IADLs independent   Driving/Transportation    Prior Level of Function Comment Ind PTA, previous cervical fx. 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   Patient Goals Initial PSFS: getting in/out of a car 8/10, walking 5/10, getting on/off of the sofa 5/10, cooking 7/10 = 25/40 = 62.5%           IRF OT Evaluation and Treatment - 01/29/24 1408        OT Time Calculation    Start Time 1400     Stop " Time 1430     Time Calculation (min) 30 min        Session Details    Document Type Daily Treatment/Progress Note     Mode of Treatment individual therapy;occupational therapy        General Information    General Observations of Patient Pt received seated in recliner chair bedside, [place]. OT donned [place] at start of session and remained on t/o session.        Mobility Belt    Mobility Belt Used for All Out of Bed Activity yes        Orthosis Neck Cervical Collar    Orthosis Properties Date Obtained: 01/17/24 Time Obtained: 1900 Location: Neck Type: Cervical Collar Features: Hard Therapeutic Indications: fracture immobilization Wearing Schedule: wear at all times (remove only for hygiene and skin inspection)    Skin Assessment intact     Compliance/Wearing Issues needs reinforcement   OT donned [place] at start of session with total assist remained on t/o session and in place at end of session.       Cognition/Psychosocial    Comment, Cognition pt verbally reluctant for wife to complete family training, discussed reason and benefits for family training for safety, to educate on compensatory technique for ADLs, techniques and DME for fxl transfers, pt receptive and agreeable.        Sit to Stand Transfer    [place], Sit to Stand Transfer moderate assist (50-74% patient effort)     Safety/Cues increased time to complete     Assistive Device walker, front-wheeled     Comment from recliner with mod A for anterior WS, pelvic lift and balance d/t completing from lower surface        Stand to Sit Transfer    [place], Stand to Sit Transfer minimum assist (75% or more patient effort)     Safety/Cues increased time to complete;minimal;verbal cues;technique     Assistive Device walker, front-wheeled     Comment to recliner with min A for eccentric control        Impairments/Safety Issues    Comment, Safety Issues/Impairments OT: short HHD with RW within room with min A for balance,  weight shifting, min vc for upright posture.        Daily Progress Summary (OT)    Daily Outcome Statement Session focused on fxl transfers, HHM with RW and completed PSFS. Continue OT POC to maximize safety and independence.                      Education Documentation  Rehabilitation Therapy, taught by Paula Sandoval, OT at 1/29/2024 12:38 PM.  Learner: Patient  Readiness: Acceptance  Method: Explanation  Response: Needs Reinforcement  Comment: cervical collar AAT and spinal precautions            IRF OT Goals    Flowsheet Row Most Recent Value   Transfer Goal 1    Activity/Assistive Device toilet at 01/25/2024 0629   [place] supervision required at 01/25/2024 0629   Time Frame short-term goal (STG), 5 - 7 days at 01/29/2024 0708   Progress/Outcome goal ongoing at 01/29/2024 0708   Transfer Goal 2    Activity/Assistive Device toilet at 01/25/2024 0629   [place] modified independence  [steady A] at 01/25/2024 0629   Time Frame long-term goal (LTG), 14 days or less at 01/29/2024 0708   Progress/Outcome goal ongoing at 01/29/2024 0708   Transfer Goal 3    Activity/Assistive Device shower at 01/25/2024 0629   [place] other (see comments)  [touch A] at 01/25/2024 0629   Time Frame short-term goal (STG), 5 - 7 days at 01/29/2024 0708   Progress/Outcome goal ongoing at 01/29/2024 0708   Transfer Goal 4    Activity/Assistive Device shower at 01/25/2024 0629   [place] modified independence at 01/29/2024 0708   Time Frame long-term goal (LTG), 14 days or less at 01/29/2024 0708   Progress/Outcome goal revised this date at 01/29/2024 0708   Bathing Goal 1    Activity/Assistive Device bathing skills, all at 01/25/2024 0629   [place] minimum assist (75% or more patient effort) at 01/25/2024 0629   Time Frame short-term goal (STG), 5 - 7 days at 01/29/2024 0708   Progress/Outcome goal ongoing at 01/29/2024 0708   Bathing Goal 2    Activity/Assistive Device bathing skills, all at 01/25/2024 0629    [place] set-up required at 01/29/2024 0708   Time Frame long-term goal (LTG), 14 days or less at 01/29/2024 0708   Progress/Outcome goal revised this date at 01/29/2024 0708   UB Dressing Goal 1    Activity/Assistive Device upper body dressing at 01/25/2024 0629   [place] minimum assist (75% or more patient effort) at 01/29/2024 0708   Time Frame short-term goal (STG), 5 - 7 days at 01/29/2024 0708   Strategies/Barriers including clothing retrieval at 01/29/2024 0708   Progress/Outcome goal revised this date at 01/29/2024 0708   UB Dressing Goal 2    Activity/Assistive Device upper body dressing at 01/25/2024 0629   [place] minimum assist (75% or more patient effort) at 01/29/2024 0708   Time Frame long-term goal (LTG), 14 days or less at 01/29/2024 0708   Progress/Outcome goal revised this date at 01/29/2024 0708   LB Dressing Goal 1    Activity/Assistive Device lower body dressing at 01/25/2024 0629   [place] minimum assist (75% or more patient effort) at 01/25/2024 0629   Time Frame short-term goal (STG), 5 - 7 days at 01/29/2024 0708   Progress/Outcome goal ongoing at 01/29/2024 0708   LB Dressing Goal 2    Activity/Assistive Device lower body dressing at 01/25/2024 0629   [place] modified independence at 01/25/2024 0629   Time Frame long-term goal (LTG), 14 days or less at 01/29/2024 0708   Progress/Outcome goal ongoing at 01/29/2024 0708   Grooming Goal 1    Activity/Assistive Device grooming skills, all at 01/25/2024 0629   [place] set-up required at 01/25/2024 0629   Time Frame short-term goal (STG), 5 - 7 days at 01/29/2024 0708   Progress/Outcome goal ongoing at 01/29/2024 0708   Grooming Goal 2    Activity/Assistive Device grooming skills, all at 01/25/2024 0629   [place] modified independence at 01/25/2024 0629   Time Frame long-term goal (LTG), 14 days or less at 01/29/2024 0708   Progress/Outcome goal ongoing at 01/29/2024 0708   Toileting Goal 1     Activity/Assistive Device toileting skills, all at 01/25/2024 0629   [place] minimum assist (75% or more patient effort) at 01/25/2024 0629   Time Frame short-term goal (STG), 5 - 7 days at 01/29/2024 0708   Progress/Outcome goal ongoing at 01/29/2024 0708   Toileting Goal 2    Activity/Assistive Device toileting skills, all at 01/25/2024 0629   [place] modified independence at 01/25/2024 0629   Time Frame long-term goal (LTG), 14 days or less at 01/29/2024 0708   Progress/Outcome goal ongoing at 01/29/2024 0708

## 2024-01-29 NOTE — PROGRESS NOTES
Patient: Randolph Daley  Location: [facility]  MRN: [id number]  Today's date: 1/29/2024    History of Present Illness  Pt is a 83 y.o. male admitted on 1/24/2024 with Multiple injuries due to trauma [T07.XXXA]. Principal problem is Multiple injuries due to trauma.  is a 83 y.o. male whose primary indication for inpatient rehabilitation is Debility.    Pt is a 83 y.o M adm to [facility] 1/24/24 who presented to [facility] after a slip and fall on ice landing on his right side.  He was found to have multiple injuries including right 8-11 rib fractures, bilateral pleural effusion s/p IR thoracentesis 1/18 with drainage of 1.1L, pelvic fracture (non op mgmt), right adductor/obturator hematoma, questionable posttraumatic bladder injury and age-indeterminate cervical fracture.  Neurosurgery was consulted and offered c spinesurgery, but patient declined.  [place] Collar on at all times, except for meals to prevent aspiration. Urology was consulted.  CT cystogram showed possible extravasation of contrast into the prostatic urethra.  Womack catheter is currently draining clear urine.  Plan is to continue Womack catheter for 1 week.  CT cystogram in 1 week with possible removal at that time, but pt declining CT Cystogram.        Past Medical History  PMH: BPH, HTN       OT Vitals    Date/Time Pulse HR Source SpO2 Pt Activity O2 Therapy BP BP Location BP Method Pt Position [facility]   01/29/24 1034 64 Monitor 98 % At rest None (Room air) 101/59 Right upper arm Automatic Sitting TS   01/29/24 1103 61 Monitor -- -- -- 116/59 Right upper arm Automatic Sitting TS      OT Pain    Date/Time Pain Type Side/Orientation Location Rating: Rest Rating: Activity Description Interventions [facility]   01/29/24 1034 Pain Assessment right flank 1 8 sore position adjusted;diversional activity provided               01/29/24 1128 Pain Reassessment right flank 1 -- sore cold applied TS             Prior Living Environment   "  Flowsheet Row Most Recent Value   People in Home spouse   Current Living Arrangements home   Home Accessibility stairs to enter home (Group), stairs within home (Group)   Living Environment Comment lives with wife in 2 SH home with first floor set up   Number of Stairs, Main Entrance 1   Surface of Stairs, Main Entrance hardwood   Stair Railings, Main Entrance none   Location, Patient Bedroom first (main) floor   Patient Bedroom Access Comment first floor set up   Location, Bathroom first (main) floor   Bathroom Access Comment First floor set up. Walk in shower. grab bar and shower \"bench\"   Stairs, Within Home, Primary 16   Number of Stairs, Within Home, Primary other (see comments)   Surface of Stairs, Within Home, Primary carpeting   Stair Railings, Within Home, Primary railings on both sides of stairs  [left side rail goes 1/2- then can hold the spindles]   Stairs Comment, Within Home, Primary pt reports wife office upstairs but master bed/bath 1st floor          Prior Level of Function    Flowsheet Row Most Recent Value   Dominant Hand right   Ambulation independent   Transferring independent   Toileting independent   Bathing independent   Dressing independent   Eating independent   IADLs independent   Driving/Transportation    Prior Level of Function Comment Ind PTA, previous cervical fx. 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           IRF OT Evaluation and Treatment - 01/29/24 1041        OT Time Calculation    Start Time 1030     Stop Time 1130     Time Calculation (min) 60 min        Session Details    Document Type Daily " "Treatment/Progress Note     Mode of Treatment individual therapy;occupational therapy        General Information    General Observations of Patient pt received seated in [facility] gym, [place] doffed resting on chair, educated on spinal precautions and cervical collar order, and OT applied at start of session.        Mobility Belt    Mobility Belt Used for All Out of Bed Activity yes        Orthosis Neck Cervical Collar    Orthosis Properties Date Obtained: 01/17/24 Time Obtained: 1900 Location: Neck Type: Cervical Collar Features: Hard Therapeutic Indications: fracture immobilization Wearing Schedule: wear at all times (remove only for hygiene and skin inspection)    Skin Assessment intact     Compliance/Wearing Issues needs reinforcement   OT donned at start of session with total assist       Cognition/Psychosocial    Comment, Cognition Additional education provided for spinal precautions, and collar management, pt agreeable for OT to don [place] at start of session and remained donned t/o. Pt resistant to adhere to spinal precautions required vc t/o. While in gym, pt requested to return to room stating he \"needs a break\" reporting overwhelmed in the gym d/t increased environmental stimuli but agreeable to complete OT session in his room.        Sit to Stand Transfer    [place], Sit to Stand Transfer minimum assist (75% or more patient effort)     Safety/Cues increased time to complete;minimal;verbal cues;technique     Assistive Device walker, front-wheeled     Comment from  min A for balance and upright posture        Stand to Sit Transfer    [place], Stand to Sit Transfer minimum assist (75% or more patient effort)     Safety/Cues increased time to complete;minimal;verbal cues;hand placement;technique     Assistive Device walker, front-wheeled     Comment to  min A for eccentric control        Stand Pivot Transfer    [place], Stand Pivot/Stand Step Transfer minimum assist (75% or more " patient effort)     Safety/Cues increased time to complete;minimal;verbal cues;proper use of assistive device     Assistive Device walker, front-wheeled     Comment via amb approach min A for balance        Impairments/Safety Issues    Comment, Safety Issues/Impairments OT: short HHD with RW within room with min A for balance.        Lower Body Dressing    Tasks doff;don;socks;shoes/slippers     Safety/Cues increased time to complete;moderate;verbal cues;tactile cues;maintaining precautions;compensatory techniques;attention;problem-solving;proper use of assistive device;use of adaptive equipment;sequencing     Position supported sitting     Adaptive Equipment dressing stick;sock aid     [place], Footwear moderate assist (50-74% patient effort)     Comment seated in wc, required assist for shoelace manangent then pt doffed sneakers alternating toe to heel to push off, utilized dressing pal to doff socks with increased time, donned socks with flexible sock aid and mod A to pull over B heels assist to correct orientation to sock on AE. required increased time for all aspects of task. Donned sneakers with long handled shoe horn with vc for proper technique with AE and required assist for shoelace management.        Modality Interventions Comprehensive    Modality Interventions cryotherapy        Cryotherapy    Location 1 other (see comments)   R lateral back    Indications pain     Response to Treatment pain decreased     Comment Provided ice pack R lateral back for 10 minutes with pt reporting decrease in pain.        Daily Progress Summary (OT)    Daily Outcome Statement Pt progressed to mod A for footwear management with AE and increased time, will require additional training to improve carryover and proper techniques with AE. Continue OT POC to maximize safety and independence.                      Education Documentation  Rehabilitation Therapy, taught by Paula Sandoval OT at 1/29/2024 12:38 PM.  Learner:  Patient  Readiness: Acceptance  Method: Explanation  Response: Needs Reinforcement  Comment: cervical collar AAT and spinal precautions    Equipment/Methods, taught by Paula Sandoval, OT at 1/29/2024 12:37 PM.  Learner: Patient  Readiness: Acceptance  Method: Explanation, Demonstration  Response: Needs Reinforcement  Comment: compensatory techniques and AE for footwear management.          IRF OT Goals    Flowsheet Row Most Recent Value   Transfer Goal 1    Activity/Assistive Device toilet at 01/25/2024 0629   [place] supervision required at 01/25/2024 0629   Time Frame short-term goal (STG), 5 - 7 days at 01/29/2024 0708   Progress/Outcome goal ongoing at 01/29/2024 0708   Transfer Goal 2    Activity/Assistive Device toilet at 01/25/2024 0629   [place] modified independence  [steady A] at 01/25/2024 0629   Time Frame long-term goal (LTG), 14 days or less at 01/29/2024 0708   Progress/Outcome goal ongoing at 01/29/2024 0708   Transfer Goal 3    Activity/Assistive Device shower at 01/25/2024 0629   [place] other (see comments)  [touch A] at 01/25/2024 0629   Time Frame short-term goal (STG), 5 - 7 days at 01/29/2024 0708   Progress/Outcome goal ongoing at 01/29/2024 0708   Transfer Goal 4    Activity/Assistive Device shower at 01/25/2024 0629   [place] modified independence at 01/29/2024 0708   Time Frame long-term goal (LTG), 14 days or less at 01/29/2024 0708   Progress/Outcome goal revised this date at 01/29/2024 0708   Bathing Goal 1    Activity/Assistive Device bathing skills, all at 01/25/2024 0629   [place] minimum assist (75% or more patient effort) at 01/25/2024 0629   Time Frame short-term goal (STG), 5 - 7 days at 01/29/2024 0708   Progress/Outcome goal ongoing at 01/29/2024 0708   Bathing Goal 2    Activity/Assistive Device bathing skills, all at 01/25/2024 0629   [place] set-up required at 01/29/2024 0708   Time Frame long-term goal (LTG), 14 days or less at 01/29/2024  0708   Progress/Outcome goal revised this date at 01/29/2024 0708   UB Dressing Goal 1    Activity/Assistive Device upper body dressing at 01/25/2024 0629   [place] minimum assist (75% or more patient effort) at 01/29/2024 0708   Time Frame short-term goal (STG), 5 - 7 days at 01/29/2024 0708   Strategies/Barriers including clothing retrieval at 01/29/2024 0708   Progress/Outcome goal revised this date at 01/29/2024 0708   UB Dressing Goal 2    Activity/Assistive Device upper body dressing at 01/25/2024 0629   [place] minimum assist (75% or more patient effort) at 01/29/2024 0708   Time Frame long-term goal (LTG), 14 days or less at 01/29/2024 0708   Progress/Outcome goal revised this date at 01/29/2024 0708   LB Dressing Goal 1    Activity/Assistive Device lower body dressing at 01/25/2024 0629   [place] minimum assist (75% or more patient effort) at 01/25/2024 0629   Time Frame short-term goal (STG), 5 - 7 days at 01/29/2024 0708   Progress/Outcome goal ongoing at 01/29/2024 0708   LB Dressing Goal 2    Activity/Assistive Device lower body dressing at 01/25/2024 0629   [place] modified independence at 01/25/2024 0629   Time Frame long-term goal (LTG), 14 days or less at 01/29/2024 0708   Progress/Outcome goal ongoing at 01/29/2024 0708   Grooming Goal 1    Activity/Assistive Device grooming skills, all at 01/25/2024 0629   [place] set-up required at 01/25/2024 0629   Time Frame short-term goal (STG), 5 - 7 days at 01/29/2024 0708   Progress/Outcome goal ongoing at 01/29/2024 0708   Grooming Goal 2    Activity/Assistive Device grooming skills, all at 01/25/2024 0629   [place] modified independence at 01/25/2024 0629   Time Frame long-term goal (LTG), 14 days or less at 01/29/2024 0708   Progress/Outcome goal ongoing at 01/29/2024 0708   Toileting Goal 1    Activity/Assistive Device toileting skills, all at 01/25/2024 0629   [place] minimum assist (75% or more patient effort) at  01/25/2024 0629   Time Frame short-term goal (STG), 5 - 7 days at 01/29/2024 0708   Progress/Outcome goal ongoing at 01/29/2024 0708   Toileting Goal 2    Activity/Assistive Device toileting skills, all at 01/25/2024 0629   [place] modified independence at 01/25/2024 0629   Time Frame long-term goal (LTG), 14 days or less at 01/29/2024 0708   Progress/Outcome goal ongoing at 01/29/2024 0708

## 2024-01-29 NOTE — PROGRESS NOTES
Patient: Randolph Daley  Location: [facility]  MRN:  [id number]  Today's date:  1/29/2024    Attempted to see patient for therapy. Unable due to patient refused.    Daily Outcome Statement: Pt declining PT session d/t increased fatigue.

## 2024-01-29 NOTE — PROGRESS NOTES
Patient: Randolph Daley  Location: [facility]  MRN: [id number]  Today's date: 1/29/2024    History of Present Illness  Pt is a 83 y.o. male admitted on 1/24/2024 with Multiple injuries due to trauma [T07.XXXA]. Principal problem is Multiple injuries due to trauma.  is a 83 y.o. male whose primary indication for inpatient rehabilitation is Debility.    Pt is a 83 y.o M adm to [facility] 1/24/24 who presented to [facility] after a slip and fall on ice landing on his right side.  He was found to have multiple injuries including right 8-11 rib fractures, bilateral pleural effusion s/p IR thoracentesis 1/18 with drainage of 1.1L, pelvic fracture (non op mgmt), right adductor/obturator hematoma, questionable posttraumatic bladder injury and age-indeterminate cervical fracture.  Neurosurgery was consulted and offered c spinesurgery, but patient declined.  [place] Collar on at all times, except for meals to prevent aspiration. Urology was consulted.  CT cystogram showed possible extravasation of contrast into the prostatic urethra.  Womack catheter is currently draining clear urine.  Plan is to continue Womack catheter for 1 week.  CT cystogram in 1 week with possible removal at that time, but pt declining CT Cystogram.        Past Medical History  PMH: BPH, HTN       SLP Pain    Date/Time Pain Type Side/Orientation Location Rating: Rest Rating: Activity Interventions [facility]   01/29/24 0930 Pain Reassessment right flank -- 4 - moderate pain position adjusted    01/29/24 0940 -- -- -- 0 -- -- KM   01/29/24 0959 -- -- -- 0 -- -- KM          Prior Living Environment    Flowsheet Row Most Recent Value   People in Home spouse   Current Living Arrangements home   Home Accessibility stairs to enter home (Group), stairs within home (Group)   Living Environment Comment lives with wife in 2  home with first floor set up   Number of Stairs, Main Entrance 1   Surface of Stairs, Main Entrance hardwood  "  Stair Railings, Main Entrance none   Location, Patient Bedroom first (main) floor   Patient Bedroom Access Comment first floor set up   Location, Bathroom first (main) floor   Bathroom Access Comment First floor set up. Walk in shower. grab bar and shower \"bench\"   Stairs, Within Home, Primary 16   Number of Stairs, Within Home, Primary other (see comments)   Surface of Stairs, Within Home, Primary carpeting   Stair Railings, Within Home, Primary railings on both sides of stairs  [left side rail goes 1/2- then can hold the spindles]   Stairs Comment, Within Home, Primary pt reports wife office upstairs but master bed/bath 1st floor          Prior Level of Function    Flowsheet Row Most Recent Value   Dominant Hand right   Ambulation independent   Transferring independent   Toileting independent   Bathing independent   Dressing independent   Eating independent   IADLs independent   Driving/Transportation    Prior Level of Function Comment Ind PTA, previous cervical fx. 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,  reports manages bill paying, medications, calendar at home           IRF SLP Evaluation and Treatment - 01/29/24 0940        SLP Time Calculation    Start Time 0930     Stop Time 1000     Time Calculation (min) 30 min        Session Details    Document Type Daily Treatment/Progress Note     Mode of Treatment individual therapy;speech language pathology        General Information    Patient Profile Reviewed yes     General Observations of Patient pt received in , cooperative        Cognition/Psychosocial    Comment, Short Term Memory Pt with good recall of family visit yesterday, location of meal and gathering to watch football game. Provided specific details when questioned by ST.     Cognitive Interventions/Strategies reasoning/problem-solving interventions     Comment, Cognitive Interventions Sequential Thought-Scrambled Sentences- " 9/12 Indly, increased to 12/12 min cues.        Orientation Log    City 3-->spontaneous/free recall     Kind of Place 3-->spontaneous/free recall     Name of Hospital 3-->spontaneous/free recall     Month 3-->spontaneous/free recall     Date 3-->spontaneous/free recall     Year 3-->spontaneous/free recall     Day of Week 3-->spontaneous/free recall     Clock Time 3-->spontaneous/free recall     Etiology/Event 3-->spontaneous/free recall     Pathology Deficits 3-->spontaneous/free recall     Total Score 30        Daily Progress Summary (SLP)    Daily Outcome Statement Session focused on cognition; memory and fx'l problem solving. Good performance with min cues for mod level verbal/reading problem solving task. Reviewed purpose of ST and goals. Continue to target memory, problem solving/reasoning and voice per POC primary SLP. Primary SLP recommends administration of BCAT next session.     Symptoms Noted During/After Treatment none                      IRF SLP Goals    Flowsheet Row Most Recent Value   Motor Speech/Voice Goal 1    Motor Speech/Voice Goal 1 breath support exercises (incentive spirometer, sustained phonation),  vocal function exercises for improved volume and voice quality x 80% mod cues. at 01/26/2024 0922   Time Frame short-term goal (STG), 2 weeks at 01/26/2024 0922   Progress/Outcome goal ongoing at 01/28/2024 1244   Motor Speech/Voice Goal 2    Motor Speech/Voice Goal 2 Functinal breath support and voice quality for complex convesational needs x 90% with I use of strategies at 01/26/2024 0922   Time Frame long-term goal (LTG), 3 weeks at 01/26/2024 0922   Progress/Outcome goal ongoing at 01/28/2024 1244   Executive Function Goal 1    Activity executive function tasks, information processing tasks, organization/sequencing tasks, planning/decision-making tasks, problem-solving/reasoning tasks, self-monitoring/self-correction  [convergent/divergent reasoning tasks,  mod level functional tasks related  to return to I managing bills, meds, calendar, safety] at 01/26/2024 0922   [place]/Accuracy with 80% accuracy, with moderate, verbal cues/redirection at 01/26/2024 0922   Time Frame short-term goal (STG), 2 weeks at 01/26/2024 0922   Progress/Outcome goal ongoing at 01/28/2024 1244   Executive Function Goal 2    Activity executive function tasks, information processing tasks, organization/sequencing tasks, planning/decision-making tasks, problem-solving/reasoning tasks, self-monitoring/self-correction at 01/26/2024 0922   [place]/Accuracy with 90% accuracy, with minimum, verbal cues/redirection at 01/26/2024 0922   Time Frame long-term goal (LTG), 3 weeks at 01/26/2024 0922   Progress/Outcome goal ongoing at 01/28/2024 1244

## 2024-01-29 NOTE — PATIENT CARE CONFERENCE
Inpatient Rehabilitation Team Conference Note  Date: 1/29/2024  Time: 9:49 AM       Patient Name: Randolph Daley     Medical Record Number: [id number]   YOB: 1940  Sex: Male      Room/Bed: 115/115D  Payor Info: Payor: IB / Plan: [employer] [facility] MEDICARE / Product Type:  Managed Care /     Admitting Diagnosis: Multiple injuries due to trauma [T07.XXXA]   Admit Date/Time: 1/24/2024  8:00 PM    Principal Problem: Multiple injuries due to trauma    Patient Active Problem List    Diagnosis Date Noted   • Multiple injuries due to trauma 01/24/2024   • Fall due to slipping on ice or snow, initial encounter 01/17/2024   • Closed fracture of multiple ribs of right side 01/17/2024   • Closed fracture of ramus of right pubis (CMS/[facility]) 01/17/2024   • Pelvic hematoma in male 01/17/2024   • Pleural effusion on left 01/17/2024   • Normocytic anemia 01/17/2024   • Stage 3a chronic kidney disease (CMS/[facility]) 01/17/2024   • Closed odontoid fracture (CMS/[facility]) 01/17/2024       Premorbid Status:  Dominant Hand: right  Ambulation: independent  Transferring: independent  Toileting: independent  Bathing: independent  Dressing: independent  Eating: independent  IADLs: independent  Driving/Transportation:   Communication: understands/communicates without difficulty  Swallowing: swallows foods/liquids without difficulty  Baseline Diet/Method of Nutritional Intake: regular; thin liquids  Past History of Dysphagia: Denies hx  Assistive Device/Animal Currently Used at Home: grab bar  Prior Level of Function Comment: Ind PTA, previous cervical fx. (+) ; retired womens clothing ; takes care of grandchildren x once a week; reports manages bill paying, medications, calendar at home      Current Functional Status:  Mobility  Gait  [place], Gait: minimum assist (75% or more patient effort)  Assistive Device: walker, front-wheeled  Comment (Gait/Stairs): 50''x 1, 25'x1 w/ min A for  "balance, slow paced and impaired R    Stairs  [place], Stairs: moderate assist (50-74% patient effort)  Assistive Device: railing  Handrail Location (Stairs): both sides  Number of Stairs: 6  Stair Height: 4 inches  Comment: up/down 6-4\" steps with BHR Mod A for lifting / eccentric control of R quad    Wheelchair  [place], Forward Propulsion: dependent (less than 25% patient effort)  [place], Steering Activities: dependent (less than 25% patient effort)  [place], Turning Activities: dependent (less than 25% patient effort)  Comment, Wheelchair Mobility: not a LTG    Transfers  Bed Mobility  Bed Mobility Activities: sit to supine  [place], Roll Left: moderate assist (50-74% patient effort)  [place], Roll Right: moderate assist (50-74% patient effort)  [place], Supine to Sit: moderate assist (50-74% patient effort)  [place], Sit to Supine: moderate assist (50-74% patient effort)  Safety/Cues: maximal; verbal cues; technique  Assistive Device: bed rails; draw sheet  Comment: Supine to EOB at Mod A x1 for trunk support    Bed to Chair Transfer  [place], Bed to Chair: moderate assist (50-74% patient effort)  Safety/Cues: technique  Assistive Device: walker, front-wheeled  Comment: via SPT mod A for lateral wt shift and balance    Chair to Bed Transfer  [place], Chair to Bed: moderate assist (50-74% patient effort)  Safety/Cues: technique  Assistive Device: walker, front-wheeled  Comment: via SPT mod A for lateral wt shift and balance    Sit to Stand Transfer  [place], Sit to Stand Transfer: minimum assist (75% or more patient effort)  Safety/Cues: increased time to complete  Assistive Device: walker, front-wheeled  Comment: from WC    Stand to Sit Transfer  [place], Stand to Sit Transfer: minimum assist (75% or more patient effort)  Safety/Cues: increased time to complete  Assistive Device: walker, front-wheeled  Comment: to WC, min A for eccentric " control    Stand Pivot Transfer  [place], Stand Pivot/Stand Step Transfer: minimum assist (75% or more patient effort)  Safety/Cues: increased time to complete; technique; proper use of assistive device  Assistive Device: walker, front-wheeled  Comment: via amb approach    Car Transfer  Transfer Technique: stand pivot  [place]: moderate assist (50-74% patient effort)  Safety/Cues: maximal; verbal cues; hand placement; preparatory posture; proper use of assistive device; technique  Assistive Device: walker, front-wheeled  Comment: SPT with RW increased time in / out of simulated car Mod A for assistance with RLE in / out of car and with sit / stand      Toilet Transfer  Transfer Technique: stand pivot  [place]: minimum assist (75% or more patient effort)  Safety/Cues: hand placement; increased time to complete  Assistive Device: grab bars/safety frame  Comment: per last status, min A for balance with use of RW, grab bars, and accessible height toilet    Shower Transfer  Transfer Technique: stand pivot (sit/stand)  [place]: minimum assist (75% or more patient effort)  Safety/Cues: increased time to complete; verbal cues; hand placement; technique  Assistive Device: grab bars/tub rail; walker, front-wheeled; shower chair  Comment: w/c to and from shower chair; shower chair to stance      Self Care  Bathing  Tasks: chest; left arm; right arm; abdomen; front perineal area; buttocks; left upper leg; right upper leg; left lower leg, including foot  [place]: minimum assist (75% or more patient effort)  Safety/Cues: increased time to complete; compensatory techniques; energy conservation; verbal cues; maintaining precautions; use of adaptive equipment  Setup Assistance: adaptive equipment setup; adjust water temperature/flow; obtain supplies; open containers; orthosis application  Adaptive Equipment: grab bar/tub rail; hand-held shower spray hose; shower chair  Comment: Full wet shower performed with  pt agreeable to use of Jenni collar. Majority of shower performed seated and in stance for perineal hygiene requiring A for balance. Pt utilizes long handled sponge to wash BLEs.    Upper Body Dressing  Tasks: don; pull over garment  [place]: moderate assist (50-74% patient effort)  Safety/Cues: increased time to complete; compensatory techniques; energy conservation; maintaining precautions  Adaptive Equipment: none  Comment: Pt requires A to thread LUE and manage cervical collar; able to perform all other tasks with increased time    Lower Body Dressing  Tasks: don; socks; underwear  [place]: maximum assist (25-49% patient effort)  Safety/Cues: increased time to complete; compensatory techniques; maintaining precautions  Adaptive Equipment: none  Comment: Pt able to thread RLE through underwear and also threads LLE through same hole; A to manage. Pt in stance for pulling above waist requiring A for balance. Dep x1 for sock management    Grooming  Tasks: washes, rinses and dries face; brushes/li hair  [place]: close supervision (per most recent performance)  Safety/Cues: increased time to complete  Setup Assistance: adjust water temperature/flow; open containers  Adaptive Equipment: none  Comment: Pt completed light morning grooming routine seated in w/c at tray table.    Toileting  Tasks: adjust/manage clothing; perform bowel hygiene  [place]: moderate assist (50-74% patient effort) (per most recent performance)  Safety/Cues: problem-solving; attention  Setup Assistance: change pad/brief; adaptive equipment setup  Adaptive Equipment: grab bar/safety frame  Comment: Pt completed pericare in stance, assistance to manage clothing around waist and assistance to maintain balance in stance for pericare. Pt incontent of bladder, no bowel movement just smear.    Self-Feeding  Tasks: scoop food onto utensil; utensil to mouth; liquids to mouth  [place]: set up (per most recent  "performance)  Safety/Cues: increased time to complete  Setup Assistance: prepare tray/open items  Adaptive Equipment: none  Comment: Assistance to prepare items on tray including opening lids and containers. Pt reporting \"it's slipping\"    Cognition  Affect/Mental Status: confabulatory  Behavioral Issues: difficulty managing stress; overwhelmed easily  Orientation Status: oriented x 4  Follows Commands: 50-74% accuracy; physical/tactile prompts required; repetition of directions required; verbal cues/prompting required; follows one-step commands  Cognitive Function: executive function deficit; safety deficit  Attention Deficit: minimal deficit  Comment, Attention: attn faded as tasks progressed; pt relates this to fatigue from lack of sleep last night; will monitor  Executive Function Deficit: moderate deficit; abstract thinking; insight/awareness of deficits; judgment; organization/sequencing; problem-solving/reasoning  Comment, Executive Function: Pt benefitted from mod cues to define personal goals for rehab stay ; located call bell with min cues and described 2/3 reasons to use it [place] and 3/3 with min cues; Nursing reports pt with decreased safety awarenes/decreased use of recommended cervical collar. Pt answered convergent reasonsing questions x 4/6; In divergent naming/reasoning task, pt listed 10 items in concrete category (animals) and 3 items in abstract category (m-words) given one minute time constraint. Pt had difficulty describing similarities and differences between common items. EX. Pt stated the similarity between an watch and calendar as: \"watch tells the days; calendar tells the days\" and the difference as: \"watch runs automatic; calendar you can carry yourself\". Pt stating \"I'm not thinking too well today\" Perseverating on disorganized/tangential  topics of conversation throughout  Memory Deficit: minimal deficit; short-term memory; semantic memory  Comment, Short Term Memory: Pt oriented x 4 with " fair recall of details from earlier today as well as yesterday's sessions. Immediate recall for 7 digits forward; working memory for 4 digits in reverse; delayed recall for 2/3 words after 5 min delay and 3/3 with choice cue  Safety Deficit: moderate deficit; safety precautions awareness; safety precautions follow-through/compliance; insight into deficits/self-awareness  Comment, Cognition: Focus of session surrounded extensive edu r/t rationale for goals, ST services, and what cognition is. Pt continued to state that he does not need speech therapy services and he wants to work on walking. Pt falling asleep in between explanations. Continued to be resistive to education. Recommend completing BCAT during next session to objectively determine cognitive level and improve insight/buy in.  City: 1-->multiple choice, phonemic cuing  Kind of Place: 3-->spontaneous/free recall  Name of [facility]: 3-->spontaneous/free recall  Month: 3-->spontaneous/free recall  Date: 3-->spontaneous/free recall  Year: 3-->spontaneous/free recall  Day of Week: 3-->spontaneous/free recall  Clock Time: 3-->spontaneous/free recall  Etiology/Event: 3-->spontaneous/free recall  Pathology Deficits: 3-->spontaneous/free recall  Total Score: 28    Communication  Breath Support (Motor Speech): minimal impairment  Comment, Motor Speech Assessment: Pt with reduced breath support for sustained phonation (5 secs for /a/); Voice quality with reduced volume/mild raspiness; improves with cues. Other parameters of speech WFLs. Pt reports voice has changed s/p fall and sounds weaker, pt relates this to feelings of depression.  Follows Commands (Auditory Comprehension): 2-step commands  Yes/No Questions (Auditory Comprehension): complex questions  Complex Questions (Auditory Comprehension): intact  Comment, Assessment (Auditory Comprehension): grossly functional; suspect decreased processing/decreased organizaiton may interfere with more complex receptive  langugae demands      Frequency of Treatment (OT): 5-7 times per week  Intensity of Treatment (OT): 60-90 minutes per day  Frequency of Treatment (PT): 5-7 times per week  Intensity of Treatment (PT): 60-90 minutes per day  Frequency of Treatment (SLP): 5-7 times per week  Intensity of Treatment (SLP): 30 minutes per day    Randolph requires an altered therapy schedule due to decreased endurance, medically complex, behavior.      Weekly Outcome Summaries:  Weekly Progress Summary (PT)  Progress Toward Functional Goals (PT): progressing toward functional goals as expected  Weekly Outcome Statement: Fluctuating participation and decr compliance w/ cervical collar use, requires frequent rest breaks and ongoing edu on cervical collar use  Barriers to Overall Progress (PT): impaired insight/safety awareness, fatigue, gait deviations, impaired standing balance  Recommendations: continue IP PT 5-7x/week for 60-90min/day    Weekly Progress Summary (OT)  Progress Toward Functional Goals (OT): progressing toward functional goals as expected  Weekly Outcome Statement: Pt grossly min A for fxl transfers and varies set up-max A for ADLs, will trial long handled AE to increase safety and independence with ADLs. Pt limited by pain, decreased dynamic standing balance, safety awareness, activty tolerance, endurance, spinal precautions, adherance to cervical colllar orders, and limited by donning cervical collars in supine. Recommend family training when appropriate. Recommend continued skilled inpatient OT services to maximize safety and independence with fxl transfers, fxl mobility, and ADLs.    Weekly Progress Summary (SLP)  Progress Toward Functional Goals (SLP): progressing toward functional goals slower than expected  Weekly Outcome Statement: On initial evaluation, pt presented with mild dysphonia c/b poor breath support for speech and mild to moderate cognitive deficits c/b decreased STM for details; decreased reasoning/problem  "solving for mod complex tasks. He is on a regular solid/thin liquid diet. Motor speech and expressive/receptive language is intact. Pt is very resistive to education/rationale for therapy. He was (I) PTA, managing all IADL’s, and taking care of his grandchildren one time per week. Plan to administer BCAT to objectively assess cognitive status and improve insight/buy in.  Barriers to Overall Progress (SLP): Resistive, limited insight, cognitive deficits  Recommendations (SLP): Communication I; cognition set up.        Problem Resolution:  Team Weekly Outcome Statement: sent to er to eval hemoglobin/fever-returned, collar at all times, alert oriented, cont b&b-[place] hs, robitussin/breathing tx's, iron, stage 2 covered with mepilex, room air  Comment, Barriers to Discharge: non compliant with surgical collar/limited insight into defecits, balance, pain, fatigue  Comment, Facilitators for Discharge: edcuation around wearing collar, ongoing balance and activity activities, family trng    Risk for Complications  Constipation: Low  Falls: Moderate  Skin Breakdown: Moderate      The patient's medical prognosis is fair to achieve the stated goals below.    Expected Level of Function  Self-Care: Supervision or touching assistance  Sphincter Control: Independent  Transfers: Independent  Locomotion: Independent  Communication: Independent  Social Cognition: Setup or clean-up assistance  Comment, Expected Level of Function at Discharge: mod with safety concerns, supervision shower      Goals/Support System:  Patient/Family Goals  Patient's Goals For Discharge:  (\"get out of here by Monday\")  Family/Support System  Caregiver Engagement: active learner related to care delivery, advocates for the patient  Family/Support System Care: self-care encouraged, support provided    IRF PT Goals    Flowsheet Row Most Recent Value   Bed Mobility Goal 1    Activity/Assistive Device bed mobility activities, all at 01/25/2024 0900 "   [place] minimum assist (75% or more patient effort) at 01/25/2024 0900   Time Frame short-term goal (STG), 5 - 7 days at 01/25/2024 0900   Progress/Outcome goal ongoing at 01/29/2024 0839   Bed Mobility Goal 2    Activity/Assistive Device bed mobility activities, all at 01/25/2024 0900   [place] modified independence at 01/25/2024 0900   Time Frame long-term goal (LTG), 14 days or less at 01/25/2024 0900   Progress/Outcome goal ongoing at 01/29/2024 0839   Transfer Goal 1    Activity/Assistive Device sit-to-stand/stand-to-sit, stand pivot at 01/29/2024 0839   [place] --  [steadying A] at 01/29/2024 0839   Time Frame short-term goal (STG), 5 - 7 days at 01/29/2024 0839   Progress/Outcome goal met, goal revised this date at 01/29/2024 0839   Transfer Goal 2    Activity/Assistive Device sit-to-stand/stand-to-sit, bed-to-chair/chair-to-bed, stand pivot at 01/25/2024 0900   [place] modified independence at 01/25/2024 0900   Time Frame long-term goal (LTG), 14 days or less at 01/25/2024 0900   Progress/Outcome goal ongoing at 01/29/2024 0839   Gait/Walking Locomotion Goal 1    Activity/Assistive Device gait (walking locomotion) at 01/25/2024 0900   Distance 25 feet at 01/25/2024 0900   [place] minimum assist (75% or more patient effort) at 01/25/2024 0900   Time Frame short-term goal (STG), 5 - 7 days at 01/25/2024 0900   Progress/Outcome goal ongoing at 01/29/2024 0839   Gait/Walking Locomotion Goal 2    Activity/Assistive Device gait (walking locomotion), assistive device use at 01/29/2024 0839   Distance 150 feet at 01/29/2024 0839   [place] modified independence at 01/29/2024 0839   Time Frame long-term goal (LTG), 14 days or less at 01/29/2024 0839   Progress/Outcome goal revised this date at 01/29/2024 0839   Stairs Goal 1    Activity/Assistive Device stairs, all skills, using handrail, left, using handrail, right at 01/28/2024 1304   Number of Stairs 12 at 01/28/2024 1304    [place] minimum assist (75% or more patient effort) at 01/28/2024 1304   Time Frame 5 - 7 days, short-term goal (STG) at 01/28/2024 1304   Progress/Outcome goal ongoing at 01/29/2024 0839   Stairs Goal 2    Activity/Assistive Device stairs, all skills, using handrail, left, cane, straight at 01/28/2024 1304   Number of Stairs 12 at 01/28/2024 1304   [place] supervision required at 01/28/2024 1304   Time Frame 14 days or less at 01/28/2024 1304   Progress/Outcome goal ongoing at 01/29/2024 0839        IRF OT Goals    Flowsheet Row Most Recent Value   Transfer Goal 1    Activity/Assistive Device toilet at 01/25/2024 0629   [place] supervision required at 01/25/2024 0629   Time Frame short-term goal (STG), 5 - 7 days at 01/29/2024 0708   Progress/Outcome goal ongoing at 01/29/2024 0708   Transfer Goal 2    Activity/Assistive Device toilet at 01/25/2024 0629   [place] modified independence  [steady A] at 01/25/2024 0629   Time Frame long-term goal (LTG), 14 days or less at 01/29/2024 0708   Progress/Outcome goal ongoing at 01/29/2024 0708   Transfer Goal 3    Activity/Assistive Device shower at 01/25/2024 0629   [place] other (see comments)  [touch A] at 01/25/2024 0629   Time Frame short-term goal (STG), 5 - 7 days at 01/29/2024 0708   Progress/Outcome goal ongoing at 01/29/2024 0708   Transfer Goal 4    Activity/Assistive Device shower at 01/25/2024 0629   [place] modified independence at 01/29/2024 0708   Time Frame long-term goal (LTG), 14 days or less at 01/29/2024 0708   Progress/Outcome goal revised this date at 01/29/2024 0708   Bathing Goal 1    Activity/Assistive Device bathing skills, all at 01/25/2024 0629   [place] minimum assist (75% or more patient effort) at 01/25/2024 0629   Time Frame short-term goal (STG), 5 - 7 days at 01/29/2024 0708   Progress/Outcome goal ongoing at 01/29/2024 0708   Bathing Goal 2    Activity/Assistive Device bathing skills, all at 01/25/2024  0629   [place] set-up required at 01/29/2024 0708   Time Frame long-term goal (LTG), 14 days or less at 01/29/2024 0708   Progress/Outcome goal revised this date at 01/29/2024 0708   UB Dressing Goal 1    Activity/Assistive Device upper body dressing at 01/25/2024 0629   [place] minimum assist (75% or more patient effort) at 01/29/2024 0708   Time Frame short-term goal (STG), 5 - 7 days at 01/29/2024 0708   Strategies/Barriers including clothing retrieval at 01/29/2024 0708   Progress/Outcome goal revised this date at 01/29/2024 0708   UB Dressing Goal 2    Activity/Assistive Device upper body dressing at 01/25/2024 0629   [place] minimum assist (75% or more patient effort) at 01/29/2024 0708   Time Frame long-term goal (LTG), 14 days or less at 01/29/2024 0708   Progress/Outcome goal revised this date at 01/29/2024 0708   LB Dressing Goal 1    Activity/Assistive Device lower body dressing at 01/25/2024 0629   [place] minimum assist (75% or more patient effort) at 01/25/2024 0629   Time Frame short-term goal (STG), 5 - 7 days at 01/29/2024 0708   Progress/Outcome goal ongoing at 01/29/2024 0708   LB Dressing Goal 2    Activity/Assistive Device lower body dressing at 01/25/2024 0629   [place] modified independence at 01/25/2024 0629   Time Frame long-term goal (LTG), 14 days or less at 01/29/2024 0708   Progress/Outcome goal ongoing at 01/29/2024 0708   Grooming Goal 1    Activity/Assistive Device grooming skills, all at 01/25/2024 0629   [place] set-up required at 01/25/2024 0629   Time Frame short-term goal (STG), 5 - 7 days at 01/29/2024 0708   Progress/Outcome goal ongoing at 01/29/2024 0708   Grooming Goal 2    Activity/Assistive Device grooming skills, all at 01/25/2024 0629   [place] modified independence at 01/25/2024 0629   Time Frame long-term goal (LTG), 14 days or less at 01/29/2024 0708   Progress/Outcome goal ongoing at 01/29/2024 0708   Toileting Goal 1     Activity/Assistive Device toileting skills, all at 01/25/2024 0629   [place] minimum assist (75% or more patient effort) at 01/25/2024 0629   Time Frame short-term goal (STG), 5 - 7 days at 01/29/2024 0708   Progress/Outcome goal ongoing at 01/29/2024 0708   Toileting Goal 2    Activity/Assistive Device toileting skills, all at 01/25/2024 0629   [place] modified independence at 01/25/2024 0629   Time Frame long-term goal (LTG), 14 days or less at 01/29/2024 0708   Progress/Outcome goal ongoing at 01/29/2024 0708        IRF SLP Goals    Flowsheet Row Most Recent Value   Motor Speech/Voice Goal 1    Motor Speech/Voice Goal 1 breath support exercises (incentive spirometer, sustained phonation),  vocal function exercises for improved volume and voice quality x 80% mod cues. at 01/26/2024 0922   Time Frame short-term goal (STG), 2 weeks at 01/26/2024 0922   Progress/Outcome goal ongoing at 01/28/2024 1244   Motor Speech/Voice Goal 2    Motor Speech/Voice Goal 2 Functinal breath support and voice quality for complex convesational needs x 90% with I use of strategies at 01/26/2024 0922   Time Frame long-term goal (LTG), 3 weeks at 01/26/2024 0922   Progress/Outcome goal ongoing at 01/28/2024 1244   Executive Function Goal 1    Activity executive function tasks, information processing tasks, organization/sequencing tasks, planning/decision-making tasks, problem-solving/reasoning tasks, self-monitoring/self-correction  [convergent/divergent reasoning tasks,  mod level functional tasks related to return to I managing bills, meds, calendar, safety] at 01/26/2024 0922   [place]/Accuracy with 80% accuracy, with moderate, verbal cues/redirection at 01/26/2024 0922   Time Frame short-term goal (STG), 2 weeks at 01/26/2024 0922   Progress/Outcome goal ongoing at 01/28/2024 1244   Executive Function Goal 2    Activity executive function tasks, information processing tasks, organization/sequencing tasks,  planning/decision-making tasks, problem-solving/reasoning tasks, self-monitoring/self-correction at 01/26/2024 0922   [place]/Accuracy with 90% accuracy, with minimum, verbal cues/redirection at 01/26/2024 0922   Time Frame long-term goal (LTG), 3 weeks at 01/26/2024 0922   Progress/Outcome goal ongoing at 01/28/2024 1244          Discharge Planning:  Anticipated Discharge Disposition: home with home health  Type of Home Care Services: home OT;home PT;nursing  Home Access/Discharge Environment  Identified Home Modification Needs: wkr,assessing bathroom dme  Discharge Planning  Does the patient need discharge transport arranged?: No  Additional Info Related to Length of Stay and Coverage: nrd 1/31    Anticipated Discharge Date: 2/7/2024    Needs Identified:       Team Members Present:     Rehab Attending Present:  Amelia Larose MD    Care Coordinator Present:  Aurora Rangel LSW    Nurse Present:  Tanisha Montejo RN    OT Present:  Paula Sandoval OT    PT Present:  Ly Garcia PT    SLP Present:  Laly Ch CCC-SLP        Next Team Conference Date: 02/06/24

## 2024-01-29 NOTE — PLAN OF CARE
"Plan of Care Review  Plan of Care Reviewed With: patient  Progress: improving  Outcome Evaluation: Patient AAOx4, continent of bowel and bladder.  Pain managed with tylenol.  Patient on regular breathing treatments throughout the day; lungs are diminished throughout.  Lasix d/c and sodium levels back to within normal limits sodium order decreased to 1 pill BID.  Even after thorough education regarding cervical instability patient is non-compliant with aspen collar frequently takes off or loosens reports that he is \"claustrophobic\" and can not wear it. Non compliance is documented and Doctors are aware.   "

## 2024-01-30 ENCOUNTER — APPOINTMENT (INPATIENT)
Dept: PHYSICAL THERAPY | Facility: REHABILITATION | Age: 84
DRG: 560 | End: 2024-01-30
Payer: COMMERCIAL

## 2024-01-30 ENCOUNTER — APPOINTMENT (INPATIENT)
Dept: SPEECH THERAPY | Facility: REHABILITATION | Age: 84
DRG: 560 | End: 2024-01-30
Payer: COMMERCIAL

## 2024-01-30 ENCOUNTER — APPOINTMENT (INPATIENT)
Dept: OCCUPATIONAL THERAPY | Facility: REHABILITATION | Age: 84
DRG: 560 | End: 2024-01-30
Payer: COMMERCIAL

## 2024-01-30 LAB
ERYTHROCYTE [DISTWIDTH] IN BLOOD BY AUTOMATED COUNT: 15.5 % (ref 11.6–14.4)
HCT VFR BLD AUTO: 25.6 % (ref 40.1–51)
HGB BLD-MCNC: 8.2 G/DL (ref 13.7–17.5)
MCH RBC QN AUTO: 31.2 PG (ref 28–33.2)
MCHC RBC AUTO-ENTMCNC: 32 G/DL (ref 32.2–36.5)
MCV RBC AUTO: 97.3 FL (ref 83–98)
PDW BLD AUTO: 9.9 FL (ref 9.4–12.4)
PLATELET # BLD AUTO: 173 K/UL (ref 150–350)
RBC # BLD AUTO: 2.63 M/UL (ref 4.5–5.8)
WBC # BLD AUTO: 9.36 K/UL (ref 3.8–10.5)

## 2024-01-30 PROCEDURE — 97530 THERAPEUTIC ACTIVITIES: CPT | Mod: GO

## 2024-01-30 PROCEDURE — 25000000 HC PHARMACY GENERAL

## 2024-01-30 PROCEDURE — 63700000 HC SELF-ADMINISTRABLE DRUG

## 2024-01-30 PROCEDURE — 97530 THERAPEUTIC ACTIVITIES: CPT | Mod: GP,59

## 2024-01-30 PROCEDURE — 97116 GAIT TRAINING THERAPY: CPT | Mod: GP

## 2024-01-30 PROCEDURE — 12800000 HC ROOM AND CARE SEMIPRIVATE REHAB

## 2024-01-30 PROCEDURE — 63600000 HC DRUGS/DETAIL CODE: Mod: JZ

## 2024-01-30 PROCEDURE — 92507 TX SP LANG VOICE COMM INDIV: CPT | Mod: GN

## 2024-01-30 PROCEDURE — 25800000 HC PHARMACY IV SOLUTIONS

## 2024-01-30 PROCEDURE — 85027 COMPLETE CBC AUTOMATED: CPT | Performed by: INTERNAL MEDICINE

## 2024-01-30 PROCEDURE — 36415 COLL VENOUS BLD VENIPUNCTURE: CPT | Performed by: INTERNAL MEDICINE

## 2024-01-30 PROCEDURE — 97535 SELF CARE MNGMENT TRAINING: CPT | Mod: GO

## 2024-01-30 PROCEDURE — 97110 THERAPEUTIC EXERCISES: CPT | Mod: GP,59

## 2024-01-30 PROCEDURE — 63700000 HC SELF-ADMINISTRABLE DRUG: Performed by: INTERNAL MEDICINE

## 2024-01-30 RX ADMIN — ALBUTEROL SULFATE: 2.5 SOLUTION RESPIRATORY (INHALATION) at 16:47

## 2024-01-30 RX ADMIN — SODIUM CHLORIDE 1 G: 1 TABLET ORAL at 20:19

## 2024-01-30 RX ADMIN — DOXYCYCLINE HYCLATE 100 MG: 100 TABLET, FILM COATED ORAL at 21:03

## 2024-01-30 RX ADMIN — ACETAMINOPHEN 650 MG: 325 TABLET, FILM COATED ORAL at 00:54

## 2024-01-30 RX ADMIN — SODIUM CHLORIDE SOLN NEBU 3% 3 ML: 3 NEBU SOLN at 16:49

## 2024-01-30 RX ADMIN — SODIUM CHLORIDE 1 G: 1 TABLET ORAL at 08:57

## 2024-01-30 RX ADMIN — APIXABAN 2.5 MG: 2.5 TABLET, FILM COATED ORAL at 08:57

## 2024-01-30 RX ADMIN — LEVOTHYROXINE SODIUM 25 MCG: 25 TABLET ORAL at 05:10

## 2024-01-30 RX ADMIN — DOXYCYCLINE HYCLATE 100 MG: 100 TABLET, FILM COATED ORAL at 09:21

## 2024-01-30 RX ADMIN — ALBUTEROL SULFATE: 2.5 SOLUTION RESPIRATORY (INHALATION) at 20:19

## 2024-01-30 RX ADMIN — GUAIFENESIN 400 MG: 100 SOLUTION ORAL at 20:19

## 2024-01-30 RX ADMIN — GUAIFENESIN 400 MG: 100 SOLUTION ORAL at 08:57

## 2024-01-30 RX ADMIN — ALBUTEROL SULFATE: 2.5 SOLUTION RESPIRATORY (INHALATION) at 09:00

## 2024-01-30 RX ADMIN — Medication 2500 UNITS: at 16:48

## 2024-01-30 RX ADMIN — SODIUM CHLORIDE SOLN NEBU 3% 3 ML: 3 NEBU SOLN at 20:19

## 2024-01-30 RX ADMIN — OXYCODONE HYDROCHLORIDE AND ACETAMINOPHEN 500 MG: 500 TABLET ORAL at 08:58

## 2024-01-30 RX ADMIN — APIXABAN 2.5 MG: 2.5 TABLET, FILM COATED ORAL at 20:19

## 2024-01-30 RX ADMIN — SODIUM CHLORIDE 125 MG: 9 INJECTION, SOLUTION INTRAVENOUS at 18:08

## 2024-01-30 RX ADMIN — SODIUM CHLORIDE SOLN NEBU 3% 3 ML: 3 NEBU SOLN at 09:20

## 2024-01-30 RX ADMIN — SENNOSIDES AND DOCUSATE SODIUM 2 TABLET: 8.6; 5 TABLET ORAL at 20:19

## 2024-01-30 RX ADMIN — TAMSULOSIN HYDROCHLORIDE 0.4 MG: 0.4 CAPSULE ORAL at 20:19

## 2024-01-30 NOTE — NURSING NOTE
Pt refuses to wear collar and be complaint with wearing schedule. Pt verbalized understating of education of ASPEN to be worn AAT. Pt refused.

## 2024-01-30 NOTE — PROGRESS NOTES
Patient: Randolph Daley  Location: [facility]  MRN: [id number]  Today's date: 1/30/2024    History of Present Illness  Pt is a 83 y.o. male admitted on 1/24/2024 with Multiple injuries due to trauma [T07.XXXA]. Principal problem is Multiple injuries due to trauma.  is a 83 y.o. male whose primary indication for inpatient rehabilitation is Debility.    Pt is a 83 y.o M adm to [facility] 1/24/24 who presented to [facility] after a slip and fall on ice landing on his right side.  He was found to have multiple injuries including right 8-11 rib fractures, bilateral pleural effusion s/p IR thoracentesis 1/18 with drainage of 1.1L, pelvic fracture (non op mgmt), right adductor/obturator hematoma, questionable posttraumatic bladder injury and age-indeterminate cervical fracture.  Neurosurgery was consulted and offered c spinesurgery, but patient declined.  [place] Collar on at all times, except for meals to prevent aspiration. Urology was consulted.  CT cystogram showed possible extravasation of contrast into the prostatic urethra.  Womack catheter is currently draining clear urine.  Plan is to continue Womack catheter for 1 week.  CT cystogram in 1 week with possible removal at that time, but pt declining CT Cystogram.        Past Medical History  PMH: BPH, HTN       OT Vitals    Date/Time Pulse HR Source BP BP Location BP Method Pt Position [facility]   01/30/24 1306 61 Monitor 108/57 Right upper arm Automatic Sitting TS      OT Pain    Date/Time Pain Type Side/Orientation Location Rating: Rest Rating: Activity Interventions [facility]   01/30/24 1306 Pain Assessment right back 6 4 diversional activity provided;position adjusted    01/30/24 1358 Pain Reassessment -- -- 0 -- -- TS             Prior Living Environment    Flowsheet Row Most Recent Value   People in Home spouse   Current Living Arrangements home   Home Accessibility stairs to enter home (Group), stairs within home (Group)   Living  "Environment Comment lives with wife in 2  home with first floor set up   Number of Stairs, Main Entrance 1   Surface of Stairs, Main Entrance hardwood   Stair Railings, Main Entrance none   Location, Patient Bedroom first (main) floor   Patient Bedroom Access Comment first floor set up   Location, Bathroom first (main) floor   Bathroom Access Comment First floor set up. Walk in shower. grab bar and shower \"bench\"   Stairs, Within Home, Primary 16   Number of Stairs, Within Home, Primary other (see comments)   Surface of Stairs, Within Home, Primary carpeting   Stair Railings, Within Home, Primary railings on both sides of stairs  [left side rail goes 1/2- then can hold the spindles]   Stairs Comment, Within Home, Primary pt reports wife office upstairs but master bed/bath 1st floor          Prior Level of Function    Flowsheet Row Most Recent Value   Dominant Hand right   Ambulation independent   Transferring independent   Toileting independent   Bathing independent   Dressing independent   Eating independent   IADLs independent   Driving/Transportation    Prior Level of Function Comment Ind PTA, previous cervical fx. 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   Patient Goals Initial PSFS: getting in/out of a car 8/10, walking 5/10, getting on/off of the sofa 5/10, cooking 7/10 = 25/40 = 62.5%           IRF OT Evaluation and Treatment - 01/30/24 1301        OT Time Calculation    Start Time 1300     Stop Time 1400     Time Calculation (min) 60 min        Session Details    Document Type Daily Treatment/Progress Note     Mode of " Treatment individual therapy;occupational therapy        General Information    General Observations of Patient Pt received seated in  bedside Shraddha polk MD present. Pt pleasant and cooperative.        Mobility Belt    Mobility Belt Used for All Out of Bed Activity yes        Orthosis Neck Cervical Collar    Orthosis Properties Date Obtained: 01/17/24 Time Obtained: 1900 Location: Neck Type: Cervical Collar Features: Hard Therapeutic Indications: fracture immobilization Wearing Schedule: wear at all times (remove only for hygiene and skin inspection)    Compliance/Wearing Issues needs reinforcement   during session pt loosened collar 1x, but agreeable to let OT adjust for proper support and positioning with additional education.       Bed Mobility    Comment OT: sitting EOB to supine with mod A for BLE management d/t increased pain, pt able to maintain spinal precautions.        Sit to Stand Transfer    [place], Sit to Stand Transfer touching/steadying assist     Safety/Cues minimal;verbal cues;technique;hand placement     Assistive Device walker, front-wheeled     Comment from  steady assist for balance        Stand to Sit Transfer    [place], Stand to Sit Transfer touching/steadying assist     Safety/Cues minimal;verbal cues;hand placement;technique     Assistive Device walker, front-wheeled     Comment to  steady assist for controlled descend        Stand Pivot Transfer    [place], Stand Pivot/Stand Step Transfer touching/steadying assist     Safety/Cues increased time to complete;minimal;verbal cues;proper use of assistive device;maintaining precautions     Assistive Device walker, front-wheeled     Comment via amb approach with steady assist for balance, SPT wc to EOB steady assist for balance.        Toilet Transfer    Transfer Technique stand pivot     [place] minimum assist (75% or more patient effort)     Safety/Cues increased time to complete;minimal;verbal cues;hand  placement;technique     Assistive Device grab bars/safety frame;walker, front-wheeled   accessible height toilet    Comment via amb approach, steady assist for controlled descend to sit, min A for pelvic lift and balance upon stance.        Shower Transfer    Transfer Technique stand pivot     [place] touching/steadying assist     Safety/Cues increased time to complete;minimal;verbal cues;hand placement;maintaining precautions;technique     Assistive Device grab bars/tub rail;shower chair;walker, front-wheeled     Comment via short amb from toilet, L entry into barrier free shower stall with steady assist for balance        Impairments/Safety Issues    Comment, Safety Issues/Impairments OT: HHD with RW within room with steady assist for balance and increased time        Bathing    Shower Provided? No, patient refused        Lower Body Dressing    Tasks doff;shoes/slippers     [place] minimum assist (75% or more patient effort)     Safety/Cues increased time to complete;moderate;verbal cues;tactile cues;use of adaptive equipment;proper use of assistive device;maintaining precautions     Position edge of bed sitting;supported sitting;supported standing     Adaptive Equipment reacher     [place], Footwear minimum assist (75% or more patient effort)     Comment Pt completed simulated LB dressing. seated in wc, pt thread BLE into theraband with use of reacher, stood with RW to hike over hips and lower below hips with intermittent UE support on RW, with min A for balance mod vc for technique with AE and to maintain precautions. Seated EOB, pt required assist for shoelace management then doffed sneakers alternating toe to heel, min A overall.        Toileting    Tasks adjust/manage clothing     [place] minimum assist (75% or more patient effort)     Safety/Cues maintaining precautions     Position supported standing     Setup Assistance obtain supplies     Adaptive Equipment urinal     Comment Pt  continent of urinal standing with unilateral support on RW and holding urinal. Pt completed CM with steady-min A for balance, OT provided set up of urinal then pt held urinal in placement.        Daily Progress Summary (OT)    Symptoms Noted During/After Treatment fatigue     Daily Outcome Statement Session focused on fxl transfers, and simulated LB dressing with AE. Issued a flexible sock aid, reacher, and dressing pal. Pt required seated therapeutic rest breaks d/t fatigue. continues to require vc to adhere to spinal precations. Continue OT POC to increase activty tolerance, balance, and safety and independence with ADLs, fxl transfers, and fxl mobility.                      Education Documentation  Equipment/Methods, taught by Paula Sandoval, OT at 1/30/2024  2:50 PM.  Learner: Patient  Readiness: Acceptance  Method: Explanation, Demonstration  Response: Verbalizes Understanding, Demonstrated Understanding  Comment: compensatory technique for LB dressing with the use of a reacher          IRF OT Goals    Flowsheet Row Most Recent Value   Transfer Goal 1    Activity/Assistive Device toilet at 01/25/2024 0629   [place] supervision required at 01/25/2024 0629   Time Frame short-term goal (STG), 5 - 7 days at 01/29/2024 0708   Progress/Outcome goal ongoing at 01/29/2024 0708   Transfer Goal 2    Activity/Assistive Device toilet at 01/25/2024 0629   [place] modified independence  [steady A] at 01/25/2024 0629   Time Frame long-term goal (LTG), 14 days or less at 01/29/2024 0708   Progress/Outcome goal ongoing at 01/29/2024 0708   Transfer Goal 3    Activity/Assistive Device shower at 01/25/2024 0629   [place] other (see comments)  [touch A] at 01/25/2024 0629   Time Frame short-term goal (STG), 5 - 7 days at 01/29/2024 0708   Progress/Outcome goal ongoing at 01/29/2024 0708   Transfer Goal 4    Activity/Assistive Device shower at 01/25/2024 0629   [place] modified independence at 01/29/2024 0708    Time Frame long-term goal (LTG), 14 days or less at 01/29/2024 0708   Progress/Outcome goal revised this date at 01/29/2024 0708   Bathing Goal 1    Activity/Assistive Device bathing skills, all at 01/25/2024 0629   [place] minimum assist (75% or more patient effort) at 01/25/2024 0629   Time Frame short-term goal (STG), 5 - 7 days at 01/29/2024 0708   Progress/Outcome goal ongoing at 01/29/2024 0708   Bathing Goal 2    Activity/Assistive Device bathing skills, all at 01/25/2024 0629   [place] set-up required at 01/29/2024 0708   Time Frame long-term goal (LTG), 14 days or less at 01/29/2024 0708   Progress/Outcome goal revised this date at 01/29/2024 0708   UB Dressing Goal 1    Activity/Assistive Device upper body dressing at 01/25/2024 0629   [place] minimum assist (75% or more patient effort) at 01/29/2024 0708   Time Frame short-term goal (STG), 5 - 7 days at 01/29/2024 0708   Strategies/Barriers including clothing retrieval at 01/29/2024 0708   Progress/Outcome goal revised this date at 01/29/2024 0708   UB Dressing Goal 2    Activity/Assistive Device upper body dressing at 01/25/2024 0629   [place] minimum assist (75% or more patient effort) at 01/29/2024 0708   Time Frame long-term goal (LTG), 14 days or less at 01/29/2024 0708   Progress/Outcome goal revised this date at 01/29/2024 0708   LB Dressing Goal 1    Activity/Assistive Device lower body dressing at 01/25/2024 0629   [place] minimum assist (75% or more patient effort) at 01/25/2024 0629   Time Frame short-term goal (STG), 5 - 7 days at 01/29/2024 0708   Progress/Outcome goal ongoing at 01/29/2024 0708   LB Dressing Goal 2    Activity/Assistive Device lower body dressing at 01/25/2024 0629   [place] modified independence at 01/25/2024 0629   Time Frame long-term goal (LTG), 14 days or less at 01/29/2024 0708   Progress/Outcome goal ongoing at 01/29/2024 0708   Grooming Goal 1    Activity/Assistive Device grooming  skills, all at 01/25/2024 0629   [place] set-up required at 01/25/2024 0629   Time Frame short-term goal (STG), 5 - 7 days at 01/29/2024 0708   Progress/Outcome goal ongoing at 01/29/2024 0708   Grooming Goal 2    Activity/Assistive Device grooming skills, all at 01/25/2024 0629   [place] modified independence at 01/25/2024 0629   Time Frame long-term goal (LTG), 14 days or less at 01/29/2024 0708   Progress/Outcome goal ongoing at 01/29/2024 0708   Toileting Goal 1    Activity/Assistive Device toileting skills, all at 01/25/2024 0629   [place] minimum assist (75% or more patient effort) at 01/25/2024 0629   Time Frame short-term goal (STG), 5 - 7 days at 01/29/2024 0708   Progress/Outcome goal ongoing at 01/29/2024 0708   Toileting Goal 2    Activity/Assistive Device toileting skills, all at 01/25/2024 0629   [place] modified independence at 01/25/2024 0629   Time Frame long-term goal (LTG), 14 days or less at 01/29/2024 0708   Progress/Outcome goal ongoing at 01/29/2024 0708

## 2024-01-30 NOTE — PLAN OF CARE
Plan of Care Review  Plan of Care Reviewed With: patient  Progress: improving  Outcome Evaluation: Pt alert and oriented. Pt refused [place] collar in bed, educated on the Drs order to be worn AAT. IV NSS infusing at 75cc/hr. SCDs on. Condom cath worn over night. Bed alarm on, call bell within reach.

## 2024-01-30 NOTE — PROGRESS NOTES
Patient: Randolph Daley  Location: [facility]  MRN: [id number]  Today's date: 1/30/2024    History of Present Illness  Pt is a 83 y.o. male admitted on 1/24/2024 with Multiple injuries due to trauma [T07.XXXA]. Principal problem is Multiple injuries due to trauma.  is a 83 y.o. male whose primary indication for inpatient rehabilitation is Debility.    Pt is a 83 y.o M adm to [facility] 1/24/24 who presented to [facility] after a slip and fall on ice landing on his right side.  He was found to have multiple injuries including right 8-11 rib fractures, bilateral pleural effusion s/p IR thoracentesis 1/18 with drainage of 1.1L, pelvic fracture (non op mgmt), right adductor/obturator hematoma, questionable posttraumatic bladder injury and age-indeterminate cervical fracture.  Neurosurgery was consulted and offered c spinesurgery, but patient declined.  [place] Collar on at all times, except for meals to prevent aspiration. Urology was consulted.  CT cystogram showed possible extravasation of contrast into the prostatic urethra.  Womack catheter is currently draining clear urine.  Plan is to continue Womack catheter for 1 week.  CT cystogram in 1 week with possible removal at that time, but pt declining CT Cystogram.        Past Medical History  PMH: BPH, HTN       PT Vitals    Date/Time Pulse HR Source BP BP Location BP Method Pt Position [facility]   01/30/24 1436 61 Monitor 108/59 Right upper arm Automatic Sitting JR      PT Pain    Date/Time Pain Type Rating: Rest Rating: Activity [facility]   01/30/24 1436 Pain Assessment 0 -- JR   01/30/24 1458 Pain Reassessment -- 0 JR             Prior Living Environment    Flowsheet Row Most Recent Value   People in Home spouse   Current Living Arrangements home   Home Accessibility stairs to enter home (Group), stairs within home (Group)   Living Environment Comment lives with wife in 2  home with first floor set up   Number of Stairs, Main Entrance 1  "  Surface of Stairs, Main Entrance hardwood   Stair Railings, Main Entrance none   Location, Patient Bedroom first (main) floor   Patient Bedroom Access Comment first floor set up   Location, Bathroom first (main) floor   Bathroom Access Comment First floor set up. Walk in shower. grab bar and shower \"bench\"   Stairs, Within Home, Primary 16   Number of Stairs, Within Home, Primary other (see comments)   Surface of Stairs, Within Home, Primary carpeting   Stair Railings, Within Home, Primary railings on both sides of stairs  [left side rail goes 1/2- then can hold the spindles]   Stairs Comment, Within Home, Primary pt reports wife office upstairs but master bed/bath 1st floor          Prior Level of Function    Flowsheet Row Most Recent Value   Dominant Hand right   Ambulation independent   Transferring independent   Toileting independent   Bathing independent   Dressing independent   Eating independent   IADLs independent   Driving/Transportation    Prior Level of Function Comment Ind PTA, previous cervical fx. 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 pt requesting to remain in bed for therapy        Orthosis Neck Cervical Collar    Orthosis Properties Date Obtained: 01/17/24 Time Obtained: 1900 Location: Neck Type: Cervical Collar Features: Hard Therapeutic Indications: fracture immobilization Wearing Schedule: wear at all times (remove only for hygiene and skin inspection)    Skin " Assessment intact        Lower Extremity (Therapeutic Exercise)    Comment assisted gastroc stretch 60 sec x 2 BL, AAROM heel slides on R 20x        Therapeutic Interventions    Comment, Therapeutic Intervention 1) diaphragmatic breathing x 10 min w/ biofeedback via hand placement on abdomen and chest, VC for technique; 2) incentie spirometer 10x cues for technique; 3) offloaded BL heels w/ pillow propping, edu pt on use of offloading allan prominences to avoid skin injury        Daily Progress Summary (PT)    Daily Outcome Statement Focused on breathing exercises to promotoe lung expansion and volume this session. Pt reporting fatigue and requests to remain in bed t/o session.                      Education Documentation  Skin Injury Prevention Measures, taught by Ly Garcia, PT at 1/30/2024  2:59 PM.  Learner: Patient  Readiness: Acceptance  Method: Explanation  Response: Verbalizes Understanding  Comment: edu on use of offloading and repositioning to prevent skin breakdown.    Rehabilitation Therapy, taught by Ly Garcia, PT at 1/30/2024  2:42 PM.  Learner: Patient  Readiness: Acceptance  Method: Explanation, Demonstration  Response: Verbalizes Understanding, Demonstrated Understanding  Comment: Pt edu on anatomy of diaphragm and role of diaphragmatic breathing to incr lung volume.    Equipment/Home Supplies, taught by Ly Garcia, PT at 1/30/2024  1:02 PM.  Learner: Patient  Readiness: Acceptance  Method: Explanation  Response: Needs Reinforcement  Comment: Ongoing edu on cervical fx, importance of collar use to allow healing and potential risks of SCI w/ non compliance.          IRF PT Goals    Flowsheet Row Most Recent Value   Bed Mobility Goal 1    Activity/Assistive Device bed mobility activities, all at 01/25/2024 0900   [place] minimum assist (75% or more patient effort) at 01/25/2024 0900   Time Frame short-term goal (STG), 5 - 7 days at 01/25/2024 0900   Progress/Outcome goal  Detail Level: Zone Detail Level: Generalized ongoing at 01/29/2024 0839   Bed Mobility Goal 2    Activity/Assistive Device bed mobility activities, all at 01/25/2024 0900   [place] modified independence at 01/25/2024 0900   Time Frame long-term goal (LTG), 14 days or less at 01/25/2024 0900   Progress/Outcome goal ongoing at 01/29/2024 0839   Transfer Goal 1    Activity/Assistive Device sit-to-stand/stand-to-sit, stand pivot at 01/29/2024 0839   [place] --  [steadying A] at 01/29/2024 0839   Time Frame short-term goal (STG), 5 - 7 days at 01/29/2024 0839   Progress/Outcome goal met, goal revised this date at 01/29/2024 0839   Transfer Goal 2    Activity/Assistive Device sit-to-stand/stand-to-sit, bed-to-chair/chair-to-bed, stand pivot at 01/25/2024 0900   [place] modified independence at 01/25/2024 0900   Time Frame long-term goal (LTG), 14 days or less at 01/25/2024 0900   Progress/Outcome goal ongoing at 01/29/2024 0839   Gait/Walking Locomotion Goal 1    Activity/Assistive Device gait (walking locomotion) at 01/25/2024 0900   Distance 25 feet at 01/25/2024 0900   [place] minimum assist (75% or more patient effort) at 01/25/2024 0900   Time Frame short-term goal (STG), 5 - 7 days at 01/25/2024 0900   Progress/Outcome goal ongoing at 01/29/2024 0839   Gait/Walking Locomotion Goal 2    Activity/Assistive Device gait (walking locomotion), assistive device use at 01/29/2024 0839   Distance 150 feet at 01/29/2024 0839   [place] modified independence at 01/29/2024 0839   Time Frame long-term goal (LTG), 14 days or less at 01/29/2024 0839   Progress/Outcome goal revised this date at 01/29/2024 0839   Stairs Goal 1    Activity/Assistive Device stairs, all skills, using handrail, left, using handrail, right at 01/28/2024 1304   Number of Stairs 12 at 01/28/2024 1304   [place] minimum assist (75% or more patient effort) at 01/28/2024 1304   Time Frame 5 - 7 days, short-term goal (STG) at 01/28/2024 1304   Progress/Outcome goal ongoing at  01/29/2024 0839   Stairs Goal 2    Activity/Assistive Device stairs, all skills, using handrail, left, cane, straight at 01/28/2024 1304   Number of Stairs 12 at 01/28/2024 1304   [place] supervision required at 01/28/2024 1304   Time Frame 14 days or less at 01/28/2024 1304   Progress/Outcome goal ongoing at 01/29/2024 0808         Detail Level: Detailed

## 2024-01-30 NOTE — PROGRESS NOTES
Patient: Randolph Daley  Location: [facility]  MRN: [id number]  Today's date: 1/30/2024    History of Present Illness  Pt is a 83 y.o. male admitted on 1/24/2024 with Multiple injuries due to trauma [T07.XXXA]. Principal problem is Multiple injuries due to trauma.  is a 83 y.o. male whose primary indication for inpatient rehabilitation is Debility.    Pt is a 83 y.o M adm to [facility] 1/24/24 who presented to [facility] after a slip and fall on ice landing on his right side.  He was found to have multiple injuries including right 8-11 rib fractures, bilateral pleural effusion s/p IR thoracentesis 1/18 with drainage of 1.1L, pelvic fracture (non op mgmt), right adductor/obturator hematoma, questionable posttraumatic bladder injury and age-indeterminate cervical fracture.  Neurosurgery was consulted and offered c spinesurgery, but patient declined.  [place] Collar on at all times, except for meals to prevent aspiration. Urology was consulted.  CT cystogram showed possible extravasation of contrast into the prostatic urethra.  Womack catheter is currently draining clear urine.  Plan is to continue Womack catheter for 1 week.  CT cystogram in 1 week with possible removal at that time, but pt declining CT Cystogram.        Past Medical History  PMH: BPH, HTN       SLP Pain    Date/Time Pain Type Rating: Rest Who   01/30/24 1406 Pain Assessment 0 CGV   01/30/24 1429 Pain Reassessment 0 CGV          Prior Living Environment    Flowsheet Row Most Recent Value   People in Home spouse   Current Living Arrangements home   Home Accessibility stairs to enter home (Group), stairs within home (Group)   Living Environment Comment lives with wife in 2  home with first floor set up   Number of Stairs, Main Entrance 1   Surface of Stairs, Main Entrance hardwood   Stair Railings, Main Entrance none   Location, Patient Bedroom first (main) floor   Patient Bedroom Access Comment first floor set up  "  Location, Bathroom first (main) floor   Bathroom Access Comment First floor set up. Walk in shower. grab bar and shower \"bench\"   Stairs, Within Home, Primary 16   Number of Stairs, Within Home, Primary other (see comments)   Surface of Stairs, Within Home, Primary carpeting   Stair Railings, Within Home, Primary railings on both sides of stairs  [left side rail goes 1/2- then can hold the spindles]   Stairs Comment, Within Home, Primary pt reports wife office upstairs but master bed/bath 1st floor          Prior Level of Function    Flowsheet Row Most Recent Value   Dominant Hand right   Ambulation independent   Transferring independent   Toileting independent   Bathing independent   Dressing independent   Eating independent   IADLs independent   Driving/Transportation    Prior Level of Function Comment Ind PTA, previous cervical fx. 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 [place] collar in place.        Cognition/Psychosocial    Comment, Executive Function Clock drawing score 10/13 per CLQT rating scale w/ hands not origninating in center of clock and incorrect placement. Impaired cognitive shifting- (i.e. \"1a, 2b, 3c, 4F, 5G, 6H, 7I, 8K\"). Pt did mention \"this used to be easy for me.\" 1/2 way through session, pt losened aspen collar velcro and reports \"this is so uncomfortable.\" When prompted to keep aspen collar tight, pt reporting \"I've " "had enough people telling me what to do with this brace!\" Education provided re: risks of non compliance to which pt verbalized understanding. Nursing notified.     Comment, Short Term Memory Multifactoral Memory Questionnaire (Memory Mistakes) started w/ pt reports \"sometimes\" for the following: \"Misplace something you use daily. Have trouble remembering a telephone number you just looked up. Leave something behind when you meant to bring it with you. Forget what you were just about to do. Have difficulty coming up with a specific word that you want.\" Complete questions 10-20 next session for further analysis of perception of memory deficits.     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 Individuals at lower range more likely to have more significant cognitive deficits. Lower scores more suggestive of residential support needs.    Total Contextual Memory Factor (CMF) Score: 14/15  The Contextual Memory Factor (CMF) indicates current verbal memory skills. It is highly predictive of cognitive diagnosis (MCI versus dementia) and instrumental activities of daily living (IADL). Scores below 12 typically indicate significant memory concerns that can impact everyday living.     Total Executive Control Functions Factor (ECFF) Score: 4/7  The Executive Control Functions Factor (ECFF) indicates current executive control functions abilities. There is a strong correlation between ECFF and predicting everyday activities of daily living, especially the higher order skills involving judgment, problem-solving, and reasoning. Scores below 5 generally indicate problems in executive control that could interfere with successful independent living.     Total Complex Attention " Factor (CAF) Score: 8/8  Complex attention is an essential cognitive domain. It includes immediate, selective, and divided attention skills. It is highly associated with the ability to perform basic and complex activities of daily living. The Complex Attention Factor score (CAF) predicts ADL and IADL abilities and empirically measures the attentional skills necessary for independent functioning. Scores of 7-8 are within the normal/adequate range, and persons with these results may demonstrate higher levels of independence. Scores below 7 indicate likely attentional deficits, the need for more supervision or assistance, and higher risk for safety concerns and errors when completing basic or complex functional tasks.     Total Cognitive Task Manager (CTM) Score: 26/30  The Cognitive Task Manager (CTM) integrates an individual's performance in three primary cognitive domains: contextual memory, executive control functions, and complex attention. The CTM score informs clinicians and families about impairments that can impact functional performance and highlight each individual's risk for falls and adverse events at home, rehospitalizations and the need for residential support.  The CTM is an important clinical tool that informs the plan of care and should be used to identify persons at higher risk for cognitively related functional deficits. CTM scores in the 26-30 range are within normal limits. CTM scores in the 20-25 range indicate moderate risk. CTM scores below 20 indicate relatively high risk.        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 Limited insight into calculation or mental manipulation mistakes. Multifactoral Memory Questionnaire Memory Mistakes started, unable to finish 2/2 time constraints. Pt w/ adequate insight into memory deficits. Complete questiosn 10-20 next session. Continue POC per primary SLP.                 Education  Documentation  Cognitive Support Measures, taught by Mary Gregorio CCC-SLP at 1/30/2024  3:47 PM.  Learner: Patient  Readiness: Acceptance  Method: Explanation  Response: Verbalizes Understanding, Needs Reinforcement  Comment: Education benefit of ST services for maximizing independence upon d/c home          IRF SLP Goals    Flowsheet Row Most Recent Value   Motor Speech/Voice Goal 1    Motor Speech/Voice Goal 1 breath support exercises (incentive spirometer, sustained phonation),  vocal function exercises for improved volume and voice quality x 80% mod cues. at 01/26/2024 0922   Time Frame short-term goal (STG), 2 weeks at 01/26/2024 0922   Progress/Outcome goal ongoing at 01/28/2024 1244   Motor Speech/Voice Goal 2    Motor Speech/Voice Goal 2 Functinal breath support and voice quality for complex convesational needs x 90% with I use of strategies at 01/26/2024 0922   Time Frame long-term goal (LTG), 3 weeks at 01/26/2024 0922   Progress/Outcome goal ongoing at 01/28/2024 1244   Executive Function Goal 1    Activity executive function tasks, information processing tasks, organization/sequencing tasks, planning/decision-making tasks, problem-solving/reasoning tasks, self-monitoring/self-correction  [convergent/divergent reasoning tasks,  mod level functional tasks related to return to I managing bills, meds, calendar, safety] at 01/26/2024 0922   [place]/Accuracy with 80% accuracy, with moderate, verbal cues/redirection at 01/26/2024 0922   Time Frame short-term goal (STG), 2 weeks at 01/26/2024 0922   Progress/Outcome goal ongoing at 01/28/2024 1244   Executive Function Goal 2    Activity executive function tasks, information processing tasks, organization/sequencing tasks, planning/decision-making tasks, problem-solving/reasoning tasks, self-monitoring/self-correction at 01/26/2024 0922   [place]/Accuracy with 90% accuracy, with minimum, verbal cues/redirection at 01/26/2024 0922   Time Frame  long-term goal (LTG), 3 weeks at 01/26/2024 0922   Progress/Outcome goal ongoing at 01/28/2024 4935

## 2024-01-30 NOTE — PROGRESS NOTES
Patient: Randolph Daley  Location: [facility]  MRN: [id number]  Today's date: 1/30/2024    History of Present Illness  Pt is a 83 y.o. male admitted on 1/24/2024 with Multiple injuries due to trauma [T07.XXXA]. Principal problem is Multiple injuries due to trauma.  is a 83 y.o. male whose primary indication for inpatient rehabilitation is Debility.    Pt is a 83 y.o M adm to [facility] 1/24/24 who presented to [facility] after a slip and fall on ice landing on his right side.  He was found to have multiple injuries including right 8-11 rib fractures, bilateral pleural effusion s/p IR thoracentesis 1/18 with drainage of 1.1L, pelvic fracture (non op mgmt), right adductor/obturator hematoma, questionable posttraumatic bladder injury and age-indeterminate cervical fracture.  Neurosurgery was consulted and offered c spinesurgery, but patient declined.  [place] Collar on at all times, except for meals to prevent aspiration. Urology was consulted.  CT cystogram showed possible extravasation of contrast into the prostatic urethra.  Womack catheter is currently draining clear urine.  Plan is to continue Womack catheter for 1 week.  CT cystogram in 1 week with possible removal at that time, but pt declining CT Cystogram.        Past Medical History  PMH: BPH, HTN       PT Vitals    Date/Time Pulse HR Source BP BP Location BP Method Pt Position [facility]   01/30/24 1037 61 Monitor 108/59 Right upper arm Automatic Sitting JR      PT Pain    Date/Time Pain Type Side/Orientation Location Rating: Rest Rating: Activity Interventions [facility]   01/30/24 1037 Pain Assessment right hip 2 - mild pain -- cold applied    01/30/24 1130 Pain Reassessment right back -- 4 - moderate pain position adjusted JR             Prior Living Environment    Flowsheet Row Most Recent Value   People in Home spouse   Current Living Arrangements home   Home Accessibility stairs to enter home (Group), stairs within home (Group)  "  Living Environment Comment lives with wife in 2 SH home with first floor set up   Number of Stairs, Main Entrance 1   Surface of Stairs, Main Entrance hardwood   Stair Railings, Main Entrance none   Location, Patient Bedroom first (main) floor   Patient Bedroom Access Comment first floor set up   Location, Bathroom first (main) floor   Bathroom Access Comment First floor set up. Walk in shower. grab bar and shower \"bench\"   Stairs, Within Home, Primary 16   Number of Stairs, Within Home, Primary other (see comments)   Surface of Stairs, Within Home, Primary carpeting   Stair Railings, Within Home, Primary railings on both sides of stairs  [left side rail goes 1/2- then can hold the spindles]   Stairs Comment, Within Home, Primary pt reports wife office upstairs but master bed/bath 1st floor          Prior Level of Function    Flowsheet Row Most Recent Value   Dominant Hand right   Ambulation independent   Transferring independent   Toileting independent   Bathing independent   Dressing independent   Eating independent   IADLs independent   Driving/Transportation    Prior Level of Function Comment Ind PTA, previous cervical fx. 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,  reports manages bill paying, medications, calendar at home           IRF PT Evaluation and Treatment - 01/30/24 1037        PT Time Calculation    Start Time 1030     Stop Time 1130     Time Calculation (min) 60 min        Session Details    Document Type Daily Treatment/Progress Note     Mode of Treatment individual therapy;physical therapy        General Information    General Observations of Patient pt received in gym w/ cervical collar donned        Orthosis Neck Cervical Collar    Orthosis Properties Date Obtained: 01/17/24 Time Obtained: 1900 Location: Neck Type: Cervical Collar Features: Hard Therapeutic Indications: fracture immobilization Wearing Schedule: wear at all " times (remove only for hygiene and skin inspection)    Skin Assessment intact        Bed Mobility    [place], Roll Left moderate assist (50-74% patient effort)     [place], Supine to Sit moderate assist (50-74% patient effort)     [place], Sit to Supine minimum assist (75% or more patient effort)     Assistive Device leg ;head of bed elevated     Comment requires min A at trunk and for RLE mgmt d/t incr back pain w/ sit > supine; supine>sit mod A at trunk and BLEs w/ log roll technique        Sit to Stand Transfer    [place], Sit to Stand Transfer touching/steadying assist     Safety/Cues hand placement     Assistive Device walker, front-wheeled     Comment from , consistent cues for hand placement        Stand to Sit Transfer    [place], Stand to Sit Transfer touching/steadying assist     Safety/Cues hand placement     Assistive Device walker, front-wheeled     Comment to , steadying A for eccentric control        Stand Pivot Transfer    [place], Stand Pivot/Stand Step Transfer touching/steadying assist     Safety/Cues proper use of assistive device     Assistive Device walker, front-wheeled     Comment via amb approach        Gait Training    [place], Gait touching/steadying assist     Safety/Cues increased time to complete     Assistive Device walker, front-wheeled     Distance in Feet 50 feet     Pattern step-through     Deviations/Abnormal Patterns antalgic;ace decreased;step length decreased;stride length decreased;bilateral deviations     Bilateral Gait Deviations heel strike decreased     Right Sided Gait Deviations foot drop/toe drag     Comment (Gait/Stairs) 50'x 1  ,20'x 2 - very slow paced, cues for upright posture and incr step length on L as able        Stairs Training    [place], Stairs minimum assist (75% or more patient effort)     Safety/Cues technique     Assistive Device railing     Handrail Location (Stairs) both sides     Number of Stairs 4      Stair Height 6 inches     Ascending Stairs Technique step-to-step   leading w/ LLE asc    Descending Stairs Technique step-to-step   leading w/ RLE desc    Comment min A at pelvis t/o, TC at anterior R knee in stance without overt buckling        Balance    Comment, Balance standing unsupported or w/ UUE support on grab bar during toileting and hygiene +hand washing, steadying A -CLS w/ flexed posture        Lower Extremity (Therapeutic Exercise)    Comment seated: heel/toe raises 30x; LAQ 30x        Cryotherapy    Comment CP to low back x 10 min during seated warm up        Daily Progress Summary (PT)    Daily Outcome Statement Focused on functional mobility this session. Pt requires significantly incr time to initiate and complete all mobility tasks, benefits from prolonged rest breaks as needed. Difficulty w/ bed mobility d/t back/flank pain, will benefit from ongoing practice.                      Education Documentation  Equipment/Home Supplies, taught by Ly Garcia, PT at 1/30/2024  1:02 PM.  Learner: Patient  Readiness: Acceptance  Method: Explanation  Response: Needs Reinforcement  Comment: Ongoing edu on cervical fx, importance of collar use to allow healing and potential risks of SCI w/ non compliance.          IRF PT Goals    Flowsheet Row Most Recent Value   Bed Mobility Goal 1    Activity/Assistive Device bed mobility activities, all at 01/25/2024 0900   [place] minimum assist (75% or more patient effort) at 01/25/2024 0900   Time Frame short-term goal (STG), 5 - 7 days at 01/25/2024 0900   Progress/Outcome goal ongoing at 01/29/2024 0839   Bed Mobility Goal 2    Activity/Assistive Device bed mobility activities, all at 01/25/2024 0900   [place] modified independence at 01/25/2024 0900   Time Frame long-term goal (LTG), 14 days or less at 01/25/2024 0900   Progress/Outcome goal ongoing at 01/29/2024 0839   Transfer Goal 1    Activity/Assistive Device sit-to-stand/stand-to-sit, stand  pivot at 01/29/2024 0839   [place] --  [steadying A] at 01/29/2024 0839   Time Frame short-term goal (STG), 5 - 7 days at 01/29/2024 0839   Progress/Outcome goal met, goal revised this date at 01/29/2024 0839   Transfer Goal 2    Activity/Assistive Device sit-to-stand/stand-to-sit, bed-to-chair/chair-to-bed, stand pivot at 01/25/2024 0900   [place] modified independence at 01/25/2024 0900   Time Frame long-term goal (LTG), 14 days or less at 01/25/2024 0900   Progress/Outcome goal ongoing at 01/29/2024 0839   Gait/Walking Locomotion Goal 1    Activity/Assistive Device gait (walking locomotion) at 01/25/2024 0900   Distance 25 feet at 01/25/2024 0900   [place] minimum assist (75% or more patient effort) at 01/25/2024 0900   Time Frame short-term goal (STG), 5 - 7 days at 01/25/2024 0900   Progress/Outcome goal ongoing at 01/29/2024 0839   Gait/Walking Locomotion Goal 2    Activity/Assistive Device gait (walking locomotion), assistive device use at 01/29/2024 0839   Distance 150 feet at 01/29/2024 0839   [place] modified independence at 01/29/2024 0839   Time Frame long-term goal (LTG), 14 days or less at 01/29/2024 0839   Progress/Outcome goal revised this date at 01/29/2024 0839   Stairs Goal 1    Activity/Assistive Device stairs, all skills, using handrail, left, using handrail, right at 01/28/2024 1304   Number of Stairs 12 at 01/28/2024 1304   [place] minimum assist (75% or more patient effort) at 01/28/2024 1304   Time Frame 5 - 7 days, short-term goal (STG) at 01/28/2024 1304   Progress/Outcome goal ongoing at 01/29/2024 0839   Stairs Goal 2    Activity/Assistive Device stairs, all skills, using handrail, left, cane, straight at 01/28/2024 1304   Number of Stairs 12 at 01/28/2024 1304   [place] supervision required at 01/28/2024 1304   Time Frame 14 days or less at 01/28/2024 1304   Progress/Outcome goal ongoing at 01/29/2024 8435

## 2024-01-30 NOTE — PROGRESS NOTES
[facility] Progress Note          Patient was seen and examined at bedside.    Subjective:   1/29/24  Saw and evaluated him in am:  Very pleasant, cooperative, conversant, has been using IS frequently, his R basal lung BS is diminished, R posterior rib fracs still painful but manageable, His VS and respiratory status and evening SO2 are fine. Reviewed his am BMP, hyponatremia resolved, Na 128-138, liberate fluid intake, d/edu po lasix and lower the salt tablets.      1/30/24  Stable, no new complaints, reviewed his am labs, H/H 8.2/25.6, no need PRBC transfusion. His PT/OT is progressing.   Objective   Vital signs in last 24 hours:  Temp:  [36.4 °C (97.6 °F)-36.7 °C (98.1 °F)] 36.7 °C (98.1 °F)  Heart Rate:  [60-83] 61  Resp:  [16-18] 16  BP: (108-121)/(55-59) 108/59      Intake/Output Summary (Last 24 hours) at 1/30/2024 1501  Last data filed at 1/30/2024 0800  Gross per 24 hour   Intake 10 ml   Output --   Net 10 ml     Intake/Output this shift:  I/O this shift:  In: 10 [I.V.:10]  Out: -    Review of Systems:  All other systems reviewed and negative except as noted in the HPI.   Objective      Labs  reviewed his labs thoroughly   Lab Results   Component Value Date    WBC 9.36 01/30/2024    HGB 8.2 (L) 01/30/2024    HCT 25.6 (L) 01/30/2024    MCV 97.3 01/30/2024     01/30/2024     Lab Results   Component Value Date    GLUCOSE 98 01/29/2024    CALCIUM 8.8 01/29/2024     01/29/2024    K 4.1 01/29/2024    CO2 23 01/29/2024     01/29/2024    BUN 42 (H) 01/29/2024    CREATININE 1.2 01/29/2024       Imaging  OSH imaging study reports reviewed:     CT cystography 1/24/24:  IMPRESSION:  No evidence of contrast extravasation into the prostatic parenchyma or other areas of contrast extravasation from the urinary bladder. Redemonstration of fractures involving the right superior and inferior pubic rami with extraperitoneal blood in the pelvis, which has slightly decreased compared  Call placed to patient to review final pathology, report outlined below. Plan to obtain pulmonary function testing (prefereably at [facility]) and then bring patient into clinic. Call placed to [place] [facility] [place] scheduling department - left message with patient information and request. If able to complete this week / early next week will plan to bring patient into clinic on 6/6/19 tentatively at 1:30. Await PFT scheduling.      Cytology:   Left upper lobe lung core biopsy:     - Moderately differentiated squamous cell carcinoma.           to 1/17/2024. Persistent mild enlargement of the right adductor muscle compartment, similar to 1/17/2024.  His avila was removed.      MRI cervical spine 1/22/24  IMPRESSION:  1.  C2 fracture with no bone marrow edema to suggest acute fracture.  2.  No abnormal enhancement.  3.  No epidural collection or hematoma.  4.  Degenerative spondylosis worst at C3-C4 where there is also suggestion of  Mild right jose luis-cord signal abnormality.  Multilevel spondylosis otherwise noted  as described.      Full Code    Physical Exam:  Head/Ear/Nose/Throat: normocephalic; atraumatic; moisture mouth mm, no oropharyngeal thrush noted.   Eyes: anicteric sclera, EOMI; PERRL.   Neck : supple, no JVD, no carotid bruits appeciated.   Respiratory: no evidence of labored breathing, bibasilar lung BS diminished area, no remarkable w/r/c.   Cardiovascular: RRR; normal S1, S2; no m/r/g; no S3 or S4.   Gastrointestinal: soft; NT; BS normal; mildly distended; no CVAT b/l.   Genitourinary: off avila.   Extremities : no c/c/e .   Neurological: AO x 3, fluent speeches, following commands, CNS II-XII grossly intact; no focal neurologic deficits.   Behavior/Emotional: in NAD, appropriate; cooperative.   Skin: clean, dry and intact.     Plan of care was discussed with patient, RN, and PMR attending     Assessment   CC:S/p fall, sustained multi trauma: R post 8th-11th rib fractures,  R superior pubic rami fracture w hematoma, age undetermined C2 fracture; non surgically managed; ADL and ambulatory dysfunction          83 y.o. male with PMH of BPH, HTN, hypothyroidism, who presented and was admitted to [facility] on 1/17/24 after a slip and fall on ice landing on his right side. He was found to have multiple injuries including right 8-11 rib fractures, bilateral pleural effusion s/p IR thoracentesis 1/18 with drainage of 1.1L, pelvic fracture (non op mgmt), right adductor/obturator hematoma, questionable posttraumatic bladder injury and age-indeterminate  cervical fracture.  Neurosurgery was consulted and offered c spinesurgery, but patient declined.  [place] Collar on at all times, except for meals to prevent aspiration.  Patient c/o difficulty coughing up mucus with collar on, but understands that he needs to wear the collar especially during therapy.  Urology was consulted.  CT cystogram showed possible extravasation of contrast into the prostatic urethra.  Avila catheter is currently draining clear urine.  Plan is to continue Avila catheter for 1 week.  CT cystogram in 1 week with possible removal at that time, but pt declining CT Cystogram.  Functionally, he has significant physical econditioning with ADL and ambulatory dysfunction, requiring inpt acute rehab, was admitted to [facility] on 1/24/24.        Repeated   CT cystography 1/24/24:  IMPRESSION:  No evidence of contrast extravasation into the prostatic parenchyma or other areas of contrast extravasation from the urinary bladder. Redemonstration of fractures involving the right superior and inferior pubic rami with extraperitoneal blood in the pelvis, which has slightly decreased compared to 1/17/2024. Persistent mild enlargement of the right adductor muscle compartment, similar to 1/17/2024.  His avila was removed.      A/P:  # S/p fall, sustained multi trauma: R post 8th-11th rib fractures,  R superior pubic rami fracture w hematoma, age undetermined C2 fracture; non surgically managed; ADL and ambulatory dysfunction  -Inpt acute rehab, pain and medical managements, DVT prophylaxis, dermal defense, fall precaution      # R post 8th-11th rib fractures, atelectasis, chest congestion, possible pleu effusion  -Monitor SO2, prn NC O2, to keep SO2>92%, incentive spirometry, bronchodilator nebulizer, mucolytic agent, pulm toileting.  -Check CXR, chest PT  - Chest wall pain management     # Urinary retention, BPH, r/o bladder extravasation  -Off avila, timed voiding, condom cath in the evening, monitor PVR with prn cic,  adequate oral hydration, check UA and UCX, consider to add flomax 0.4 mg qhs.     # Hypothyroidism   -cont home dose of levothyroxine     # DVT prophylaxis   -Eliquis 2.5 mg bid      # Insomnia, anxiety   - address his pain   - prn ativan and Ambien  - psychology CBT            Billing code: [id number]  Diagnoses:  Patient Active Problem List   Diagnosis   • Fall due to slipping on ice or snow, initial encounter   • Closed fracture of multiple ribs of right side   • Closed fracture of ramus of right pubis (CMS/HCC)   • Pelvic hematoma in male   • Pleural effusion on left   • Normocytic anemia   • Stage 3a chronic kidney disease (CMS/HCC)   • Closed odontoid fracture (CMS/HCC)   • Multiple injuries due to trauma             Neville Arndt MD  1/30/2024

## 2024-01-30 NOTE — PROGRESS NOTES
Daily Progress Note     Patient was seen and examined.   Attestation Notes: Face to face encounter completed        Subjective       Interval History: Patient is feeling well today. He does report feeling sore in pelvis area but denies pain. He is tolerating therapies. He did have questions regarding his fluid restriction - we did discuss his low sodium level, and we would continue to monitor and adjust as indicated. He also remains on salt tabs at this time. He was willing to put c-collar on for therapy this afternoon. Hgb stable at 8.2 today.    Review of Systems:  Negative except as per HPI    Current Functional Status:   Bed mobility:   [place], Supine to Sit: moderate assist (50-74% patient effort)   [place], Sit to Supine: minimum assist (75% or more patient effort)   Transfers:    [place], Sit to Stand Transfer: touching/steadying assist  [place], Stand to Sit Transfer: touching/steadying assist   [place], Stand Pivot/Stand Step Transfer: touching/steadying assist   Gait:   [place], Gait: touching/steadying assist  Assistive Device: walker, front-wheeled   Distance in Feet: 50 feet    Bathing:   Waggoner: minimum assist (75% or more patient effort)   Toileting:   Waggoner: moderate assist (50-74% patient effort) (per most recent performance)   Upper body dressing:   Waggoner: moderate assist (50-74% patient effort)   Lower body dressing:   Waggoner: maximum assist (25-49% patient effort)     Mild to moderate cognitive deficits    Functional Progress:    Functional status reviewed. Overall, patient's functional status is being assessed      Objective     Objective   Vitals and Hemodynamics  Patient Vitals for the past 24 hrs:   BP Temp Temp src Pulse Resp SpO2   01/30/24 1306 (!) 108/57 -- -- 61 -- --   01/30/24 1037 (!) 108/59 -- -- 61 -- --   01/30/24 0653 (!) 114/55 36.7 °C (98.1 °F) Oral 60 16 94 %   01/29/24 1900 (!) 114/56 36.6 °C (97.8 °F) Oral 65 18 95 %    01/29/24 1600 (!) 121/58 36.4 °C (97.6 °F) Oral 83 18 99 %   01/29/24 1408 (!) 111/55 -- -- 69 -- --   01/29/24 1405 (!) 101/55 -- -- 64 -- --         Physical Exam    General: NAD, sitting in WC at bedside   HEENT: Atraumatic, Normocephalic; c collar donned  Cardiovascular: RRR  Respiratory: CTAB with breath sounds diminished at the bases   Abdomen/GI: NT ND  Skin: Warm, Dry  Extremities/MSK: Non tender bilateral gastrocs.  Neuro: AAOx3, follows commands, repetitive at times       Labs      Results from last 7 days   Lab Units 01/30/24  0601 01/27/24  0626 01/26/24  1452 01/26/24  0548 01/25/24  0558   WBC K/uL 9.36 6.30 8.79 9.55 9.69   RBC M/uL 2.63* 2.58* 2.57* 2.27* 2.80*   HEMOGLOBIN g/dL 8.2* 8.0* 8.1* 7.2* 8.8*   HEMATOCRIT % 25.6* 24.5* 24.1* 21.3* 26.4*   PLATELETS K/uL 173 122* 118* 124* 114*   MCV fL 97.3 95.0 93.8 93.8 94.3   RDW % 15.5* 15.2* 15.5* 15.4* 15.4*   EOS ABS AUTO K/uL  --   --  0.01* 0.00* 0.03*       Results from last 7 days   Lab Units 01/29/24  0534 01/26/24  1452 01/26/24  0547 01/25/24  0558   SODIUM mEQ/L 138 128* 132* 134*   POTASSIUM mEQ/L 4.1 4.0 4.2 3.9   CHLORIDE mEQ/L 106 99 97* 100   CO2 mEQ/L 23 23 24 25   BUN mg/dL 42* 35* 31* 29*   CREATININE mg/dL 1.2 1.3 1.3 1.2   EGFR mL/min/1.73m*2 >60.0 55.2* 55.2* >60.0   ANION GAP mEQ/L 9 6 11 9     Results from last 7 days   Lab Units 01/29/24  0534 01/26/24  1452 01/26/24  0547 01/25/24  0558   CALCIUM mg/dL 8.8 8.9 9.0 9.1       Results from last 7 days   Lab Units 01/26/24  1452 01/25/24  0558   AST IU/L 12* 11*   ALT IU/L 10 10   ALK PHOS IU/L 55 53   BILIRUBIN TOTAL mg/dL 0.8 0.9       Imaging      Assessment and Plan     Randolph Daley is a 83 y.o. male with a PMHx of BPH, hypothyroid, cervical spine fracture, who presents with ADL/mobility dysfunction secondary to multi trauma after fall, found to have right pubic ramus fracture, right posterior rib fractures, chronic odontoid fracture.     #ADL/Ambulatory dysfunction: 2/2  multi trauma. Continue acute inpatient rehabilitation with PT/OT/SLP/SW/Rehab RN/Neuropsych to increase independence with ADLs, improve balance, coordination, endurance, strength, mobility, community reintegration, decreased burden of care on others and family education.  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 with mild reight jose luis-cord signal abnormality  - chronic  - evaluated by neurosurgery in acute care  - decline operative management at this time; continue hard cervical collar at all times  - follow-up neurosurgery in 4-6 weeks     #right perivascular hematoma associated with mild mass effect on the urinary bladder and perivesicular infiltration  - monitor bladder scans, CIC PRN  - CT cystogram without evidence of contrast extravasation into prostatic parenchyma or other areas of extravasation from urinary bladder  - had avila catheter - removed      #BPH  - continue flomax     #Pleural effusion  - s/p thoracentesis  - monitor O2    #hyponatremia  - Na 138 1/29  - continue salt tabs, fluid restriction     #Immobility  -DVT prophylaxis with apixiban     # Follow-up  - PCP  - neurosurgery  - orthopedic surgery      #Disposition: 2/7/2024, home      Amelia Larose MD  Physical Medicine and Rehabilitation

## 2024-01-30 NOTE — PLAN OF CARE
Plan of Care Review  Plan of Care Reviewed With: patient  Progress: improving  Outcome Evaluation: AAOx4. Pt c/o pain yet refuses pain medication except Voltaren gel to R ribs. Pt ax1 s/p to wheelchair. Pt continet of urine. Awaiting stool. Pt continues on PO ABX for UTI. Pt refuses to wear ASPEN collar AAT. Pt verbalizes undertsnading of eductaion yet still refuses to wear collar.

## 2024-01-31 ENCOUNTER — APPOINTMENT (INPATIENT)
Dept: OCCUPATIONAL THERAPY | Facility: REHABILITATION | Age: 84
DRG: 560 | End: 2024-01-31
Payer: COMMERCIAL

## 2024-01-31 ENCOUNTER — APPOINTMENT (INPATIENT)
Dept: SPEECH THERAPY | Facility: REHABILITATION | Age: 84
DRG: 560 | End: 2024-01-31
Payer: COMMERCIAL

## 2024-01-31 ENCOUNTER — APPOINTMENT (INPATIENT)
Dept: PHYSICAL THERAPY | Facility: REHABILITATION | Age: 84
DRG: 560 | End: 2024-01-31
Payer: COMMERCIAL

## 2024-01-31 PROCEDURE — 97530 THERAPEUTIC ACTIVITIES: CPT | Mod: GP

## 2024-01-31 PROCEDURE — 97530 THERAPEUTIC ACTIVITIES: CPT | Mod: GO

## 2024-01-31 PROCEDURE — 97112 NEUROMUSCULAR REEDUCATION: CPT | Mod: GP

## 2024-01-31 PROCEDURE — 25000000 HC PHARMACY GENERAL

## 2024-01-31 PROCEDURE — 63700000 HC SELF-ADMINISTRABLE DRUG: Performed by: INTERNAL MEDICINE

## 2024-01-31 PROCEDURE — 97110 THERAPEUTIC EXERCISES: CPT | Mod: GP

## 2024-01-31 PROCEDURE — 97116 GAIT TRAINING THERAPY: CPT | Mod: GP

## 2024-01-31 PROCEDURE — 63700000 HC SELF-ADMINISTRABLE DRUG

## 2024-01-31 PROCEDURE — 92507 TX SP LANG VOICE COMM INDIV: CPT | Mod: GN

## 2024-01-31 PROCEDURE — 97535 SELF CARE MNGMENT TRAINING: CPT | Mod: GO

## 2024-01-31 PROCEDURE — 12800000 HC ROOM AND CARE SEMIPRIVATE REHAB

## 2024-01-31 RX ORDER — CEPHALEXIN 250 MG/1
500 CAPSULE ORAL 3 TIMES DAILY
Status: DISCONTINUED | OUTPATIENT
Start: 2024-01-31 | End: 2024-01-31

## 2024-01-31 RX ORDER — DICLOFENAC SODIUM 10 MG/G
2 GEL TOPICAL 3 TIMES DAILY
Qty: 15 G | Refills: 0 | Status: DISCONTINUED | OUTPATIENT
Start: 2024-01-31 | End: 2024-02-02

## 2024-01-31 RX ORDER — DOXYCYCLINE HYCLATE 100 MG
100 TABLET ORAL EVERY 12 HOURS
Status: DISCONTINUED | OUTPATIENT
Start: 2024-01-31 | End: 2024-01-31

## 2024-01-31 RX ORDER — DOXYCYCLINE HYCLATE 100 MG
100 TABLET ORAL 2 TIMES DAILY
Status: COMPLETED | OUTPATIENT
Start: 2024-01-31 | End: 2024-02-04

## 2024-01-31 RX ORDER — FERROUS SULFATE 325(65) MG
325 TABLET ORAL
Status: DISCONTINUED | OUTPATIENT
Start: 2024-02-01 | End: 2024-02-07 | Stop reason: HOSPADM

## 2024-01-31 RX ADMIN — DOXYCYCLINE HYCLATE 100 MG: 100 TABLET, FILM COATED ORAL at 15:27

## 2024-01-31 RX ADMIN — SENNOSIDES AND DOCUSATE SODIUM 2 TABLET: 8.6; 5 TABLET ORAL at 07:56

## 2024-01-31 RX ADMIN — APIXABAN 2.5 MG: 2.5 TABLET, FILM COATED ORAL at 07:56

## 2024-01-31 RX ADMIN — APIXABAN 2.5 MG: 2.5 TABLET, FILM COATED ORAL at 20:08

## 2024-01-31 RX ADMIN — ALBUTEROL SULFATE: 2.5 SOLUTION RESPIRATORY (INHALATION) at 20:08

## 2024-01-31 RX ADMIN — Medication 2500 UNITS: at 17:12

## 2024-01-31 RX ADMIN — DOXYCYCLINE HYCLATE 100 MG: 100 TABLET, FILM COATED ORAL at 20:08

## 2024-01-31 RX ADMIN — LEVOTHYROXINE SODIUM 25 MCG: 25 TABLET ORAL at 05:41

## 2024-01-31 RX ADMIN — ALBUTEROL SULFATE: 2.5 SOLUTION RESPIRATORY (INHALATION) at 15:28

## 2024-01-31 RX ADMIN — ALBUTEROL SULFATE: 2.5 SOLUTION RESPIRATORY (INHALATION) at 08:45

## 2024-01-31 RX ADMIN — SODIUM CHLORIDE 1 G: 1 TABLET ORAL at 07:56

## 2024-01-31 RX ADMIN — GUAIFENESIN 400 MG: 100 SOLUTION ORAL at 08:49

## 2024-01-31 RX ADMIN — SODIUM CHLORIDE 1 G: 1 TABLET ORAL at 20:08

## 2024-01-31 RX ADMIN — SODIUM CHLORIDE SOLN NEBU 3% 3 ML: 3 NEBU SOLN at 20:08

## 2024-01-31 RX ADMIN — OXYCODONE HYDROCHLORIDE AND ACETAMINOPHEN 500 MG: 500 TABLET ORAL at 07:56

## 2024-01-31 RX ADMIN — SODIUM CHLORIDE SOLN NEBU 3% 3 ML: 3 NEBU SOLN at 15:34

## 2024-01-31 RX ADMIN — GUAIFENESIN 400 MG: 100 SOLUTION ORAL at 20:08

## 2024-01-31 RX ADMIN — TAMSULOSIN HYDROCHLORIDE 0.4 MG: 0.4 CAPSULE ORAL at 20:08

## 2024-01-31 RX ADMIN — SODIUM CHLORIDE SOLN NEBU 3% 3 ML: 3 NEBU SOLN at 08:49

## 2024-01-31 RX ADMIN — ACETAMINOPHEN 650 MG: 325 TABLET, FILM COATED ORAL at 20:16

## 2024-01-31 RX ADMIN — SENNOSIDES AND DOCUSATE SODIUM 2 TABLET: 8.6; 5 TABLET ORAL at 20:08

## 2024-01-31 RX ADMIN — BISACODYL 10 MG: 5 TABLET, COATED ORAL at 07:56

## 2024-01-31 RX ADMIN — DICLOFENAC SODIUM 2 G: 10 GEL TOPICAL at 17:14

## 2024-01-31 NOTE — PLAN OF CARE
Plan of Care Review  Plan of Care Reviewed With: patient  Progress: improving  Outcome Evaluation: AAOx4. Pt educated on the importance of ASPEN collar AAT. Pt refuses to wear collar. PT educated about the importance of repositioning to prevent the discomfort in his bottom. Pt verbalized understanding of reposition ing education. Foam replaced on pt's sacral areas. Pt abbie with RW s/p to WC. Pt continent of urine. Awaiting BM.

## 2024-01-31 NOTE — PROGRESS NOTES
[facility] Progress Note          Patient was seen and examined at bedside.    Subjective:   Saw and evaluated him in am:  Very pleasant, cooperative, conversant.  No new complaints, reviewed his recent labs, H/H 8.2/25.6, no need PRBC transfusion. His PT/OT is progressing. His respiratory status and SO2 are good.     Addendum:  L arm iv infiltrated, focal induration with tenderness, c/w thrombophlebitis, provide topical Voltaren gel , will extend oral doxycyline to 5 more days.  Elevate LUE   Objective   Vital signs in last 24 hours:  Temp:  [36.4 °C (97.5 °F)-36.7 °C (98 °F)] 36.7 °C (98 °F)  Heart Rate:  [56-71] 63  Resp:  [16-18] 16  BP: (108-130)/(58-65) 110/58      Intake/Output Summary (Last 24 hours) at 1/31/2024 1402  Last data filed at 1/30/2024 2000  Gross per 24 hour   Intake 10 ml   Output --   Net 10 ml     Intake/Output this shift:  No intake/output data recorded.   Review of Systems:  All other systems reviewed and negative except as noted in the HPI.   Objective      Labs  reviewed his labs thoroughly   Lab Results   Component Value Date    WBC 9.36 01/30/2024    HGB 8.2 (L) 01/30/2024    HCT 25.6 (L) 01/30/2024    MCV 97.3 01/30/2024     01/30/2024     Lab Results   Component Value Date    GLUCOSE 98 01/29/2024    CALCIUM 8.8 01/29/2024     01/29/2024    K 4.1 01/29/2024    CO2 23 01/29/2024     01/29/2024    BUN 42 (H) 01/29/2024    CREATININE 1.2 01/29/2024       Imaging  OSH imaging study reports reviewed:     CT cystography 1/24/24:  IMPRESSION:  No evidence of contrast extravasation into the prostatic parenchyma or other areas of contrast extravasation from the urinary bladder. Redemonstration of fractures involving the right superior and inferior pubic rami with extraperitoneal blood in the pelvis, which has slightly decreased compared to 1/17/2024. Persistent mild enlargement of the right adductor muscle compartment, similar to 1/17/2024.  His  avila was removed.      MRI cervical spine 1/22/24  IMPRESSION:  1.  C2 fracture with no bone marrow edema to suggest acute fracture.  2.  No abnormal enhancement.  3.  No epidural collection or hematoma.  4.  Degenerative spondylosis worst at C3-C4 where there is also suggestion of  Mild right jose luis-cord signal abnormality.  Multilevel spondylosis otherwise noted  as described.      Full Code    Physical Exam:  Head/Ear/Nose/Throat: normocephalic; atraumatic; moisture mouth mm, no oropharyngeal thrush noted.   Eyes: anicteric sclera, EOMI; PERRL.   Neck : supple, no JVD, no carotid bruits appeciated.   Respiratory: no evidence of labored breathing, bibasilar lung BS diminished area, no remarkable w/r/c.   Cardiovascular: RRR; normal S1, S2; no m/r/g; no S3 or S4.   Gastrointestinal: soft; NT; BS normal; mildly distended; no CVAT b/l.   Genitourinary: off avila.   Extremities : no c/c/e .   Neurological: AO x 3, fluent speeches, following commands, CNS II-XII grossly intact; no focal neurologic deficits.   Behavior/Emotional: in NAD, appropriate; cooperative.   Skin: clean, dry and intact.     Plan of care was discussed with patient, RN, and PMR attending     Assessment   CC:S/p fall, sustained multi trauma: R post 8th-11th rib fractures,  R superior pubic rami fracture w hematoma, age undetermined C2 fracture; non surgically managed; ADL and ambulatory dysfunction          83 y.o. male with PMH of BPH, HTN, hypothyroidism, who presented and was admitted to [facility] on 1/17/24 after a slip and fall on ice landing on his right side. He was found to have multiple injuries including right 8-11 rib fractures, bilateral pleural effusion s/p IR thoracentesis 1/18 with drainage of 1.1L, pelvic fracture (non op mgmt), right adductor/obturator hematoma, questionable posttraumatic bladder injury and age-indeterminate cervical fracture.  Neurosurgery was consulted and offered c spinesurgery, but patient declined.  [place] Collar on at  all times, except for meals to prevent aspiration.  Patient c/o difficulty coughing up mucus with collar on, but understands that he needs to wear the collar especially during therapy.  Urology was consulted.  CT cystogram showed possible extravasation of contrast into the prostatic urethra.  Avila catheter is currently draining clear urine.  Plan is to continue Avila catheter for 1 week.  CT cystogram in 1 week with possible removal at that time, but pt declining CT Cystogram.  Functionally, he has significant physical econditioning with ADL and ambulatory dysfunction, requiring inpt acute rehab, was admitted to [facility] on 1/24/24.        Repeated   CT cystography 1/24/24:  IMPRESSION:  No evidence of contrast extravasation into the prostatic parenchyma or other areas of contrast extravasation from the urinary bladder. Redemonstration of fractures involving the right superior and inferior pubic rami with extraperitoneal blood in the pelvis, which has slightly decreased compared to 1/17/2024. Persistent mild enlargement of the right adductor muscle compartment, similar to 1/17/2024.  His avila was removed.      A/P:  # S/p fall, sustained multi trauma: R post 8th-11th rib fractures,  R superior pubic rami fracture w hematoma, age undetermined C2 fracture; non surgically managed; ADL and ambulatory dysfunction  -Inpt acute rehab, pain and medical managements, DVT prophylaxis, dermal defense, fall precaution      # R post 8th-11th rib fractures, atelectasis, chest congestion, possible pleu effusion  -Monitor SO2, prn NC O2, to keep SO2>92%, incentive spirometry, bronchodilator nebulizer, mucolytic agent, pulm toileting.  -Check CXR, chest PT  - Chest wall pain management     # Urinary retention, BPH, r/o bladder extravasation  -Off avila, timed voiding, condom cath in the evening, monitor PVR with prn cic, adequate oral hydration, check UA and UCX, consider to add flomax 0.4 mg qhs.     # Hypothyroidism   -cont home dose  of levothyroxine     # DVT prophylaxis   -Eliquis 2.5 mg bid      # Insomnia, anxiety   - address his pain   - prn ativan and Ambien  - psychology CBT            Billing code: [id number]  Diagnoses:  Patient Active Problem List   Diagnosis   • Fall due to slipping on ice or snow, initial encounter   • Closed fracture of multiple ribs of right side   • Closed fracture of ramus of right pubis (CMS/HCC)   • Pelvic hematoma in male   • Pleural effusion on left   • Normocytic anemia   • Stage 3a chronic kidney disease (CMS/HCC)   • Closed odontoid fracture (CMS/HCC)   • Multiple injuries due to trauma             Neville Arndt MD  1/31/2024

## 2024-01-31 NOTE — PROGRESS NOTES
Daily Progress Note     Patient was seen and examined.   Attestation Notes: Face to face encounter completed        Subjective       Interval History: Patient does note some edema of the left more than right foot - non painful, no erythema. Patient denies calf pain. Patient also has area of erythema of the left arm -- IV infiltrated - appears to be improving from margins drawn on the arm - can use heat or ice. Doxycycline continued per IM.    Review of Systems:  Negative except as per HPI    Current Functional Status:   Bed mobility:   [place], Supine to Sit: moderate assist (50-74% patient effort)   [place], Sit to Supine: minimum assist (75% or more patient effort)   Transfers:    [place], Sit to Stand Transfer: touching/steadying assist  [place], Stand to Sit Transfer: touching/steadying assist   [place], Stand Pivot/Stand Step Transfer: touching/steadying assist   Gait:   [place], Gait: touching/steadying assist  Assistive Device: walker, front-wheeled   Distance in Feet: 50 feet (50'x3)    Bathing:   [place]: minimum assist (75% or more patient effort)   Toileting:   [place]: minimum assist (75% or more patient effort)   Upper body dressing:   [place]: minimum assist (75% or more patient effort)   Lower body dressing:   [place]: minimum assist (75% or more patient effort)     Mild to moderate cognitive deficits    Functional Progress:    Functional status reviewed. Overall, patient's functional status is being assessed      Objective     Objective   Vitals and Hemodynamics  Patient Vitals for the past 24 hrs:   BP Temp Temp src Pulse Resp SpO2 Weight   01/31/24 1309 (!) 110/58 -- -- 63 -- -- --   01/31/24 0908 (!) 112/59 -- -- (!) 56 -- 99 % --   01/31/24 0845 -- -- -- -- -- -- 87.1 kg (192 lb)   01/31/24 0712 (!) 110/58 36.7 °C (98 °F) Oral (!) 56 16 92 % --   01/30/24 1900 130/60 36.6 °C (97.9 °F) Oral 71 18 98 % --   01/30/24 1600 125/65 36.4 °C (97.5 °F)  Oral (!) 56 18 95 % --         Physical Exam    General: NAD, sitting in WC at bedside   HEENT: Atraumatic, Normocephalic; c collar donned  Cardiovascular: RRR  Respiratory: CTAB with breath sounds diminished at the bases   Abdomen/GI: NT ND  Skin: Warm, Dry, erythema and induration of the left proximal forearm  Extremities/MSK: Non tender bilateral gastrocs, trace to 1+ edema of the right foot; trace edema of the left foot   Neuro: AAOx3, follows commands, repetitive at times       Labs      Results from last 7 days   Lab Units 01/30/24  0601 01/27/24  0626 01/26/24  1452 01/26/24  0548 01/25/24  0558   WBC K/uL 9.36 6.30 8.79 9.55 9.69   RBC M/uL 2.63* 2.58* 2.57* 2.27* 2.80*   HEMOGLOBIN g/dL 8.2* 8.0* 8.1* 7.2* 8.8*   HEMATOCRIT % 25.6* 24.5* 24.1* 21.3* 26.4*   PLATELETS K/uL 173 122* 118* 124* 114*   MCV fL 97.3 95.0 93.8 93.8 94.3   RDW % 15.5* 15.2* 15.5* 15.4* 15.4*   EOS ABS AUTO K/uL  --   --  0.01* 0.00* 0.03*       Results from last 7 days   Lab Units 01/29/24  0534 01/26/24  1452 01/26/24  0547 01/25/24  0558   SODIUM mEQ/L 138 128* 132* 134*   POTASSIUM mEQ/L 4.1 4.0 4.2 3.9   CHLORIDE mEQ/L 106 99 97* 100   CO2 mEQ/L 23 23 24 25   BUN mg/dL 42* 35* 31* 29*   CREATININE mg/dL 1.2 1.3 1.3 1.2   EGFR mL/min/1.73m*2 >60.0 55.2* 55.2* >60.0   ANION GAP mEQ/L 9 6 11 9     Results from last 7 days   Lab Units 01/29/24  0534 01/26/24  1452 01/26/24  0547 01/25/24  0558   CALCIUM mg/dL 8.8 8.9 9.0 9.1       Results from last 7 days   Lab Units 01/26/24  1452 01/25/24  0558   AST IU/L 12* 11*   ALT IU/L 10 10   ALK PHOS IU/L 55 53   BILIRUBIN TOTAL mg/dL 0.8 0.9       Imaging      Assessment and Plan     Randolph Daley is a 83 y.o. male with a PMHx of BPH, hypothyroid, cervical spine fracture, who presents with ADL/mobility dysfunction secondary to multi trauma after fall, found to have right pubic ramus fracture, right posterior rib fractures, chronic odontoid fracture.     #ADL/Ambulatory dysfunction: 2/2  multi trauma. Continue acute inpatient rehabilitation with PT/OT/SLP/SW/Rehab RN/Neuropsych to increase independence with ADLs, improve balance, coordination, endurance, strength, mobility, community reintegration, decreased burden of care on others and family education.  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 with mild reight jose luis-cord signal abnormality  - chronic  - evaluated by neurosurgery in acute care  - decline operative management at this time; continue hard cervical collar at all times  - follow-up neurosurgery in 4-6 weeks     #right perivascular hematoma associated with mild mass effect on the urinary bladder and perivesicular infiltration  - monitor bladder scans, CIC PRN  - CT cystogram without evidence of contrast extravasation into prostatic parenchyma or other areas of extravasation from urinary bladder  - had avila catheter - removed      #BPH  - continue flomax     #Pleural effusion  - s/p thoracentesis  - monitor O2    #hyponatremia  - Na 138 1/29  - continue salt tabs, fluid restriction     #Immobility  -DVT prophylaxis with apixiban     # Follow-up  - PCP  - neurosurgery  - orthopedic surgery      #Disposition: 2/7/2024, home      Amelia Larose MD  Physical Medicine and Rehabilitation

## 2024-01-31 NOTE — PROGRESS NOTES
Patient: Randolph Daley  Location: [facility] [address]  MRN: [id number]  Today's date: 1/31/2024    History of Present Illness  Pt is a 83 y.o. male admitted on 1/24/2024 with Multiple injuries due to trauma [T07.XXXA]. Principal problem is Multiple injuries due to trauma.  is a 83 y.o. male whose primary indication for inpatient rehabilitation is Debility.    Pt is a 83 y.o M adm to [facility] 1/24/24 who presented to [facility] after a slip and fall on ice landing on his right side.  He was found to have multiple injuries including right 8-11 rib fractures, bilateral pleural effusion s/p IR thoracentesis 1/18 with drainage of 1.1L, pelvic fracture (non op mgmt), right adductor/obturator hematoma, questionable posttraumatic bladder injury and age-indeterminate cervical fracture.  Neurosurgery was consulted and offered c spinesurgery, but patient declined.  [place] Collar on at all times, except for meals to prevent aspiration. Urology was consulted.  CT cystogram showed possible extravasation of contrast into the prostatic urethra.  [place] catheter is currently draining clear urine.  Plan is to continue [place] catheter for 1 week.  CT cystogram in 1 week with possible removal at that time, but pt declining CT Cystogram.        Past Medical History  PMH: BPH, HTN       PT Vitals    Date/Time Pulse HR Source SpO2 Pt Activity O2 Therapy BP BP Location BP Method Pt Position [facility]   01/31/24 0908 56 Monitor 99 % At rest None (Room air) 112/59 Right upper arm Automatic Sitting PLP      PT Pain    Date/Time Pain Type Location Rating: Rest Rating: Activity [facility]   01/31/24 0908 Pain Assessment other (see comments) ribs 2 - mild pain 2 - mild pain PLP   01/31/24 0955 Pain Reassessment other (see comments) ribs 2 - mild pain -- PLP             Prior Living Environment    Flowsheet Row Most Recent Value   People in Home spouse   Current Living Arrangements home   Home Accessibility stairs to enter home  "(Group), stairs within home (Group)   Living Environment Comment lives with wife in 2 SH home with first floor set up   Number of Stairs, Main Entrance 1   Surface of Stairs, Main Entrance hardwood   Stair Railings, Main Entrance none   Location, Patient Bedroom first (main) floor   Patient Bedroom Access Comment first floor set up   Location, Bathroom first (main) floor   Bathroom Access Comment First floor set up. Walk in shower. grab bar and shower \"bench\"   Stairs, Within Home, Primary 16   Number of Stairs, Within Home, Primary other (see comments)   Surface of Stairs, Within Home, Primary carpeting   Stair Railings, Within Home, Primary railings on both sides of stairs  [left side rail goes 1/2- then can hold the spindles]   Stairs Comment, Within Home, Primary pt reports wife office upstairs but master bed/bath 1st floor          Prior Level of Function    Flowsheet Row Most Recent Value   Dominant Hand right   Ambulation independent   Transferring independent   Toileting independent   Bathing independent   Dressing independent   Eating independent   IADLs independent   Driving/Transportation    Prior Level of Function Comment Ind PTA, previous cervical fx. 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,  reports manages bill paying, medications, calendar at home           IRF PT Evaluation and Treatment - 01/31/24 0909        PT Time Calculation    Start Time 0901     Stop Time 1001     Time Calculation (min) 60 min        Session Details    Document Type Daily Treatment/Progress Note     Mode of Treatment individual therapy;physical therapy        General Information    Patient Profile Reviewed yes     General Observations of Patient Pt received in  in room, [place] Collar doffed. PT provided education on importance of wearing cervical collar AAT. Pt agreeable to don at start of session, remained on throughout.        Orthosis Neck Cervical " Collar    Orthosis Properties Date Obtained: 01/17/24 Time Obtained: 1900 Location: Neck Type: Cervical Collar Features: Hard Therapeutic Indications: fracture immobilization Wearing Schedule: wear at all times (remove only for hygiene and skin inspection)    Compliance/Wearing Issues needs reinforcement   agreeable to PT donning at start of session post education, needs reinforcement on necessity of collar compliance for healing       Sit to Stand Transfer    [place], Sit to Stand Transfer touching/steadying assist     Safety/Cues minimal;verbal cues;hand placement     Assistive Device walker, front-wheeled     Comment steady assist at gait belt for balance, increased time to complete and antalgic        Stand to Sit Transfer    [place], Stand to Sit Transfer touching/steadying assist     Safety/Cues minimal;verbal cues;hand placement     Assistive Device walker, front-wheeled     Comment steady assist at gait belt for control        Stand Pivot Transfer    [place], Stand Pivot/Stand Step Transfer touching/steadying assist     Safety/Cues increased time to complete;minimal;verbal cues;proper use of assistive device;technique     Assistive Device walker, front-wheeled     Comment via ambulatory approach with steady assist at pelvis for balance        Gait Training    [place], Gait touching/steadying assist     Safety/Cues increased time to complete;minimal;verbal cues;gait deviations;technique     Assistive Device walker, front-wheeled     Distance in Feet 50 feet   50'x3    Pattern step-through     Deviations/Abnormal Patterns antalgic;ace decreased;gait speed decreased;step length decreased;stride length decreased     Bilateral Gait Deviations heel strike decreased     Right Sided Gait Deviations --   decreased stance time    Advanced Gait Activity step over obstacle     Comment (Gait/Stairs) steady assist at gait belt for balance; decreased R stance time and L step length, slight  "improvement with distance; increased time and effort to complete, seated rest break required due to fatigue        Step Over Obstacle    [place] touching/steadying assist;minimum assist (75% or more patient effort)     Assistive Device walker, front-wheeled     Comment non reciprocal stepping over 6\" delphine x3 with each LE leading; Luis progressing to steady assist at pelvis for balance        Balance    Balance Interventions standing     Comment, Balance Standing on foam unsupported, ball press x10; staggered stance with 1LE on foam x15s/side; steady assist        Lower Extremity (Therapeutic Exercise)    Exercise Position/Type seated;AROM (active range of motion)     General Exercise bilateral     Reps and Sets x10     Comment LAQ, seated clam blue band, HS curls blue band, hs/gastroc stretching 2x30s/side        Daily Progress Summary (PT)    Daily Outcome Statement Pt continues to require increased time for all tasks secondary to R sided pain. Additionally decreased endurance, requiring seated break during gait training. Pt demonstrated good foot clearance, stance stability, and response to cueing during obstacle step overs. Pt making progress towards LTG of independence. Continues to require education for use of cervical collar, but agreeable to wear during therapy.                           IRF PT Goals    Flowsheet Row Most Recent Value   Bed Mobility Goal 1    Activity/Assistive Device bed mobility activities, all at 01/25/2024 0900   [place] minimum assist (75% or more patient effort) at 01/25/2024 0900   Time Frame short-term goal (STG), 5 - 7 days at 01/25/2024 0900   Progress/Outcome goal ongoing at 01/29/2024 0839   Bed Mobility Goal 2    Activity/Assistive Device bed mobility activities, all at 01/25/2024 0900   [place] modified independence at 01/25/2024 0900   Time Frame long-term goal (LTG), 14 days or less at 01/25/2024 0900   Progress/Outcome goal ongoing at 01/29/2024 0839 "   Transfer Goal 1    Activity/Assistive Device sit-to-stand/stand-to-sit, stand pivot at 01/29/2024 0839   [place] --  [steadying A] at 01/29/2024 0839   Time Frame short-term goal (STG), 5 - 7 days at 01/29/2024 0839   Progress/Outcome goal met, goal revised this date at 01/29/2024 0839   Transfer Goal 2    Activity/Assistive Device sit-to-stand/stand-to-sit, bed-to-chair/chair-to-bed, stand pivot at 01/25/2024 0900   [place] modified independence at 01/25/2024 0900   Time Frame long-term goal (LTG), 14 days or less at 01/25/2024 0900   Progress/Outcome goal ongoing at 01/29/2024 0839   Gait/Walking Locomotion Goal 1    Activity/Assistive Device gait (walking locomotion) at 01/25/2024 0900   Distance 25 feet at 01/25/2024 0900   [place] minimum assist (75% or more patient effort) at 01/25/2024 0900   Time Frame short-term goal (STG), 5 - 7 days at 01/25/2024 0900   Progress/Outcome goal ongoing at 01/29/2024 0839   Gait/Walking Locomotion Goal 2    Activity/Assistive Device gait (walking locomotion), assistive device use at 01/29/2024 0839   Distance 150 feet at 01/29/2024 0839   [place] modified independence at 01/29/2024 0839   Time Frame long-term goal (LTG), 14 days or less at 01/29/2024 0839   Progress/Outcome goal revised this date at 01/29/2024 0839   Stairs Goal 1    Activity/Assistive Device stairs, all skills, using handrail, left, using handrail, right at 01/28/2024 1304   Number of Stairs 12 at 01/28/2024 1304   [place] minimum assist (75% or more patient effort) at 01/28/2024 1304   Time Frame 5 - 7 days, short-term goal (STG) at 01/28/2024 1304   Progress/Outcome goal ongoing at 01/29/2024 0839   Stairs Goal 2    Activity/Assistive Device stairs, all skills, using handrail, left, cane, straight at 01/28/2024 1304   Number of Stairs 12 at 01/28/2024 1304   [place] supervision required at 01/28/2024 1304   Time Frame 14 days or less at 01/28/2024 1304   Progress/Outcome  goal ongoing at 01/29/2024 0871

## 2024-01-31 NOTE — NURSING NOTE
This RN applied new foam to sacral areas. Pt has red blanchable area to sacrum. Pt refusing pictures. Pt verbalized understanding of education yet still refuses.    Pt refuses to wear [place] collar AAT. Pt educated about the importance of wearing collar to promote healing and protect area yet pt still refused. Pt non-complaint with wearing schedule. Dr & team aware.

## 2024-01-31 NOTE — CONSULTS
115/115D    Clinical Course: Patient is a 83 y.o. male who was admitted on 1/24/2024 with a diagnosis of Multiple injuries due to trauma [T07.XXXA].     Nutrition Interventions/Recommendations:   1. Continue Regular/thins diet, consider liberalizing fluid restriction (Na+ WNL)  2. Boost d/c'd due to complaints of it causing phlegm  3. Will send almond milk  4. Will send fruit at each meal as requested  5. Obtain weekly weight and update in Epic  6. Additional alternative foods list provided  7. Add MVI with minerals  At nutrition risk level 1    Past Medical History:   Diagnosis Date   • BPH (benign prostatic hyperplasia)    • Disease of thyroid gland    • Hypertension    • Multiple rib fractures 01/17/2024   • Pelvic fracture (CMS/HCC) 01/17/2024   • Pleural effusion      Past Surgical History:   Procedure Laterality Date   • HERNIA REPAIR          Adult Nutrition Assessment - 01/31/24 1100        Charting Type    Nutrition Charting Type Nutrition Reassessment     Nutrition Status Classification Mild nutritional compromise     Time Spent (Minutes) 40        Reason for Assessment    Reason For Assessment per organizational policy        Nutrition/Diet History    Intake (%) 100%     Food Preferences Likes fruit (except cantaloupe and bananas), prefers almond milk     Supplemental Drinks/Foods/Additives None     Vitamin/Mineral/Herbal Supplements Vit C, D3, ferric gluconate, NaCl tablets     Factors Affecting Nutritional Intake --   None       Body Mass Index (BMI)    BMI Assessment BMI 25-29.9: overweight        Labs/Procedures/Meds    Lab Results Reviewed reviewed, pertinent     Lab Results Comments 1/29 BUN 42H, H/H 8.2L/25.6L        Medications    Pertinent Medications Reviewed reviewed, pertinent     Pertinent Medications Comments dulcolax, levothyroxine, senlkot,        PES Statement    Nutrition Diagnosis --     Related To: --     Nutritional Needs Met? Yes                 CMP Results       01/29/24 01/26/24  "01/26/24     0534 1452 0547     128 132    K 4.1 4.0 4.2    Cl 106 99 97    CO2 23 23 24    Glucose 98 107 98    BUN 42 35 31    Creatinine 1.2 1.3 1.3    Calcium 8.8 8.9 9.0    Anion Gap 9 6 11    AST -- 12 --    ALT -- 10 --    Albumin -- 3.0 --    EGFR >60.0 55.2 55.2         Comment for K at 1452 on 01/26/24: Results obtained on plasma. Plasma Potassium values may be up to 0.4 mEQ/L less than serum values. The differences may be greater for patients with high platelet or white cell counts.    Comment for EGFR at 0534 on 01/29/24: Calculation based on the Chronic Kidney Disease Epidemiology Collaboration (CKD-EPI) equation refit without adjustment for race.    Comment for EGFR at 1452 on 01/26/24: Calculation based on the Chronic Kidney Disease Epidemiology Collaboration (CKD-EPI) equation refit without adjustment for race.    Comment for EGFR at 0547 on 01/26/24: Calculation based on the Chronic Kidney Disease Epidemiology Collaboration (CKD-EPI) equation refit without adjustment for race.        Lab Results   Component Value Date    ALT 10 01/26/2024    AST 12 (L) 01/26/2024    ALKPHOS 55 01/26/2024    BILITOT 0.8 01/26/2024     No results found for: \"[id number]\"  Lab Results   Component Value Date    WBC 9.36 01/30/2024    HGB 8.2 (L) 01/30/2024    HCT 25.6 (L) 01/30/2024    MCV 97.3 01/30/2024     01/30/2024     No results found for: \"CHOL\"  No results found for: \"HDL\"  No results found for: \"LDLCALC\"  No results found for: \"TRIG\"  No results found for: \"CHOLHDL\"  Lab Results   Component Value Date    CALCIUM 8.8 01/29/2024    PHOS 3.4 01/20/2024     Glucose Results    No lab values to display.           • albuterol 1.25 mg, ipratropium (ATROVENT) 0.5 mg   nebulization TID   • apixaban  2.5 mg oral BID   • ascorbic acid  500 mg oral Daily   • bisacodyL  10 mg oral Daily   • cholecalciferol (vitamin D3)  2,500 Units oral Daily with dinner   • ferric gluconate (FERRLECIT) 125 mg in sodium chloride " 0.9 % 100 mL IVPB  125 mg intravenous Daily (6p)   • guaiFENesin  400 mg oral QID   • levothyroxine  25 mcg oral Daily (6:30a)   • sennosides-docusate sodium  2 tablet oral BID   • sodium chloride  3 mL nebulization TID   • sodium chloride  1 g oral BID   • tamsulosin  0.4 mg oral Nightly              Dietary Orders   (From admission, onward)             Start     Ordered    01/29/24 1155  Adult Diet Regular; 1800 mL Fluid; RD/LDN may NOT adjust order  Diet effective now        References:    IDDSI Diet reference   Question Answer Comment   Diet Texture Regular    Fluid restriction dietary / 24h: 1800 mL Fluid    Delegation of Authority. Diet orders written by PA/Cuong may not be adjusted by RD/LDNs. RD/LDN may NOT adjust order        01/29/24 1154              Wt Readings from Last 3 Encounters:   01/31/24 87.1 kg (192 lb)   01/26/24 83.9 kg (185 lb)   01/17/24 87.8 kg (193 lb 9 oz)       Weights (last 7 days)     Date/Time Weight    01/31/24 0845 87.1 kg (192 lb)    01/24/24 2100 83.5 kg (184 lb)          Clinical Comments:    Patient c/o the food being too bland, but states he got pasta with sauce last night that was quite tasty.  He dislikes the Boost (causes phlegm) and prefers almond milk over regular milk.  He requested fruit with all meals (dislikes cantaloupe and bananas). His weight is stable since admission. His sodium level is now WNL (on salt tablets and a fluid restriction).  Elevated BUN noted also (likely due to fluid restriction).     Date: 01/31/24  Signature: Caty Lomeli, JOAQUIN

## 2024-01-31 NOTE — PROGRESS NOTES
Patient: Randolph Daley  Location: [facility]  MRN: [id number]  Today's date: 1/31/2024    History of Present Illness  Pt is a 83 y.o. male admitted on 1/24/2024 with Multiple injuries due to trauma [T07.XXXA]. Principal problem is Multiple injuries due to trauma.  is a 83 y.o. male whose primary indication for inpatient rehabilitation is Debility.    Pt is a 83 y.o M adm to [facility] 1/24/24 who presented to [facility] after a slip and fall on ice landing on his right side.  He was found to have multiple injuries including right 8-11 rib fractures, bilateral pleural effusion s/p IR thoracentesis 1/18 with drainage of 1.1L, pelvic fracture (non op mgmt), right adductor/obturator hematoma, questionable posttraumatic bladder injury and age-indeterminate cervical fracture.  Neurosurgery was consulted and offered c spinesurgery, but patient declined.  [place] Collar on at all times, except for meals to prevent aspiration. Urology was consulted.  CT cystogram showed possible extravasation of contrast into the prostatic urethra.  Womack catheter is currently draining clear urine.  Plan is to continue Womack catheter for 1 week.  CT cystogram in 1 week with possible removal at that time, but pt declining CT Cystogram.        Past Medical History  PMH: BPH, HTN       OT Vitals    Date/Time Pulse HR Source BP BP Location BP Method Pt Position [facility]   01/31/24 1309 63 Monitor 110/58 Right upper arm Automatic Sitting TS      OT Pain    Date/Time Pain Type Side/Orientation Location Rating: Rest Rating: Activity Interventions [facility]   01/31/24 1309 Pain Assessment right flank 0 - no pain 4 - moderate pain diversional activity provided;position adjusted    01/31/24 1428 Pain Reassessment -- -- 0 - no pain -- -- TS             Prior Living Environment    Flowsheet Row Most Recent Value   People in Home spouse   Current Living Arrangements home   Home Accessibility stairs to enter home (Group), stairs  "within home (Group)   Living Environment Comment lives with wife in 2  home with first floor set up   Number of Stairs, Main Entrance 1   Surface of Stairs, Main Entrance hardwood   Stair Railings, Main Entrance none   Location, Patient Bedroom first (main) floor   Patient Bedroom Access Comment first floor set up   Location, Bathroom first (main) floor   Bathroom Access Comment First floor set up. Walk in shower. grab bar and shower \"bench\"   Stairs, Within Home, Primary 16   Number of Stairs, Within Home, Primary other (see comments)   Surface of Stairs, Within Home, Primary carpeting   Stair Railings, Within Home, Primary railings on both sides of stairs  [left side rail goes 1/2- then can hold the spindles]   Stairs Comment, Within Home, Primary pt reports wife office upstairs but master bed/bath 1st floor          Prior Level of Function    Flowsheet Row Most Recent Value   Dominant Hand right   Ambulation independent   Transferring independent   Toileting independent   Bathing independent   Dressing independent   Eating independent   IADLs independent   Driving/Transportation    Prior Level of Function Comment Ind PTA, previous cervical fx. 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   Patient Goals Initial PSFS: getting in/out of a car 8/10, walking 5/10, getting on/off of the sofa 5/10, cooking 7/10 = 25/40 = 62.5%           IRF OT Evaluation and Treatment - 01/31/24 1339        OT Time Calculation    Start Time 1300     Stop Time 1430     Time Calculation (min) 90 min        Session Details    Document Type Daily " Treatment/Progress Note     Mode of Treatment individual therapy;occupational therapy        General Information    General Observations of Patient Pt received standing with PCT utilizing urinal, w/o [place] collar donned. OT donned [place] at start of session.        Mobility Belt    Mobility Belt Used for All Out of Bed Activity yes        Orthosis Neck Cervical Collar    Orthosis Properties Date Obtained: 01/17/24 Time Obtained: 1900 Location: Neck Type: Cervical Collar Features: Hard Therapeutic Indications: fracture immobilization Wearing Schedule: wear at all times (remove only for hygiene and skin inspection)    Compliance/Wearing Issues non-compliant with wearing schedule due to;needs reinforcement;patient   pt agreeable for OT to don [place] at start of session. Pt initially refused wearing cervical collar to shower but agreeable to Jenni collar after additional information. Pt requested to remove [place] O[place] in bed, agreed to wear with add education.       Caregiver Training    Caregiver(s) to be Trained spouse/significant other     Caregiver Training Plan safe ADL techniques;transfer training     Comment, Caregiver Training Plan Pt's wife, Eunice, present for unscheduled training session. Discussion included home set up, compensatory techniques and AE for ADLs, spinal precautions, cervical collar management, current fxl performance.  Recommend toilet safety rails and a shower chair with armrests and back rest, Eunice receptive and agreeable to purchase. Eunice stated she is able to assist prn however works 2 days per week but has supportive neighbors that will assist with 1 neighbor being a retired nurse.        Cognition/Psychosocial    Comment, Cognition Pt easily frustrated with AE during LB dressing task and verbalized frustration regarding swelling on L forearm. Nursing and MDs notified and assessed swelling during session.        Bed Mobility    Comment OT: sitting EOB to supine with  mod A for BLE management d/t increased pain and fatigue post shower, pt able to maintain spinal precautions.        Sit to Stand Transfer    [place], Sit to Stand Transfer touching/steadying assist     Safety/Cues increased time to complete;minimal;verbal cues;hand placement;technique     Assistive Device walker, front-wheeled     Comment from EOB steady assist for balance        Stand to Sit Transfer    [place], Stand to Sit Transfer touching/steadying assist     Safety/Cues increased time to complete;minimal;verbal cues;hand placement;technique     Assistive Device walker, front-wheeled     Comment to EOB steady assist for controlled descend        Stand Pivot Transfer    [place], Stand Pivot/Stand Step Transfer touching/steadying assist     Safety/Cues increased time to complete;minimal;verbal cues;proper use of assistive device;technique     Assistive Device walker, front-wheeled     Comment via amb approach steady assist for balance min vc for upright posture        Shower Transfer    Transfer Technique stand pivot     [place] minimum assist (75% or more patient effort)     Safety/Cues increased time to complete;minimal;verbal cues;hand placement;technique;sequencing     Assistive Device grab bars/tub rail;shower chair;walker, front-wheeled     Comment via short amb with steady assist for balance and controlled descend to sit on shwoer chair, required min A for pelvic lift and balance SPT from the shower chair post shower d/t increased fatigue.        Impairments/Safety Issues    Comment, Safety Issues/Impairments OT: short HHD with RW within room with steady assist for balance min vc for upright posture        Bathing    Shower Provided? Yes     Tasks chest;left arm;right arm;abdomen;left upper leg;right upper leg     [place] minimum assist (75% or more patient effort)     Safety/Cues increased time to complete;minimal;verbal cues;maintaining precautions;compensatory  techniques;initiation     Position supported sitting;supported standing     Setup Assistance adaptive equipment setup;adjust water temperature/flow;obtain supplies     Adaptive Equipment grab bar/tub rail;hand-held shower spray hose;shower chair     Comment Pt completed full wet shower seated on shower chair, pt declined for himself and OT to wash BLE and to complete pericare. Pt required min A for balance and assist wot dry buttocks.        Upper Body Dressing    Tasks doff;don;pull over garment     [place] minimum assist (75% or more patient effort)     Safety/Cues increased time to complete;minimal;verbal cues;initiation;maintaining precautions     Position edge of bed sitting;supported sitting     Adaptive Equipment orthosis     Comment Pt requires total assist for Jenni collar and [place] collar. Seated on tub bench, pt required assist to adjust shirt collar to outside of cervical collar then pt able to doff with s for vc to adhere to spinal precautions. seated EOB pt initially requested OT to place shirt overhead however with encouragement, pt able to don pull over shirt with s to ensure adherance to spinal precautions, min A overall.        Lower Body Dressing    Tasks don;doff;pants/bottoms;shoes/slippers     [place] minimum assist (75% or more patient effort)     Safety/Cues increased time to complete;moderate;verbal cues;tactile cues;compensatory techniques;proper use of assistive device;use of adaptive equipment     Position edge of bed sitting;supported standing     Adaptive Equipment reacher;sock aid   dressing pal    [place], Footwear minimum assist (75% or more patient effort)     Comment While seated on tub bench, pt doffed socks with dressing pal required assist to doff over L heel however did not require assistance to doff hospital socks with AE seated EOB with min vc for technique with AE. Pt utilized reacher to thread BLE into underwear/pants with min A then stood with RW to  complete CM with steady assist for balance. Pt declined to place sock on sock aid and required therapist to set up, required assist to don sock on R heel pt able to don L sock with set up. Pt required assist to untie sneakers and pt able to doff alternating heel to toe. min A overall        Grooming    Tasks washes, rinses and dries face     [place] supervision     Position supported sitting     Adaptive Equipment none     [place], Oral Hygiene set up     Comment pt washed face during shower seated on shower chair with s to ensure adherance to spinal precautions. per nursing report set up to complete oral care while seated.        Toileting    Tasks adjust/manage clothing     [place] minimum assist (75% or more patient effort)     Safety/Cues initiation     Position supported standing     Setup Assistance obtain supplies     Adaptive Equipment urinal     Comment Pt continent of urine standing with RW and urinal. Pt required steady assist while completing CM, pt's wife held urinal in place, min A overall. vc for initiation to complete CM.        Daily Progress Summary (OT)    Symptoms Noted During/After Treatment fatigue     Daily Outcome Statement Session focused on ADLs and completed full wet shower. Wife present for unscheduled training. Pt limited by fatigue and required encouragement t.o session to complete ADLs with AE vs receiving assistance. Continue OT POC to maximize safety and independence.                      Education Documentation  Equipment/Methods, taught by Paula Sandoval, ADRYAN at 1/31/2024  3:05 PM.  Learner: Significant Other, Patient  Readiness: Acceptance  Method: Explanation, Demonstration  Response: Verbalizes Understanding, Needs Reinforcement  Comment: pt needs reinforcement with AE and compensatory techniques for ADLs, wife verbalized understanding. Toilet safety rails vs commode, shower chair.          IRF OT Goals    Flowsheet Row Most Recent Value   Transfer Goal 1     Activity/Assistive Device toilet at 01/25/2024 0629   [place] supervision required at 01/25/2024 0629   Time Frame short-term goal (STG), 5 - 7 days at 01/29/2024 0708   Progress/Outcome goal ongoing at 01/29/2024 0708   Transfer Goal 2    Activity/Assistive Device toilet at 01/25/2024 0629   [place] modified independence  [steady A] at 01/25/2024 0629   Time Frame long-term goal (LTG), 14 days or less at 01/29/2024 0708   Progress/Outcome goal ongoing at 01/29/2024 0708   Transfer Goal 3    Activity/Assistive Device shower at 01/25/2024 0629   [place] other (see comments)  [touch A] at 01/25/2024 0629   Time Frame short-term goal (STG), 5 - 7 days at 01/29/2024 0708   Progress/Outcome goal ongoing at 01/29/2024 0708   Transfer Goal 4    Activity/Assistive Device shower at 01/25/2024 0629   [place] modified independence at 01/29/2024 0708   Time Frame long-term goal (LTG), 14 days or less at 01/29/2024 0708   Progress/Outcome goal revised this date at 01/29/2024 0708   Bathing Goal 1    Activity/Assistive Device bathing skills, all at 01/25/2024 0629   [place] minimum assist (75% or more patient effort) at 01/25/2024 0629   Time Frame short-term goal (STG), 5 - 7 days at 01/29/2024 0708   Progress/Outcome goal ongoing at 01/29/2024 0708   Bathing Goal 2    Activity/Assistive Device bathing skills, all at 01/25/2024 0629   [place] set-up required at 01/29/2024 0708   Time Frame long-term goal (LTG), 14 days or less at 01/29/2024 0708   Progress/Outcome goal revised this date at 01/29/2024 0708   UB Dressing Goal 1    Activity/Assistive Device upper body dressing at 01/25/2024 0629   [place] minimum assist (75% or more patient effort) at 01/29/2024 0708   Time Frame short-term goal (STG), 5 - 7 days at 01/29/2024 0708   Strategies/Barriers including clothing retrieval at 01/29/2024 0708   Progress/Outcome goal revised this date at 01/29/2024 0708   UB Dressing Goal 2     Activity/Assistive Device upper body dressing at 01/25/2024 0629   [place] minimum assist (75% or more patient effort) at 01/29/2024 0708   Time Frame long-term goal (LTG), 14 days or less at 01/29/2024 0708   Progress/Outcome goal revised this date at 01/29/2024 0708   LB Dressing Goal 1    Activity/Assistive Device lower body dressing at 01/25/2024 0629   [place] minimum assist (75% or more patient effort) at 01/25/2024 0629   Time Frame short-term goal (STG), 5 - 7 days at 01/29/2024 0708   Progress/Outcome goal ongoing at 01/29/2024 0708   LB Dressing Goal 2    Activity/Assistive Device lower body dressing at 01/25/2024 0629   [place] modified independence at 01/25/2024 0629   Time Frame long-term goal (LTG), 14 days or less at 01/29/2024 0708   Progress/Outcome goal ongoing at 01/29/2024 0708   Grooming Goal 1    Activity/Assistive Device grooming skills, all at 01/25/2024 0629   [place] set-up required at 01/25/2024 0629   Time Frame short-term goal (STG), 5 - 7 days at 01/29/2024 0708   Progress/Outcome goal ongoing at 01/29/2024 0708   Grooming Goal 2    Activity/Assistive Device grooming skills, all at 01/25/2024 0629   [place] modified independence at 01/25/2024 0629   Time Frame long-term goal (LTG), 14 days or less at 01/29/2024 0708   Progress/Outcome goal ongoing at 01/29/2024 0708   Toileting Goal 1    Activity/Assistive Device toileting skills, all at 01/25/2024 0629   [place] minimum assist (75% or more patient effort) at 01/25/2024 0629   Time Frame short-term goal (STG), 5 - 7 days at 01/29/2024 0708   Progress/Outcome goal ongoing at 01/29/2024 0708   Toileting Goal 2    Activity/Assistive Device toileting skills, all at 01/25/2024 0629   [place] modified independence at 01/25/2024 0629   Time Frame long-term goal (LTG), 14 days or less at 01/29/2024 0708   Progress/Outcome goal ongoing at 01/29/2024 0708

## 2024-01-31 NOTE — PLAN OF CARE
Plan of Care Review  Plan of Care Reviewed With: patient  Progress: improving  Outcome Evaluation: Pt is AAO X 3. Patient c/o burning on sacrum, education provided r/t importance of turning /repositioning, agreed to turn. Sacral mepilex is on. IV line removed d/t infiltration, arm elevated on pillow, cold compress applied, picture in media. Non-compliant with ASPEN collar, despite the edication. Slept well. Safty precautions maintained.

## 2024-01-31 NOTE — PROGRESS NOTES
Patient: Randolph Daley  Location: [facility]  MRN: [id number]  Today's date: 1/31/2024    History of Present Illness  Pt is a 83 y.o. male admitted on 1/24/2024 with Multiple injuries due to trauma [T07.XXXA]. Principal problem is Multiple injuries due to trauma.  is a 83 y.o. male whose primary indication for inpatient rehabilitation is Debility.    Pt is a 83 y.o M adm to [facility] 1/24/24 who presented to [facility] after a slip and fall on ice landing on his right side.  He was found to have multiple injuries including right 8-11 rib fractures, bilateral pleural effusion s/p IR thoracentesis 1/18 with drainage of 1.1L, pelvic fracture (non op mgmt), right adductor/obturator hematoma, questionable posttraumatic bladder injury and age-indeterminate cervical fracture.  Neurosurgery was consulted and offered c spinesurgery, but patient declined.  [place] Collar on at all times, except for meals to prevent aspiration. Urology was consulted.  CT cystogram showed possible extravasation of contrast into the prostatic urethra.  Womack catheter is currently draining clear urine.  Plan is to continue Womack catheter for 1 week.  CT cystogram in 1 week with possible removal at that time, but pt declining CT Cystogram.        Past Medical History  PMH: BPH, HTN       SLP Pain    Date/Time Pain Type Rating: Rest Who   01/31/24 1435 Pain Assessment 0 - no pain HL   01/31/24 1459 Pain Reassessment 0 - no pain HL          Prior Living Environment    Flowsheet Row Most Recent Value   People in Home spouse   Current Living Arrangements home   Home Accessibility stairs to enter home (Group), stairs within home (Group)   Living Environment Comment lives with wife in 2  home with first floor set up   Number of Stairs, Main Entrance 1   Surface of Stairs, Main Entrance hardwood   Stair Railings, Main Entrance none   Location, Patient Bedroom first (main) floor   Patient Bedroom Access Comment first  "floor set up   Location, Bathroom first (main) floor   Bathroom Access Comment First floor set up. Walk in shower. grab bar and shower \"bench\"   Stairs, Within Home, Primary 16   Number of Stairs, Within Home, Primary other (see comments)   Surface of Stairs, Within Home, Primary carpeting   Stair Railings, Within Home, Primary railings on both sides of stairs  [left side rail goes 1/2- then can hold the spindles]   Stairs Comment, Within Home, Primary pt reports wife office upstairs but master bed/bath 1st floor          Prior Level of Function    Flowsheet Row Most Recent Value   Dominant Hand right   Ambulation independent   Transferring independent   Toileting independent   Bathing independent   Dressing independent   Eating independent   IADLs independent   Driving/Transportation    Prior Level of Function Comment Ind PTA, previous cervical fx. 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     General Observations of Patient Pt recieved in bed, as a direct handoff from OT. [place] collar donned.        Cognition/Psychosocial    Comment, Executive Function Pt participated in a letter placement task from Workbook for Cognitive Skills for scrambled words (pg. 183) where he was give four-five letters and three definitions and had to create three different words given the scrambled letters. He was able to complete this task with 100% acc given MIN-MOD cues for thought organization.        " Motor Speech    Comment, Motor Speech Intervention Pt with reduced breath support for speech, becoming visibly SOB towards the end of sentences. Pt was able to get to 1000 mL on incentive spirometer in 3/10 opps. Will need continued training and was encouraged to use this for improved respiratory muscle strength.        Daily Progress Summary (SLP)    Daily Outcome Statement Session targeted training on incentive spirometer in r/t speech and cognition. Pt left in bed with aspen collar in place.                 Education Documentation  Communication Techniques, taught by Laly Ch CCC-SLP at 1/31/2024  3:03 PM.  Learner: Significant Other, Patient  Readiness: Acceptance  Method: Explanation  Response: Verbalizes Understanding  Comment: Pt and wife edu re: goals, progress, and compensatory strategies.    Cognitive Support Measures, taught by Laly Ch CCC-SLP at 1/31/2024  3:03 PM.  Learner: Significant Other, Patient  Readiness: Acceptance  Method: Explanation  Response: Verbalizes Understanding  Comment: Pt and wife edu re: goals, progress, and compensatory strategies.          IRF SLP Goals    Flowsheet Row Most Recent Value   Motor Speech/Voice Goal 1    Motor Speech/Voice Goal 1 breath support exercises (incentive spirometer, sustained phonation),  vocal function exercises for improved volume and voice quality x 80% mod cues. at 01/26/2024 0922   Time Frame short-term goal (STG), 2 weeks at 01/26/2024 0922   Progress/Outcome goal ongoing at 01/28/2024 1244   Motor Speech/Voice Goal 2    Motor Speech/Voice Goal 2 Functinal breath support and voice quality for complex convesational needs x 90% with I use of strategies at 01/26/2024 0922   Time Frame long-term goal (LTG), 3 weeks at 01/26/2024 0922   Progress/Outcome goal ongoing at 01/28/2024 1244   Executive Function Goal 1    Activity executive function tasks, information processing tasks, organization/sequencing tasks, planning/decision-making  tasks, problem-solving/reasoning tasks, self-monitoring/self-correction  [convergent/divergent reasoning tasks,  mod level functional tasks related to return to I managing bills, meds, calendar, safety] at 01/26/2024 0922   [place]/Accuracy with 80% accuracy, with moderate, verbal cues/redirection at 01/26/2024 0922   Time Frame short-term goal (STG), 2 weeks at 01/26/2024 0922   Progress/Outcome goal ongoing at 01/28/2024 1244   Executive Function Goal 2    Activity executive function tasks, information processing tasks, organization/sequencing tasks, planning/decision-making tasks, problem-solving/reasoning tasks, self-monitoring/self-correction at 01/26/2024 0922   [place]/Accuracy with 90% accuracy, with minimum, verbal cues/redirection at 01/26/2024 0922   Time Frame long-term goal (LTG), 3 weeks at 01/26/2024 0922   Progress/Outcome goal ongoing at 01/28/2024 1243

## 2024-01-31 NOTE — NURSING NOTE
Pt is AAO X 3. IV site infiltrated, area reddedned, inflamed and warm to touch. IV line discontinued. Warm compress applied and elevated on pillow. Physician on call and nursing supervisor notified. Site is improving. Pt c/o discomfort but no pain. Turned and repositioned. Slept well. Safety precautions maintained.

## 2024-02-01 ENCOUNTER — APPOINTMENT (INPATIENT)
Dept: PHYSICAL THERAPY | Facility: REHABILITATION | Age: 84
DRG: 560 | End: 2024-02-01
Payer: COMMERCIAL

## 2024-02-01 ENCOUNTER — APPOINTMENT (INPATIENT)
Dept: OCCUPATIONAL THERAPY | Facility: REHABILITATION | Age: 84
DRG: 560 | End: 2024-02-01
Payer: COMMERCIAL

## 2024-02-01 ENCOUNTER — APPOINTMENT (INPATIENT)
Dept: WOUND CARE | Facility: REHABILITATION | Age: 84
DRG: 560 | End: 2024-02-01
Payer: COMMERCIAL

## 2024-02-01 ENCOUNTER — APPOINTMENT (INPATIENT)
Dept: SPEECH THERAPY | Facility: REHABILITATION | Age: 84
DRG: 560 | End: 2024-02-01
Payer: COMMERCIAL

## 2024-02-01 PROCEDURE — 63700000 HC SELF-ADMINISTRABLE DRUG: Performed by: INTERNAL MEDICINE

## 2024-02-01 PROCEDURE — 97535 SELF CARE MNGMENT TRAINING: CPT | Mod: GO

## 2024-02-01 PROCEDURE — 97530 THERAPEUTIC ACTIVITIES: CPT | Mod: GP,59

## 2024-02-01 PROCEDURE — 97116 GAIT TRAINING THERAPY: CPT | Mod: GP

## 2024-02-01 PROCEDURE — 97530 THERAPEUTIC ACTIVITIES: CPT | Mod: GO,59

## 2024-02-01 PROCEDURE — 97110 THERAPEUTIC EXERCISES: CPT | Mod: GP

## 2024-02-01 PROCEDURE — 63700000 HC SELF-ADMINISTRABLE DRUG

## 2024-02-01 PROCEDURE — 92507 TX SP LANG VOICE COMM INDIV: CPT | Mod: GN

## 2024-02-01 PROCEDURE — 25000000 HC PHARMACY GENERAL

## 2024-02-01 PROCEDURE — 12800000 HC ROOM AND CARE SEMIPRIVATE REHAB

## 2024-02-01 RX ADMIN — BISACODYL 10 MG: 5 TABLET, COATED ORAL at 08:42

## 2024-02-01 RX ADMIN — ALBUTEROL SULFATE: 2.5 SOLUTION RESPIRATORY (INHALATION) at 18:31

## 2024-02-01 RX ADMIN — SODIUM CHLORIDE SOLN NEBU 3% 3 ML: 3 NEBU SOLN at 18:51

## 2024-02-01 RX ADMIN — FERROUS SULFATE TAB 325 MG (65 MG ELEMENTAL FE) 325 MG: 325 (65 FE) TAB at 08:42

## 2024-02-01 RX ADMIN — DOXYCYCLINE HYCLATE 100 MG: 100 TABLET, FILM COATED ORAL at 08:42

## 2024-02-01 RX ADMIN — DOXYCYCLINE HYCLATE 100 MG: 100 TABLET, FILM COATED ORAL at 20:47

## 2024-02-01 RX ADMIN — OXYCODONE HYDROCHLORIDE AND ACETAMINOPHEN 500 MG: 500 TABLET ORAL at 08:42

## 2024-02-01 RX ADMIN — APIXABAN 2.5 MG: 2.5 TABLET, FILM COATED ORAL at 08:42

## 2024-02-01 RX ADMIN — SODIUM CHLORIDE 1 G: 1 TABLET ORAL at 08:42

## 2024-02-01 RX ADMIN — TAMSULOSIN HYDROCHLORIDE 0.4 MG: 0.4 CAPSULE ORAL at 20:47

## 2024-02-01 RX ADMIN — Medication 2500 UNITS: at 17:22

## 2024-02-01 RX ADMIN — LEVOTHYROXINE SODIUM 25 MCG: 25 TABLET ORAL at 05:32

## 2024-02-01 RX ADMIN — SENNOSIDES AND DOCUSATE SODIUM 2 TABLET: 8.6; 5 TABLET ORAL at 08:42

## 2024-02-01 RX ADMIN — ALBUTEROL SULFATE: 2.5 SOLUTION RESPIRATORY (INHALATION) at 08:42

## 2024-02-01 RX ADMIN — APIXABAN 2.5 MG: 2.5 TABLET, FILM COATED ORAL at 20:47

## 2024-02-01 NOTE — PROGRESS NOTES
Patient: Randolph Daley  Location: [facility] [address]  MRN: [id number]  Today's date: 2/1/2024    History of Present Illness  Pt is a 83 y.o. male admitted on 1/24/2024 with Multiple injuries due to trauma [T07.XXXA]. Principal problem is Multiple injuries due to trauma.  is a 83 y.o. male whose primary indication for inpatient rehabilitation is Debility.    Pt is a 83 y.o M adm to [facility] 1/24/24 who presented to [facility] after a slip and fall on ice landing on his right side.  He was found to have multiple injuries including right 8-11 rib fractures, bilateral pleural effusion s/p IR thoracentesis 1/18 with drainage of 1.1L, pelvic fracture (non op mgmt), right adductor/obturator hematoma, questionable posttraumatic bladder injury and age-indeterminate cervical fracture.  Neurosurgery was consulted and offered c spinesurgery, but patient declined.  [place] Collar on at all times, except for meals to prevent aspiration. Urology was consulted.  CT cystogram showed possible extravasation of contrast into the prostatic urethra.  [place] catheter is currently draining clear urine.  Plan is to continue Womack catheter for 1 week.  CT cystogram in 1 week with possible removal at that time, but pt declining CT Cystogram.        Past Medical History  PMH: BPH, HTN       PT Vitals    Date/Time Pulse HR Source BP BP Location BP Method Pt Position [facility]   02/01/24 1410 55 Monitor 128/67 Right upper arm Automatic Sitting PLP      PT Pain    Date/Time Pain Type Side/Orientation Location Rating: Rest Rating: Activity Interventions [facility]   02/01/24 1410 Pain Assessment right flank 3 4 prescribed exercises encouraged PLP   02/01/24 1428 Pain Reassessment right flank 3 -- position adjusted PLP             Prior Living Environment    Flowsheet Row Most Recent Value   People in Home spouse   Current Living Arrangements home   Home Accessibility stairs to enter home (Group), stairs within home (Group)   Living  "Environment Comment lives with wife in 2  home with first floor set up   Number of Stairs, Main Entrance 1   Surface of Stairs, Main Entrance hardwood   Stair Railings, Main Entrance none   Location, Patient Bedroom first (main) floor   Patient Bedroom Access Comment first floor set up   Location, Bathroom first (main) floor   Bathroom Access Comment First floor set up. Walk in shower. grab bar and shower \"bench\"   Stairs, Within Home, Primary 16   Number of Stairs, Within Home, Primary other (see comments)   Surface of Stairs, Within Home, Primary carpeting   Stair Railings, Within Home, Primary railings on both sides of stairs  [left side rail goes 1/2- then can hold the spindles]   Stairs Comment, Within Home, Primary pt reports wife office upstairs but master bed/bath 1st floor          Prior Level of Function    Flowsheet Row Most Recent Value   Dominant Hand right   Ambulation independent   Transferring independent   Toileting independent   Bathing independent   Dressing independent   Eating independent   IADLs independent   Driving/Transportation    Prior Level of Function Comment Ind PTA, previous cervical fx. 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,  reports manages bill paying, medications, calendar at home           IRF PT Evaluation and Treatment - 02/01/24 1410        PT Time Calculation    Start Time 1401     Stop Time 1431     Time Calculation (min) 30 min        Session Details    Document Type Daily Treatment/Progress Note     Mode of Treatment individual therapy;physical therapy        General Information    Patient Profile Reviewed yes     Patient/Family/Caregiver Comments/Observations Pt's wifeEunice, present for family training     General Observations of Patient Pt received in wheelchair in room, cervical collar donned        Mobility Belt    Mobility Belt Used for All Out of Bed Activity yes        Orthosis Neck " Cervical Collar    Orthosis Properties Date Obtained: 01/17/24 Time Obtained: 1900 Location: Neck Type: Cervical Collar Features: Hard Therapeutic Indications: fracture immobilization Wearing Schedule: wear at all times (remove only for hygiene and skin inspection)       Caregiver Training    Caregiver(s) to be Trained spouse/significant other     Caregiver Training Plan ambulation using assistive device;bed mobility training;transfer training     Comment, Caregiver Training Plan Pt's wife observed pt perform transfers, ambulation, stairs, and bed mobility. Hands on practice completed during bed mobility. Educated on use of RW for mobility and technique for navigating steps. Discussed pt's anticipated level of function on DC vs CLOF. Educated on energy conservation and discussed home safety. Wife was engaged and asked appropriate questions throughout.        Bed Mobility    [place], Supine to Sit minimum assist (75% or more patient effort)     [place], Sit to Supine touching/steadying assist;minimum assist (75% or more patient effort)     Assistive Device none     Comment Sit>supinet: 2 trials, first trial requiring Luis for RLE, second trial steadying assist with pt bringing RLE up on his own, increased time to complete; Supine>sit: Luis for uprighting trunk, pt's wife completed hands on practice, PT edu on safe hand placement and techniqye        Sit to Stand Transfer    [place], Sit to Stand Transfer touching/steadying assist     Safety/Cues increased time to complete;minimal;verbal cues;hand placement     Assistive Device walker, front-wheeled     Comment steady assist at pelvis for balance        Stand to Sit Transfer    [place], Stand to Sit Transfer touching/steadying assist     Safety/Cues minimal;verbal cues;hand placement     Assistive Device walker, front-wheeled     Comment steady assist at gait belt for control        Stand Pivot Transfer    [place], Stand Pivot/Stand Step  Transfer touching/steadying assist     Safety/Cues increased time to complete;minimal;verbal cues;proper use of assistive device     Assistive Device walker, front-wheeled     Comment steady assist at pelvis for balance and weight shift        Gait Training    [place], Gait touching/steadying assist     Safety/Cues increased time to complete;minimal;verbal cues;gait deviations;technique     Assistive Device walker, front-wheeled     Distance in Feet 50 feet     Pattern step-through     Deviations/Abnormal Patterns antalgic;ace decreased;gait speed decreased;step length decreased;stride length decreased     Bilateral Gait Deviations heel strike decreased     Right Sided Gait Deviations --   decreased weight shift    Comment (Gait/Stairs) steady assist at pelvis for balance, VC for L step length and heel strike        Stairs Training    [place], Stairs touching/steadying assist     Safety/Cues increased time to complete;minimal;verbal cues;sequencing;technique     Assistive Device railing     Handrail Location (Stairs) both sides     Number of Stairs 4     Stair Height 6 inches     Ascending Stairs Technique step-to-step   LLE leading    Descending Stairs Technique step-to-step   RLE leading    Comment steady assist for balance and safety        Daily Progress Summary (PT)    Daily Outcome Statement Pt's wife present for family training. Completed hands on practice for bed mobility and confirmed purchase of bed rail. B HR installed for 2 LARRY from the outside. Discussed safety concerns and plan for continued therapy with patient returning home. Pt's wife receptive to all education and asking appropriate questions. Pt still requiring reinforcement regarding optimal safety and precautions.                      Education Documentation  Safety Techniques, taught by Sue Rome PT at 2/1/2024  4:37 PM.  Learner: Family, Patient  Readiness: Acceptance  Method: Explanation, Demonstration  Response:  Verbalizes Understanding  Comment: Pt's wife completed family training for bed mobility, transfers, ambulation, and elevations.    Mobility Aids/Assistive Devices, taught by Sue Rome, PT at 2/1/2024  4:37 PM.  Learner: Family, Patient  Readiness: Acceptance  Method: Explanation, Demonstration  Response: Verbalizes Understanding  Comment: Pt's wife completed family training for bed mobility, transfers, ambulation, and elevations.          IRF PT Goals    Flowsheet Row Most Recent Value   Bed Mobility Goal 1    Activity/Assistive Device bed mobility activities, all at 01/25/2024 0900   [place] minimum assist (75% or more patient effort) at 01/25/2024 0900   Time Frame short-term goal (STG), 5 - 7 days at 01/25/2024 0900   Progress/Outcome goal ongoing at 01/29/2024 0839   Bed Mobility Goal 2    Activity/Assistive Device bed mobility activities, all at 01/25/2024 0900   [place] modified independence at 01/25/2024 0900   Time Frame long-term goal (LTG), 14 days or less at 01/25/2024 0900   Progress/Outcome goal ongoing at 01/29/2024 0839   Transfer Goal 1    Activity/Assistive Device sit-to-stand/stand-to-sit, stand pivot at 01/29/2024 0839   [place] --  [steadying A] at 01/29/2024 0839   Time Frame short-term goal (STG), 5 - 7 days at 01/29/2024 0839   Progress/Outcome goal met, goal revised this date at 01/29/2024 0839   Transfer Goal 2    Activity/Assistive Device sit-to-stand/stand-to-sit, bed-to-chair/chair-to-bed, stand pivot at 01/25/2024 0900   [place] modified independence at 01/25/2024 0900   Time Frame long-term goal (LTG), 14 days or less at 01/25/2024 0900   Progress/Outcome goal ongoing at 01/29/2024 0839   Gait/Walking Locomotion Goal 1    Activity/Assistive Device gait (walking locomotion) at 01/25/2024 0900   Distance 25 feet at 01/25/2024 0900   [place] minimum assist (75% or more patient effort) at 01/25/2024 0900   Time Frame short-term goal (STG), 5 - 7 days at  01/25/2024 0900   Progress/Outcome goal ongoing at 01/29/2024 0839   Gait/Walking Locomotion Goal 2    Activity/Assistive Device gait (walking locomotion), assistive device use at 01/29/2024 0839   Distance 150 feet at 01/29/2024 0839   [place] modified independence at 01/29/2024 0839   Time Frame long-term goal (LTG), 14 days or less at 01/29/2024 0839   Progress/Outcome goal revised this date at 01/29/2024 0839   Stairs Goal 1    Activity/Assistive Device stairs, all skills, using handrail, left, using handrail, right at 01/28/2024 1304   Number of Stairs 12 at 01/28/2024 1304   [place] minimum assist (75% or more patient effort) at 01/28/2024 1304   Time Frame 5 - 7 days, short-term goal (STG) at 01/28/2024 1304   Progress/Outcome goal ongoing at 01/29/2024 0839   Stairs Goal 2    Activity/Assistive Device stairs, all skills, using handrail, left, cane, straight at 01/28/2024 1304   Number of Stairs 12 at 01/28/2024 1304   [place] supervision required at 01/28/2024 1304   Time Frame 14 days or less at 01/28/2024 1304   Progress/Outcome goal ongoing at 01/29/2024 0839

## 2024-02-01 NOTE — PROGRESS NOTES
[facility] Progress Note          Patient was seen and examined at bedside.    Subjective:   1/31/24  Saw and evaluated him in am:  Very pleasant, cooperative, conversant.  No new complaints, reviewed his recent labs, H/H 8.2/25.6, no need PRBC transfusion. His PT/OT is progressing. His respiratory status and SO2 are good.     Addendum:  L arm iv infiltrated, focal induration with tenderness, c/w thrombophlebitis, provide topical Voltaren gel , will extend oral doxycyline to 5 more days.  Elevate LUE     2/1/24  No new complaints or O/N events.   Objective   Vital signs in last 24 hours:  Temp:  [36.4 °C (97.6 °F)-36.8 °C (98.2 °F)] 36.4 °C (97.6 °F)  Heart Rate:  [57-80] 57  Resp:  [18] 18  BP: (107-123)/(56-62) 107/57    No intake or output data in the 24 hours ending 02/01/24 1133  Intake/Output this shift:  No intake/output data recorded.   Review of Systems:  All other systems reviewed and negative except as noted in the HPI.   Objective      Labs  reviewed his labs thoroughly   Lab Results   Component Value Date    WBC 9.36 01/30/2024    HGB 8.2 (L) 01/30/2024    HCT 25.6 (L) 01/30/2024    MCV 97.3 01/30/2024     01/30/2024     Lab Results   Component Value Date    GLUCOSE 98 01/29/2024    CALCIUM 8.8 01/29/2024     01/29/2024    K 4.1 01/29/2024    CO2 23 01/29/2024     01/29/2024    BUN 42 (H) 01/29/2024    CREATININE 1.2 01/29/2024       Imaging  OSH imaging study reports reviewed:     CT cystography 1/24/24:  IMPRESSION:  No evidence of contrast extravasation into the prostatic parenchyma or other areas of contrast extravasation from the urinary bladder. Redemonstration of fractures involving the right superior and inferior pubic rami with extraperitoneal blood in the pelvis, which has slightly decreased compared to 1/17/2024. Persistent mild enlargement of the right adductor muscle compartment, similar to 1/17/2024.  His avila was removed.      MRI cervical  spine 1/22/24  IMPRESSION:  1.  C2 fracture with no bone marrow edema to suggest acute fracture.  2.  No abnormal enhancement.  3.  No epidural collection or hematoma.  4.  Degenerative spondylosis worst at C3-C4 where there is also suggestion of  Mild right jose luis-cord signal abnormality.  Multilevel spondylosis otherwise noted  as described.      Full Code    Physical Exam:  Head/Ear/Nose/Throat: normocephalic; atraumatic; moisture mouth mm, no oropharyngeal thrush noted.   Eyes: anicteric sclera, EOMI; PERRL.   Neck : supple, no JVD, no carotid bruits appeciated.   Respiratory: no evidence of labored breathing, bibasilar lung BS diminished area, no remarkable w/r/c.   Cardiovascular: RRR; normal S1, S2; no m/r/g; no S3 or S4.   Gastrointestinal: soft; NT; BS normal; mildly distended; no CVAT b/l.   Genitourinary: off avila.   Extremities : no c/c/e .   Neurological: AO x 3, fluent speeches, following commands, CNS II-XII grossly intact; no focal neurologic deficits.   Behavior/Emotional: in NAD, appropriate; cooperative.   Skin: clean, dry and intact.     Plan of care was discussed with patient, RN, and PMR attending     Assessment   CC:S/p fall, sustained multi trauma: R post 8th-11th rib fractures,  R superior pubic rami fracture w hematoma, age undetermined C2 fracture; non surgically managed; ADL and ambulatory dysfunction          83 y.o. male with PMH of BPH, HTN, hypothyroidism, who presented and [place] [facility] [place] after a slip and fall on ice landing on his right side. He was found to have multiple injuries including right 8-11 rib fractures, bilateral pleural effusion s/p IR thoracentesis 1/18 with drainage of 1.1L, pelvic fracture (non op mgmt), right adductor/obturator hematoma, questionable posttraumatic bladder injury and age-indeterminate cervical fracture.  Neurosurgery was consulted and offered c spinesurgery, but patient declined.  [place] Collar on at all times, except for meals to  prevent aspiration.  Patient c/o difficulty coughing up mucus with collar on, but understands that he needs to wear the collar especially during therapy.  Urology was consulted.  CT cystogram showed possible extravasation of contrast into the prostatic urethra.  Avila catheter is currently draining clear urine.  Plan is to continue Avila catheter for 1 week.  CT cystogram in 1 week with possible removal at that time, but pt declining CT Cystogram.  Functionally, he has significant physical econditioning with ADL and ambulatory dysfunction, requiring inpt acute rehab, was admitted to [facility] on 1/24/24.        Repeated   CT cystography 1/24/24:  IMPRESSION:  No evidence of contrast extravasation into the prostatic parenchyma or other areas of contrast extravasation from the urinary bladder. Redemonstration of fractures involving the right superior and inferior pubic rami with extraperitoneal blood in the pelvis, which has slightly decreased compared to 1/17/2024. Persistent mild enlargement of the right adductor muscle compartment, similar to 1/17/2024.  His avila was removed.      A/P:  # S/p fall, sustained multi trauma: R post 8th-11th rib fractures,  R superior pubic rami fracture w hematoma, age undetermined C2 fracture; non surgically managed; ADL and ambulatory dysfunction  -Inpt acute rehab, pain and medical managements, DVT prophylaxis, dermal defense, fall precaution      # R post 8th-11th rib fractures, atelectasis, chest congestion, possible pleu effusion  -Monitor SO2, prn NC O2, to keep SO2>92%, incentive spirometry, bronchodilator nebulizer, mucolytic agent, pulm toileting.  -Check CXR, chest PT  - Chest wall pain management     # Urinary retention, BPH, r/o bladder extravasation  -Off avila, timed voiding, condom cath in the evening, monitor PVR with prn cic, adequate oral hydration, check UA and UCX, consider to add flomax 0.4 mg qhs.     # Hypothyroidism   -cont home dose of levothyroxine     # DVT  prophylaxis   -Eliquis 2.5 mg bid      # Insomnia, anxiety   - address his pain   - prn ativan and Ambien  - psychology CBT            Billing code: [id number]  Diagnoses:  Patient Active Problem List   Diagnosis   • Fall due to slipping on ice or snow, initial encounter   • Closed fracture of multiple ribs of right side   • Closed fracture of ramus of right pubis (CMS/HCC)   • Pelvic hematoma in male   • Pleural effusion on left   • Normocytic anemia   • Stage 3a chronic kidney disease (CMS/HCC)   • Closed odontoid fracture (CMS/HCC)   • Multiple injuries due to trauma             Neville Arndt MD  2/1/2024

## 2024-02-01 NOTE — PROGRESS NOTES
Daily Progress Note     Patient was seen and examined.   Attestation Notes: Face to face encounter completed        Subjective       Interval History: Patient has area of induration and tenderness of the left forearm - area slightly smaller than yesterday - does have pen drawn around area. He remains on doxycycline. He has heat and ice available PRN. Recommend elevation. Patient otherwise is feeling ok today. Denies SOB.    Review of Systems:  Negative except as per HPI    Current Functional Status:   Bed mobility:   [place], Supine to Sit: minimum assist (75% or more patient effort)   [place], Sit to Supine: minimum assist (75% or more patient effort)   Transfers:    [place], Sit to Stand Transfer: touching/steadying assist  [place], Stand to Sit Transfer: touching/steadying assist   [place], Stand Pivot/Stand Step Transfer: touching/steadying assist   Gait:   [place], Gait: touching/steadying assist  Assistive Device: walker, front-wheeled   Distance in Feet: 45 feet (45'x2; 15'x1)    Bathing:   [place]: minimum assist (75% or more patient effort)   Toileting:   [place]: touching/steadying assist   Upper body dressing:   [place]: minimum assist (75% or more patient effort)   Lower body dressing:   [place]: minimum assist (75% or more patient effort)     Mild to moderate cognitive deficits    Functional Progress:    Functional status reviewed. Overall, patient's functional status is being assessed      Objective     Objective   Vitals and Hemodynamics  Patient Vitals for the past 24 hrs:   BP Temp Temp src Pulse Resp SpO2   02/01/24 1037 (!) 107/57 -- -- (!) 57 -- --   02/01/24 0911 (!) 114/56 -- -- 76 -- 98 %   02/01/24 0711 123/62 36.4 °C (97.6 °F) Oral (!) 58 18 95 %   01/31/24 1900 122/61 36.8 °C (98.2 °F) Oral 62 18 94 %   01/31/24 1600 (!) 123/58 36.6 °C (97.9 °F) Oral 80 18 94 %   01/31/24 1309 (!) 110/58 -- -- 63 -- --         Physical Exam    General: NAD,  laying in bed   HEENT: Atraumatic, Normocephalic; c collar off  Cardiovascular: RRR  Respiratory: CTAB with breath sounds diminished at the bases   Abdomen/GI: NT ND  Skin: Warm, Dry, erythema and induration of the left proximal forearm - slightly smaller  Extremities/MSK: Non tender bilateral gastrocs, trace to 1+ edema of the right foot; trace edema of the left foot   Neuro: AAOx3, follows commands, repetitive at times       Labs      Results from last 7 days   Lab Units 01/30/24  0601 01/27/24  0626 01/26/24  1452 01/26/24  0548   WBC K/uL 9.36 6.30 8.79 9.55   RBC M/uL 2.63* 2.58* 2.57* 2.27*   HEMOGLOBIN g/dL 8.2* 8.0* 8.1* 7.2*   HEMATOCRIT % 25.6* 24.5* 24.1* 21.3*   PLATELETS K/uL 173 122* 118* 124*   MCV fL 97.3 95.0 93.8 93.8   RDW % 15.5* 15.2* 15.5* 15.4*   EOS ABS AUTO K/uL  --   --  0.01* 0.00*       Results from last 7 days   Lab Units 01/29/24  0534 01/26/24  1452 01/26/24  0547   SODIUM mEQ/L 138 128* 132*   POTASSIUM mEQ/L 4.1 4.0 4.2   CHLORIDE mEQ/L 106 99 97*   CO2 mEQ/L 23 23 24   BUN mg/dL 42* 35* 31*   CREATININE mg/dL 1.2 1.3 1.3   EGFR mL/min/1.73m*2 >60.0 55.2* 55.2*   ANION GAP mEQ/L 9 6 11     Results from last 7 days   Lab Units 01/29/24  0534 01/26/24  1452 01/26/24  0547   CALCIUM mg/dL 8.8 8.9 9.0       Results from last 7 days   Lab Units 01/26/24  1452   AST IU/L 12*   ALT IU/L 10   ALK PHOS IU/L 55   BILIRUBIN TOTAL mg/dL 0.8       Imaging      Assessment and Plan     Randolph Daley is a 83 y.o. male with a PMHx of BPH, hypothyroid, cervical spine fracture, who presents with ADL/mobility dysfunction secondary to multi trauma after fall, found to have right pubic ramus fracture, right posterior rib fractures, chronic odontoid fracture.     #ADL/Ambulatory dysfunction: 2/2 multi trauma. Continue acute inpatient rehabilitation with PT/OT/SLP/SW/Rehab RN/Neuropsych to increase independence with ADLs, improve balance, coordination, endurance, strength, mobility, community  reintegration, decreased burden of care on others and family education.  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 with mild reight jose luis-cord signal abnormality  - chronic  - evaluated by neurosurgery in acute care  - decline operative management at this time; continue hard cervical collar at all times  - follow-up neurosurgery in 4-6 weeks     #right perivascular hematoma associated with mild mass effect on the urinary bladder and perivesicular infiltration  - monitor bladder scans, CIC PRN  - CT cystogram without evidence of contrast extravasation into prostatic parenchyma or other areas of extravasation from urinary bladder  - had avila catheter - removed      #BPH  - continue flomax     #Pleural effusion  - s/p thoracentesis  - monitor O2    #hyponatremia  - Na 138 1/29  - continue salt tabs, fluid restriction    #left forearm induration  - doxycycline  - elevation, heat or ice PRN     #Immobility  -DVT prophylaxis with apixiban     # Follow-up  - PCP  - neurosurgery  - orthopedic surgery      #Disposition: 2/7/2024, pt to go home with skilled RN/PT/OT       Amelia Larose MD  Physical Medicine and Rehabilitation

## 2024-02-01 NOTE — PLAN OF CARE
Plan of Care Review  Plan of Care Reviewed With: patient  Progress: improving  Outcome Evaluation: AAOx4. 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 Pt verbalizes understanding of collar yet still refuses. Pt educated about the importance of repositioning to prevent skin injuries yet he still refused. Pt offered Tylenol to help manage pain yet declines. See KEEGAN crespo.

## 2024-02-01 NOTE — PLAN OF CARE
Plan of Care Review  Plan of Care Reviewed With: patient  Progress: improving  Outcome Evaluation: Pt is AAo X 3. Using [place] cath at night. PRN tylenol administered for neck pain with positive effect. REmained non compliant with [place] collar . Slept well. Safety precautions maintained.

## 2024-02-01 NOTE — PROGRESS NOTES
Nutrition Support hc referral made to [place] at [facility], referral faxed and accepted.

## 2024-02-01 NOTE — PROGRESS NOTES
Patient: Randolph Daley  Location: [facility] [address]  MRN: [id number]  Today's date: 2/1/2024    History of Present Illness  Pt is a 83 y.o. male admitted on 1/24/2024 with Multiple injuries due to trauma [T07.XXXA]. Principal problem is Multiple injuries due to trauma.  is a 83 y.o. male whose primary indication for inpatient rehabilitation is Debility.    Pt is a 83 y.o M adm to [facility] 1/24/24 who presented to [facility] after a slip and fall on ice landing on his right side.  He was found to have multiple injuries including right 8-11 rib fractures, bilateral pleural effusion s/p IR thoracentesis 1/18 with drainage of 1.1L, pelvic fracture (non op mgmt), right adductor/obturator hematoma, questionable posttraumatic bladder injury and age-indeterminate cervical fracture.  Neurosurgery was consulted and offered c spinesurgery, but patient declined.  [place] Collar on at all times, except for meals to prevent aspiration. Urology was consulted.  CT cystogram showed possible extravasation of contrast into the prostatic urethra.  [place] catheter is currently draining clear urine.  Plan is to continue [place] catheter for 1 week.  CT cystogram in 1 week with possible removal at that time, but pt declining CT Cystogram.        Past Medical History  PMH: BPH, HTN       OT Vitals    Date/Time Pulse HR Source BP BP Location BP Method Pt Position [facility]   02/01/24 1301 73 Monitor 133/70 Right upper arm Automatic Lying TS      OT Pain    Date/Time Pain Type Side/Orientation Location Rating: Rest Rating: Activity Description Interventions [facility]   02/01/24 1301 Pain Assessment right flank 2 4 sore diversional activity provided;position adjusted    02/01/24 1329 Pain Reassessment right flank 4 -- sore position adjusted TS             Prior Living Environment    Flowsheet Row Most Recent Value   People in Home spouse   Current Living Arrangements home   Home Accessibility stairs to enter home (Group),  "stairs within home (Group)   Living Environment Comment lives with wife in 2 SH home with first floor set up   Number of Stairs, Main Entrance 1   Surface of Stairs, Main Entrance hardwood   Stair Railings, Main Entrance none   Location, Patient Bedroom first (main) floor   Patient Bedroom Access Comment first floor set up   Location, Bathroom first (main) floor   Bathroom Access Comment First floor set up. Walk in shower. grab bar and shower \"bench\"   Stairs, Within Home, Primary 16   Number of Stairs, Within Home, Primary other (see comments)   Surface of Stairs, Within Home, Primary carpeting   Stair Railings, Within Home, Primary railings on both sides of stairs  [left side rail goes 1/2- then can hold the spindles]   Stairs Comment, Within Home, Primary pt reports wife office upstairs but master bed/bath 1st floor          Prior Level of Function    Flowsheet Row Most Recent Value   Dominant Hand right   Ambulation independent   Transferring independent   Toileting independent   Bathing independent   Dressing independent   Eating independent   IADLs independent   Driving/Transportation    Prior Level of Function Comment Ind PTA, previous cervical fx. 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   Patient Goals Initial PSFS: getting in/out of a car 8/10, walking 5/10, getting on/off of the sofa 5/10, cooking 7/10 = 25/40 = 62.5%           IRF OT Evaluation and Treatment - 02/01/24 1303        OT Time Calculation    Start Time 1300     Stop Time 1330     Time Calculation (min) 30 min        Session Details    Document Type Daily " Treatment/Progress Note     Mode of Treatment individual therapy;occupational therapy        General Information    General Observations of Patient Pt received supine in bed, cervical collar doffed, pt's wife present.        Mobility Belt    Mobility Belt Used for All Out of Bed Activity yes        Orthosis Neck Cervical Collar    Orthosis Properties Date Obtained: 01/17/24 Time Obtained: 1900 Location: Neck Type: Cervical Collar Features: Hard Therapeutic Indications: fracture immobilization Wearing Schedule: wear at all times (remove only for hygiene and skin inspection)    Compliance/Wearing Issues patient;non-compliant with wearing schedule due to;needs reinforcement   [place] donned at start of session supine and remained on t/o, in place at end of session.       Caregiver Training    Caregiver(s) to be Trained spouse/significant other     Caregiver Training Plan transfer training;safe ADL techniques;ambulation using assistive device     Comment, Caregiver Training Plan Pt's wife, Eunice, present for family training session. Educated on technique to don [place] collar supine in bed, Eunice verbalized understanding and provided good return demonstration. Education on spinal precautions, cervical collar management and wearing schedule and pt's non-compliance, Eunice verbalized understanding. Provided and discussed resource manual. Discussed pt completing posterior pericare with use of L hand, pt reporting Eunice can complete it for him, OT recommended encouraging pt complete tasks as independently as posssible with Eunice assisting as needed, both receptive and agreeable. Discussed DME for home set up with use of toilet safety rails, pt initially resistant but agreeable with education on safety and benefits, wife is going to purchase. Educated on technique for shower transfer and at this time need for steady assist and recommend s initially when home, Eunice provided good return demonstration for steady  assist for shower transfer. Discussed shower chair, Eunice took picture of specific shower chair to buy. All questions answered.        Bed Mobility    Comment OT: supine to EOB with HOB elevated, use of bed rail and steady assist for stability pt utilized LLE to assist with moving RLE.        Sit to Stand Transfer    [place], Sit to Stand Transfer touching/steadying assist     Safety/Cues increased time to complete;minimal;verbal cues;technique     Assistive Device walker, front-wheeled     Comment from wc steady assist for balance        Stand to Sit Transfer    [place], Stand to Sit Transfer touching/steadying assist     Safety/Cues minimal;verbal cues;technique     Assistive Device walker, front-wheeled     Comment to wc steady assist for controlled descend        Stand Pivot Transfer    [place], Stand Pivot/Stand Step Transfer touching/steadying assist     Safety/Cues increased time to complete;minimal;verbal cues;hand placement;technique     Assistive Device walker, front-wheeled     Comment EOB to wc and via amb approach steady assist for balance.        Shower Transfer    Transfer Technique stand pivot     [place] touching/steadying assist     Safety/Cues increased time to complete;minimal;verbal cues;hand placement;sequencing;technique     Assistive Device shower chair;walker, front-wheeled     Comment pt stood with UE support on RW and stepped backwards over shower ledge then transitioned sit<> stand from shower chair, BUE support on RW while stepping over ledge to exit shower, completed 2x with steady assist for balance.        Upper Body Dressing    Position supine     Adaptive Equipment orthosis     Comment total assist to don [place] collar supine in bed.        Toileting    Tasks adjust/manage clothing     [place] minimum assist (75% or more patient effort)     Safety/Cues initiation     Position supported standing     Setup Assistance obtain supplies     Adaptive  "Equipment urinal     Comment pt stood with RW adjusted shorts, wife held urinal for pt, OT encouraged pt to hold but pt stated \"no she can\" +void steady assist for balance min A d/t wife holding urinal.        Daily Progress Summary (OT)    Daily Outcome Statement Session focused on family training. Pt progressed to steady assist for shower transfer in simulated home set up. Pt displaying behaviors of learned helplessness with his wife which impacts his independence with ADLs. Continue OT POC to maximize safety and independence.                      Education Documentation  Resource Manual, taught by Paula Sandoval OT at 2/1/2024  3:28 PM.  Learner: Significant Other, Patient  Readiness: Acceptance  Method: Explanation, Demonstration  Response: Verbalizes Understanding, Demonstrated Understanding  Comment: cervical collar management, spinal precautions and pt's non compliance, bathroom DME and techniques for shower and toilet transfers. wearing sneakers or hospital socks, utilizing slippers when exiting shower.    Guarding Techniques, Assist With Transfers/Ambulation, taught by Paula Sandoval OT at 2/1/2024  3:28 PM.  Learner: Significant Other, Patient  Readiness: Acceptance  Method: Explanation, Demonstration  Response: Verbalizes Understanding, Demonstrated Understanding  Comment: cervical collar management, spinal precautions and pt's non compliance, bathroom DME and techniques for shower and toilet transfers. wearing sneakers or hospital socks, utilizing slippers when exiting shower.    Fall Risk Prevention/Management, Strategies, taught by Paula Sandoval OT at 2/1/2024  3:28 PM.  Learner: Significant Other, Patient  Readiness: Acceptance  Method: Explanation, Demonstration  Response: Verbalizes Understanding, Demonstrated Understanding  Comment: cervical collar management, spinal precautions and pt's non compliance, bathroom DME and techniques for shower and toilet transfers. wearing sneakers or " hospital socks, utilizing slippers when exiting shower.    Equipment/Methods, taught by Paula Sandoval, OT at 2/1/2024  3:28 PM.  Learner: Significant Other, Patient  Readiness: Acceptance  Method: Explanation, Demonstration  Response: Verbalizes Understanding, Demonstrated Understanding  Comment: cervical collar management, spinal precautions and pt's non compliance, bathroom DME and techniques for shower and toilet transfers. wearing sneakers or hospital socks, utilizing slippers when exiting shower.          IRF OT Goals    Flowsheet Row Most Recent Value   Transfer Goal 1    Activity/Assistive Device toilet at 01/25/2024 0629   [place] supervision required at 01/25/2024 0629   Time Frame short-term goal (STG), 5 - 7 days at 01/29/2024 0708   Progress/Outcome goal ongoing at 01/29/2024 0708   Transfer Goal 2    Activity/Assistive Device toilet at 01/25/2024 0629   [place] modified independence  [steady A] at 01/25/2024 0629   Time Frame long-term goal (LTG), 14 days or less at 01/29/2024 0708   Progress/Outcome goal ongoing at 01/29/2024 0708   Transfer Goal 3    Activity/Assistive Device shower at 01/25/2024 0629   [place] other (see comments)  [touch A] at 01/25/2024 0629   Time Frame short-term goal (STG), 5 - 7 days at 01/29/2024 0708   Progress/Outcome goal ongoing at 01/29/2024 0708   Transfer Goal 4    Activity/Assistive Device shower at 01/25/2024 0629   [place] modified independence at 01/29/2024 0708   Time Frame long-term goal (LTG), 14 days or less at 01/29/2024 0708   Progress/Outcome goal revised this date at 01/29/2024 0708   Bathing Goal 1    Activity/Assistive Device bathing skills, all at 01/25/2024 0629   [place] minimum assist (75% or more patient effort) at 01/25/2024 0629   Time Frame short-term goal (STG), 5 - 7 days at 01/29/2024 0708   Progress/Outcome goal ongoing at 01/29/2024 0708   Bathing Goal 2    Activity/Assistive Device bathing skills, all at 01/25/2024  0629   [place] set-up required at 01/29/2024 0708   Time Frame long-term goal (LTG), 14 days or less at 01/29/2024 0708   Progress/Outcome goal revised this date at 01/29/2024 0708   UB Dressing Goal 1    Activity/Assistive Device upper body dressing at 01/25/2024 0629   [place] minimum assist (75% or more patient effort) at 01/29/2024 0708   Time Frame short-term goal (STG), 5 - 7 days at 01/29/2024 0708   Strategies/Barriers including clothing retrieval at 01/29/2024 0708   Progress/Outcome goal revised this date at 01/29/2024 0708   UB Dressing Goal 2    Activity/Assistive Device upper body dressing at 01/25/2024 0629   [place] minimum assist (75% or more patient effort) at 01/29/2024 0708   Time Frame long-term goal (LTG), 14 days or less at 01/29/2024 0708   Progress/Outcome goal revised this date at 01/29/2024 0708   LB Dressing Goal 1    Activity/Assistive Device lower body dressing at 01/25/2024 0629   [place] minimum assist (75% or more patient effort) at 01/25/2024 0629   Time Frame short-term goal (STG), 5 - 7 days at 01/29/2024 0708   Progress/Outcome goal ongoing at 01/29/2024 0708   LB Dressing Goal 2    Activity/Assistive Device lower body dressing at 01/25/2024 0629   [place] modified independence at 01/25/2024 0629   Time Frame long-term goal (LTG), 14 days or less at 01/29/2024 0708   Progress/Outcome goal ongoing at 01/29/2024 0708   Grooming Goal 1    Activity/Assistive Device grooming skills, all at 01/25/2024 0629   [place] set-up required at 01/25/2024 0629   Time Frame short-term goal (STG), 5 - 7 days at 01/29/2024 0708   Progress/Outcome goal ongoing at 01/29/2024 0708   Grooming Goal 2    Activity/Assistive Device grooming skills, all at 01/25/2024 0629   [place] modified independence at 01/25/2024 0629   Time Frame long-term goal (LTG), 14 days or less at 01/29/2024 0708   Progress/Outcome goal ongoing at 01/29/2024 0708   Toileting Goal 1     Activity/Assistive Device toileting skills, all at 01/25/2024 0629   [place] minimum assist (75% or more patient effort) at 01/25/2024 0629   Time Frame short-term goal (STG), 5 - 7 days at 01/29/2024 0708   Progress/Outcome goal ongoing at 01/29/2024 0708   Toileting Goal 2    Activity/Assistive Device toileting skills, all at 01/25/2024 0629   [place] modified independence at 01/25/2024 0629   Time Frame long-term goal (LTG), 14 days or less at 01/29/2024 0708   Progress/Outcome goal ongoing at 01/29/2024 0708

## 2024-02-01 NOTE — PROGRESS NOTES
Patient: Randolph Daley  Location: [facility] [address]  MRN: [id number]  Today's date: 2/1/2024    History of Present Illness  Pt is a 83 y.o. male admitted on 1/24/2024 with Multiple injuries due to trauma [T07.XXXA]. Principal problem is Multiple injuries due to trauma.  is a 83 y.o. male whose primary indication for inpatient rehabilitation is Debility.    Pt is a 83 y.o M adm to [facility] 1/24/24 who presented to [facility] after a slip and fall on ice landing on his right side.  He was found to have multiple injuries including right 8-11 rib fractures, bilateral pleural effusion s/p IR thoracentesis 1/18 with drainage of 1.1L, pelvic fracture (non op mgmt), right adductor/obturator hematoma, questionable posttraumatic bladder injury and age-indeterminate cervical fracture.  Neurosurgery was consulted and offered c spinesurgery, but patient declined.  [place] Collar on at all times, except for meals to prevent aspiration. Urology was consulted.  CT cystogram showed possible extravasation of contrast into the prostatic urethra.  [place] catheter is currently draining clear urine.  Plan is to continue Womack catheter for 1 week.  CT cystogram in 1 week with possible removal at that time, but pt declining CT Cystogram.        Past Medical History  PMH: BPH, HTN       OT Vitals    Date/Time Pulse HR Source BP BP Location BP Method Pt Position [facility]   02/01/24 1037 57 Monitor 107/57 Right upper arm Automatic Sitting TS      OT Pain    Date/Time Pain Type Side/Orientation Location Rating: Rest Rating: Activity Interventions [facility]   02/01/24 1037 Pain Assessment right flank 2 8 diversional activity provided;position adjusted    02/01/24 1058 Pain Reassessment -- -- 0 -- -- TS             Prior Living Environment    Flowsheet Row Most Recent Value   People in Home spouse   Current Living Arrangements home   Home Accessibility stairs to enter home (Group), stairs within home (Group)   Living  "Environment Comment lives with wife in 2  home with first floor set up   Number of Stairs, Main Entrance 1   Surface of Stairs, Main Entrance hardwood   Stair Railings, Main Entrance none   Location, Patient Bedroom first (main) floor   Patient Bedroom Access Comment first floor set up   Location, Bathroom first (main) floor   Bathroom Access Comment First floor set up. Walk in shower. grab bar and shower \"bench\"   Stairs, Within Home, Primary 16   Number of Stairs, Within Home, Primary other (see comments)   Surface of Stairs, Within Home, Primary carpeting   Stair Railings, Within Home, Primary railings on both sides of stairs  [left side rail goes 1/2- then can hold the spindles]   Stairs Comment, Within Home, Primary pt reports wife office upstairs but master bed/bath 1st floor          Prior Level of Function    Flowsheet Row Most Recent Value   Dominant Hand right   Ambulation independent   Transferring independent   Toileting independent   Bathing independent   Dressing independent   Eating independent   IADLs independent   Driving/Transportation    Prior Level of Function Comment Ind PTA, previous cervical fx. 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   Patient Goals Initial PSFS: getting in/out of a car 8/10, walking 5/10, getting on/off of the sofa 5/10, cooking 7/10 = 25/40 = 62.5%           IRF OT Evaluation and Treatment - 02/01/24 1038        OT Time Calculation    Start Time 1030     Stop Time 1100     Time Calculation (min) 30 min        Session Details    Document Type Daily Treatment/Progress Note     Mode of " Treatment individual therapy;occupational therapy        General Information    General Observations of Patient Pt received seated upright in wc bedside, cervical collar doffed. OT applied [place] at start of session. Pt requested to complete OT session in his room.        Mobility Belt    Mobility Belt Used for All Out of Bed Activity yes        Orthosis Neck Cervical Collar    Orthosis Properties Date Obtained: 01/17/24 Time Obtained: 1900 Location: Neck Type: Cervical Collar Features: Hard Therapeutic Indications: fracture immobilization Wearing Schedule: wear at all times (remove only for hygiene and skin inspection)    Compliance/Wearing Issues patient;non-compliant with wearing schedule due to;needs reinforcement   OT donned [place] at start of session and remained on t/o and in place at end of session       Sit to Stand Transfer    Junior, Sit to Stand Transfer touching/steadying assist     Safety/Cues increased time to complete;minimal;verbal cues;preparatory posture;technique     Assistive Device walker, front-wheeled     Comment from wc steady assist for balance        Stand to Sit Transfer    Junior, Stand to Sit Transfer touching/steadying assist     Safety/Cues minimal;verbal cues;technique     Assistive Device walker, front-wheeled     Comment to  steady assist for controlled descend        Stand Pivot Transfer    Junior, Stand Pivot/Stand Step Transfer touching/steadying assist     Safety/Cues increased time to complete;minimal;verbal cues;technique     Assistive Device walker, front-wheeled     Comment via amb approach steady assist for balance min vc for upright posture        Impairments/Safety Issues    Comment, Safety Issues/Impairments OT: short HHD with RW within room with steady assist for balance increased time d/t slow pace min vc for upright posture.        Balance    Comment, Balance OT: standing w/ and w/o RW pt reached outside ZACH opening closet and drawers  "and reaching for clothing items with steady assist for balance min vc for RW management and positioning when opening doors. standing unsupported pt completed alternating forward punches and shoulder flexion to 90* completed 20 reps 2 sets, standing unsupported L posterior reach to buttock 10x, steady assist for balance.        Therapeutic Interventions    Comment, Therapeutic Intervention Discussed toileting and having pt complete posterior pericare, pt stated \"my wife will do it\" discussed caregiver burnout, pt receptive. perched sitting edge of wc, pt completed posterior reach increased pain with RUE pt able to complete posterior reach simulating posterior pericare instructed to completed pericare with LUE, pt receptive and agreeable.        Toileting    Tasks adjust/manage clothing     [place] touching/steadying assist     Position supported standing     Adaptive Equipment urinal     Comment Pt stood with unilateral support on RW, pt able to lift pant leg and place urinal +void, steady assist for balance.        Daily Progress Summary (OT)    Symptoms Noted During/After Treatment fatigue     Daily Outcome Statement Session focused on dynamic standing balance, required encouragement to actively participate in session d/t increased fatigue. Continue OT POC to maximize safety and independence.                          IRF OT Goals    Flowsheet Row Most Recent Value   Transfer Goal 1    Activity/Assistive Device toilet at 01/25/2024 0629   [place] supervision required at 01/25/2024 0629   Time Frame short-term goal (STG), 5 - 7 days at 01/29/2024 0708   Progress/Outcome goal ongoing at 01/29/2024 0708   Transfer Goal 2    Activity/Assistive Device toilet at 01/25/2024 0629   [place] modified independence  [steady A] at 01/25/2024 0629   Time Frame long-term goal (LTG), 14 days or less at 01/29/2024 0708   Progress/Outcome goal ongoing at 01/29/2024 0708   Transfer Goal 3    Activity/Assistive Device " shower at 01/25/2024 0629   [place] other (see comments)  [touch A] at 01/25/2024 0629   Time Frame short-term goal (STG), 5 - 7 days at 01/29/2024 0708   Progress/Outcome goal ongoing at 01/29/2024 0708   Transfer Goal 4    Activity/Assistive Device shower at 01/25/2024 0629   [place] modified independence at 01/29/2024 0708   Time Frame long-term goal (LTG), 14 days or less at 01/29/2024 0708   Progress/Outcome goal revised this date at 01/29/2024 0708   Bathing Goal 1    Activity/Assistive Device bathing skills, all at 01/25/2024 0629   [place] minimum assist (75% or more patient effort) at 01/25/2024 0629   Time Frame short-term goal (STG), 5 - 7 days at 01/29/2024 0708   Progress/Outcome goal ongoing at 01/29/2024 0708   Bathing Goal 2    Activity/Assistive Device bathing skills, all at 01/25/2024 0629   [place] set-up required at 01/29/2024 0708   Time Frame long-term goal (LTG), 14 days or less at 01/29/2024 0708   Progress/Outcome goal revised this date at 01/29/2024 0708   UB Dressing Goal 1    Activity/Assistive Device upper body dressing at 01/25/2024 0629   [place] minimum assist (75% or more patient effort) at 01/29/2024 0708   Time Frame short-term goal (STG), 5 - 7 days at 01/29/2024 0708   Strategies/Barriers including clothing retrieval at 01/29/2024 0708   Progress/Outcome goal revised this date at 01/29/2024 0708   UB Dressing Goal 2    Activity/Assistive Device upper body dressing at 01/25/2024 0629   [place] minimum assist (75% or more patient effort) at 01/29/2024 0708   Time Frame long-term goal (LTG), 14 days or less at 01/29/2024 0708   Progress/Outcome goal revised this date at 01/29/2024 0708   LB Dressing Goal 1    Activity/Assistive Device lower body dressing at 01/25/2024 0629   [place] minimum assist (75% or more patient effort) at 01/25/2024 0629   Time Frame short-term goal (STG), 5 - 7 days at 01/29/2024 0708   Progress/Outcome goal ongoing at  01/29/2024 0708   LB Dressing Goal 2    Activity/Assistive Device lower body dressing at 01/25/2024 0629   [place] modified independence at 01/25/2024 0629   Time Frame long-term goal (LTG), 14 days or less at 01/29/2024 0708   Progress/Outcome goal ongoing at 01/29/2024 0708   Grooming Goal 1    Activity/Assistive Device grooming skills, all at 01/25/2024 0629   [place] set-up required at 01/25/2024 0629   Time Frame short-term goal (STG), 5 - 7 days at 01/29/2024 0708   Progress/Outcome goal ongoing at 01/29/2024 0708   Grooming Goal 2    Activity/Assistive Device grooming skills, all at 01/25/2024 0629   [place] modified independence at 01/25/2024 0629   Time Frame long-term goal (LTG), 14 days or less at 01/29/2024 0708   Progress/Outcome goal ongoing at 01/29/2024 0708   Toileting Goal 1    Activity/Assistive Device toileting skills, all at 01/25/2024 0629   [place] minimum assist (75% or more patient effort) at 01/25/2024 0629   Time Frame short-term goal (STG), 5 - 7 days at 01/29/2024 0708   Progress/Outcome goal ongoing at 01/29/2024 0708   Toileting Goal 2    Activity/Assistive Device toileting skills, all at 01/25/2024 0629   [place] modified independence at 01/25/2024 0629   Time Frame long-term goal (LTG), 14 days or less at 01/29/2024 0708   Progress/Outcome goal ongoing at 01/29/2024 0708

## 2024-02-01 NOTE — CONSULTS
Wound Ostomy Continence Note    Subjective    HPI Patient is a 83 y.o. male who was admitted on 1/24/2024 with a diagnosis of Multiple injuries due to trauma [T07.XXXA].    Problem list Principal Problem:    Multiple injuries due to trauma     PMH/PSH Past Medical History:   Diagnosis Date   • BPH (benign prostatic hyperplasia)    • Disease of thyroid gland    • Hypertension    • Multiple rib fractures 01/17/2024   • Pelvic fracture (CMS/HCC) 01/17/2024   • Pleural effusion      Past Surgical History:   Procedure Laterality Date   • HERNIA REPAIR        Assessment and Recommendation         Seen for skin assessment. Patient in bed, Wife at bedside. Clara Tech assisted to turn on left side for skin assessment. PI prevention education given.   Labs: 1/30: WBC: 9.36.    Partial thickness tear versus PI, stage 2 right buttocks, measures .7cm x .3cm.  Pink, tan tissue, NSS, hydrogel, hydrofiber , with silver, gauze, sacral foam applied. Notified patient that I would like to order a wedge to turn on side, Encouraged patient to turn on side, or Wound on right buttock could  worsen.     Patient asking to be turned back on back after a few minutes on his side, due to pain. Wife present.   Left arm, warm, red, raised, measures 7cm x 7cm.           Suggest: Maintain the PREVENT bundle for PI prevention. The most recent Familia score is a 16. Offload heels from bed, turn q 2 hours in bed, weight shift in wheelchair, q 30 minutes.   Limit head of bed to less than 35 degrees to prevent shearing and friction injuries.    Limit supine position in bed.     Monitor labs.   Consider a KPAD for use for pain management rib area if indicated. Consider ice for rib area prn pain management prn.     Consider a KPAD or ice for left upper extremity , site of IV infiltrate.     Consider a specialty bed, if indicated,Evolution, low air loss therapy, Sizewise,  Familia score is a 16, at risk for a PI. Patient is not turning due to pain in  "ribs.    Wound Prevention Bundle                                                            Positioning- If clinically able:  Reposition at a minimum of 2 hours.  Adjust to avoid lying on medical devices. Use positioning devices to offload bony prominences. When out of bed, use pressure redistribution cushions. Monitor time out of bed and encourage repositioning. Use transfer aids to reduce friction and shear. Use \"turn assist\" function on bed surface if equipped.                  Risk Assessment:   Utilize risk assessment scale per hospital guidelines.  Identify additional risk factors, for example, medications and comorbidities. Match interventions to subscales of hospital's Risk assessment scale.  Monitor laboratory values. Communicate risk to interdisciplinary healthcare team. Consider use of prophylactic silicone foam sacral dressing.                  Evaluate Surface/Pressure Redistribution:   Consider specialty sleep surface according to patient need.   Utilize \"less is best\" with linen usage.                Vigilant Skin Inspection:   Inspect skin from head to toe, including areas under removable medical devices and dressings. Communicate skin issues to healthcare team and consult resources as needed.                   Evaluate incontinence/moisture/temperature:   Offer toileting when appropriate. Keep skin clean and dry (microclimate). Use moisture barrier products for incontinence care. Diapers/briefs for out of bed or off unit. Use absorbent under pads that wick away and hold moisture. Intervene for fecal and/or urinary incontinence (consider external containment devices. Use skin emollients (dry skin).                  Nutrition:   Consult dietician for at risk patients. Assist with menu preferences and feeding. Encourage snacks, fluid and supplements with rounding. Accurately record all intake where indicated.           Teaching Patients and Families:   Encourage patients and families to partner with " staff in preventing pressure injuries. Give patients and families pressure injury education, assess their understanding of education given, and document education.        Zaira Ramon RN  2/1/2024  3:58 PM                                                                            WOCN consult is completed. Will follow. :     Date: 02/01/24  Signature: Zaira Ramon RN

## 2024-02-01 NOTE — PROGRESS NOTES
Patient: Randolph Daley  Location: [facility]  MRN: [id number]  Today's date: 2/1/2024    History of Present Illness  Pt is a 83 y.o. male admitted on 1/24/2024 with Multiple injuries due to trauma [T07.XXXA]. Principal problem is Multiple injuries due to trauma.  is a 83 y.o. male whose primary indication for inpatient rehabilitation is Debility.    Pt is a 83 y.o M adm to [facility] 1/24/24 who presented to [facility] after a slip and fall on ice landing on his right side.  He was found to have multiple injuries including right 8-11 rib fractures, bilateral pleural effusion s/p IR thoracentesis 1/18 with drainage of 1.1L, pelvic fracture (non op mgmt), right adductor/obturator hematoma, questionable posttraumatic bladder injury and age-indeterminate cervical fracture.  Neurosurgery was consulted and offered c spinesurgery, but patient declined.  [place] Collar on at all times, except for meals to prevent aspiration. Urology was consulted.  CT cystogram showed possible extravasation of contrast into the prostatic urethra.  Womack catheter is currently draining clear urine.  Plan is to continue Womack catheter for 1 week.  CT cystogram in 1 week with possible removal at that time, but pt declining CT Cystogram.        Past Medical History  PMH: BPH, HTN       PT Vitals    Date/Time Pulse HR Source SpO2 Pt Activity O2 Therapy BP BP Location BP Method Pt Position [facility]   02/01/24 0911 76 Monitor 98 % At rest None (Room air) 114/56 Left upper arm Automatic Sitting PLP      PT Pain    Date/Time Pain Type Location Rating: Rest Rating: Activity Interventions [facility]   02/01/24 0911 Pain Assessment other (see comments) ribs 0 5 premedicated for activity PLP   02/01/24 0955 Pain Reassessment pelvic 3 -- position adjusted PLP             Prior Living Environment    Flowsheet Row Most Recent Value   People in Home spouse   Current Living Arrangements home   Home Accessibility stairs to enter home  "(Group), stairs within home (Group)   Living Environment Comment lives with wife in 2  home with first floor set up   Number of Stairs, Main Entrance 1   Surface of Stairs, Main Entrance hardwood   Stair Railings, Main Entrance none   Location, Patient Bedroom first (main) floor   Patient Bedroom Access Comment first floor set up   Location, Bathroom first (main) floor   Bathroom Access Comment First floor set up. Walk in shower. grab bar and shower \"bench\"   Stairs, Within Home, Primary 16   Number of Stairs, Within Home, Primary other (see comments)   Surface of Stairs, Within Home, Primary carpeting   Stair Railings, Within Home, Primary railings on both sides of stairs  [left side rail goes 1/2- then can hold the spindles]   Stairs Comment, Within Home, Primary pt reports wife office upstairs but master bed/bath 1st floor          Prior Level of Function    Flowsheet Row Most Recent Value   Dominant Hand right   Ambulation independent   Transferring independent   Toileting independent   Bathing independent   Dressing independent   Eating independent   IADLs independent   Driving/Transportation    Prior Level of Function Comment Ind PTA, previous cervical fx. 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,  reports manages bill paying, medications, calendar at home           IRF PT Evaluation and Treatment - 02/01/24 0911        PT Time Calculation    Start Time 0901     Stop Time 1001     Time Calculation (min) 60 min        Session Details    Document Type Daily Treatment/Progress Note     Mode of Treatment individual therapy;physical therapy        General Information    Patient Profile Reviewed yes     General Observations of Patient Pt received in room with PCT assisting with donning cervical collar and transferring into chair. Care also provided by Ly Perea PT, DPT        Mobility Belt    Mobility Belt Used for All Out of Bed Activity " yes        Orthosis Neck Cervical Collar    Orthosis Properties Date Obtained: 01/17/24 Time Obtained: 1900 Location: Neck Type: Cervical Collar Features: Hard Therapeutic Indications: fracture immobilization Wearing Schedule: wear at all times (remove only for hygiene and skin inspection)       Bed Mobility    [place], Supine to Sit minimum assist (75% or more patient effort)     [place], Sit to Supine minimum assist (75% or more patient effort)     Comment Performed on mat table in [employer] gym with wedge + 2 pillows; transitioned for sitting edge of mat to partial long sit + controlled descent onto wedge; PT Stefano for trunk control and RLE positioning onto mat, Stefano for uprighting trunk        Sit to Stand Transfer    [place], Sit to Stand Transfer touching/steadying assist     Safety/Cues increased time to complete;technique     Assistive Device walker, front-wheeled     Comment steady assist at gait belt for balance, improved power to stand        Stand to Sit Transfer    [place], Stand to Sit Transfer touching/steadying assist     Safety/Cues increased time to complete;verbal cues;hand placement;technique     Assistive Device walker, front-wheeled     Comment steady assist at pelvis for control        Stand Pivot Transfer    [place], Stand Pivot/Stand Step Transfer touching/steadying assist     Safety/Cues increased time to complete;minimal;verbal cues;proper use of assistive device;technique     Assistive Device walker, front-wheeled     Comment via ambulatory approach with steady assist at gait belt for balance and weight shfit        Gait Training    [place], Gait touching/steadying assist     Safety/Cues increased time to complete;minimal;verbal cues;gait deviations;technique     Assistive Device walker, front-wheeled     Distance in Feet 45 feet   45'x2; 15'x1    Pattern step-through     Deviations/Abnormal Patterns antalgic;ace decreased;gait speed decreased;step length  decreased     Bilateral Gait Deviations heel strike decreased     Right Sided Gait Deviations --   decreased stance time    Comment (Gait/Stairs) steady assist at pelvis for balance; VC for L step length, posture, and heel strike        Lower Extremity (Therapeutic Exercise)    Exercise Position/Type supine;AROM (active range of motion)     General Exercise bilateral     Comment Bridges 2x10, Hooklying Clamshell blue band 2x15, SAQ 2x10/side        Core Strength (Therapeutic Exercise)    Core Strength, Position supine     Core Strength abdominal bracing     Reps and Sets x10        Aerobic Exercise    Type pedal exerciser     Time Performed 6     Comment Active resistance level 3        Daily Progress Summary (PT)    Daily Outcome Statement Pt reporting less pain this morning, resulting in improved efficiency of movement and participation. Pt has mild difficulty with bed mobility secondary to right rib pain. OT reports pt's wife may purchase a bedrail. To practice this afternoon in family training.                           IRF PT Goals    Flowsheet Row Most Recent Value   Bed Mobility Goal 1    Activity/Assistive Device bed mobility activities, all at 01/25/2024 0900   [place] minimum assist (75% or more patient effort) at 01/25/2024 0900   Time Frame short-term goal (STG), 5 - 7 days at 01/25/2024 0900   Progress/Outcome goal ongoing at 01/29/2024 0839   Bed Mobility Goal 2    Activity/Assistive Device bed mobility activities, all at 01/25/2024 0900   [place] modified independence at 01/25/2024 0900   Time Frame long-term goal (LTG), 14 days or less at 01/25/2024 0900   Progress/Outcome goal ongoing at 01/29/2024 0839   Transfer Goal 1    Activity/Assistive Device sit-to-stand/stand-to-sit, stand pivot at 01/29/2024 0839   [place] --  [steadying A] at 01/29/2024 0839   Time Frame short-term goal (STG), 5 - 7 days at 01/29/2024 0839   Progress/Outcome goal met, goal revised this date at 01/29/2024  0839   Transfer Goal 2    Activity/Assistive Device sit-to-stand/stand-to-sit, bed-to-chair/chair-to-bed, stand pivot at 01/25/2024 0900   [place] modified independence at 01/25/2024 0900   Time Frame long-term goal (LTG), 14 days or less at 01/25/2024 0900   Progress/Outcome goal ongoing at 01/29/2024 0839   Gait/Walking Locomotion Goal 1    Activity/Assistive Device gait (walking locomotion) at 01/25/2024 0900   Distance 25 feet at 01/25/2024 0900   [place] minimum assist (75% or more patient effort) at 01/25/2024 0900   Time Frame short-term goal (STG), 5 - 7 days at 01/25/2024 0900   Progress/Outcome goal ongoing at 01/29/2024 0839   Gait/Walking Locomotion Goal 2    Activity/Assistive Device gait (walking locomotion), assistive device use at 01/29/2024 0839   Distance 150 feet at 01/29/2024 0839   [place] modified independence at 01/29/2024 0839   Time Frame long-term goal (LTG), 14 days or less at 01/29/2024 0839   Progress/Outcome goal revised this date at 01/29/2024 0839   Stairs Goal 1    Activity/Assistive Device stairs, all skills, using handrail, left, using handrail, right at 01/28/2024 1304   Number of Stairs 12 at 01/28/2024 1304   [place] minimum assist (75% or more patient effort) at 01/28/2024 1304   Time Frame 5 - 7 days, short-term goal (STG) at 01/28/2024 1304   Progress/Outcome goal ongoing at 01/29/2024 0839   Stairs Goal 2    Activity/Assistive Device stairs, all skills, using handrail, left, cane, straight at 01/28/2024 1304   Number of Stairs 12 at 01/28/2024 1304   [place] supervision required at 01/28/2024 1304   Time Frame 14 days or less at 01/28/2024 1304   Progress/Outcome goal ongoing at 01/29/2024 0839

## 2024-02-01 NOTE — PROGRESS NOTES
Patient: Randolph Daley  Location: [facility]  MRN: [id number]  Today's date: 2/1/2024    History of Present Illness  Pt is a 83 y.o. male admitted on 1/24/2024 with Multiple injuries due to trauma [T07.XXXA]. Principal problem is Multiple injuries due to trauma.  is a 83 y.o. male whose primary indication for inpatient rehabilitation is Debility.    Pt is a 83 y.o M adm to [facility] 1/24/24 who presented to [facility] after a slip and fall on ice landing on his right side.  He was found to have multiple injuries including right 8-11 rib fractures, bilateral pleural effusion s/p IR thoracentesis 1/18 with drainage of 1.1L, pelvic fracture (non op mgmt), right adductor/obturator hematoma, questionable posttraumatic bladder injury and age-indeterminate cervical fracture.  Neurosurgery was consulted and offered c spinesurgery, but patient declined.  [place] Collar on at all times, except for meals to prevent aspiration. Urology was consulted.  CT cystogram showed possible extravasation of contrast into the prostatic urethra.  Womack catheter is currently draining clear urine.  Plan is to continue Womack catheter for 1 week.  CT cystogram in 1 week with possible removal at that time, but pt declining CT Cystogram.        Past Medical History  PMH: BPH, HTN       SLP Pain    Date/Time Pain Type Rating: Rest Who   02/01/24 1333 Pain Assessment 4 - moderate pain HL   02/01/24 1359 Pain Reassessment 4 - moderate pain HL          Prior Living Environment    Flowsheet Row Most Recent Value   People in Home spouse   Current Living Arrangements home   Home Accessibility stairs to enter home (Group), stairs within home (Group)   Living Environment Comment lives with wife in 2  home with first floor set up   Number of Stairs, Main Entrance 1   Surface of Stairs, Main Entrance hardwood   Stair Railings, Main Entrance none   Location, Patient Bedroom first (main) floor   Patient Bedroom Access  "Comment first floor set up   Location, Bathroom first (main) floor   Bathroom Access Comment First floor set up. Walk in shower. grab bar and shower \"bench\"   Stairs, Within Home, Primary 16   Number of Stairs, Within Home, Primary other (see comments)   Surface of Stairs, Within Home, Primary carpeting   Stair Railings, Within Home, Primary railings on both sides of stairs  [left side rail goes 1/2- then can hold the spindles]   Stairs Comment, Within Home, Primary pt reports wife office upstairs but master bed/bath 1st floor          Prior Level of Function    Flowsheet Row Most Recent Value   Dominant Hand right   Ambulation independent   Transferring independent   Toileting independent   Bathing independent   Dressing independent   Eating independent   IADLs independent   Driving/Transportation    Prior Level of Function Comment Ind PTA, previous cervical fx. 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,  reports manages bill paying, medications, calendar at home           IRF SLP Evaluation and Treatment - 02/01/24 1331        SLP Time Calculation    Start Time 1330     Stop Time 1400     Time Calculation (min) 30 min        Session Details    Document Type Daily Treatment/Progress Note     Mode of Treatment speech language pathology;individual therapy        General Information    Patient/Family/Caregiver Comments/Observations Pt's wife, Sophia, present for FTR.     General Observations of Patient Pt recieved sitting upright in wheelchair, aspen collar donned.        Caregiver Training    Caregiver(s) to be Trained spouse/significant other     Comment, Caregiver Training Plan Education provided to patient and his wife re: cognition and specific area being targeted in tx: executive functioning. Education provided re: findings of cognitive-communication IE, BCAT results, goals of therapy and progress thus far with handouts provided re: cognitive " impairment and strategies for including STAR. Pt’s wife was engaged t/o and asked appropriate questions, which were answered. Wife was also educated on motor speech strategies including use of adequate breath support for voicing and use of incentive spirometer. Education provided re: compensatory strategies, home carryover activities, environmental adaptations in the home environment, recommendations for continued ST upon DC from IP rehab and set up assistance with high consequence IADL’s (finances, medication management, etc.). Pt and wife verbalized understanding of all education and recommendations.        Daily Progress Summary (SLP)    Daily Outcome Statement FTR completed. Pt left in wheelchair with wife present and aspen collar donned.                      IRF SLP Goals    Flowsheet Row Most Recent Value   Motor Speech/Voice Goal 1    Motor Speech/Voice Goal 1 breath support exercises (incentive spirometer, sustained phonation),  vocal function exercises for improved volume and voice quality x 80% mod cues. at 01/26/2024 0922   Time Frame short-term goal (STG), 2 weeks at 01/26/2024 0922   Progress/Outcome goal ongoing at 01/28/2024 1244   Motor Speech/Voice Goal 2    Motor Speech/Voice Goal 2 Functinal breath support and voice quality for complex convesational needs x 90% with I use of strategies at 01/26/2024 0922   Time Frame long-term goal (LTG), 3 weeks at 01/26/2024 0922   Progress/Outcome goal ongoing at 01/28/2024 1244   Executive Function Goal 1    Activity executive function tasks, information processing tasks, organization/sequencing tasks, planning/decision-making tasks, problem-solving/reasoning tasks, self-monitoring/self-correction  [convergent/divergent reasoning tasks,  mod level functional tasks related to return to I managing bills, meds, calendar, safety] at 01/26/2024 0922   [place]/Accuracy with 80% accuracy, with moderate, verbal cues/redirection at 01/26/2024 0922   Time Frame  short-term goal (STG), 2 weeks at 01/26/2024 0922   Progress/Outcome goal ongoing at 01/28/2024 1244   Executive Function Goal 2    Activity executive function tasks, information processing tasks, organization/sequencing tasks, planning/decision-making tasks, problem-solving/reasoning tasks, self-monitoring/self-correction at 01/26/2024 0922   [place]/Accuracy with 90% accuracy, with minimum, verbal cues/redirection at 01/26/2024 0922   Time Frame long-term goal (LTG), 3 weeks at 01/26/2024 0922   Progress/Outcome goal ongoing at 01/28/2024 1244

## 2024-02-02 ENCOUNTER — APPOINTMENT (INPATIENT)
Dept: PHYSICAL THERAPY | Facility: REHABILITATION | Age: 84
DRG: 560 | End: 2024-02-02
Payer: COMMERCIAL

## 2024-02-02 ENCOUNTER — APPOINTMENT (INPATIENT)
Dept: SPEECH THERAPY | Facility: REHABILITATION | Age: 84
DRG: 560 | End: 2024-02-02
Payer: COMMERCIAL

## 2024-02-02 ENCOUNTER — APPOINTMENT (INPATIENT)
Dept: OCCUPATIONAL THERAPY | Facility: REHABILITATION | Age: 84
DRG: 560 | End: 2024-02-02
Payer: COMMERCIAL

## 2024-02-02 PROCEDURE — 12800000 HC ROOM AND CARE SEMIPRIVATE REHAB

## 2024-02-02 PROCEDURE — 63700000 HC SELF-ADMINISTRABLE DRUG

## 2024-02-02 PROCEDURE — 97535 SELF CARE MNGMENT TRAINING: CPT | Mod: GO

## 2024-02-02 PROCEDURE — 97530 THERAPEUTIC ACTIVITIES: CPT | Mod: GO,59

## 2024-02-02 PROCEDURE — 97110 THERAPEUTIC EXERCISES: CPT | Mod: GO,59

## 2024-02-02 PROCEDURE — 97116 GAIT TRAINING THERAPY: CPT | Mod: GP

## 2024-02-02 PROCEDURE — 25000000 HC PHARMACY GENERAL

## 2024-02-02 PROCEDURE — 92507 TX SP LANG VOICE COMM INDIV: CPT | Mod: GN

## 2024-02-02 PROCEDURE — 97110 THERAPEUTIC EXERCISES: CPT | Mod: GP,59

## 2024-02-02 PROCEDURE — 63700000 HC SELF-ADMINISTRABLE DRUG: Performed by: INTERNAL MEDICINE

## 2024-02-02 RX ADMIN — APIXABAN 2.5 MG: 2.5 TABLET, FILM COATED ORAL at 20:09

## 2024-02-02 RX ADMIN — Medication 2500 UNITS: at 17:12

## 2024-02-02 RX ADMIN — OXYCODONE HYDROCHLORIDE AND ACETAMINOPHEN 500 MG: 500 TABLET ORAL at 08:32

## 2024-02-02 RX ADMIN — SODIUM CHLORIDE SOLN NEBU 3% 3 ML: 3 NEBU SOLN at 08:32

## 2024-02-02 RX ADMIN — DOXYCYCLINE HYCLATE 100 MG: 100 TABLET, FILM COATED ORAL at 08:32

## 2024-02-02 RX ADMIN — TAMSULOSIN HYDROCHLORIDE 0.4 MG: 0.4 CAPSULE ORAL at 20:09

## 2024-02-02 RX ADMIN — FERROUS SULFATE TAB 325 MG (65 MG ELEMENTAL FE) 325 MG: 325 (65 FE) TAB at 08:32

## 2024-02-02 RX ADMIN — DOXYCYCLINE HYCLATE 100 MG: 100 TABLET, FILM COATED ORAL at 20:09

## 2024-02-02 RX ADMIN — LEVOTHYROXINE SODIUM 25 MCG: 25 TABLET ORAL at 06:04

## 2024-02-02 RX ADMIN — ALBUTEROL SULFATE: 2.5 SOLUTION RESPIRATORY (INHALATION) at 17:10

## 2024-02-02 RX ADMIN — ALBUTEROL SULFATE: 2.5 SOLUTION RESPIRATORY (INHALATION) at 08:32

## 2024-02-02 RX ADMIN — SODIUM CHLORIDE SOLN NEBU 3% 3 ML: 3 NEBU SOLN at 17:10

## 2024-02-02 RX ADMIN — APIXABAN 2.5 MG: 2.5 TABLET, FILM COATED ORAL at 08:32

## 2024-02-02 NOTE — PROGRESS NOTES
Patient: Randolph Daley  Location: [facility]  MRN: [id number]  Today's date: 2/2/2024    History of Present Illness  Pt is a 83 y.o. male admitted on 1/24/2024 with Multiple injuries due to trauma [T07.XXXA]. Principal problem is Multiple injuries due to trauma.  is a 83 y.o. male whose primary indication for inpatient rehabilitation is Debility.    Pt is a 83 y.o M adm to [facility] 1/24/24 who presented to [facility] after a slip and fall on ice landing on his right side.  He was found to have multiple injuries including right 8-11 rib fractures, bilateral pleural effusion s/p IR thoracentesis 1/18 with drainage of 1.1L, pelvic fracture (non op mgmt), right adductor/obturator hematoma, questionable posttraumatic bladder injury and age-indeterminate cervical fracture.  Neurosurgery was consulted and offered c spinesurgery, but patient declined.  [place] Collar on at all times, except for meals to prevent aspiration. Urology was consulted.  CT cystogram showed possible extravasation of contrast into the prostatic urethra.  [place]ack catheter is currently draining clear urine.  Plan is to continue Womack catheter for 1 week.  CT cystogram in 1 week with possible removal at that time, but pt declining CT Cystogram.        Past Medical History  PMH: BPH, HTN       OT Vitals    Date/Time Pulse HR Source BP BP Location BP Method Pt Position [facility]   02/02/24 1041 58 Monitor 102/55 Right upper arm Automatic Sitting GD      OT Pain    Date/Time Pain Type Side/Orientation Location Rating: Rest Interventions [facility]   02/02/24 1041 Pain Assessment right flank 6 premedicated for activity though reporting no pain meds. GD             Prior Living Environment    Flowsheet Row Most Recent Value   People in Home spouse   Current Living Arrangements home   Home Accessibility stairs to enter home (Group), stairs within home (Group)   Living Environment Comment lives with wife in 2  home with first floor set  "up   Number of Stairs, Main Entrance 1   Surface of Stairs, Main Entrance hardwood   Stair Railings, Main Entrance none   Location, Patient Bedroom first (main) floor   Patient Bedroom Access Comment first floor set up   Location, Bathroom first (main) floor   Bathroom Access Comment First floor set up. Walk in shower. grab bar and shower \"bench\"   Stairs, Within Home, Primary 16   Number of Stairs, Within Home, Primary other (see comments)   Surface of Stairs, Within Home, Primary carpeting   Stair Railings, Within Home, Primary railings on both sides of stairs  [left side rail goes 1/2- then can hold the spindles]   Stairs Comment, Within Home, Primary pt reports wife office upstairs but master bed/bath 1st floor          Prior Level of Function    Flowsheet Row Most Recent Value   Dominant Hand right   Ambulation independent   Transferring independent   Toileting independent   Bathing independent   Dressing independent   Eating independent   IADLs independent   Driving/Transportation    Prior Level of Function Comment Ind PTA, previous cervical fx. 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   Patient Goals Initial PSFS: getting in/out of a car 8/10, walking 5/10, getting on/off of the sofa 5/10, cooking 7/10 = 25/40 = 62.5%           IRF OT Evaluation and Treatment - 02/02/24 1043        OT Time Calculation    Start Time 1030     Stop Time 1100     Time Calculation (min) 30 min        Session Details    Document Type Daily Treatment/Progress Note     Mode of Treatment occupational therapy;individual therapy        General Information " "   General Observations of Patient received in w/c s cervical collar.        Orthosis Neck Cervical Collar    Orthosis Properties Date Obtained: 01/17/24 Time Obtained: 1900 Location: Neck Type: Cervical Collar Features: Hard Therapeutic Indications: fracture immobilization Wearing Schedule: wear at all times (remove only for hygiene and skin inspection)    Skin Assessment --   Pt refusing to wear collar even c education on wear schedule. \"My injury has nothing to do with my neck.\"    Compliance/Wearing Issues patient;non-compliant with wearing schedule due to        Cognition/Psychosocial    Comment, Cognition provided education on cervical collar on AAT. Pt continues to refuse \"I can't breathe with that thing on.\"        Therapeutic Interventions    Comment, Therapeutic Intervention adjusted positioning of w/c to improve comfort.        Upper Extremity (Therapeutic Exercise)    Exercise Position/Type seated;resistive exercises     General Exercise bilateral     Range of Motion Exercises shoulder horizontal abduction/adduction;elbow flexion/extension     Weight/Resistance resistance band   pink    Reps and Sets 2 x 10     Comment completed in w/c. Mod cueing for pacing, technique and hand placement. rest breaks in between sets.        Lower Extremity (Therapeutic Exercise)    Comment 1) inner thigh squeezes 15 reps c 5 sec hold c soft ball in between knees.        Daily Progress Summary (OT)    Daily Outcome Statement limited by insight into deficits and not compliant c cervical collar even c education. focused on BUE strength and endurance via ther ex                           IRF OT Goals    Flowsheet Row Most Recent Value   Transfer Goal 1    Activity/Assistive Device toilet at 01/25/2024 0629   [place] supervision required at 01/25/2024 0629   Time Frame short-term goal (STG), 5 - 7 days at 01/29/2024 0708   Progress/Outcome goal ongoing at 01/29/2024 0708   Transfer Goal 2    Activity/Assistive Device " toilet at 01/25/2024 0629   [place] modified independence  [steady A] at 01/25/2024 0629   Time Frame long-term goal (LTG), 14 days or less at 01/29/2024 0708   Progress/Outcome goal ongoing at 01/29/2024 0708   Transfer Goal 3    Activity/Assistive Device shower at 01/25/2024 0629   [place] other (see comments)  [touch A] at 01/25/2024 0629   Time Frame short-term goal (STG), 5 - 7 days at 01/29/2024 0708   Progress/Outcome goal ongoing at 01/29/2024 0708   Transfer Goal 4    Activity/Assistive Device shower at 01/25/2024 0629   [place] modified independence at 01/29/2024 0708   Time Frame long-term goal (LTG), 14 days or less at 01/29/2024 0708   Progress/Outcome goal revised this date at 01/29/2024 0708   Bathing Goal 1    Activity/Assistive Device bathing skills, all at 01/25/2024 0629   [place] minimum assist (75% or more patient effort) at 01/25/2024 0629   Time Frame short-term goal (STG), 5 - 7 days at 01/29/2024 0708   Progress/Outcome goal ongoing at 01/29/2024 0708   Bathing Goal 2    Activity/Assistive Device bathing skills, all at 01/25/2024 0629   [place] set-up required at 01/29/2024 0708   Time Frame long-term goal (LTG), 14 days or less at 01/29/2024 0708   Progress/Outcome goal revised this date at 01/29/2024 0708   UB Dressing Goal 1    Activity/Assistive Device upper body dressing at 01/25/2024 0629   [place] minimum assist (75% or more patient effort) at 01/29/2024 0708   Time Frame short-term goal (STG), 5 - 7 days at 01/29/2024 0708   Strategies/Barriers including clothing retrieval at 01/29/2024 0708   Progress/Outcome goal revised this date at 01/29/2024 0708   UB Dressing Goal 2    Activity/Assistive Device upper body dressing at 01/25/2024 0629   [place] minimum assist (75% or more patient effort) at 01/29/2024 0708   Time Frame long-term goal (LTG), 14 days or less at 01/29/2024 0708   Progress/Outcome goal revised this date at 01/29/2024 0708   LB  Dressing Goal 1    Activity/Assistive Device lower body dressing at 01/25/2024 0629   [place] minimum assist (75% or more patient effort) at 01/25/2024 0629   Time Frame short-term goal (STG), 5 - 7 days at 01/29/2024 0708   Progress/Outcome goal ongoing at 01/29/2024 0708   LB Dressing Goal 2    Activity/Assistive Device lower body dressing at 01/25/2024 0629   [place] modified independence at 01/25/2024 0629   Time Frame long-term goal (LTG), 14 days or less at 01/29/2024 0708   Progress/Outcome goal ongoing at 01/29/2024 0708   Grooming Goal 1    Activity/Assistive Device grooming skills, all at 01/25/2024 0629   [place] set-up required at 01/25/2024 0629   Time Frame short-term goal (STG), 5 - 7 days at 01/29/2024 0708   Progress/Outcome goal ongoing at 01/29/2024 0708   Grooming Goal 2    Activity/Assistive Device grooming skills, all at 01/25/2024 0629   [place] modified independence at 01/25/2024 0629   Time Frame long-term goal (LTG), 14 days or less at 01/29/2024 0708   Progress/Outcome goal ongoing at 01/29/2024 0708   Toileting Goal 1    Activity/Assistive Device toileting skills, all at 01/25/2024 0629   [place] minimum assist (75% or more patient effort) at 01/25/2024 0629   Time Frame short-term goal (STG), 5 - 7 days at 01/29/2024 0708   Progress/Outcome goal ongoing at 01/29/2024 0708   Toileting Goal 2    Activity/Assistive Device toileting skills, all at 01/25/2024 0629   [place] modified independence at 01/25/2024 0629   Time Frame long-term goal (LTG), 14 days or less at 01/29/2024 0708   Progress/Outcome goal ongoing at 01/29/2024 0708

## 2024-02-02 NOTE — PROGRESS NOTES
[facility] Progress Note          Patient was seen and examined at bedside.    Subjective:     Saw and evaluated him in am:  Very pleasant, cooperative, conversant.  C/o L arm phlebitis site induration with some pain, checked, which is shrinking slowly, keep doxycyline, provide focal heat therapy, keep focal skin clean.   Objective   Vital signs in last 24 hours:  Temp:  [36.6 °C (97.9 °F)-36.7 °C (98.1 °F)] 36.7 °C (98.1 °F)  Heart Rate:  [55-73] 57  Resp:  [18] 18  BP: (100-142)/(55-77) 100/57    No intake or output data in the 24 hours ending 02/02/24 1213  Intake/Output this shift:  No intake/output data recorded.   Review of Systems:  All other systems reviewed and negative except as noted in the HPI.   Objective      Labs  reviewed his labs thoroughly   Lab Results   Component Value Date    WBC 9.36 01/30/2024    HGB 8.2 (L) 01/30/2024    HCT 25.6 (L) 01/30/2024    MCV 97.3 01/30/2024     01/30/2024     Lab Results   Component Value Date    GLUCOSE 98 01/29/2024    CALCIUM 8.8 01/29/2024     01/29/2024    K 4.1 01/29/2024    CO2 23 01/29/2024     01/29/2024    BUN 42 (H) 01/29/2024    CREATININE 1.2 01/29/2024       Imaging  OSH imaging study reports reviewed:     CT cystography 1/24/24:  IMPRESSION:  No evidence of contrast extravasation into the prostatic parenchyma or other areas of contrast extravasation from the urinary bladder. Redemonstration of fractures involving the right superior and inferior pubic rami with extraperitoneal blood in the pelvis, which has slightly decreased compared to 1/17/2024. Persistent mild enlargement of the right adductor muscle compartment, similar to 1/17/2024.  His avila was removed.      MRI cervical spine 1/22/24  IMPRESSION:  1.  C2 fracture with no bone marrow edema to suggest acute fracture.  2.  No abnormal enhancement.  3.  No epidural collection or hematoma.  4.  Degenerative spondylosis worst at C3-C4 where there is also  suggestion of  Mild right jose luis-cord signal abnormality.  Multilevel spondylosis otherwise noted  as described.      Full Code    Physical Exam:  Head/Ear/Nose/Throat: normocephalic; atraumatic; moisture mouth mm, no oropharyngeal thrush noted.   Eyes: anicteric sclera, EOMI; PERRL.   Neck : supple, no JVD, no carotid bruits appeciated.   Respiratory: no evidence of labored breathing, bibasilar lung BS diminished area, no remarkable w/r/c.   Cardiovascular: RRR; normal S1, S2; no m/r/g; no S3 or S4.   Gastrointestinal: soft; NT; BS normal; mildly distended; no CVAT b/l.   Genitourinary: off avila.   Extremities : no c/c/e .   Neurological: AO x 3, fluent speeches, following commands, CNS II-XII grossly intact; no focal neurologic deficits.   Behavior/Emotional: in NAD, appropriate; cooperative.   Skin: clean, dry and intact.     Plan of care was discussed with patient, RN, and PMR attending     Assessment   CC:S/p fall, sustained multi trauma: R post 8th-11th rib fractures,  R superior pubic rami fracture w hematoma, age undetermined C2 fracture; non surgically managed; ADL and ambulatory dysfunction          83 y.o. male with PMH of BPH, HTN, hypothyroidism, who presented and was admitted to [facility] on 1/17/24 after a slip and fall on ice landing on his right side. He was found to have multiple injuries including right 8-11 rib fractures, bilateral pleural effusion s/p IR thoracentesis 1/18 with drainage of 1.1L, pelvic fracture (non op mgmt), right adductor/obturator hematoma, questionable posttraumatic bladder injury and age-indeterminate cervical fracture.  Neurosurgery was consulted and offered c spinesurgery, but patient declined.  [place] Collar on at all times, except for meals to prevent aspiration.  Patient c/o difficulty coughing up mucus with collar on, but understands that he needs to wear the collar especially during therapy.  Urology was consulted.  CT cystogram showed possible extravasation of contrast into  the prostatic urethra.  Avila catheter is currently draining clear urine.  Plan is to continue Avila catheter for 1 week.  CT cystogram in 1 week with possible removal at that time, but pt declining CT Cystogram.  Functionally, he has significant physical econditioning with ADL and ambulatory dysfunction, requiring inpt acute rehab, was admitted to [facility] on 1/24/24.        Repeated   CT cystography 1/24/24:  IMPRESSION:  No evidence of contrast extravasation into the prostatic parenchyma or other areas of contrast extravasation from the urinary bladder. Redemonstration of fractures involving the right superior and inferior pubic rami with extraperitoneal blood in the pelvis, which has slightly decreased compared to 1/17/2024. Persistent mild enlargement of the right adductor muscle compartment, similar to 1/17/2024.  His avila was removed.      A/P:  # S/p fall, sustained multi trauma: R post 8th-11th rib fractures,  R superior pubic rami fracture w hematoma, age undetermined C2 fracture; non surgically managed; ADL and ambulatory dysfunction  -Inpt acute rehab, pain and medical managements, DVT prophylaxis, dermal defense, fall precaution      # R post 8th-11th rib fractures, atelectasis, chest congestion, possible pleu effusion  -Monitor SO2, prn NC O2, to keep SO2>92%, incentive spirometry, bronchodilator nebulizer, mucolytic agent, pulm toileting.  -Check CXR, chest PT  - Chest wall pain management     # Urinary retention, BPH, r/o bladder extravasation  -Off avila, timed voiding, condom cath in the evening, monitor PVR with prn cic, adequate oral hydration, check UA and UCX, consider to add flomax 0.4 mg qhs.     # Hypothyroidism   -cont home dose of levothyroxine     # DVT prophylaxis   -Eliquis 2.5 mg bid      # Insomnia, anxiety   - address his pain   - prn ativan and Ambien  - psychology CBT            Billing code: [id number]  Diagnoses:  Patient Active Problem List   Diagnosis   • Fall due to slipping on ice or  snow, initial encounter   • Closed fracture of multiple ribs of right side   • Closed fracture of ramus of right pubis (CMS/HCC)   • Pelvic hematoma in male   • Pleural effusion on left   • Normocytic anemia   • Stage 3a chronic kidney disease (CMS/HCC)   • Closed odontoid fracture (CMS/HCC)   • Multiple injuries due to trauma             Neville Arndt MD  2/2/2024

## 2024-02-02 NOTE — PROGRESS NOTES
Patient: Randolph Daley  Location: [facility]  MRN: [id number]  Today's date: 2/2/2024    History of Present Illness  Pt is a 83 y.o. male admitted on 1/24/2024 with Multiple injuries due to trauma [T07.XXXA]. Principal problem is Multiple injuries due to trauma.  is a 83 y.o. male whose primary indication for inpatient rehabilitation is Debility.    Pt is a 83 y.o M adm to [facility] 1/24/24 who presented to [facility] after a slip and fall on ice landing on his right side.  He was found to have multiple injuries including right 8-11 rib fractures, bilateral pleural effusion s/p IR thoracentesis 1/18 with drainage of 1.1L, pelvic fracture (non op mgmt), right adductor/obturator hematoma, questionable posttraumatic bladder injury and age-indeterminate cervical fracture.  Neurosurgery was consulted and offered c spinesurgery, but patient declined.  [place] Collar on at all times, except for meals to prevent aspiration. Urology was consulted.  CT cystogram showed possible extravasation of contrast into the prostatic urethra.  [place]om[place] catheter is currently draining clear urine.  Plan is to continue Womack catheter for 1 week.  CT cystogram in 1 week with possible removal at that time, but pt declining CT Cystogram.        Past Medical History  PMH: BPH, HTN          02/02/24 0905 02/02/24 0913 02/02/24 0958   Pain/Comfort/Sleep   Pain Charting Type Pain Assessment  --  Pain Reassessment   (0-10) Pain Rating: Rest 0  --  2   (0-10) Pain Rating: Activity 8  --   --    Pain Side/Orientation right  --  right   Pain Body Location flank  --  flank   Pain Management Interventions diversional activity provided;position adjusted  --  position adjusted   Vitals   Heart Rate  --  64  --    Heart Rate Source  --  Monitor  --    BP  --  119/61  --    BP Location  --  Right upper arm  --    BP Method  --  Automatic  --    Patient Position  --  Lying  --             Prior Living Environment    Flowsheet  "Row Most Recent Value   People in Home spouse   Current Living Arrangements home   Home Accessibility stairs to enter home (Group), stairs within home (Group)   Living Environment Comment lives with wife in 2 SH home with first floor set up   Number of Stairs, Main Entrance 1   Surface of Stairs, Main Entrance hardwood   Stair Railings, Main Entrance none   Location, Patient Bedroom first (main) floor   Patient Bedroom Access Comment first floor set up   Location, Bathroom first (main) floor   Bathroom Access Comment First floor set up. Walk in shower. grab bar and shower \"bench\"   Stairs, Within Home, Primary 16   Number of Stairs, Within Home, Primary other (see comments)   Surface of Stairs, Within Home, Primary carpeting   Stair Railings, Within Home, Primary railings on both sides of stairs  [left side rail goes 1/2- then can hold the spindles]   Stairs Comment, Within Home, Primary pt reports wife office upstairs but master bed/bath 1st floor          Prior Level of Function    Flowsheet Row Most Recent Value   Dominant Hand right   Ambulation independent   Transferring independent   Toileting independent   Bathing independent   Dressing independent   Eating independent   IADLs independent   Driving/Transportation    Prior Level of Function Comment Ind PTA, previous cervical fx. 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   Patient Goals Initial PSFS: getting in/out of a car 8/10, walking 5/10, getting on/off of the sofa 5/10, cooking 7/10 = 25/40 = 62.5%               02/02/24 0919   OT Time Calculation   Start Time 0900   Stop Time " 1000   Time Calculation (min) 60 min   Session Details   Document Type Daily Treatment/Progress Note   Mode of Treatment individual therapy;occupational therapy   General Information   General Observations of Patient Pt received supine in bed with [place] doffed. OT applied Jenni collar supine at start of session.   Mobility Belt   Mobility Belt Used for All Out of Bed Activity yes   Orthosis Neck Cervical Collar   Date Obtained/Time Obtained: 01/17/24 1900   Location: Neck  Type: Cervical Collar  Features: Hard  Therapeutic Indications: fracture immobilization  Wearing Schedule: wear at all times (remove only for hygiene and skin inspection)   Compliance/Wearing Issues patient;non-compliant with wearing schedule due to;needs reinforcement   Cognition/Psychosocial   Comment, Cognition Pt required encouragement t/o session to complete tasks as independently as possible w/ pt requesting OT to complete ADL tasks for pt despite pt being able to complete with assistance for balance. Pt demonstrating behaviors of learned helplessness.   Bed Mobility   Comment OT: supine to EOB with HOB elevated use of bed rail, min A to boost trunk, pt able to manage BLE.   Sit to Stand Transfer   [place], Sit to Stand Transfer touching/steadying assist   Safety/Cues increased time to complete;technique   Assistive Device walker, front-wheeled   Comment from EOB with RW and from shower chair with anterior grab bar, steady assist for balance   Stand to Sit Transfer   [place], Stand to Sit Transfer touching/steadying assist   Safety/Cues increased time to complete;technique   Assistive Device walker, front-wheeled   Comment to wc with RW and to shower chair with anterior grab bar steady assist for controlled descend   Stand Pivot Transfer   [place], Stand Pivot/Stand Step Transfer touching/steadying assist   Safety/Cues increased time to complete;minimal;verbal cues;proper use of assistive device;technique   Assistive  Device walker, front-wheeled   Comment via amb approach steady assist for balance   Shower Transfer   Transfer Technique stand pivot   [place] touching/steadying assist   Safety/Cues increased time to complete;minimal;verbal cues;hand placement;technique   Assistive Device grab bars/tub rail;shower chair;walker, front-wheeled   Comment via amb approach R entry into barrier free shower stall steady assist for balance   Impairments/Safety Issues   Comment, Safety Issues/Impairments OT: short HHD with RW within room with steady assist for balance, min vc for upright posture and increase step length   Therapeutic Interventions   Comment, Therapeutic Intervention Pt stood with RW reaching outside ZACH into drawers to complete clothing retrieval with cl s -steady assist for balance, vc and encouragement for pt to complete vs OT gathering.   Bathing   Shower Provided? Yes   [place] minimum assist (75% or more patient effort)   Safety/Cues increased time to complete;minimal;verbal cues;initiation;maintaining precautions;compensatory techniques;use of adaptive equipment   Position supported sitting;supported standing   Setup Assistance adjust water temperature/flow;obtain supplies   Adaptive Equipment grab bar/tub rail;hand-held shower spray hose;long-handled sponge;shower chair   Comment Pt completed full wet shower seated on shower chair majority of shower, stood briefly with unilateral support on anterior grab bar and completed posterior pericare with R posterior reach steady assist for balance, required min A to dry buttocks d/t wound dressing change post shower. Utilized long handled sponge to wash B feet, vc and visual demo to complete draping technique to dry B feet, pt completed w/ unilateral support on anterior grab bar.   Upper Body Dressing   Tasks doff;don;pull over garment   [place] minimum assist (75% or more patient effort)   Safety/Cues increased time to complete;minimal;verbal cues;maintaining  precautions;compensatory techniques   Position supported sitting   Adaptive Equipment orthosis   Comment total assist for [place] and [place] collar management. pt able to doff/kenzie pull over shirts with s min vc to thread RUE prior to threading LUE.   Lower Body Dressing   Tasks don;underwear;pants/bottoms;doff;socks   [place] touching/steadying assist   Safety/Cues increased time to complete;minimal;verbal cues;compensatory techniques;use of adaptive equipment   Position supported sitting;supported standing   Adaptive Equipment reacher;dressing stick   [place], Footwear moderate assist (50-74% patient effort)   Comment pt utilized dressing pal to doff socks with s for vc for initiaion and use of AE. Pt refused to don footwear post shower, OT donned socks and sneakers with total assist, mod A overall.  seated on tub bench, pt utilized reacher to thread BLE into underwear/pants stood with alternating UE support on anterior grab bar to complete CM with steady assist for balance.   Grooming   Comment Pt refused to complete oral care, stated his wife will complete later.   Toileting   Tasks adjust/manage clothing   [place] touching/steadying assist   Safety/Cues initiation   Position supported standing   Comment pt stood with UE support on grab bar facing toilet to complete CM and +void, steady assist for balance. vc for pt to complete CM vs OT completing.   Daily Progress Summary (OT)   Symptoms Noted During/After Treatment fatigue   Daily Outcome Statement Session focused on ADLs, pt continues to be non-compliant with spinal precautions and cervial collar despite educating each session. Pt displays behaviors of learned helplessness and requires encouragment t/o session to complete tasks with least assistance required in order to safely complete. Continue OT POC to increase safety and independence with fxl transfers, fxl mobility, and ADLs.                IRF OT Goals    Flowsheet Row Most  Recent Value   Transfer Goal 1    Activity/Assistive Device toilet at 01/25/2024 0629   [place] supervision required at 01/25/2024 0629   Time Frame short-term goal (STG), 5 - 7 days at 01/29/2024 0708   Progress/Outcome goal ongoing at 01/29/2024 0708   Transfer Goal 2    Activity/Assistive Device toilet at 01/25/2024 0629   [place] modified independence  [steady A] at 01/25/2024 0629   Time Frame long-term goal (LTG), 14 days or less at 01/29/2024 0708   Progress/Outcome goal ongoing at 01/29/2024 0708   Transfer Goal 3    Activity/Assistive Device shower at 01/25/2024 0629   [place] other (see comments)  [touch A] at 01/25/2024 0629   Time Frame short-term goal (STG), 5 - 7 days at 01/29/2024 0708   Progress/Outcome goal ongoing at 01/29/2024 0708   Transfer Goal 4    Activity/Assistive Device shower at 01/25/2024 0629   [place] modified independence at 01/29/2024 0708   Time Frame long-term goal (LTG), 14 days or less at 01/29/2024 0708   Progress/Outcome goal revised this date at 01/29/2024 0708   Bathing Goal 1    Activity/Assistive Device bathing skills, all at 01/25/2024 0629   [place] minimum assist (75% or more patient effort) at 01/25/2024 0629   Time Frame short-term goal (STG), 5 - 7 days at 01/29/2024 0708   Progress/Outcome goal ongoing at 01/29/2024 0708   Bathing Goal 2    Activity/Assistive Device bathing skills, all at 01/25/2024 0629   [place] set-up required at 01/29/2024 0708   Time Frame long-term goal (LTG), 14 days or less at 01/29/2024 0708   Progress/Outcome goal revised this date at 01/29/2024 0708   UB Dressing Goal 1    Activity/Assistive Device upper body dressing at 01/25/2024 0629   [place] minimum assist (75% or more patient effort) at 01/29/2024 0708   Time Frame short-term goal (STG), 5 - 7 days at 01/29/2024 0708   Strategies/Barriers including clothing retrieval at 01/29/2024 0708   Progress/Outcome goal revised this date at 01/29/2024 0708    UB Dressing Goal 2    Activity/Assistive Device upper body dressing at 01/25/2024 0629   [place] minimum assist (75% or more patient effort) at 01/29/2024 0708   Time Frame long-term goal (LTG), 14 days or less at 01/29/2024 0708   Progress/Outcome goal revised this date at 01/29/2024 0708   LB Dressing Goal 1    Activity/Assistive Device lower body dressing at 01/25/2024 0629   [place] minimum assist (75% or more patient effort) at 01/25/2024 0629   Time Frame short-term goal (STG), 5 - 7 days at 01/29/2024 0708   Progress/Outcome goal ongoing at 01/29/2024 0708   LB Dressing Goal 2    Activity/Assistive Device lower body dressing at 01/25/2024 0629   [place] modified independence at 01/25/2024 0629   Time Frame long-term goal (LTG), 14 days or less at 01/29/2024 0708   Progress/Outcome goal ongoing at 01/29/2024 0708   Grooming Goal 1    Activity/Assistive Device grooming skills, all at 01/25/2024 0629   [place] set-up required at 01/25/2024 0629   Time Frame short-term goal (STG), 5 - 7 days at 01/29/2024 0708   Progress/Outcome goal ongoing at 01/29/2024 0708   Grooming Goal 2    Activity/Assistive Device grooming skills, all at 01/25/2024 0629   [place] modified independence at 01/25/2024 0629   Time Frame long-term goal (LTG), 14 days or less at 01/29/2024 0708   Progress/Outcome goal ongoing at 01/29/2024 0708   Toileting Goal 1    Activity/Assistive Device toileting skills, all at 01/25/2024 0629   [place] minimum assist (75% or more patient effort) at 01/25/2024 0629   Time Frame short-term goal (STG), 5 - 7 days at 01/29/2024 0708   Progress/Outcome goal ongoing at 01/29/2024 0708   Toileting Goal 2    Activity/Assistive Device toileting skills, all at 01/25/2024 0629   [place] modified independence at 01/25/2024 0629   Time Frame long-term goal (LTG), 14 days or less at 01/29/2024 0708   Progress/Outcome goal ongoing at 01/29/2024 0708

## 2024-02-02 NOTE — PROGRESS NOTES
Daily Progress Note     Patient was seen and examined.   Attestation Notes: Face to face encounter completed        Subjective       Interval History: There is mild improvement of the left forearm. Patient is tolerating therapies. He does report feeling he 'over did it' with therapy this morning and noted increased pain in the right upper back. This pain has improved with laying down. He was also noted with area of erythema on the right thigh - patient reports his underwear bunched up and had been rubbing. Photo in media. Will continue skin checks and local care.    Review of Systems:  Negative except as per HPI    Current Functional Status:   Bed mobility:   [place], Supine to Sit: minimum assist (75% or more patient effort)   [place]da, Sit to Supine: touching/steadying assist, minimum assist (75% or more patient effort)   Transfers:    [place], Sit to Stand Transfer: touching/steadying assist  [place], Stand to Sit Transfer: touching/steadying assist   [place], Stand Pivot/Stand Step Transfer: touching/steadying assist   Gait:   [place], Gait: touching/steadying assist  Assistive Device: walker, front-wheeled   Distance in Feet: 60 feet    Bathing:   [place]: minimum assist (75% or more patient effort)   Toileting:   [place]: minimum assist (75% or more patient effort)   Upper body dressing:   [place]: minimum assist (75% or more patient effort)   Lower body dressing:   [place]: minimum assist (75% or more patient effort)     Mild to moderate cognitive deficits    Functional Progress:    Functional status reviewed. Overall, patient's functional status is being assessed      Objective     Objective   Vitals and Hemodynamics  Patient Vitals for the past 24 hrs:   BP Temp Temp src Pulse Resp SpO2   02/02/24 1109 (!) 100/57 -- -- (!) 57 -- --   02/02/24 1041 (!) 102/55 -- -- (!) 58 -- --   02/02/24 0913 119/61 -- -- 64 -- --   02/02/24 0712 (!) 107/57 36.7 °C (98.1 °F)  Oral (!) 56 18 92 %   02/1940 124/60 36.6 °C (97.9 °F) Oral 72 18 94 %   02/01/24 1600 (!) 142/77 36.7 °C (98 °F) Oral (!) 57 18 99 %   02/01/24 1410 128/67 -- -- (!) 55 -- --   02/01/24 1301 133/70 -- -- 73 -- --         Physical Exam    General: NAD, laying in bed   HEENT: Atraumatic, Normocephalic; c collar off  Cardiovascular: RRR  Respiratory: CTAB   Abdomen/GI: NT ND  Skin: Warm, Dry, erythema and induration of the left proximal forearm - slightly smaller today  Extremities/MSK: Non tender bilateral gastrocs, trace to 1+ edema of the right foot; trace edema of the left foot   Neuro: AAOx3, follows commands, repetitive at times       Labs      Results from last 7 days   Lab Units 01/30/24  0601 01/27/24  0626 01/26/24  1452   WBC K/uL 9.36 6.30 8.79   RBC M/uL 2.63* 2.58* 2.57*   HEMOGLOBIN g/dL 8.2* 8.0* 8.1*   HEMATOCRIT % 25.6* 24.5* 24.1*   PLATELETS K/uL 173 122* 118*   MCV fL 97.3 95.0 93.8   RDW % 15.5* 15.2* 15.5*   EOS ABS AUTO K/uL  --   --  0.01*       Results from last 7 days   Lab Units 01/29/24  0534 01/26/24  1452   SODIUM mEQ/L 138 128*   POTASSIUM mEQ/L 4.1 4.0   CHLORIDE mEQ/L 106 99   CO2 mEQ/L 23 23   BUN mg/dL 42* 35*   CREATININE mg/dL 1.2 1.3   EGFR mL/min/1.73m*2 >60.0 55.2*   ANION GAP mEQ/L 9 6     Results from last 7 days   Lab Units 01/29/24  0534 01/26/24  1452   CALCIUM mg/dL 8.8 8.9       Results from last 7 days   Lab Units 01/26/24  1452   AST IU/L 12*   ALT IU/L 10   ALK PHOS IU/L 55   BILIRUBIN TOTAL mg/dL 0.8       Imaging      Assessment and Plan     Randolph Daley is a 83 y.o. male with a PMHx of BPH, hypothyroid, cervical spine fracture, who presents with ADL/mobility dysfunction secondary to multi trauma after fall, found to have right pubic ramus fracture, right posterior rib fractures, chronic odontoid fracture.     #ADL/Ambulatory dysfunction: 2/2 multi trauma. Continue acute inpatient rehabilitation with PT/OT/SLP/SW/Rehab RN/Neuropsych to increase independence  with ADLs, improve balance, coordination, endurance, strength, mobility, community reintegration, decreased burden of care on others and family education.  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 with mild reight jose luis-cord signal abnormality  - chronic  - evaluated by neurosurgery in acute care  - decline operative management at this time; continue hard cervical collar at all times  - follow-up neurosurgery in 4-6 weeks     #right perivascular hematoma associated with mild mass effect on the urinary bladder and perivesicular infiltration  - monitor bladder scans, CIC PRN  - CT cystogram without evidence of contrast extravasation into prostatic parenchyma or other areas of extravasation from urinary bladder  - had avila catheter - removed      #BPH  - continue flomax     #Pleural effusion  - s/p thoracentesis  - monitor O2    #hyponatremia  - Na 138 1/29  - continue salt tabs, fluid restriction    #left forearm induration  - doxycycline  - elevation, heat or ice PRN     #Immobility  -DVT prophylaxis with apixiban     # Follow-up  - PCP  - neurosurgery  - orthopedic surgery      #Disposition: 2/7/2024, pt to go home with skilled RN/PT/OT       Amelia Larose MD  Physical Medicine and Rehabilitation

## 2024-02-02 NOTE — PLAN OF CARE
Plan of Care Review  Plan of Care Reviewed With: patient  Progress: improving  Outcome Evaluation: alert and orientedx4; continent of bowel and bladder, wears [place] catheter overnight; complaints of pain with movement-tylenol offered but patient declined; patient refused repositioning assistance overnight despite education provided regarding skin breakdown and his increased risk for a pressure ulcer; patient was agreeable to heels being elevated on pillows. patient also refusing to wear [place] collar stating it makes him feel claustrophobic. call bell in reach; bed alarm set. no other reports of concerns at this time.

## 2024-02-02 NOTE — PROGRESS NOTES
Patient: Randolph Daley  Location: [facility]  MRN: [id number]  Today's date: 2/2/2024    History of Present Illness  Pt is a 83 y.o. male admitted on 1/24/2024 with Multiple injuries due to trauma [T07.XXXA]. Principal problem is Multiple injuries due to trauma.  is a 83 y.o. male whose primary indication for inpatient rehabilitation is Debility.    Pt is a 83 y.o M adm to [facility] 1/24/24 who presented to [facility] after a slip and fall on ice landing on his right side.  He was found to have multiple injuries including right 8-11 rib fractures, bilateral pleural effusion s/p IR thoracentesis 1/18 with drainage of 1.1L, pelvic fracture (non op mgmt), right adductor/obturator hematoma, questionable posttraumatic bladder injury and age-indeterminate cervical fracture.  Neurosurgery was consulted and offered c spinesurgery, but patient declined.  [place] Collar on at all times, except for meals to prevent aspiration. Urology was consulted.  CT cystogram showed possible extravasation of contrast into the prostatic urethra.  Womack catheter is currently draining clear urine.  Plan is to continue Womack catheter for 1 week.  CT cystogram in 1 week with possible removal at that time, but pt declining CT Cystogram.        Past Medical History  PMH: BPH, HTN       PT Vitals    Date/Time Pulse HR Source Pt Activity O2 Therapy BP BP Location BP Method Pt Position [facility]   02/02/24 1109 57 Monitor -- -- 100/57 Right upper arm Automatic Sitting PLP      PT Pain    Date/Time Pain Type Acceptable Pain Level Side/Orientation Location Rating: Rest Rating: Activity Description Interventions Sleep/Rest/Relaxation [facility]   02/02/24 1109 Pain Assessment -- right flank 2 3 -- premedicated for activity -- PLP   02/02/24 1158 Pain Reassessment -- right flank 2 -- -- -- -- PLP             Prior Living Environment    Flowsheet Row Most Recent Value   People in Home spouse   Current Living Arrangements home  "  Home Accessibility stairs to enter home (Group), stairs within home (Group)   Living Environment Comment lives with wife in 2 SH home with first floor set up   Number of Stairs, Main Entrance 1   Surface of Stairs, Main Entrance hardwood   Stair Railings, Main Entrance none   Location, Patient Bedroom first (main) floor   Patient Bedroom Access Comment first floor set up   Location, Bathroom first (main) floor   Bathroom Access Comment First floor set up. Walk in shower. grab bar and shower \"bench\"   Stairs, Within Home, Primary 16   Number of Stairs, Within Home, Primary other (see comments)   Surface of Stairs, Within Home, Primary carpeting   Stair Railings, Within Home, Primary railings on both sides of stairs  [left side rail goes 1/2- then can hold the spindles]   Stairs Comment, Within Home, Primary pt reports wife office upstairs but master bed/bath 1st floor          Prior Level of Function    Flowsheet Row Most Recent Value   Dominant Hand right   Ambulation independent   Transferring independent   Toileting independent   Bathing independent   Dressing independent   Eating independent   IADLs independent   Driving/Transportation    Prior Level of Function Comment Ind PTA, previous cervical fx. 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,  reports manages bill paying, medications, calendar at home           IRF PT Evaluation and Treatment - 02/02/24 1111        PT Time Calculation    Start Time 1102     Stop Time 1202     Time Calculation (min) 60 min        Session Details    Document Type Daily Treatment/Progress Note     Mode of Treatment individual therapy;physical therapy        General Information    Patient Profile Reviewed yes     General Observations of Patient Pt received in his room, cervical collar doffed, PT educated importance of wearing cervical collar AAT, PT dons at start of session        Mobility Belt    Mobility Belt " "Used for All Out of Bed Activity yes        Orthosis Neck Cervical Collar    Orthosis Properties Date Obtained: 01/17/24 Time Obtained: 1900 Location: Neck Type: Cervical Collar Features: Hard Therapeutic Indications: fracture immobilization Wearing Schedule: wear at all times (remove only for hygiene and skin inspection)       Sit to Stand Transfer    [place], Sit to Stand Transfer touching/steadying assist     Safety/Cues increased time to complete;minimal;verbal cues;technique;hand placement     Assistive Device walker, front-wheeled     Comment steady assist at pelvis for balance        Stand to Sit Transfer    [place], Stand to Sit Transfer touching/steadying assist     Safety/Cues minimal;verbal cues;proper use of assistive device     Assistive Device walker, front-wheeled     Comment steady assist at gait belt for balance        Stand Pivot Transfer    [place], Stand Pivot/Stand Step Transfer touching/steadying assist     Safety/Cues increased time to complete;minimal;verbal cues;technique     Assistive Device walker, front-wheeled     Comment via ambulatory approach with steady assist at pelvis for balance        Gait Training    [place], Gait touching/steadying assist     Safety/Cues increased time to complete;minimal;verbal cues;gait deviations;technique     Assistive Device walker, front-wheeled     Distance in Feet 60 feet     Pattern step-through     Deviations/Abnormal Patterns antalgic;ace decreased;gait speed decreased;step length decreased;stride length decreased     Bilateral Gait Deviations heel strike decreased     Right Sided Gait Deviations --   decreased weight shift    Comment (Gait/Stairs) steady assist at pelvis for balance, VC for posture, L step length, and heel strike        Curb Negotiation    [place] touching/steadying assist     Assistive Device walker, front-wheeled     Curb Height 6 inches     Comment up/down 6\" curb step with steady assist at gait belt " for balance; LLE up, RLE down        Rough/Uneven Surface Gait Skills    [place] touching/steadying assist     Assistive Device walker, front-wheeled     Distance in Feet 30 feet     Comment up/down 30' ADA ramp with RW and steady assist at pelvis for balance; increased time and effort        Upper Extremity (Therapeutic Exercise)    Exercise Position/Type seated;AROM (active range of motion)     General Exercise bilateral     Reps and Sets 2x10     Comment Sitting at edge of wc with no back support: Single arm biceps curls #3, single arm chest press #3, green band row        Lower Extremity (Therapeutic Exercise)    Exercise Position/Type seated;AROM (active range of motion)     General Exercise bilateral     Reps and Sets 2x10     Comment Ankle pumps, LAQ, hip flexion, hip adduction        Daily Progress Summary (PT)    Daily Outcome Statement Pt completed 30' ramp and curb step, progressing to steady assist and demonstrating improved stability. He demonstrated mild improvement in RLE strength during ther-ex. He requires increased time and rest breaks, but slowly improving with activity tolerance.                           IRF PT Goals    Flowsheet Row Most Recent Value   Bed Mobility Goal 1    Activity/Assistive Device bed mobility activities, all at 01/25/2024 0900   [place] minimum assist (75% or more patient effort) at 01/25/2024 0900   Time Frame short-term goal (STG), 5 - 7 days at 01/25/2024 0900   Progress/Outcome goal ongoing at 01/29/2024 0839   Bed Mobility Goal 2    Activity/Assistive Device bed mobility activities, all at 01/25/2024 0900   [place] modified independence at 01/25/2024 0900   Time Frame long-term goal (LTG), 14 days or less at 01/25/2024 0900   Progress/Outcome goal ongoing at 01/29/2024 0839   Transfer Goal 1    Activity/Assistive Device sit-to-stand/stand-to-sit, stand pivot at 01/29/2024 0839   [place] --  [steadying A] at 01/29/2024 0839   Time Frame short-term  goal (STG), 5 - 7 days at 01/29/2024 0839   Progress/Outcome goal met, goal revised this date at 01/29/2024 0839   Transfer Goal 2    Activity/Assistive Device sit-to-stand/stand-to-sit, bed-to-chair/chair-to-bed, stand pivot at 01/25/2024 0900   [place] modified independence at 01/25/2024 0900   Time Frame long-term goal (LTG), 14 days or less at 01/25/2024 0900   Progress/Outcome goal ongoing at 01/29/2024 0839   Gait/Walking Locomotion Goal 1    Activity/Assistive Device gait (walking locomotion) at 01/25/2024 0900   Distance 25 feet at 01/25/2024 0900   [place] minimum assist (75% or more patient effort) at 01/25/2024 0900   Time Frame short-term goal (STG), 5 - 7 days at 01/25/2024 0900   Progress/Outcome goal ongoing at 01/29/2024 0839   Gait/Walking Locomotion Goal 2    Activity/Assistive Device gait (walking locomotion), assistive device use at 01/29/2024 0839   Distance 150 feet at 01/29/2024 0839   [place] modified independence at 01/29/2024 0839   Time Frame long-term goal (LTG), 14 days or less at 01/29/2024 0839   Progress/Outcome goal revised this date at 01/29/2024 0839   Stairs Goal 1    Activity/Assistive Device stairs, all skills, using handrail, left, using handrail, right at 01/28/2024 1304   Number of Stairs 12 at 01/28/2024 1304   [place] minimum assist (75% or more patient effort) at 01/28/2024 1304   Time Frame 5 - 7 days, short-term goal (STG) at 01/28/2024 1304   Progress/Outcome goal ongoing at 01/29/2024 0839   Stairs Goal 2    Activity/Assistive Device stairs, all skills, using handrail, left, cane, straight at 01/28/2024 1304   Number of Stairs 12 at 01/28/2024 1304   [place] supervision required at 01/28/2024 1304   Time Frame 14 days or less at 01/28/2024 1304   Progress/Outcome goal ongoing at 01/29/2024 9567

## 2024-02-02 NOTE — PLAN OF CARE
Plan of Care Review  Plan of Care Reviewed With: patient  Progress: improving  Outcome Evaluation: [place] AAOX4, room air but gets scheduled breathing treatments due to previous pleural effusions.  Patient is continent of bowel and bladder.  Patient requests [place] cath at .  patient continues to be non compliant regarding aspen collar; order is for patient to wear At all times and patient continues to refuse even after lengthy education. patient on eliquis for anticoag. RT groin develop redness, patient reports its because shorts were to tight yesterday; picture taken and measurements placed in epc.  Sacrum dressing changed with hydrogel, aquacel and mepilex.

## 2024-02-02 NOTE — PROGRESS NOTES
"Patient: Randolph Daley  Location: [facility]  MRN: [id number]  Today's date: 2/2/2024    History of Present Illness  Pt is a 83 y.o. male admitted on 1/24/2024 with Multiple injuries due to trauma [T07.XXXA]. Principal problem is Multiple injuries due to trauma.  is a 83 y.o. male whose primary indication for inpatient rehabilitation is Debility.    Pt is a 83 y.o M adm to [facility] 1/24/24 who presented to [facility] [address] [facility] after a slip and fall on ice landing on his right side.  He was found to have multiple injuries including right 8-11 rib fractures, bilateral pleural effusion s/p IR thoracentesis 1/18 with drainage of 1.1L, pelvic fracture (non op mgmt), right adductor/obturator hematoma, questionable posttraumatic bladder injury and age-indeterminate cervical fracture.  Neurosurgery was consulted and offered c spinesurgery, but patient declined.  [place] Collar on at all times, except for meals to prevent aspiration. Urology was consulted.  CT cystogram showed possible extravasation of contrast into the prostatic urethra.  Womack catheter is currently draining clear urine.  Plan is to continue Womack catheter for 1 week.  CT cystogram in 1 week with possible removal at that time, but pt declining CT Cystogram.        Past Medical History  PMH: BPH, HTN       SLP Pain    Date/Time Pain Type Location Rating: Rest Interventions [facility]   02/02/24 1003 Pain Assessment other (see comments) \"ribs\" 4 - moderate pain premedicated for activity DLK   02/02/24 1027 Pain Reassessment other (see comments) \"ribs\" 4 - moderate pain pain management plan reviewed with patient/caregiver DLK          Prior Living Environment    Flowsheet Row Most Recent Value   People in Home spouse   Current Living Arrangements home   Home Accessibility stairs to enter home (Group), stairs within home (Group)   Living Environment Comment lives with wife in 2 SH home with first floor set up   Number of Stairs, Main " "Entrance 1   Surface of Stairs, Main Entrance hardwood   Stair Railings, Main Entrance none   Location, Patient Bedroom first (main) floor   Patient Bedroom Access Comment first floor set up   Location, Bathroom first (main) floor   Bathroom Access Comment First floor set up. Walk in shower. grab bar and shower \"bench\"   Stairs, Within Home, Primary 16   Number of Stairs, Within Home, Primary other (see comments)   Surface of Stairs, Within Home, Primary carpeting   Stair Railings, Within Home, Primary railings on both sides of stairs  [left side rail goes 1/2- then can hold the spindles]   Stairs Comment, Within Home, Primary pt reports wife office upstairs but master bed/bath 1st floor          Prior Level of Function    Flowsheet Row Most Recent Value   Dominant Hand right   Ambulation independent   Transferring independent   Toileting independent   Bathing independent   Dressing independent   Eating independent   IADLs independent   Driving/Transportation    Prior Level of Function Comment Ind PTA, previous cervical fx. 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        Cognition/Psychosocial    Executive Function Deficit problem-solving/reasoning     Comment, Executive Function Medication management:  evaluating pillboxes for errors (mod level) - pt completed x 50% accuracy mod I, increasing to 100% with min A for increased attention to details     " Comment, Short Term Memory Fxl recall: pt recalled previous ST activities and upcoming discharge date and plans (I).        Daily Progress Summary (SLP)    Daily Outcome Statement Session targeted cognition.  Moderate level medication management task completed with min A for increased attention to details.   Continue POC per primary treating therapist,.                      IRF SLP Goals    Flowsheet Row Most Recent Value   Motor Speech/Voice Goal 1    Motor Speech/Voice Goal 1 breath support exercises (incentive spirometer, sustained phonation),  vocal function exercises for improved volume and voice quality x 80% mod cues. at 01/26/2024 0922   Time Frame short-term goal (STG), 2 weeks at 01/26/2024 0922   Progress/Outcome goal ongoing at 01/28/2024 1244   Motor Speech/Voice Goal 2    Motor Speech/Voice Goal 2 Functinal breath support and voice quality for complex convesational needs x 90% with I use of strategies at 01/26/2024 0922   Time Frame long-term goal (LTG), 3 weeks at 01/26/2024 0922   Progress/Outcome goal ongoing at 01/28/2024 1244   Executive Function Goal 1    Activity executive function tasks, information processing tasks, organization/sequencing tasks, planning/decision-making tasks, problem-solving/reasoning tasks, self-monitoring/self-correction  [convergent/divergent reasoning tasks,  mod level functional tasks related to return to I managing bills, meds, calendar, safety] at 01/26/2024 0922   [place]/Accuracy with 80% accuracy, with moderate, verbal cues/redirection at 01/26/2024 0922   Time Frame short-term goal (STG), 2 weeks at 01/26/2024 0922   Progress/Outcome goal ongoing at 01/28/2024 1244   Executive Function Goal 2    Activity executive function tasks, information processing tasks, organization/sequencing tasks, planning/decision-making tasks, problem-solving/reasoning tasks, self-monitoring/self-correction at 01/26/2024 0922   [place]/Accuracy with 90% accuracy, with minimum,  verbal cues/redirection at 01/26/2024 0922   Time Frame long-term goal (LTG), 3 weeks at 01/26/2024 0922   Progress/Outcome goal ongoing at 01/28/2024 9913

## 2024-02-03 ENCOUNTER — APPOINTMENT (INPATIENT)
Dept: SPEECH THERAPY | Facility: REHABILITATION | Age: 84
DRG: 560 | End: 2024-02-03
Payer: COMMERCIAL

## 2024-02-03 ENCOUNTER — APPOINTMENT (INPATIENT)
Dept: OCCUPATIONAL THERAPY | Facility: REHABILITATION | Age: 84
DRG: 560 | End: 2024-02-03
Payer: COMMERCIAL

## 2024-02-03 PROCEDURE — 63700000 HC SELF-ADMINISTRABLE DRUG: Performed by: INTERNAL MEDICINE

## 2024-02-03 PROCEDURE — 25000000 HC PHARMACY GENERAL

## 2024-02-03 PROCEDURE — 92507 TX SP LANG VOICE COMM INDIV: CPT | Mod: GN

## 2024-02-03 PROCEDURE — 12800000 HC ROOM AND CARE SEMIPRIVATE REHAB

## 2024-02-03 PROCEDURE — 63700000 HC SELF-ADMINISTRABLE DRUG

## 2024-02-03 PROCEDURE — 97110 THERAPEUTIC EXERCISES: CPT | Mod: GO,59

## 2024-02-03 RX ADMIN — Medication 2500 UNITS: at 17:29

## 2024-02-03 RX ADMIN — FERROUS SULFATE TAB 325 MG (65 MG ELEMENTAL FE) 325 MG: 325 (65 FE) TAB at 09:29

## 2024-02-03 RX ADMIN — ALBUTEROL SULFATE: 2.5 SOLUTION RESPIRATORY (INHALATION) at 21:47

## 2024-02-03 RX ADMIN — SODIUM CHLORIDE SOLN NEBU 3% 3 ML: 3 NEBU SOLN at 17:37

## 2024-02-03 RX ADMIN — ALBUTEROL SULFATE: 2.5 SOLUTION RESPIRATORY (INHALATION) at 17:36

## 2024-02-03 RX ADMIN — OXYCODONE HYDROCHLORIDE AND ACETAMINOPHEN 500 MG: 500 TABLET ORAL at 09:29

## 2024-02-03 RX ADMIN — Medication 1 APPLICATION: at 09:36

## 2024-02-03 RX ADMIN — TAMSULOSIN HYDROCHLORIDE 0.4 MG: 0.4 CAPSULE ORAL at 21:45

## 2024-02-03 RX ADMIN — DOXYCYCLINE HYCLATE 100 MG: 100 TABLET, FILM COATED ORAL at 21:44

## 2024-02-03 RX ADMIN — ALBUTEROL SULFATE: 2.5 SOLUTION RESPIRATORY (INHALATION) at 09:32

## 2024-02-03 RX ADMIN — APIXABAN 2.5 MG: 2.5 TABLET, FILM COATED ORAL at 21:45

## 2024-02-03 RX ADMIN — SENNOSIDES AND DOCUSATE SODIUM 2 TABLET: 8.6; 5 TABLET ORAL at 21:44

## 2024-02-03 RX ADMIN — SENNOSIDES AND DOCUSATE SODIUM 2 TABLET: 8.6; 5 TABLET ORAL at 09:30

## 2024-02-03 RX ADMIN — SODIUM CHLORIDE SOLN NEBU 3% 3 ML: 3 NEBU SOLN at 21:46

## 2024-02-03 RX ADMIN — SODIUM CHLORIDE SOLN NEBU 3% 3 ML: 3 NEBU SOLN at 09:32

## 2024-02-03 RX ADMIN — DOXYCYCLINE HYCLATE 100 MG: 100 TABLET, FILM COATED ORAL at 09:30

## 2024-02-03 RX ADMIN — APIXABAN 2.5 MG: 2.5 TABLET, FILM COATED ORAL at 09:29

## 2024-02-03 RX ADMIN — LEVOTHYROXINE SODIUM 25 MCG: 25 TABLET ORAL at 05:52

## 2024-02-03 NOTE — PLAN OF CARE
Problem: Rehabilitation (IRF) Plan of Care  Goal: Plan of Care Review  Outcome: Progressing  Flowsheets (Taken 2/3/2024 1154)  Progress: improving  Plan of Care Reviewed With: patient  Outcome Evaluation: patient remains free from falls, using call bell appropriately, refusing to wear aspen collar, vito used this am for back discomfort   Plan of Care Review  Plan of Care Reviewed With: patient  Progress: improving  Outcome Evaluation: patient remains free from falls, using call bell appropriately, refusing to wear aspen collar, vito used this am for back discomfort

## 2024-02-03 NOTE — PLAN OF CARE
Plan of Care Review  Plan of Care Reviewed With: patient  Progress: improving  Outcome Evaluation: alert and oriented x4; continent of bowel and bladder; complaints of pain but refuses tylenol; refuses to wear aspen collar; refuses q2 repositioning in bed; education provided and patient continues to refuse; call bell in reach; bed alarm set; no other reports of concerns at this time.

## 2024-02-03 NOTE — PROGRESS NOTES
Patient: Randolph Daley  Location: [facility]  MRN: [id number]  Today's date: 2/3/2024    History of Present Illness  Pt is a 83 y.o. male admitted on 1/24/2024 with Multiple injuries due to trauma [T07.XXXA]. Principal problem is Multiple injuries due to trauma.  is a 83 y.o. male whose primary indication for inpatient rehabilitation is Debility.    Pt is a 83 y.o M adm to [facility] 1/24/24 who presented to [facility] after a slip and fall on ice landing on his right side.  He was found to have multiple injuries including right 8-11 rib fractures, bilateral pleural effusion s/p IR thoracentesis 1/18 with drainage of 1.1L, pelvic fracture (non op mgmt), right adductor/obturator hematoma, questionable posttraumatic bladder injury and age-indeterminate cervical fracture.  Neurosurgery was consulted and offered c spinesurgery, but patient declined.  [place] Collar on at all times, except for meals to prevent aspiration. Urology was consulted.  CT cystogram showed possible extravasation of contrast into the prostatic urethra.  Womack catheter is currently draining clear urine.  Plan is to continue Womack catheter for 1 week.  CT cystogram in 1 week with possible removal at that time, but pt declining CT Cystogram.        Past Medical History  PMH: BPH, HTN       SLP Pain    Date/Time Pain Type Location Rating: Rest Who   02/03/24 1101 Pain Assessment back 2 - mild pain MS   02/03/24 1129 Pain Reassessment back 2 - mild pain MS          Prior Living Environment    Flowsheet Row Most Recent Value   People in Home spouse   Current Living Arrangements home   Home Accessibility stairs to enter home (Group), stairs within home (Group)   Living Environment Comment lives with wife in 2  home with first floor set up   Number of Stairs, Main Entrance 1   Surface of Stairs, Main Entrance hardwood   Stair Railings, Main Entrance none   Location, Patient Bedroom first (main) floor   Patient Bedroom  "Access Comment first floor set up   Location, Bathroom first (main) floor   Bathroom Access Comment First floor set up. Walk in shower. grab bar and shower \"bench\"   Stairs, Within Home, Primary 16   Number of Stairs, Within Home, Primary other (see comments)   Surface of Stairs, Within Home, Primary carpeting   Stair Railings, Within Home, Primary railings on both sides of stairs  [left side rail goes 1/2- then can hold the spindles]   Stairs Comment, Within Home, Primary pt reports wife office upstairs but master bed/bath 1st floor          Prior Level of Function    Flowsheet Row Most Recent Value   Dominant Hand right   Ambulation independent   Transferring independent   Toileting independent   Bathing independent   Dressing independent   Eating independent   IADLs independent   Driving/Transportation    Prior Level of Function Comment Ind PTA, previous cervical fx. 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,  reports manages bill paying, medications, calendar at home           IRF SLP Evaluation and Treatment - 02/03/24 1101        SLP Time Calculation    Start Time 1100     Stop Time 1130     Time Calculation (min) 30 min        Session Details    Document Type Daily Treatment/Progress Note     Mode of Treatment speech language pathology;individual therapy        General Information    General Observations of Patient pt received seated upright in w/c at bedside; pleasant and agreeable to tx        Motor Speech    Motor Speech Intervention breath support for speech;phonation/voice production     Comment, Motor Speech Intervention pt reached 1100mL via incentive spirometer; SLP reviewed motor speech/voice strategies for increased breath and vocal intensity; pt utilized strategies given occasional cues in conversation for improved vocal intensity        Daily Progress Summary (SLP)    Daily Outcome Statement Tx session targeted motor speech. Pt " with improved carryover of motor speech strategies for improved vocal loudness. Continue plan of care per primary therapist.                      IRF SLP Goals    Flowsheet Row Most Recent Value   Motor Speech/Voice Goal 1    Motor Speech/Voice Goal 1 breath support exercises (incentive spirometer, sustained phonation),  vocal function exercises for improved volume and voice quality x 80% mod cues. at 01/26/2024 0922   Time Frame short-term goal (STG), 2 weeks at 01/26/2024 0922   Progress/Outcome goal ongoing at 01/28/2024 1244   Motor Speech/Voice Goal 2    Motor Speech/Voice Goal 2 Functinal breath support and voice quality for complex convesational needs x 90% with I use of strategies at 01/26/2024 0922   Time Frame long-term goal (LTG), 3 weeks at 01/26/2024 0922   Progress/Outcome goal ongoing at 01/28/2024 1244   Executive Function Goal 1    Activity executive function tasks, information processing tasks, organization/sequencing tasks, planning/decision-making tasks, problem-solving/reasoning tasks, self-monitoring/self-correction  [convergent/divergent reasoning tasks,  mod level functional tasks related to return to I managing bills, meds, calendar, safety] at 01/26/2024 0922   [place]/Accuracy with 80% accuracy, with moderate, verbal cues/redirection at 01/26/2024 0922   Time Frame short-term goal (STG), 2 weeks at 01/26/2024 0922   Progress/Outcome goal ongoing at 01/28/2024 1244   Executive Function Goal 2    Activity executive function tasks, information processing tasks, organization/sequencing tasks, planning/decision-making tasks, problem-solving/reasoning tasks, self-monitoring/self-correction at 01/26/2024 0922   [place]/Accuracy with 90% accuracy, with minimum, verbal cues/redirection at 01/26/2024 0922   Time Frame long-term goal (LTG), 3 weeks at 01/26/2024 0922   Progress/Outcome goal ongoing at 01/28/2024 1244

## 2024-02-03 NOTE — PROGRESS NOTES
Patient: Randolph Daley  Location: [facility]  MRN: [id number]  Today's date: 1/28/2024    History of Present Illness  Pt is a 83 y.o. male admitted on 1/24/2024 with Multiple injuries due to trauma [T07.XXXA]. Principal problem is Multiple injuries due to trauma.  is a 83 y.o. male whose primary indication for inpatient rehabilitation is Debility.    Pt is a 83 y.o M adm to [facility] 1/24/24 who presented to [facility] after a slip and fall on ice landing on his right side.  He was found to have multiple injuries including right 8-11 rib fractures, bilateral pleural effusion s/p IR thoracentesis 1/18 with drainage of 1.1L, pelvic fracture (non op mgmt), right adductor/obturator hematoma, questionable posttraumatic bladder injury and age-indeterminate cervical fracture.  Neurosurgery was consulted and offered c spinesurgery, but patient declined.  [place] Collar on at all times, except for meals to prevent aspiration. Urology was consulted.  CT cystogram showed possible extravasation of contrast into the prostatic urethra.  Womack catheter is currently draining clear urine.  Plan is to continue Womack catheter for 1 week.  CT cystogram in 1 week with possible removal at that time, but pt declining CT Cystogram.        Past Medical History  PMH: BPH, HTN          01/28/24 0805   Pain/Comfort/Sleep   Pain Charting Type Pain Assessment   Preferred Pain Scale number (Numeric Rating Pain Scale)   (0-10) Pain Rating: Rest 0   Vitals   Heart Rate (!) 59   Heart Rate Source Monitor   /67   BP Location Right upper arm   BP Method Automatic   Patient Position Lying        01/28/24 0855   Pain/Comfort/Sleep   Pain Charting Type Pain Reassessment   Preferred Pain Scale number (Numeric Rating Pain Scale)   (0-10) Pain Rating: Rest 3   (0-10) Pain Rating: Activity 10  (Pt states high levels of pain)   Pain Side/Orientation right   Pain Body Location flank   Pain Management Interventions  "position adjusted            Prior Living Environment    Flowsheet Row Most Recent Value   People in Home spouse   Current Living Arrangements home   Home Accessibility stairs to enter home (Group), stairs within home (Group)   Living Environment Comment lives with wife in 2 SH home with first floor set up   Number of Stairs, Main Entrance 1   Surface of Stairs, Main Entrance hardwood   Stair Railings, Main Entrance none   Location, Patient Bedroom first (main) floor   Patient Bedroom Access Comment first floor set up   Location, Bathroom first (main) floor   Bathroom Access Comment First floor set up. Walk in shower. grab bar and shower \"bench\"   Stairs, Within Home, Primary 16   Number of Stairs, Within Home, Primary other (see comments)   Surface of Stairs, Within Home, Primary carpeting   Stair Railings, Within Home, Primary railings on both sides of stairs  [left side rail goes 1/2- then can hold the spindles]   Stairs Comment, Within Home, Primary pt reports wife office upstairs but master bed/bath 1st floor          Prior Level of Function    Flowsheet Row Most Recent Value   Dominant Hand right   Ambulation independent   Transferring independent   Toileting independent   Bathing independent   Dressing independent   Eating independent   IADLs independent   Driving/Transportation    Prior Level of Function Comment Ind PTA, previous cervical fx. 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   Patient Goals Initial PSFS: getting in/out of a car 8/10, walking 5/10, getting on/off of the sofa 5/10, cooking 7/10 = 25/40 = 62.5%           " "IRF OT Evaluation and Treatment - 02/03/24 1315        Orthosis Neck Cervical Collar    Orthosis Properties Date Obtained: 01/17/24 Time Obtained: 1900 Location: Neck Type: Cervical Collar Features: Hard Therapeutic Indications: fracture immobilization Wearing Schedule: wear at all times (remove only for hygiene and skin inspection)       Impairments/Safety Issues    Functional Endurance \"Back of shoulder\" discomfort noted during some UE ROM; improves with repositioning                         01/28/24 0803   OT Time Calculation   Start Time 0800   Stop Time 0900   Time Calculation (min) 60 min   Session Details   Document Type Daily Treatment/Progress Note   Mode of Treatment occupational therapy;individual therapy   General Information   Patient Profile Reviewed yes   General Observations of Patient Pt supine in bed upon arrival receiving breathing treatment; agreeable to OT   Limitations/Impairments safety/cognitive   Mobility Belt   Mobility Belt Used for All Out of Bed Activity yes   Bed Mobility   Comment Supine to EOB at Mod A x1 for trunk support   Transfers   Comment A for safety/balance and pelvic lift   Sit to Stand Transfer   [place], Sit to Stand Transfer minimum assist (75% or more patient effort)   Safety/Cues increased time to complete;technique   Assistive Device walker, front-wheeled   Comment EOB to stance; w/c to stance   Stand to Sit Transfer   [place], Stand to Sit Transfer minimum assist (75% or more patient effort)   Safety/Cues increased time to complete;technique   Assistive Device walker, front-wheeled   Comment stance to w/c   Stand Pivot Transfer   [place], Stand Pivot/Stand Step Transfer minimum assist (75% or more patient effort)   Safety/Cues increased time to complete;technique   Assistive Device walker, front-wheeled   Comment EOB to w/c   Shower Transfer   Transfer Technique stand pivot  (sit/stand)   [place] minimum assist (75% or more patient effort) "   Safety/Cues increased time to complete;verbal cues;hand placement;technique   Assistive Device grab bars/tub rail;walker, front-wheeled;shower chair   Comment w/c to and from shower chair; shower chair to stance   Balance   Comment, Balance Supported stance at Min A x1 for safety; unsupported sitting at CLS x1   Bathing   Shower Provided? Yes   Tasks chest;left arm;right arm;abdomen;front perineal area;buttocks;left upper leg;right upper leg;left lower leg, including foot   [place] minimum assist (75% or more patient effort)   Safety/Cues increased time to complete;compensatory techniques;energy conservation;verbal cues;maintaining precautions;use of adaptive equipment   Position supported sitting   Setup Assistance adaptive equipment setup;adjust water temperature/flow;obtain supplies;open containers;orthosis application   Adaptive Equipment grab bar/tub rail;hand-held shower spray hose;shower chair   Comment Full wet shower performed with pt agreeable to use of [place] collar. Majority of shower performed seated and in stance for perineal hygiene requiring A for balance. Pt utilizes long handled sponge to wash BLEs.   Upper Body Dressing   Tasks don;pull over garment   [place] moderate assist (50-74% patient effort)   Safety/Cues increased time to complete;compensatory techniques;energy conservation;maintaining precautions   Position supported sitting   Adaptive Equipment none   Comment Pt requires A to thread LUE and manage cervical collar; able to perform all other tasks with increased time   Lower Body Dressing   Tasks don;socks;underwear   [place] maximum assist (25-49% patient effort)   Safety/Cues increased time to complete;compensatory techniques;maintaining precautions   Position supported sitting   Adaptive Equipment none   [place], Footwear dependent (less than 25% patient effort)   Comment Pt able to thread RLE through underwear and also threads LLE through same hole; A to manage.  Pt in stance for pulling above waist requiring A for balance. Dep x1 for sock management   Grooming   [place] close supervision  (per most recent performance)   [place], Oral Hygiene set up  (per most recent performance)   Toileting   [place] moderate assist (50-74% patient effort)  (per most recent performance)   Self-Feeding   [place] set up  (per most recent performance)   Daily Progress Summary (OT)   Daily Outcome Statement OT session focused on bathing and dressing tasks with pt progressing with bathing; agreeable to use of [place] collar. Direct pt handoff to nursing at conclusion of session. Recommend ongoing OT to maximize rehab potential; continue with POC.            IRF OT Goals    Flowsheet Row Most Recent Value   Transfer Goal 1    Activity/Assistive Device toilet at 01/25/2024 0629   [place] supervision required at 01/25/2024 0629   Time Frame short-term goal (STG), 5 - 7 days at 01/29/2024 0708   Progress/Outcome goal ongoing at 01/29/2024 0708   Transfer Goal 2    Activity/Assistive Device toilet at 01/25/2024 0629   [place] modified independence  [steady A] at 01/25/2024 0629   Time Frame long-term goal (LTG), 14 days or less at 01/29/2024 0708   Progress/Outcome goal ongoing at 01/29/2024 0708   Transfer Goal 3    Activity/Assistive Device shower at 01/25/2024 0629   [place] other (see comments)  [touch A] at 01/25/2024 0629   Time Frame short-term goal (STG), 5 - 7 days at 01/29/2024 0708   Progress/Outcome goal ongoing at 01/29/2024 0708   Transfer Goal 4    Activity/Assistive Device shower at 01/25/2024 0629   [place] modified independence at 01/29/2024 0708   Time Frame long-term goal (LTG), 14 days or less at 01/29/2024 0708   Progress/Outcome goal revised this date at 01/29/2024 0708   Bathing Goal 1    Activity/Assistive Device bathing skills, all at 01/25/2024 0629   [place] minimum assist (75% or more patient effort) at 01/25/2024 0629   Time  Frame short-term goal (STG), 5 - 7 days at 01/29/2024 0708   Progress/Outcome goal ongoing at 01/29/2024 0708   Bathing Goal 2    Activity/Assistive Device bathing skills, all at 01/25/2024 0629   [place] set-up required at 01/29/2024 0708   Time Frame long-term goal (LTG), 14 days or less at 01/29/2024 0708   Progress/Outcome goal revised this date at 01/29/2024 0708   UB Dressing Goal 1    Activity/Assistive Device upper body dressing at 01/25/2024 0629   [place] minimum assist (75% or more patient effort) at 01/29/2024 0708   Time Frame short-term goal (STG), 5 - 7 days at 01/29/2024 0708   Strategies/Barriers including clothing retrieval at 01/29/2024 0708   Progress/Outcome goal revised this date at 01/29/2024 0708   UB Dressing Goal 2    Activity/Assistive Device upper body dressing at 01/25/2024 0629   [place] minimum assist (75% or more patient effort) at 01/29/2024 0708   Time Frame long-term goal (LTG), 14 days or less at 01/29/2024 0708   Progress/Outcome goal revised this date at 01/29/2024 0708   LB Dressing Goal 1    Activity/Assistive Device lower body dressing at 01/25/2024 0629   [place] minimum assist (75% or more patient effort) at 01/25/2024 0629   Time Frame short-term goal (STG), 5 - 7 days at 01/29/2024 0708   Progress/Outcome goal ongoing at 01/29/2024 0708   LB Dressing Goal 2    Activity/Assistive Device lower body dressing at 01/25/2024 0629   [place] modified independence at 01/25/2024 0629   Time Frame long-term goal (LTG), 14 days or less at 01/29/2024 0708   Progress/Outcome goal ongoing at 01/29/2024 0708   Grooming Goal 1    Activity/Assistive Device grooming skills, all at 01/25/2024 0629   [place] set-up required at 01/25/2024 0629   Time Frame short-term goal (STG), 5 - 7 days at 01/29/2024 0708   Progress/Outcome goal ongoing at 01/29/2024 0708   Grooming Goal 2    Activity/Assistive Device grooming skills, all at 01/25/2024 0629   [place]  modified independence at 01/25/2024 0629   Time Frame long-term goal (LTG), 14 days or less at 01/29/2024 0708   Progress/Outcome goal ongoing at 01/29/2024 0708   Toileting Goal 1    Activity/Assistive Device toileting skills, all at 01/25/2024 0629   [place] minimum assist (75% or more patient effort) at 01/25/2024 0629   Time Frame short-term goal (STG), 5 - 7 days at 01/29/2024 0708   Progress/Outcome goal ongoing at 01/29/2024 0708   Toileting Goal 2    Activity/Assistive Device toileting skills, all at 01/25/2024 0629   [place] modified independence at 01/25/2024 0629   Time Frame long-term goal (LTG), 14 days or less at 01/29/2024 0708   Progress/Outcome goal ongoing at 01/29/2024 0708

## 2024-02-03 NOTE — PROGRESS NOTES
Patient: Randolph Daley  Location: [facility]  MRN: [id number]  Today's date: 2/3/2024    History of Present Illness  Pt is a 83 y.o. male admitted on 1/24/2024 with Multiple injuries due to trauma [T07.XXXA]. Principal problem is Multiple injuries due to trauma.  is a 83 y.o. male whose primary indication for inpatient rehabilitation is Debility.    Pt is a 83 y.o M adm to [facility] 1/24/24 who presented to [facility] after a slip and fall on ice landing on his right side.  He was found to have multiple injuries including right 8-11 rib fractures, bilateral pleural effusion s/p IR thoracentesis 1/18 with drainage of 1.1L, pelvic fracture (non op mgmt), right adductor/obturator hematoma, questionable posttraumatic bladder injury and age-indeterminate cervical fracture.  Neurosurgery was consulted and offered c spinesurgery, but patient declined.  [place] Collar on at all times, except for meals to prevent aspiration. Urology was consulted.  CT cystogram showed possible extravasation of contrast into the prostatic urethra.  Womack catheter is currently draining clear urine.  Plan is to continue Womack catheter for 1 week.  CT cystogram in 1 week with possible removal at that time, but pt declining CT Cystogram.        Past Medical History  PMH: BPH, HTN       OT Vitals    Date/Time Pulse HR Source BP BP Location BP Method Pt Position [facility]   02/03/24 1301 59 Monitor 99/54 Right upper arm Automatic Sitting MCS      OT Pain    Date/Time Pain Type Rating: Rest [facility]   02/03/24 1301 Pain Assessment 0 [facility]CS             Prior Living Environment    Flowsheet Row Most Recent Value   People in Home spouse   Current Living Arrangements home   Home Accessibility stairs to enter home (Group), stairs within home (Group)   Living Environment Comment lives with wife in 2  home with first floor set up   Number of Stairs, Main Entrance 1   Surface of Stairs, Main Entrance hardwood   Stair Railings, Main  "Entrance none   Location, Patient Bedroom first (main) floor   Patient Bedroom Access Comment first floor set up   Location, Bathroom first (main) floor   Bathroom Access Comment First floor set up. Walk in shower. grab bar and shower \"bench\"   Stairs, Within Home, Primary 16   Number of Stairs, Within Home, Primary other (see comments)   Surface of Stairs, Within Home, Primary carpeting   Stair Railings, Within Home, Primary railings on both sides of stairs  [left side rail goes 1/2- then can hold the spindles]   Stairs Comment, Within Home, Primary pt reports wife office upstairs but master bed/bath 1st floor          Prior Level of Function    Flowsheet Row Most Recent Value   Dominant Hand right   Ambulation independent   Transferring independent   Toileting independent   Bathing independent   Dressing independent   Eating independent   IADLs independent   Driving/Transportation    Prior Level of Function Comment Ind PTA, previous cervical fx. 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   Patient Goals Initial PSFS: getting in/out of a car 8/10, walking 5/10, getting on/off of the sofa 5/10, cooking 7/10 = 25/40 = 62.5%           IRF OT Evaluation and Treatment - 02/03/24 1315        Orthosis Neck Cervical Collar    Orthosis Properties Date Obtained: 01/17/24 Time Obtained: 1900 Location: Neck Type: Cervical Collar Features: Hard Therapeutic Indications: fracture immobilization Wearing Schedule: wear at all times (remove only for hygiene and skin inspection)       Impairments/Safety Issues    Functional Endurance \"Back of shoulder\" " discomfort noted during some UE ROM; improves with repositioning                  02/03/24 1303   OT Time Calculation   Start Time 1300   Stop Time 1330   Time Calculation (min) 30 min   Session Details   Document Type Daily Treatment/Progress Note   Mode of Treatment occupational therapy;individual therapy   General Information   Patient Profile Reviewed yes   General Observations of Patient Pt seated on w/c upon arrival; agreealbe to OT. Pt's spouse and dtr present t/o session. Pt refuses use of cervical collar during session; OT session performed at seated level on w/c for safety   Limitations/Impairments safety/cognitive   Mobility Belt   Mobility Belt Used for All Out of Bed Activity no   Reason Mobility Belt Not Used Pt remained seated on w/c   Therapeutic Exercise   Therapeutic Exercise upper extremity   Upper Extremity (Therapeutic Exercise)   Exercise Position/Type seated;AROM (active range of motion)   General Exercise bilateral   Range of Motion Exercises bilateral;shoulder flexion/extension;shoulder abduction/adduction;shoulder horizontal abduction/adduction   Reps and Sets 2 sets of 10 reps   Comment BUE strengthening performed to increase sustained activity tolerance. Emphasis placed on slow and controlled eccentric lowering. Pt tolerates well with increase in R flank pain which improves with rest and repositioning.   Daily Progress Summary (OT)   Daily Outcome Statement OT session focused on seated AROM/ROM exercise. Pt refuses use of cervical collar; education provided. Recommend ongoing OT to maximize rehab potential (spouse interested in obtaining HEP for theraband); continue with POC.                 IRF OT Goals    Flowsheet Row Most Recent Value   Transfer Goal 1    Activity/Assistive Device toilet at 01/25/2024 0629   [place] supervision required at 01/25/2024 0629   Time Frame short-term goal (STG), 5 - 7 days at 01/29/2024 0708   Progress/Outcome goal ongoing at 01/29/2024 0708    Transfer Goal 2    Activity/Assistive Device toilet at 01/25/2024 0629   [place] modified independence  [steady A] at 01/25/2024 0629   Time Frame long-term goal (LTG), 14 days or less at 01/29/2024 0708   Progress/Outcome goal ongoing at 01/29/2024 0708   Transfer Goal 3    Activity/Assistive Device shower at 01/25/2024 0629   [place] other (see comments)  [touch A] at 01/25/2024 0629   Time Frame short-term goal (STG), 5 - 7 days at 01/29/2024 0708   Progress/Outcome goal ongoing at 01/29/2024 0708   Transfer Goal 4    Activity/Assistive Device shower at 01/25/2024 0629   [place] modified independence at 01/29/2024 0708   Time Frame long-term goal (LTG), 14 days or less at 01/29/2024 0708   Progress/Outcome goal revised this date at 01/29/2024 0708   Bathing Goal 1    Activity/Assistive Device bathing skills, all at 01/25/2024 0629   [place] minimum assist (75% or more patient effort) at 01/25/2024 0629   Time Frame short-term goal (STG), 5 - 7 days at 01/29/2024 0708   Progress/Outcome goal ongoing at 01/29/2024 0708   Bathing Goal 2    Activity/Assistive Device bathing skills, all at 01/25/2024 0629   [place] set-up required at 01/29/2024 0708   Time Frame long-term goal (LTG), 14 days or less at 01/29/2024 0708   Progress/Outcome goal revised this date at 01/29/2024 0708   UB Dressing Goal 1    Activity/Assistive Device upper body dressing at 01/25/2024 0629   [place] minimum assist (75% or more patient effort) at 01/29/2024 0708   Time Frame short-term goal (STG), 5 - 7 days at 01/29/2024 0708   Strategies/Barriers including clothing retrieval at 01/29/2024 0708   Progress/Outcome goal revised this date at 01/29/2024 0708   UB Dressing Goal 2    Activity/Assistive Device upper body dressing at 01/25/2024 0629   [place] minimum assist (75% or more patient effort) at 01/29/2024 0708   Time Frame long-term goal (LTG), 14 days or less at 01/29/2024 0708   Progress/Outcome goal  revised this date at 01/29/2024 0708   LB Dressing Goal 1    Activity/Assistive Device lower body dressing at 01/25/2024 0629   [place] minimum assist (75% or more patient effort) at 01/25/2024 0629   Time Frame short-term goal (STG), 5 - 7 days at 01/29/2024 0708   Progress/Outcome goal ongoing at 01/29/2024 0708   LB Dressing Goal 2    Activity/Assistive Device lower body dressing at 01/25/2024 0629   [place] modified independence at 01/25/2024 0629   Time Frame long-term goal (LTG), 14 days or less at 01/29/2024 0708   Progress/Outcome goal ongoing at 01/29/2024 0708   Grooming Goal 1    Activity/Assistive Device grooming skills, all at 01/25/2024 0629   [place] set-up required at 01/25/2024 0629   Time Frame short-term goal (STG), 5 - 7 days at 01/29/2024 0708   Progress/Outcome goal ongoing at 01/29/2024 0708   Grooming Goal 2    Activity/Assistive Device grooming skills, all at 01/25/2024 0629   [place] modified independence at 01/25/2024 0629   Time Frame long-term goal (LTG), 14 days or less at 01/29/2024 0708   Progress/Outcome goal ongoing at 01/29/2024 0708   Toileting Goal 1    Activity/Assistive Device toileting skills, all at 01/25/2024 0629   [place] minimum assist (75% or more patient effort) at 01/25/2024 0629   Time Frame short-term goal (STG), 5 - 7 days at 01/29/2024 0708   Progress/Outcome goal ongoing at 01/29/2024 0708   Toileting Goal 2    Activity/Assistive Device toileting skills, all at 01/25/2024 0629   [place] modified independence at 01/25/2024 0629   Time Frame long-term goal (LTG), 14 days or less at 01/29/2024 0708   Progress/Outcome goal ongoing at 01/29/2024 0708

## 2024-02-04 ENCOUNTER — APPOINTMENT (INPATIENT)
Dept: SPEECH THERAPY | Facility: REHABILITATION | Age: 84
DRG: 560 | End: 2024-02-04
Payer: COMMERCIAL

## 2024-02-04 ENCOUNTER — APPOINTMENT (INPATIENT)
Dept: PHYSICAL THERAPY | Facility: REHABILITATION | Age: 84
DRG: 560 | End: 2024-02-04
Payer: COMMERCIAL

## 2024-02-04 PROCEDURE — 25000000 HC PHARMACY GENERAL

## 2024-02-04 PROCEDURE — 97116 GAIT TRAINING THERAPY: CPT | Mod: GP

## 2024-02-04 PROCEDURE — 63700000 HC SELF-ADMINISTRABLE DRUG: Performed by: INTERNAL MEDICINE

## 2024-02-04 PROCEDURE — 12800000 HC ROOM AND CARE SEMIPRIVATE REHAB

## 2024-02-04 PROCEDURE — 97530 THERAPEUTIC ACTIVITIES: CPT | Mod: GP,59

## 2024-02-04 PROCEDURE — 63700000 HC SELF-ADMINISTRABLE DRUG

## 2024-02-04 PROCEDURE — 97112 NEUROMUSCULAR REEDUCATION: CPT | Mod: GP,59

## 2024-02-04 PROCEDURE — 97110 THERAPEUTIC EXERCISES: CPT | Mod: GP,59

## 2024-02-04 PROCEDURE — 92507 TX SP LANG VOICE COMM INDIV: CPT | Mod: GN

## 2024-02-04 RX ADMIN — APIXABAN 2.5 MG: 2.5 TABLET, FILM COATED ORAL at 20:08

## 2024-02-04 RX ADMIN — Medication 2500 UNITS: at 18:38

## 2024-02-04 RX ADMIN — BISACODYL 10 MG: 5 TABLET, COATED ORAL at 08:30

## 2024-02-04 RX ADMIN — SODIUM CHLORIDE SOLN NEBU 3% 3 ML: 3 NEBU SOLN at 20:09

## 2024-02-04 RX ADMIN — APIXABAN 2.5 MG: 2.5 TABLET, FILM COATED ORAL at 08:30

## 2024-02-04 RX ADMIN — ALBUTEROL SULFATE: 2.5 SOLUTION RESPIRATORY (INHALATION) at 08:29

## 2024-02-04 RX ADMIN — SODIUM CHLORIDE SOLN NEBU 3% 3 ML: 3 NEBU SOLN at 08:29

## 2024-02-04 RX ADMIN — SENNOSIDES AND DOCUSATE SODIUM 2 TABLET: 8.6; 5 TABLET ORAL at 20:07

## 2024-02-04 RX ADMIN — LEVOTHYROXINE SODIUM 25 MCG: 25 TABLET ORAL at 06:14

## 2024-02-04 RX ADMIN — ALBUTEROL SULFATE: 2.5 SOLUTION RESPIRATORY (INHALATION) at 20:09

## 2024-02-04 RX ADMIN — OXYCODONE HYDROCHLORIDE AND ACETAMINOPHEN 500 MG: 500 TABLET ORAL at 08:30

## 2024-02-04 RX ADMIN — DOXYCYCLINE HYCLATE 100 MG: 100 TABLET, FILM COATED ORAL at 08:30

## 2024-02-04 RX ADMIN — DOXYCYCLINE HYCLATE 100 MG: 100 TABLET, FILM COATED ORAL at 20:08

## 2024-02-04 RX ADMIN — SENNOSIDES AND DOCUSATE SODIUM 2 TABLET: 8.6; 5 TABLET ORAL at 08:30

## 2024-02-04 RX ADMIN — TAMSULOSIN HYDROCHLORIDE 0.4 MG: 0.4 CAPSULE ORAL at 20:08

## 2024-02-04 RX ADMIN — FERROUS SULFATE TAB 325 MG (65 MG ELEMENTAL FE) 325 MG: 325 (65 FE) TAB at 08:30

## 2024-02-04 ASSESSMENT — COGNITIVE AND FUNCTIONAL STATUS - GENERAL: AFFECT: FLAT/BLUNTED AFFECT

## 2024-02-04 NOTE — PROGRESS NOTES
Patient: Randolph Daley  Location: [facility] [address]  MRN: [id number]  Today's date: 2/4/2024    History of Present Illness  Pt is a 83 y.o. male admitted on 1/24/2024 with Multiple injuries due to trauma [T07.XXXA]. Principal problem is Multiple injuries due to trauma.  is a 83 y.o. male whose primary indication for inpatient rehabilitation is Debility.    Pt is a 83 y.o M adm to [facility] 1/24/24 who presented to [facility] after a slip and fall on ice landing on his right side.  He was found to have multiple injuries including right 8-11 rib fractures, bilateral pleural effusion s/p IR thoracentesis 1/18 with drainage of 1.1L, pelvic fracture (non op mgmt), right adductor/obturator hematoma, questionable posttraumatic bladder injury and age-indeterminate cervical fracture.  Neurosurgery was consulted and offered c spinesurgery, but patient declined.  [place] Collar on at all times, except for meals to prevent aspiration. Urology was consulted.  CT cystogram showed possible extravasation of contrast into the prostatic urethra.  Womack catheter is currently draining clear urine.  Plan is to continue Womack catheter for 1 week.  CT cystogram in 1 week with possible removal at that time, but pt declining CT Cystogram.        Past Medical History  PMH: BPH, HTN       SLP Pain    Date/Time Pain Type Side/Orientation Location Rating: Rest Description Interventions [facility]   02/04/24 1131 Pain Assessment right back 4 sore diversional activity provided [facility]   02/04/24 1158 -- right back 4 sore diversional activity provided [facility]          Prior Living Environment    Flowsheet Row Most Recent Value   People in Home spouse   Current Living Arrangements home   Home Accessibility stairs to enter home (Group), stairs within home (Group)   Living Environment Comment lives with wife in 2  home with first floor set up   Number of Stairs, Main Entrance 1   Surface of Stairs, Main Entrance hardwood   Stair  "Railings, Main Entrance none   Location, Patient Bedroom first (main) floor   Patient Bedroom Access Comment first floor set up   Location, Bathroom first (main) floor   Bathroom Access Comment First floor set up. Walk in shower. grab bar and shower \"bench\"   Stairs, Within Home, Primary 16   Number of Stairs, Within Home, Primary other (see comments)   Surface of Stairs, Within Home, Primary carpeting   Stair Railings, Within Home, Primary railings on both sides of stairs  [left side rail goes 1/2- then can hold the spindles]   Stairs Comment, Within Home, Primary pt reports wife office upstairs but master bed/bath 1st floor          Prior Level of Function    Flowsheet Row Most Recent Value   Dominant Hand right   Ambulation independent   Transferring independent   Toileting independent   Bathing independent   Dressing independent   Eating independent   IADLs independent   Driving/Transportation    Prior Level of Function Comment Ind PTA, previous cervical fx. 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,  reports manages bill paying, medications, calendar at home           IRF SLP Evaluation and Treatment - 02/04/24 1131        SLP Time Calculation    Start Time 1130     Stop Time 1200     Time Calculation (min) 30 min        Session Details    Document Type Daily Treatment/Progress Note     Mode of Treatment speech language pathology;individual therapy        General Information    General Observations of Patient Pt seen in pt's room.  Pt upright in wheelchair watching golf.  Cerical collar not donned.  Pt refused to wear cervical collar during session.  Pt stated it was for when he was walking around.        Cognition/Psychosocial    Affect/Mental Status flat/blunted affect     Comment, Attention Pt with fair attention to tasks today.  Occasional distracted by pain.  Easily redirected     Comment, Executive Function Divergent naming: [place] " categories 15 items in 3 sets with good speech and independence.  Convergent categorzation set 2 90% accuracy TINO with good speed.        Motor Speech    Comment, Motor Speech Intervention Pt completed incentive spirometer x 10.  Pt able to acheive 900-1100 ml in 10 opportunities   Pt benefited from 2 sets of 5 with a short break.  Sustained phonation avg 7.6 sec.  Pt had pitch breaks and some dysphonia.        Daily Progress Summary (SLP)    Daily Outcome Statement Tx targeted breath support and cognition.  Pt acheived 900-1100 on incentive spirometer.  Avg sustained phonation 7 seconds.  Pt with improve divergent and convergent reasoning skills of concrete categories.  Continue with POC targeting all goals.     Symptoms Noted During/After Treatment none                     IRF SLP Goals    Flowsheet Row Most Recent Value   Motor Speech/Voice Goal 1    Motor Speech/Voice Goal 1 breath support exercises (incentive spirometer, sustained phonation),  vocal function exercises for improved volume and voice quality x 80% mod cues. at 01/26/2024 0922   Time Frame short-term goal (STG), 2 weeks at 01/26/2024 0922   Progress/Outcome goal ongoing at 01/28/2024 1244   Motor Speech/Voice Goal 2    Motor Speech/Voice Goal 2 Functinal breath support and voice quality for complex convesational needs x 90% with I use of strategies at 01/26/2024 0922   Time Frame long-term goal (LTG), 3 weeks at 01/26/2024 0922   Progress/Outcome goal ongoing at 01/28/2024 1244   Executive Function Goal 1    Activity executive function tasks, information processing tasks, organization/sequencing tasks, planning/decision-making tasks, problem-solving/reasoning tasks, self-monitoring/self-correction  [convergent/divergent reasoning tasks,  mod level functional tasks related to return to I managing bills, meds, calendar, safety] at 01/26/2024 0922   [place]/Accuracy with 80% accuracy, with moderate, verbal cues/redirection at 01/26/2024 0922    Time Frame short-term goal (STG), 2 weeks at 01/26/2024 0922   Progress/Outcome goal ongoing at 01/28/2024 1244   Executive Function Goal 2    Activity executive function tasks, information processing tasks, organization/sequencing tasks, planning/decision-making tasks, problem-solving/reasoning tasks, self-monitoring/self-correction at 01/26/2024 0922   [place]/Accuracy with 90% accuracy, with minimum, verbal cues/redirection at 01/26/2024 0922   Time Frame long-term goal (LTG), 3 weeks at 01/26/2024 0922   Progress/Outcome goal ongoing at 01/28/2024 1244

## 2024-02-04 NOTE — PLAN OF CARE
Problem: Rehabilitation (IRF) Plan of Care  Goal: Plan of Care Review  Outcome: Progressing  Flowsheets (Taken 2/4/2024 1120)  Progress: improving  Plan of Care Reviewed With: patient  Outcome Evaluation: milly remains free from falls. using call bell appropriately, no c/o of pain this am, nebs administered, non compliant with aspen collar   Plan of Care Review  Plan of Care Reviewed With: patient  Progress: improving  Outcome Evaluation: milly remains free from falls. using call bell appropriately, no c/o of pain this am, nebs administered, non compliant with aspen collar

## 2024-02-04 NOTE — PROGRESS NOTES
Patient: Randolph Dlaey  Location: [facility] [address]  MRN: [id number]  Today's date: 2/4/2024    History of Present Illness  Pt is a 83 y.o. male admitted on 1/24/2024 with Multiple injuries due to trauma [T07.XXXA]. Principal problem is Multiple injuries due to trauma.  is a 83 y.o. male whose primary indication for inpatient rehabilitation is Debility.    Pt is a 83 y.o M adm to [facility] 1/24/24 who presented to [facility] after a slip and fall on ice landing on his right side.  He was found to have multiple injuries including right 8-11 rib fractures, bilateral pleural effusion s/p IR thoracentesis 1/18 with drainage of 1.1L, pelvic fracture (non op mgmt), right adductor/obturator hematoma, questionable posttraumatic bladder injury and age-indeterminate cervical fracture.  Neurosurgery was consulted and offered c spinesurgery, but patient declined.  [place] Collar on at all times, except for meals to prevent aspiration. Urology was consulted.  CT cystogram showed possible extravasation of contrast into the prostatic urethra.  Womack catheter is currently draining clear urine.  Plan is to continue Womack catheter for 1 week.  CT cystogram in 1 week with possible removal at that time, but pt declining CT Cystogram.        Past Medical History  PMH: BPH, HTN       PT Vitals    Date/Time Pulse HR Source SpO2 Pt Activity O2 Therapy BP BP Location BP Method Pt Position [facility]   02/04/24 1001 59 Monitor 98 % At rest None (Room air) 110/62 Left upper arm Automatic Sitting ESS      PT Pain    Date/Time Pain Type Rating: Rest [facility]   02/04/24 1001 Pain Assessment 0 - no pain ESS   02/04/24 1059 Pain Reassessment 0 - no pain ESS             Prior Living Environment    Flowsheet Row Most Recent Value   People in Home spouse   Current Living Arrangements home   Home Accessibility stairs to enter home (Group), stairs within home (Group)   Living Environment Comment lives with wife in 2 SH home with first  "floor set up   Number of Stairs, Main Entrance 1   Surface of Stairs, Main Entrance hardwood   Stair Railings, Main Entrance none   Location, Patient Bedroom first (main) floor   Patient Bedroom Access Comment first floor set up   Location, Bathroom first (main) floor   Bathroom Access Comment First floor set up. Walk in shower. grab bar and shower \"bench\"   Stairs, Within Home, Primary 16   Number of Stairs, Within Home, Primary other (see comments)   Surface of Stairs, Within Home, Primary carpeting   Stair Railings, Within Home, Primary railings on both sides of stairs  [left side rail goes 1/2- then can hold the spindles]   Stairs Comment, Within Home, Primary pt reports wife office upstairs but master bed/bath 1st floor          Prior Level of Function    Flowsheet Row Most Recent Value   Dominant Hand right   Ambulation independent   Transferring independent   Toileting independent   Bathing independent   Dressing independent   Eating independent   IADLs independent   Driving/Transportation    Prior Level of Function Comment Ind PTA, previous cervical fx. 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,  reports manages bill paying, medications, calendar at home           IRF PT Evaluation and Treatment - 02/04/24 1001        PT Time Calculation    Start Time 1000     Stop Time 1100     Time Calculation (min) 60 min        Session Details    Document Type Daily Treatment/Progress Note     Mode of Treatment physical therapy;individual therapy        General Information    Patient Profile Reviewed yes     General Observations of Patient pt received in room seated in wheelchair without cervical collar donned.  therapist provided education and pt agreeable to donning cervical collar. cervical collar in place throughout session.        Mobility Belt    Mobility Belt Used for All Out of Bed Activity yes        Orthosis Neck Cervical Collar    Orthosis " Properties Date Obtained: 01/17/24 Time Obtained: 1900 Location: Neck Type: Cervical Collar Features: Hard Therapeutic Indications: fracture immobilization Wearing Schedule: wear at all times (remove only for hygiene and skin inspection)       Sit to Stand Transfer    [place], Sit to Stand Transfer touching/steadying assist     Safety/Cues increased time to complete;technique     Assistive Device walker, front-wheeled     Comment steadying assist at pelvis for balance        Stand to Sit Transfer    [place], Stand to Sit Transfer touching/steadying assist     Safety/Cues increased time to complete;technique     Assistive Device walker, front-wheeled     Comment steadying assist at pelvis for controlled descent        Stand Pivot Transfer    [place], Stand Pivot/Stand Step Transfer touching/steadying assist     Safety/Cues increased time to complete;verbal cues;hand placement;sequencing     Assistive Device walker, front-wheeled     Comment via ambulatory approach with RW, steadying assist at pelvis for balance and sequencing of transfer.  verbal cues for proximity to wheelchair prior to sitting.        Gait Training    [place], Gait touching/steadying assist     Safety/Cues increased time to complete;minimal;verbal cues;tactile cues     Assistive Device walker, front-wheeled     Distance in Feet 50 feet   x2    Pattern step-through     Deviations/Abnormal Patterns antalgic;base of support, narrow;gait speed decreased;step length decreased;weight shifting decreased     Bilateral Gait Deviations heel strike decreased     Comment (Gait/Stairs) 50' x 2 reps over level surfaces with RW and steadying assist at pelvis for balance.  Pt noted with decreased gait speed, increased B UE reliance on RW, decreased L step length, flat foot contact B.  Distance limited by pt reports of R scapular discomfort.        Balance    Comment, Balance per pt request, pt assisted to restroom during session and completed  the following functional balance training while toileting.  dynamic balance without UE support in standing while completing toileting, steadying assist for balance.        Lower Extremity (Therapeutic Exercise)    Comment pt completed the following seated B LE TE for muscular strengthening and endurance. 3 x 10 reps each, with increased time to complete.  1) LAQ with 2 sec isometric  2) hip flexion (AA R LE)  3) ankle DF/PF 4) hip abduction into green theraband        Therapeutic Interventions    Comment, Therapeutic Intervention pt completed block practice STS from low mat table to RW, 2 x 5 reps with steadying assist for functional transfer training and functional LE strengthening.  emphasis on eccentric control stand to sit.        Daily Progress Summary (PT)    Daily Outcome Statement Session focused on gait training, LE strengthening, and functional balance.  Pt requiring education to don cervical collar prior to therapy session.  Cervical collar in place for entire session.  Pt requiring increased rest breaks with all mobility due to fatigue.  Pt will benefit from continued functional endurance, balance, LE strengthening, and safety with mobility training.                           IRF PT Goals    Flowsheet Row Most Recent Value   Bed Mobility Goal 1    Activity/Assistive Device bed mobility activities, all at 01/25/2024 0900   [place] minimum assist (75% or more patient effort) at 01/25/2024 0900   Time Frame short-term goal (STG), 5 - 7 days at 01/25/2024 0900   Progress/Outcome goal ongoing at 01/29/2024 0839   Bed Mobility Goal 2    Activity/Assistive Device bed mobility activities, all at 01/25/2024 0900   [place] modified independence at 01/25/2024 0900   Time Frame long-term goal (LTG), 14 days or less at 01/25/2024 0900   Progress/Outcome goal ongoing at 01/29/2024 0839   Transfer Goal 1    Activity/Assistive Device sit-to-stand/stand-to-sit, stand pivot at 01/29/2024 0839   [place] --   [steadying A] at 01/29/2024 0839   Time Frame short-term goal (STG), 5 - 7 days at 01/29/2024 0839   Progress/Outcome goal met, goal revised this date at 01/29/2024 0839   Transfer Goal 2    Activity/Assistive Device sit-to-stand/stand-to-sit, bed-to-chair/chair-to-bed, stand pivot at 01/25/2024 0900   Alger modified independence at 01/25/2024 0900   Time Frame long-term goal (LTG), 14 days or less at 01/25/2024 0900   Progress/Outcome goal ongoing at 01/29/2024 0839   Gait/Walking Locomotion Goal 1    Activity/Assistive Device gait (walking locomotion) at 01/25/2024 0900   Distance 25 feet at 01/25/2024 0900   [place] minimum assist (75% or more patient effort) at 01/25/2024 0900   Time Frame short-term goal (STG), 5 - 7 days at 01/25/2024 0900   Progress/Outcome goal ongoing at 01/29/2024 0839   Gait/Walking Locomotion Goal 2    Activity/Assistive Device gait (walking locomotion), assistive device use at 01/29/2024 0839   Distance 150 feet at 01/29/2024 0839   Alger modified independence at 01/29/2024 0839   Time Frame long-term goal (LTG), 14 days or less at 01/29/2024 0839   Progress/Outcome goal revised this date at 01/29/2024 0839   Stairs Goal 1    Activity/Assistive Device stairs, all skills, using handrail, left, using handrail, right at 01/28/2024 1304   Number of Stairs 12 at 01/28/2024 1304   [place]ger minimum assist (75% or more patient effort) at 01/28/2024 1304   Time Frame 5 - 7 days, short-term goal (STG) at 01/28/2024 1304   Progress/Outcome goal ongoing at 01/29/2024 0839   Stairs Goal 2    Activity/Assistive Device stairs, all skills, using handrail, left, cane, straight at 01/28/2024 1304   Number of Stairs 12 at 01/28/2024 1304   Alger supervision required at 01/28/2024 1304   Time Frame 14 days or less at 01/28/2024 1304   Progress/Outcome goal ongoing at 01/29/2024 0897

## 2024-02-04 NOTE — PLAN OF CARE
Plan of Care Review  Plan of Care Reviewed With: patient  Progress: improving  Outcome Evaluation: Sleeping well between care. Refused to wear SCD, pt stated understanding of its purpose. Refused to wear cervical collar. Safety reviewed, call bell in reach. Continue plan of care.

## 2024-02-05 ENCOUNTER — APPOINTMENT (OUTPATIENT)
Dept: PSYCHOLOGY | Facility: CLINIC | Age: 84
End: 2024-02-05
Payer: COMMERCIAL

## 2024-02-05 ENCOUNTER — APPOINTMENT (INPATIENT)
Dept: OCCUPATIONAL THERAPY | Facility: REHABILITATION | Age: 84
DRG: 560 | End: 2024-02-05
Payer: COMMERCIAL

## 2024-02-05 ENCOUNTER — APPOINTMENT (INPATIENT)
Dept: PHYSICAL THERAPY | Facility: REHABILITATION | Age: 84
DRG: 560 | End: 2024-02-05
Payer: COMMERCIAL

## 2024-02-05 ENCOUNTER — APPOINTMENT (INPATIENT)
Dept: SPEECH THERAPY | Facility: REHABILITATION | Age: 84
DRG: 560 | End: 2024-02-05
Payer: COMMERCIAL

## 2024-02-05 PROCEDURE — 97116 GAIT TRAINING THERAPY: CPT | Mod: GP

## 2024-02-05 PROCEDURE — 12800000 HC ROOM AND CARE SEMIPRIVATE REHAB

## 2024-02-05 PROCEDURE — 63700000 HC SELF-ADMINISTRABLE DRUG: Performed by: INTERNAL MEDICINE

## 2024-02-05 PROCEDURE — 97110 THERAPEUTIC EXERCISES: CPT | Mod: GO

## 2024-02-05 PROCEDURE — 97535 SELF CARE MNGMENT TRAINING: CPT | Mod: GO

## 2024-02-05 PROCEDURE — 97112 NEUROMUSCULAR REEDUCATION: CPT | Mod: GP,59

## 2024-02-05 PROCEDURE — 97530 THERAPEUTIC ACTIVITIES: CPT | Mod: GP,59

## 2024-02-05 PROCEDURE — 25000000 HC PHARMACY GENERAL

## 2024-02-05 PROCEDURE — 63700000 HC SELF-ADMINISTRABLE DRUG

## 2024-02-05 PROCEDURE — 97110 THERAPEUTIC EXERCISES: CPT | Mod: GP,59

## 2024-02-05 PROCEDURE — 90832 PSYTX W PT 30 MINUTES: CPT | Performed by: PSYCHOLOGIST

## 2024-02-05 PROCEDURE — 97530 THERAPEUTIC ACTIVITIES: CPT | Mod: GO

## 2024-02-05 PROCEDURE — 92507 TX SP LANG VOICE COMM INDIV: CPT | Mod: GN

## 2024-02-05 RX ORDER — ACETAMINOPHEN 500 MG
2500 TABLET ORAL
COMMUNITY
Start: 2024-02-05 | End: 2024-03-06

## 2024-02-05 RX ORDER — ACETAMINOPHEN 325 MG/1
975 TABLET ORAL EVERY 8 HOURS PRN
COMMUNITY
Start: 2024-02-05 | End: 2024-03-06

## 2024-02-05 RX ORDER — FERROUS SULFATE 325(65) MG
325 TABLET ORAL
COMMUNITY
Start: 2024-02-06 | End: 2024-03-07

## 2024-02-05 RX ORDER — ASCORBIC ACID 500 MG
500 TABLET ORAL DAILY
COMMUNITY
Start: 2024-02-06 | End: 2024-03-07

## 2024-02-05 RX ORDER — AMOXICILLIN 250 MG
2 CAPSULE ORAL DAILY
COMMUNITY
Start: 2024-02-05 | End: 2024-03-06

## 2024-02-05 RX ORDER — TAMSULOSIN HYDROCHLORIDE 0.4 MG/1
0.4 CAPSULE ORAL NIGHTLY
Qty: 30 CAPSULE | Refills: 0 | Status: SHIPPED | OUTPATIENT
Start: 2024-02-05 | End: 2024-03-06

## 2024-02-05 RX ADMIN — APIXABAN 2.5 MG: 2.5 TABLET, FILM COATED ORAL at 20:52

## 2024-02-05 RX ADMIN — TAMSULOSIN HYDROCHLORIDE 0.4 MG: 0.4 CAPSULE ORAL at 20:52

## 2024-02-05 RX ADMIN — APIXABAN 2.5 MG: 2.5 TABLET, FILM COATED ORAL at 08:32

## 2024-02-05 RX ADMIN — Medication 2500 UNITS: at 17:44

## 2024-02-05 RX ADMIN — FERROUS SULFATE TAB 325 MG (65 MG ELEMENTAL FE) 325 MG: 325 (65 FE) TAB at 08:31

## 2024-02-05 RX ADMIN — LEVOTHYROXINE SODIUM 25 MCG: 25 TABLET ORAL at 06:32

## 2024-02-05 RX ADMIN — OXYCODONE HYDROCHLORIDE AND ACETAMINOPHEN 500 MG: 500 TABLET ORAL at 08:32

## 2024-02-05 RX ADMIN — ALBUTEROL SULFATE: 2.5 SOLUTION RESPIRATORY (INHALATION) at 08:34

## 2024-02-05 ASSESSMENT — COGNITIVE AND FUNCTIONAL STATUS - GENERAL
SPEECH: REGULAR;SOFT
PSYCHOMOTOR FUNCTIONING: WNL
APPETITE: NO CHANGE
AROUSAL LEVEL: ALERT
APPEARANCE: WELL GROOMED
SLEEP_WAKE_CYCLE: NO CHANGE
EST. PREMORBID INTELLIGENCE: ABOVE AVERAGE
AFFECT: FULL RANGE
THOUGHT_CONTENT: APPROPRIATE
RECENT MEMORY: WNL
CONCENTRATION: WNL
PERCEPTUAL FUNCTION: PAIN
THOUGHT_PROCESS: WNL
EYE_CONTACT: WNL
REMOTE MEMORY: WNL
MOOD: EUTHYMIC (NORMAL)
ATTENTION: WNL
LIBIDO: NON-CONTRIBUTORY
DELUSIONS: NONE OR AGE APPROPRIATE
ORIENTATION: FULLY ORIENTED
IMPULSE CONTROL: INTACT
INSIGHT: INTACT

## 2024-02-05 NOTE — PROGRESS NOTES
[facility] Progress Note          Patient was seen and examined at bedside.    Subjective:     Saw and evaluated him in am:  Very pleasant, cooperative, conversant. No new complaints or O/N events.   His L arm phlebitis has much improved.   Objective   Vital signs in last 24 hours:  Temp:  [36.4 °C (97.6 °F)-36.6 °C (97.9 °F)] 36.6 °C (97.8 °F)  Heart Rate:  [60-63] 63  Resp:  [16-18] 16  BP: (115-132)/(59-65) 115/60    No intake or output data in the 24 hours ending 02/05/24 1023  Intake/Output this shift:  No intake/output data recorded.   Review of Systems:  All other systems reviewed and negative except as noted in the HPI.   Objective      Labs  reviewed his labs thoroughly   Lab Results   Component Value Date    WBC 9.36 01/30/2024    HGB 8.2 (L) 01/30/2024    HCT 25.6 (L) 01/30/2024    MCV 97.3 01/30/2024     01/30/2024     Lab Results   Component Value Date    GLUCOSE 98 01/29/2024    CALCIUM 8.8 01/29/2024     01/29/2024    K 4.1 01/29/2024    CO2 23 01/29/2024     01/29/2024    BUN 42 (H) 01/29/2024    CREATININE 1.2 01/29/2024       Imaging  OSH imaging study reports reviewed:     CT cystography 1/24/24:  IMPRESSION:  No evidence of contrast extravasation into the prostatic parenchyma or other areas of contrast extravasation from the urinary bladder. Redemonstration of fractures involving the right superior and inferior pubic rami with extraperitoneal blood in the pelvis, which has slightly decreased compared to 1/17/2024. Persistent mild enlargement of the right adductor muscle compartment, similar to 1/17/2024.  His avila was removed.      MRI cervical spine 1/22/24  IMPRESSION:  1.  C2 fracture with no bone marrow edema to suggest acute fracture.  2.  No abnormal enhancement.  3.  No epidural collection or hematoma.  4.  Degenerative spondylosis worst at C3-C4 where there is also suggestion of  Mild right jose luis-cord signal abnormality.  Multilevel spondylosis  otherwise noted  as described.      Full Code    Physical Exam:  Head/Ear/Nose/Throat: normocephalic; atraumatic; moisture mouth mm, no oropharyngeal thrush noted.   Eyes: anicteric sclera, EOMI; PERRL.   Neck : supple, no JVD, no carotid bruits appeciated.   Respiratory: no evidence of labored breathing, bibasilar lung BS diminished area, no remarkable w/r/c.   Cardiovascular: RRR; normal S1, S2; no m/r/g; no S3 or S4.   Gastrointestinal: soft; NT; BS normal; mildly distended; no CVAT b/l.   Genitourinary: off avila.   Extremities : no c/c/e .   Neurological: AO x 3, fluent speeches, following commands, CNS II-XII grossly intact; no focal neurologic deficits.   Behavior/Emotional: in NAD, appropriate; cooperative.   Skin: clean, dry and intact.     Plan of care was discussed with patient, RN, and PMR attending     Assessment   CC:S/p fall, sustained multi trauma: R post 8th-11th rib fractures,  R superior pubic rami fracture w hematoma, age undetermined C2 fracture; non surgically managed; ADL and ambulatory dysfunction          83 y.o. male with PMH of BPH, HTN, hypothyroidism, who presented and was admitted to [facility] on 1/17/24 after a slip and fall on ice landing on his right side. He was found to have multiple injuries including right 8-11 rib fractures, bilateral pleural effusion s/p IR thoracentesis 1/18 with drainage of 1.1L, pelvic fracture (non op mgmt), right adductor/obturator hematoma, questionable posttraumatic bladder injury and age-indeterminate cervical fracture.  Neurosurgery was consulted and offered c spinesurgery, but patient declined.  [place] Collar on at all times, except for meals to prevent aspiration.  Patient c/o difficulty coughing up mucus with collar on, but understands that he needs to wear the collar especially during therapy.  Urology was consulted.  CT cystogram showed possible extravasation of contrast into the prostatic urethra.  Avila catheter is currently draining clear urine.  Plan  is to continue Avila catheter for 1 week.  CT cystogram in 1 week with possible removal at that time, but pt declining CT Cystogram.  Functionally, he has significant physical econditioning with ADL and ambulatory dysfunction, requiring inpt acute rehab, was admitted to [facility] on 1/24/24.        Repeated   CT cystography 1/24/24:  IMPRESSION:  No evidence of contrast extravasation into the prostatic parenchyma or other areas of contrast extravasation from the urinary bladder. Redemonstration of fractures involving the right superior and inferior pubic rami with extraperitoneal blood in the pelvis, which has slightly decreased compared to 1/17/2024. Persistent mild enlargement of the right adductor muscle compartment, similar to 1/17/2024.  His avila was removed.      A/P:  # S/p fall, sustained multi trauma: R post 8th-11th rib fractures,  R superior pubic rami fracture w hematoma, age undetermined C2 fracture; non surgically managed; ADL and ambulatory dysfunction  -Inpt acute rehab, pain and medical managements, DVT prophylaxis, dermal defense, fall precaution      # R post 8th-11th rib fractures, atelectasis, chest congestion, possible pleu effusion  -Monitor SO2, prn NC O2, to keep SO2>92%, incentive spirometry, bronchodilator nebulizer, mucolytic agent, pulm toileting.  -Check CXR, chest PT  - Chest wall pain management     # Urinary retention, BPH, r/o bladder extravasation  -Off avila, timed voiding, condom cath in the evening, monitor PVR with prn cic, adequate oral hydration, check UA and UCX, consider to add flomax 0.4 mg qhs.     # Hypothyroidism   -cont home dose of levothyroxine     # DVT prophylaxis   -Eliquis 2.5 mg bid      # Insomnia, anxiety   - address his pain   - prn ativan and Ambien  - psychology CBT            Billing code: [id number]  Diagnoses:  Patient Active Problem List   Diagnosis   • Fall due to slipping on ice or snow, initial encounter   • Closed fracture of multiple ribs of right side   •  Closed fracture of ramus of right pubis (CMS/HCC)   • Pelvic hematoma in male   • Pleural effusion on left   • Normocytic anemia   • Stage 3a chronic kidney disease (CMS/HCC)   • Closed odontoid fracture (CMS/HCC)   • Multiple injuries due to trauma             Neville Arndt MD  2/5/2024

## 2024-02-05 NOTE — PLAN OF CARE
Problem: Rehabilitation (IRF) Plan of Care  Goal: Plan of Care Review  Outcome: Progressing  Flowsheets (Taken 2/5/2024 1226)  Plan of Care Reviewed With: patient  Outcome Evaluation: OT POC and goals reviewed and updated  Goal: Patient-Specific Goal (Individualized)  Outcome: Progressing     Problem: BADL (Basic Activities of Daily Living) Impairment  Goal: Optimal Safe BADL Performance  Outcome: Progressing

## 2024-02-05 NOTE — PROGRESS NOTES
Inpatient Psychology Progress Note    Date: 2024   Duration: 20 minutes  Patient: Randolph Daley : 1940, a 83 y.o. male, was seen for follow up visit.  Reason: Adjustment to Disability.    HPI: Pt is a 83 y.o., male, with a previous medical history of hypothyroidism, hypertension, normocytic anemia, chronic kidney disease, and benign prostatic hyperplasia (BPH), shingles (2007), fall with head strike (), fracture of the base of the odontoid process (found on imaging in  age indeterminate). Pt presented to [facility] ED on 2023 with a one-week history of productive cough. Chest X-ray showed bilateral pleural effusions and pt tested positive for RSV. EKG was normal. Pt was prescribed a cough suppressant and instructed to follow up with his PCP regarding pleural effusions; he discharged home the same day in stable condition. Pt then saw his family doctor on 2023 who recommended to continue cough suppressant and to increase fluids.     More recently, pt presented to [facility] on 2024 after a fall. Pt reportedly slipped on ice and fell on his right side. There was no head injury or loss of consciousness during this event. On eval, GCS was 15. Imaging revealed bilateral pleural effusion (L > R) and multiple fractures including ribs (right 8, 9, 10, 11) and right pubic ramus with pelvic hematoma that was concerning for traumatic injury to prostate/urethra/bladder. Of note, pt declined analgesics except for Tylenol in ED (chart indicates a history of visual hallucinations with oxycodone). Pt was transferred to [facility] for escalation of care.     At [facility], pt underwent thoracentesis on 2024. Urology was consulted and pt underwent cystogram. No acute urologic interventions were recommended and pt was given avila catheter. Orthopedic Surgery was consulted and recommended non-operative management of pelvic fracture. Pt was made weightbearing as tolerated to  bilateral legs. Neurosurgery was consulted; CT head showed C2 fracture that was determined to likely be from a previous injury. Flexion-extension x-rays of the cervical spine disclose widening of the first and second cervical interspinous process concerning for instability. MRI was recommended and as well as pt wearing hard cervical collar. Pt initially declined MRI of cervical neck due to claustrophobia; was later agreeable with anxiolytics. MRI of C-spine confirmed chronic C2 fracture, along with degenerative spondylosis at C3-C4. Pt was recommended for surgical fusion; however, pt also declined surgical intervention. Pt was counseled at length about recommendations for surgery and wearing hard collar at all times. Pt understands that he is a high fall risk and the risks of future spinal injury from fall (potential paralysis, paresthesia, or death) and continued to decline cervical interventions throughout hospitalization. Pt was recommended for outpatient neurosurgery follow up in 4- 6 weeks with updated imaging. Pt was stabilized and transferred to [facility] on 1/24/2024 for inpatient rehab therapies.       Current Evaluation:     Mental Status Evaluation:  Arousal Level: Alert  Appearance: Well Groomed  Speech: Regular, Soft  Psychomotor Functioning: WNL  Eye Contact: WNL  Est. Premorbid Intelligence: Above average  Orientation: Fully oriented  Attention: WNL  Concentration: WNL  Recent Memory: WNL  Remote Memory: WNL  Thought Content: Appropriate  Thought Process: WNL  Insight: Intact  Perceptual Function: Pain  Delusions: None or age appropriate  Sleeping: No Change (due to pain)  Appetite: No Change  Libido: Non-Contributory  Affect: Full Range  Mood: Euthymic (normal)       Goals Addressed                 This Visit's Progress    • Increase adjustment to rehabilitation facility.   On track          Interventions  Monitoring of Symptoms    Recommendations:   No Further sessions;  "Patient pending discharge      Visit Diagnosis:     1. Adjustment disorder with mixed anxiety and depressed mood        Diagnostic Impression:     Randolph Daley, 1940, 83 y.o., was seen for follow-up. Pt was reclined in bed on arrival. Pt was alert and engaged.  He was oriented to time, place and person. Eye contact was WNL. Speech was WNL. Thought processes were future oriented. Mood was euthymic; affect was mood-congruent.    Pt reported his mood was \"good\" and that he is looking forward to discharging home later this week. He stated that his therapies were going well, but that he broadly feels impatient about his recovery. Despite his impatience, pt remains optimistic about his future recovery, as he knows healing will just take more time. He is looking forward to eventually returning to enjoyed activities, such as going for walks, cycling, and later this year walking his daughter down the aisle at her marriage. Supportive therapy was provided and pt's feelings were validated and normalized. He was receptive to support. Psychological condition is generally stable.    Anna Henry, PhD @ 11:51 AM  "

## 2024-02-05 NOTE — PROGRESS NOTES
Patient: Randolph Daley  Location: [facility] [address]  MRN: [id number]  Today's date: 2/5/2024    History of Present Illness  Pt is a 83 y.o. male admitted on 1/24/2024 with Multiple injuries due to trauma [T07.XXXA]. Principal problem is Multiple injuries due to trauma.  is a 83 y.o. male whose primary indication for inpatient rehabilitation is Debility.    Pt is a 83 y.o M adm to [facility] 1/24/24 who presented to [facility] after a slip and fall on ice landing on his right side.  He was found to have multiple injuries including right 8-11 rib fractures, bilateral pleural effusion s/p IR thoracentesis 1/18 with drainage of 1.1L, pelvic fracture (non op mgmt), right adductor/obturator hematoma, questionable posttraumatic bladder injury and age-indeterminate cervical fracture.  Neurosurgery was consulted and offered c spinesurgery, but patient declined.  [place] Collar on at all times, except for meals to prevent aspiration. Urology was consulted.  CT cystogram showed possible extravasation of contrast into the prostatic urethra.  Womack catheter is currently draining clear urine.  Plan is to continue Womack catheter for 1 week.  CT cystogram in 1 week with possible removal at that time, but pt declining CT Cystogram.        Past Medical History  PMH: BPH, HTN       PT Vitals    Date/Time Pulse HR Source BP BP Location BP Method Pt Position [facility]   02/05/24 1534 63 Monitor 126/66 Right upper arm Automatic Sitting SV      PT Pain    Date/Time Pain Type Rating: Rest Rating: Activity [facility]   02/05/24 1534 Pain Assessment 0 -- SV   02/05/24 1559 Pain Reassessment;Post Activity 0 0 SV             Prior Living Environment    Flowsheet Row Most Recent Value   People in Home spouse   Current Living Arrangements home   Home Accessibility stairs to enter home (Group), stairs within home (Group)   Living Environment Comment lives with wife in 2  home with first floor set up   Number of Stairs, Main  "Entrance 1   Surface of Stairs, Main Entrance hardwood   Stair Railings, Main Entrance none   Location, Patient Bedroom first (main) floor   Patient Bedroom Access Comment first floor set up   Location, Bathroom first (main) floor   Bathroom Access Comment First floor set up. Walk in shower. grab bar and shower \"bench\"   Stairs, Within Home, Primary 16   Number of Stairs, Within Home, Primary other (see comments)   Surface of Stairs, Within Home, Primary carpeting   Stair Railings, Within Home, Primary railings on both sides of stairs  [left side rail goes 1/2- then can hold the spindles]   Stairs Comment, Within Home, Primary pt reports wife office upstairs but master bed/bath 1st floor          Prior Level of Function    Flowsheet Row Most Recent Value   Dominant Hand right   Ambulation independent   Transferring independent   Toileting independent   Bathing independent   Dressing independent   Eating independent   IADLs independent   Driving/Transportation    Prior Level of Function Comment Ind PTA, previous cervical fx. 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,  reports manages bill paying, medications, calendar at home           IRF PT Evaluation and Treatment - 02/05/24 1532        PT Time Calculation    Start Time 1532     Stop Time 1602     Time Calculation (min) 30 min        Session Details    Document Type Daily Treatment/Progress Note     Mode of Treatment physical therapy;individual therapy        General Information    General Observations of Patient Pt received direct hand off from previous PT session        Mobility Belt    Mobility Belt Used for All Out of Bed Activity yes        Orthosis Neck Cervical Collar    Orthosis Properties Date Obtained: 01/17/24 Time Obtained: 1900 Location: Neck Type: Cervical Collar Features: Hard Therapeutic Indications: fracture immobilization Wearing Schedule: wear at all times (remove only for " hygiene and skin inspection)       Sit to Stand Transfer    [place], Sit to Stand Transfer close supervision     Safety/Cues increased time to complete     Assistive Device walker, front-wheeled     Comment from wc        Stand to Sit Transfer    [place], Stand to Sit Transfer close supervision     Safety/Cues increased time to complete     Assistive Device walker, front-wheeled     Comment to wc        Stand Pivot Transfer    [place], Stand Pivot/Stand Step Transfer close supervision     Safety/Cues increased time to complete     Assistive Device walker, front-wheeled     Comment wc > EOB with RW        Balance    Comment, Balance static stance at EOB to use urinal steadying assist for balance        Lower Extremity (Therapeutic Exercise)    Exercise Position/Type seated     General Exercise bilateral;ankle pumps;quad sets;LAQ (long arc quad);gluteal sets;hip aBduction;hip aDduction;marching while seated   hip abd with pink TB; hip add with ball squeeze, ball squeeze    Reps and Sets 2x10     Comment Reviewed seated and supine HEP.        Daily Progress Summary (PT)    Daily Outcome Statement Reviewed and completed HEP without issues. Pt requested to return to bed at end of session d/t fatigue and discomfort in DC. Cont POC.                        IRF PT Goals    Flowsheet Row Most Recent Value   Bed Mobility Goal 1    Activity/Assistive Device bed mobility activities, all at 01/25/2024 0900   [place] minimum assist (75% or more patient effort) at 01/25/2024 0900   Time Frame short-term goal (STG), 5 - 7 days at 01/25/2024 0900   Progress/Outcome goal met at 02/05/2024 0857   Bed Mobility Goal 2    Activity/Assistive Device bed mobility activities, all at 01/25/2024 0900   [place] modified independence at 01/25/2024 0900   Time Frame long-term goal (LTG), 14 days or less at 01/25/2024 0900   Progress/Outcome goal met at 02/05/2024 0857   Transfer Goal 1    Activity/Assistive Device  sit-to-stand/stand-to-sit, stand pivot at 01/29/2024 0839   [place] --  [steadying A] at 01/29/2024 0839   Time Frame short-term goal (STG), 5 - 7 days at 01/29/2024 0839   Progress/Outcome goal met at 02/05/2024 0857   Transfer Goal 2    Activity/Assistive Device sit-to-stand/stand-to-sit, bed-to-chair/chair-to-bed, stand pivot at 01/25/2024 0900   [place] modified independence at 01/25/2024 0900   Time Frame long-term goal (LTG), 14 days or less at 01/25/2024 0900   Progress/Outcome goal ongoing at 02/05/2024 0857   Gait/Walking Locomotion Goal 1    Activity/Assistive Device gait (walking locomotion) at 01/25/2024 0900   Distance 25 feet at 01/25/2024 0900   [place] minimum assist (75% or more patient effort) at 01/25/2024 0900   Time Frame short-term goal (STG), 5 - 7 days at 01/25/2024 0900   Progress/Outcome goal met at 02/05/2024 0857   Gait/Walking Locomotion Goal 2    Activity/Assistive Device gait (walking locomotion), assistive device use at 01/29/2024 0839   Distance 150 feet at 01/29/2024 0839   [place] modified independence at 01/29/2024 0839   Time Frame long-term goal (LTG), 14 days or less at 01/29/2024 0839   Progress/Outcome goal ongoing at 02/05/2024 0857   Stairs Goal 1    Activity/Assistive Device stairs, all skills, using handrail, left, using handrail, right at 01/28/2024 1304   Number of Stairs 12 at 01/28/2024 1304   [place] minimum assist (75% or more patient effort) at 01/28/2024 1304   Time Frame 5 - 7 days, short-term goal (STG) at 01/28/2024 1304   Progress/Outcome goal met at 02/05/2024 0857   Stairs Goal 2    Activity/Assistive Device stairs, all skills, using handrail, left, cane, straight at 01/28/2024 1304   Number of Stairs 12 at 01/28/2024 1304   [place] supervision required at 01/28/2024 1304   Time Frame 14 days or less at 01/28/2024 1304   Progress/Outcome goal ongoing at 02/05/2024 0805

## 2024-02-05 NOTE — PROGRESS NOTES
Patient: Randolph Daley  Location: [facility] [address]  MRN: [id number]  Today's date: 2/5/2024    History of Present Illness  Pt is a 83 y.o. male admitted on 1/24/2024 with Multiple injuries due to trauma [T07.XXXA]. Principal problem is Multiple injuries due to trauma.  is a 83 y.o. male whose primary indication for inpatient rehabilitation is Debility.    Pt is a 83 y.o M adm to [facility] 1/24/24 who presented to [facility] after a slip and fall on ice landing on his right side.  He was found to have multiple injuries including right 8-11 rib fractures, bilateral pleural effusion s/p IR thoracentesis 1/18 with drainage of 1.1L, pelvic fracture (non op mgmt), right adductor/obturator hematoma, questionable posttraumatic bladder injury and age-indeterminate cervical fracture.  Neurosurgery was consulted and offered c spinesurgery, but patient declined.  [place] Collar on at all times, except for meals to prevent aspiration. Urology was consulted.  CT cystogram showed possible extravasation of contrast into the prostatic urethra.  Womack catheter is currently draining clear urine.  Plan is to continue Womack catheter for 1 week.  CT cystogram in 1 week with possible removal at that time, but pt declining CT Cystogram.        Past Medical History  PMH: BPH, HTN       PT Vitals    Date/Time Pulse HR Source BP BP Location BP Method Pt Position [facility]        02/05/24 1447   Pain/Comfort/Sleep   Pain Charting Type Pain Assessment   Preferred Pain Scale number (Numeric Rating Pain Scale)   (0-10) Pain Rating: Rest 0   (0-10) Pain Rating: Activity 3   Pain Body Location pelvic   Pain Management Interventions prescribed exercises encouraged   Vitals   Heart Rate 61   Heart Rate Source Monitor   /71   BP Location Right upper arm   BP Method Automatic   Patient Position Sitting      02/05/24 1528   Pain/Comfort/Sleep   Pain Charting Type Pain Reassessment   Preferred Pain Scale number (Numeric  "Rating Pain Scale)   (0-10) Pain Rating: Rest 1   Pain Body Location pelvic   Pain Management Interventions position adjusted     Prior Living Environment    Flowsheet Row Most Recent Value   People in Home spouse   Current Living Arrangements home   Home Accessibility stairs to enter home (Group), stairs within home (Group)   Living Environment Comment lives with wife in 2 SH home with first floor set up   Number of Stairs, Main Entrance 1   Surface of Stairs, Main Entrance hardwood   Stair Railings, Main Entrance none   Location, Patient Bedroom first (main) floor   Patient Bedroom Access Comment first floor set up   Location, Bathroom first (main) floor   Bathroom Access Comment First floor set up. Walk in shower. grab bar and shower \"bench\"   Stairs, Within Home, Primary 16   Number of Stairs, Within Home, Primary other (see comments)   Surface of Stairs, Within Home, Primary carpeting   Stair Railings, Within Home, Primary railings on both sides of stairs  [left side rail goes 1/2- then can hold the spindles]   Stairs Comment, Within Home, Primary pt reports wife office upstairs but master bed/bath 1st floor          Prior Level of Function    Flowsheet Row Most Recent Value   Dominant Hand right   Ambulation independent   Transferring independent   Toileting independent   Bathing independent   Dressing independent   Eating independent   IADLs independent   Driving/Transportation    Prior Level of Function Comment Ind PTA, previous cervical fx. 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,  reports manages bill paying, medications, calendar at home                  02/05/24 1445   PT Time Calculation   Start Time 1431   Stop Time 1531   Time Calculation (min) 60 min   Session Details   Document Type Daily Treatment/Progress Note   Mode of Treatment individual therapy;physical therapy   General Information   Patient Profile Reviewed yes   General " Observations of Patient Pt received in his room with PCT assisting pt from bed to chair. Pt with cervical collar doffed. PT edu pt on importance of wearing cervical collar. Pt agreeable to PT donning collar once sitting in wheelchair.   Mobility Belt   Mobility Belt Used for All Out of Bed Activity yes   Sit to Stand Transfer   [place], Sit to Stand Transfer close supervision   Safety/Cues increased time to complete   Assistive Device walker, front-wheeled   Comment VC for hand placement   Stand to Sit Transfer   [place], Stand to Sit Transfer close supervision   Safety/Cues increased time to complete   Assistive Device walker, front-wheeled   Comment VC for hand placement   Stand Pivot Transfer   [place], Stand Pivot/Stand Step Transfer close supervision   Safety/Cues increased time to complete;minimal;verbal cues;proper use of assistive device   Assistive Device walker, front-wheeled   Comment via amb approach and EOB>wc; VC for properly aligning with chair prior to sitting and RW management   Toilet Transfer   Comment PT: pt continent of bladder, transfer sit to stand using grab bar, stands to void with clS from PT   Gait Training   [place], Gait close supervision   Safety/Cues increased time to complete;minimal;verbal cues;gait deviations;technique   Assistive Device walker, front-wheeled   Distance in Feet 70 feet   Pattern step-through   Deviations/Abnormal Patterns antalgic;base of support, narrow;gait speed decreased;step length decreased;stride length decreased   Bilateral Gait Deviations heel strike decreased   Right Sided Gait Deviations   (dec stance time)   Comment (Gait/Stairs) Cls due to balance deficits and decreased endurance; VC for posture, heel strike, and L step length   10 Meter Walk Test (Self-Selected Velocity)   Trial Two: Ten Meter Walk Test (sec) 32.56 seconds   Trial One: Ten Meter Walk Test (sec) 38.61 seconds   Assistive Device walker, front-wheeled   Trial One: Gait  "Speed (m/s) 0.16 m/s   Trial Two: Gait Speed (m/s) 0.18 m/s   Average Gait Speed (m/s): Two Trials 0.17 m/s   Stairs Training   [place], Stairs close supervision   Safety/Cues increased time to complete;minimal;verbal cues   Assistive Device railing   Handrail Location (Stairs) both sides   Number of Stairs 4   Stair Height 6 inches   Ascending Stairs Technique step-to-step  (LLE)   Descending Stairs Technique step-to-step  (RLE)   Comment 2 trials with rest in bewteen; clS due to balance and strength deficits, VC for technique, eccentric control, and sequencing   Wheelchair Mobility/Management   Method of Locomotion bimanual (upper extremity) propulsion   Wheelchair Type manual, lightweight   Salters, Forward Propulsion close supervision   Salters, Steering Activities close supervision   Salters, Turning Activities close supervision   Distance Propelled in Feet 75 feet   Activity Tolerance able to tolerate functional household activity distances (less than 150 feet)   Comment, Wheelchair Mobility VC for technique, increased time to complete   Balance   Balance Interventions standing   Comment, Balance Forward/retro stepping on stepping stone at RW x10/side, emphasizing anterolateral weight shift   Lower Extremity (Therapeutic Exercise)   Exercise Position/Type seated;static stretching   General Exercise bilateral   Reps and Sets 2x30s   Comment gastroc/HS stretching   Daily Progress Summary (PT)   Daily Outcome Statement Pt progressed to clS for transfers and ambulation. Distances limited to less than 75 feet secondary to decreased endurance and pain. Pt completed 2 trials of 4 steps, still unable to complete >4 steps at this time. Pt reporting he has noticed \"less cracking\" in his neck when looking side to side. PT cont to reinforce and edu pt on importance of wearing cervical collar and spinal precautions. Pt in agreement wearing the collar is helping with pain. Direct hand-off to second PT to " review HEP.          IRF PT Goals    Flowsheet Row Most Recent Value   Bed Mobility Goal 1    Activity/Assistive Device bed mobility activities, all at 01/25/2024 0900   [place] minimum assist (75% or more patient effort) at 01/25/2024 0900   Time Frame short-term goal (STG), 5 - 7 days at 01/25/2024 0900   Progress/Outcome goal met at 02/05/2024 0857   Bed Mobility Goal 2    Activity/Assistive Device bed mobility activities, all at 01/25/2024 0900   [place] modified independence at 01/25/2024 0900   Time Frame long-term goal (LTG), 14 days or less at 01/25/2024 0900   Progress/Outcome goal met at 02/05/2024 0857   Transfer Goal 1    Activity/Assistive Device sit-to-stand/stand-to-sit, stand pivot at 01/29/2024 0839   [place] --  [steadying A] at 01/29/2024 0839   Time Frame short-term goal (STG), 5 - 7 days at 01/29/2024 0839   Progress/Outcome goal met at 02/05/2024 0857   Transfer Goal 2    Activity/Assistive Device sit-to-stand/stand-to-sit, bed-to-chair/chair-to-bed, stand pivot at 01/25/2024 0900   [place] modified independence at 01/25/2024 0900   Time Frame long-term goal (LTG), 14 days or less at 01/25/2024 0900   Progress/Outcome goal ongoing at 02/05/2024 0857   Gait/Walking Locomotion Goal 1    Activity/Assistive Device gait (walking locomotion) at 01/25/2024 0900   Distance 25 feet at 01/25/2024 0900   [place] minimum assist (75% or more patient effort) at 01/25/2024 0900   Time Frame short-term goal (STG), 5 - 7 days at 01/25/2024 0900   Progress/Outcome goal met at 02/05/2024 0857   Gait/Walking Locomotion Goal 2    Activity/Assistive Device gait (walking locomotion), assistive device use at 01/29/2024 0839   Distance 150 feet at 01/29/2024 0839   [place] modified independence at 01/29/2024 0839   Time Frame long-term goal (LTG), 14 days or less at 01/29/2024 0839   Progress/Outcome goal ongoing at 02/05/2024 0857   Stairs Goal 1    Activity/Assistive Device stairs, all  skills, using handrail, left, using handrail, right at 01/28/2024 1304   Number of Stairs 12 at 01/28/2024 1304   [place] minimum assist (75% or more patient effort) at 01/28/2024 1304   Time Frame 5 - 7 days, short-term goal (STG) at 01/28/2024 1304   Progress/Outcome goal met at 02/05/2024 0857   Stairs Goal 2    Activity/Assistive Device stairs, all skills, using handrail, left, cane, straight at 01/28/2024 1304   Number of Stairs 12 at 01/28/2024 1304   [place] supervision required at 01/28/2024 1304   Time Frame 14 days or less at 01/28/2024 1304   Progress/Outcome goal ongoing at 02/05/2024 0857

## 2024-02-05 NOTE — PLAN OF CARE
Problem: Rehabilitation (IRF) Plan of Care  Goal: Plan of Care Review  Flowsheets (Taken 2/5/2024 1122)  Plan of Care Reviewed With: spouse  Outcome Evaluation: spoke with wife to give team report. confirmed elos for 2/7 and that cm was able to get a hc agency. she is very pleased. no issue, went over dc info and process per her questions. cm to follow.

## 2024-02-05 NOTE — PROGRESS NOTES
Patient: Randolph Daley  Location: [facility] [address]  MRN: [id number]  Today's date: 2/5/2024    History of Present Illness  Pt is a 83 y.o. male admitted on 1/24/2024 with Multiple injuries due to trauma [T07.XXXA]. Principal problem is Multiple injuries due to trauma.  is a 83 y.o. male whose primary indication for inpatient rehabilitation is Debility.    Pt is a 83 y.o M adm to [facility] 1/24/24 who presented to [facility] after a slip and fall on ice landing on his right side.  He was found to have multiple injuries including right 8-11 rib fractures, bilateral pleural effusion s/p IR thoracentesis 1/18 with drainage of 1.1L, pelvic fracture (non op mgmt), right adductor/obturator hematoma, questionable posttraumatic bladder injury and age-indeterminate cervical fracture.  Neurosurgery was consulted and offered c spinesurgery, but patient declined.  [place] Collar on at all times, except for meals to prevent aspiration. Urology was consulted.  CT cystogram showed possible extravasation of contrast into the prostatic urethra.  Womack catheter is currently draining clear urine.  Plan is to continue Womack catheter for 1 week.  CT cystogram in 1 week with possible removal at that time, but pt declining CT Cystogram.        Past Medical History  PMH: BPH, HTN       OT Vitals    Date/Time Pulse HR Source BP BP Location BP Method Pt Position [facility]   02/05/24 0902 63 Monitor 115/60 Right upper arm Automatic Lying TS      OT Pain    Date/Time Pain Type Side/Orientation Location Rating: Rest Rating: Activity Radiation to Description Interventions [facility]   02/05/24 0902 Pain Assessment right;upper;lateral back 1 5 leg, right sore position adjusted;diversional activity provided    02/05/24 0958 Pain Reassessment right;upper;lateral back 1 -- -- sore -- TS             Prior Living Environment    Flowsheet Row Most Recent Value   People in Home spouse   Current Living Arrangements home   Home  "Accessibility stairs to enter home (Group), stairs within home (Group)   Living Environment Comment lives with wife in 2 SH home with first floor set up   Number of Stairs, Main Entrance 1   Surface of Stairs, Main Entrance hardwood   Stair Railings, Main Entrance none   Location, Patient Bedroom first (main) floor   Patient Bedroom Access Comment first floor set up   Location, Bathroom first (main) floor   Bathroom Access Comment First floor set up. Walk in shower. grab bar and shower \"bench\"   Stairs, Within Home, Primary 16   Number of Stairs, Within Home, Primary other (see comments)   Surface of Stairs, Within Home, Primary carpeting   Stair Railings, Within Home, Primary railings on both sides of stairs  [left side rail goes 1/2- then can hold the spindles]   Stairs Comment, Within Home, Primary pt reports wife office upstairs but master bed/bath 1st floor          Prior Level of Function    Flowsheet Row Most Recent Value   Dominant Hand right   Ambulation independent   Transferring independent   Toileting independent   Bathing independent   Dressing independent   Eating independent   IADLs independent   Driving/Transportation    Prior Level of Function Comment Ind PTA, previous cervical fx. 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   Patient Goals Initial PSFS: getting in/out of a car 8/10, walking 5/10, getting on/off of the sofa 5/10, cooking 7/10 = 25/40 = 62.5%           IRF OT Evaluation and Treatment - 02/05/24 0700        OT Time Calculation    Start Time 0900     Stop Time 1000     Time Calculation (min) 60 min        " Session Details    Document Type Daily Treatment/Progress Note     Mode of Treatment individual therapy;occupational therapy   Humaira Sanchez MS, OTR/L participated in pt. care       General Information    General Observations of Patient Pt received supine in bed, [place] Doffed, agreeable to OT session. Additional treatment and documentation provided by Humaira Sanchez OTR/L.        Mobility Belt    Mobility Belt Used for All Out of Bed Activity yes        Orthosis Neck Cervical Collar    Orthosis Properties Date Obtained: 01/17/24 Time Obtained: 1900 Location: Neck Type: Cervical Collar Features: Hard Therapeutic Indications: fracture immobilization Wearing Schedule: wear at all times (remove only for hygiene and skin inspection)    Compliance/Wearing Issues patient;non-compliant with wearing schedule due to;needs reinforcement   OT donned [place] collar while pt was supine at start of session.       Bed Mobility    Comment OT: supine to EOB with HOB elevated use of bed rail and cl s to ensure adherance to spinal precautions.        Sit to Stand Transfer    [place], Sit to Stand Transfer close supervision     Safety/Cues increased time to complete;hand placement;technique     Assistive Device walker, front-wheeled     Comment from EOB and couch cl s for vc for hand palcement        Stand to Sit Transfer    [place], Stand to Sit Transfer close supervision     Safety/Cues increased time to complete;technique     Assistive Device walker, front-wheeled     Comment to EOB and couch cl s to ensure utilization of fall prevention strategies and 1 vc for hand placement.        Stand Pivot Transfer    [place], Stand Pivot/Stand Step Transfer close supervision     Safety/Cues increased time to complete;minimal;verbal cues;proper use of assistive device     Assistive Device walker, front-wheeled     Comment via amb approach cl s for vc for RW management        Toilet Transfer    Transfer Technique  stand pivot     [place] close supervision     Safety/Cues increased time to complete;minimal;verbal cues;proper use of assistive device;technique     Assistive Device walker, front-wheeled     Comment via amb approach cl s d/t impaired balance min vc for hand placement and to square up with toilet with RW.        Shower Transfer    Transfer Technique stand pivot     [place] close supervision     Safety/Cues increased time to complete;minimal;verbal cues;hand placement;sequencing;technique     Assistive Device shower chair;walker, front-wheeled     Comment via amb approach with UE support on RW pt steps backwards over shower ledge then transition sit<>stand to shower chair w/ armrest and backrest then forward stepping over ledge to exit shower with UE support on RW, cl s for safety and vc for hand placement and sequence of task. completed with simulated home set up.        Impairments/Safety Issues    Comment, Safety Issues/Impairments OT: short HHD with RW within room and ILU with cl s to ensure utilization of fall prevention strategies 1 vc for proximity of RW to bring closer        Upper Extremity (Therapeutic Exercise)    Exercise Position/Type seated;resistive exercises     General Exercise bilateral     Range of Motion Exercises bilateral;shoulder flexion/extension;shoulder abduction/adduction;elbow flexion/extension   chest press, D2 shoulder flexion    Weight/Resistance resistance band   pink    Reps and Sets 10 x 1     Comment completed HEP seated in wc with vc and visual demo for proper technique.        Bathing    [place] minimum assist (75% or more patient effort)     Comment per last status, Pt completed full wet shower seated on shower chair majority of shower, stood briefly with unilateral support on anterior grab bar and completed posterior pericare with R posterior reach steady assist for balance, required min A to dry buttocks d/t wound dressing change post shower. Utilized long  handled sponge to wash B feet, vc and visual demo to complete draping technique to dry B feet, pt completed w/ unilateral support on anterior grab bar.        Upper Body Dressing    [place] minimum assist (75% or more patient effort)     Comment per last status, total assist for cervical collar management, s to doff/don pull over shirt with loose neck opening.        Lower Body Dressing    Tasks doff;don;socks;shoes/slippers     Safety/Cues increased time to complete;moderate;verbal cues;proper use of assistive device;use of adaptive equipment     Position edge of bed sitting     Adaptive Equipment sock aid;long-handled shoe horn;dressing stick     [place], Footwear supervision     Comment pt received with shorts donned to thighs, pt stood with RW to complete CM with cl s. Seated EOB, pt doffed socks with dressing pal and 1 vc for technique, donned socks with flexible sock aid mod vc for technique with AE and s donned sneakers with long handled shoe horn, propped foot on stool in order to complete shoe lace management. Pt required vc for orietnation of sock on AE and to correct.        Grooming    Tasks washes, rinses and dries hands;oral care (brushing teeth, cleaning dentures)     [place] supervision     Position supported standing;unsupported standing;sink side     Adaptive Equipment none     [place], Oral Hygiene supervision     Comment grooming tasks complete standing w/ intermittent UE support on sink for increase stability and s to ensure adherance to spinal precautions.        Toileting    Tasks adjust/manage clothing     [place] close supervision     Position supported standing     Setup Assistance obtain supplies     Adaptive Equipment urinal     Comment pt stood w/ and w/o UE support on RW and complete CM, held urinal cl s d/t impaired balance and vc to hold urinal vs OT holding.        Daily Progress Summary (OT)    Daily Outcome Statement Pt tolerated session well focused on fxl  transfers, UE HEP. Pt progressed to cls for toilet and shower transfer still requires vc for hand placement. Progressed to cl s for footwear management continues to require vc for proper technique with AEs. Pt requires seated therapeutic rest breaks t/o session d/t fatigue. Continue OT POC to maximize safety and independence.        Weekly Progress Summary (OT)    Progress Toward Functional Goals (OT) progressing toward functional goals as expected     Weekly Outcome Statement Pt grossly cl s for fxl transfers and varies set up-min A for ADLs. Pt limited by pain, decreased dynamic standing balance, standing tolerance, safety awareness, insight, activity tolerance, endurance, spinal precautions, adherance to cervical collar orders, decreased carryover technique with long hanlded equipment for ADLs. Pt displays behaviors of learned helplessness and plans to rely on wife for assistance. Recommend continued skilled inpatient OT services to maximize safety and independence with fxl transfers, fxl mobility, and ADLs.                      Education Documentation  Safety Techniques, taught by Paula Sandoval OT at 2/5/2024 12:24 PM.  Learner: Patient  Readiness: Acceptance  Method: Explanation, Demonstration, Handout  Response: Verbalizes Understanding, Demonstrated Understanding  Comment: UE strengthening HEP          IRF OT Goals    Flowsheet Row Most Recent Value   Transfer Goal 1    Activity/Assistive Device toilet at 01/25/2024 0629   [place] supervision required at 01/25/2024 0629   Time Frame short-term goal (STG), 5 - 7 days at 02/05/2024 0700   Progress/Outcome goal ongoing at 02/05/2024 0700   Transfer Goal 2    Activity/Assistive Device toilet at 01/25/2024 0629   [place] modified independence at 02/05/2024 0700   Time Frame long-term goal (LTG), 5 - 7 days at 02/05/2024 0700   Progress/Outcome goal ongoing at 02/05/2024 0700   Transfer Goal 3    Activity/Assistive Device shower at 01/25/2024 0629    [place] supervision required at 02/05/2024 0700   Time Frame short-term goal (STG), 5 - 7 days at 02/05/2024 0700   Progress/Outcome goal met, goal revised this date at 02/05/2024 0700   Transfer Goal 4    Activity/Assistive Device shower at 01/25/2024 0629   [place] supervision required at 02/05/2024 0700   Time Frame long-term goal (LTG), 5 - 7 days at 02/05/2024 0700   Progress/Outcome goal revised this date at 02/05/2024 0700   Bathing Goal 1    Activity/Assistive Device bathing skills, all at 01/25/2024 0629   [place] supervision required at 02/05/2024 0700   Time Frame short-term goal (STG), 5 - 7 days at 02/05/2024 0700   Progress/Outcome goal met, goal revised this date at 02/05/2024 0700   Bathing Goal 2    Activity/Assistive Device bathing skills, all at 01/25/2024 0629   [place] supervision required at 02/05/2024 0700   Time Frame long-term goal (LTG), 5 - 7 days at 02/05/2024 0700   Progress/Outcome goal ongoing at 02/05/2024 0700   UB Dressing Goal 1    Activity/Assistive Device upper body dressing at 01/25/2024 0629   [place] minimum assist (75% or more patient effort) at 01/29/2024 0708   Time Frame short-term goal (STG), 5 - 7 days at 02/05/2024 0700   Strategies/Barriers including clothing retrieval at 01/29/2024 0708   Progress/Outcome goal met at 02/05/2024 0700   UB Dressing Goal 2    Activity/Assistive Device upper body dressing at 01/25/2024 0629   [place] minimum assist (75% or more patient effort) at 01/29/2024 0708   Time Frame long-term goal (LTG), 5 - 7 days at 02/05/2024 0700   Progress/Outcome goal met at 02/05/2024 0700   LB Dressing Goal 1    Activity/Assistive Device lower body dressing at 01/25/2024 0629   [place] supervision required at 02/05/2024 0700   Time Frame short-term goal (STG), 5 - 7 days at 02/05/2024 0700   Progress/Outcome goal met, goal revised this date at 02/05/2024 0700   LB Dressing Goal 2    Activity/Assistive Device lower body  dressing at 01/25/2024 0629   [place] supervision required at 02/05/2024 0700   Time Frame long-term goal (LTG), 5 - 7 days at 02/05/2024 0700   Progress/Outcome goal revised this date at 02/05/2024 0700   Grooming Goal 1    Activity/Assistive Device grooming skills, all at 01/25/2024 0629   [place] set-up required at 01/25/2024 0629   Time Frame short-term goal (STG), 5 - 7 days at 02/05/2024 0700   Progress/Outcome goal ongoing at 02/05/2024 0700   Grooming Goal 2    Activity/Assistive Device grooming skills, all at 01/25/2024 0629   [place] modified independence at 01/25/2024 0629   Time Frame long-term goal (LTG), 5 - 7 days at 02/05/2024 0700   Progress/Outcome goal ongoing at 02/05/2024 0700   Toileting Goal 1    Activity/Assistive Device toileting skills, all at 01/25/2024 0629   [place] supervision required at 02/05/2024 0700   Time Frame short-term goal (STG), 5 - 7 days at 02/05/2024 0700   Progress/Outcome goal met, goal revised this date at 02/05/2024 0700   Toileting Goal 2    Activity/Assistive Device toileting skills, all at 01/25/2024 0629   [place] modified independence at 02/05/2024 0700   Time Frame long-term goal (LTG), 5 - 7 days at 02/05/2024 0700   Progress/Outcome goal ongoing at 02/05/2024 0700

## 2024-02-05 NOTE — PLAN OF CARE
Plan of Care Review  Plan of Care Reviewed With: patient  Progress: improving  Outcome Evaluation: AAOx4. Denies pain/BURGOS/SOB. continent of b&b. Last BM 2/5/24. Neb tx d/c. Pt noncomplaint with collar orders. Pt refusing to reposition self q2hr. Pt verbalized understanding of education yet still refuses.

## 2024-02-05 NOTE — PATIENT CARE CONFERENCE
Inpatient Rehabilitation Team Conference Note  Date: 2/5/2024  Time: 9:39 AM       Patient Name: Randolph Daley     Medical Record Number: [id number]   YOB: 1940  Sex: Male      Room/Bed: 115/115D  Payor Info: Payor: IB / Plan: [employer] [facility] MEDICARE / Product Type:  Managed Care /     Admitting Diagnosis: Multiple injuries due to trauma [T07.XXXA]   Admit Date/Time: 1/24/2024  8:00 PM    Principal Problem: Multiple injuries due to trauma    Patient Active Problem List    Diagnosis Date Noted   • Multiple injuries due to trauma 01/24/2024   • Fall due to slipping on ice or snow, initial encounter 01/17/2024   • Closed fracture of multiple ribs of right side 01/17/2024   • Closed fracture of ramus of right pubis (CMS/[facility]) 01/17/2024   • Pelvic hematoma in male 01/17/2024   • Pleural effusion on left 01/17/2024   • Normocytic anemia 01/17/2024   • Stage 3a chronic kidney disease (CMS/[facility]) 01/17/2024   • Closed odontoid fracture (CMS/[facility]) 01/17/2024       Premorbid Status:  Dominant Hand: right  Ambulation: independent  Transferring: independent  Toileting: independent  Bathing: independent  Dressing: independent  Eating: independent  IADLs: independent  Driving/Transportation:   Communication: understands/communicates without difficulty  Swallowing: swallows foods/liquids without difficulty  Baseline Diet/Method of Nutritional Intake: regular; thin liquids  Past History of Dysphagia: Denies hx  Assistive Device/Animal Currently Used at Home: grab bar  Prior Level of Function Comment: Ind PTA, previous cervical fx. (+) ; retired womens clothing ; takes care of grandchildren x once a week; reports manages bill paying, medications, calendar at home      Current Functional Status:  Mobility  Gait  [place], Gait: touching/steadying assist  Assistive Device: walker, front-wheeled  Comment (Gait/Stairs): 50' x 2 reps over level surfaces with RW and  steadying assist at pelvis for balance.  Pt noted with decreased gait speed, increased B UE reliance on RW, decreased L step length, flat foot contact B.  Distance limited by pt reports of R scapular discomfort.    Stairs  [place], Stairs: touching/steadying assist  Assistive Device: railing  Handrail Location (Stairs): both sides  Number of Stairs: 4  Stair Height: 6 inches  Comment: steady assist for balance and safety    Wheelchair  [place], Forward Propulsion: dependent (less than 25% patient effort)  [place], Steering Activities: dependent (less than 25% patient effort)  [place], Turning Activities: dependent (less than 25% patient effort)  Comment, Wheelchair Mobility: leg rests adjusted for mroe appropriate LE support/position    Transfers  Bed Mobility  Bed Mobility Activities: sit to supine  [place], Roll Left: moderate assist (50-74% patient effort)  [place], Roll Right: moderate assist (50-74% patient effort)  [place], Supine to Sit: minimum assist (75% or more patient effort)  [place], Sit to Supine: touching/steadying assist; minimum assist (75% or more patient effort)  Safety/Cues: maximal; verbal cues; technique  Assistive Device: none  Comment: OT: supine to EOB with HOB elevated use of bed rail, min A to boost trunk, pt able to manage BLE.    Bed to Chair Transfer  [place], Bed to Chair: moderate assist (50-74% patient effort)  Safety/Cues: technique  Assistive Device: walker, front-wheeled  Comment: via SPT mod A for lateral wt shift and balance    Chair to Bed Transfer  [place], Chair to Bed: moderate assist (50-74% patient effort)  Safety/Cues: technique  Assistive Device: walker, front-wheeled  Comment: via SPT mod A for lateral wt shift and balance    Sit to Stand Transfer  [place], Sit to Stand Transfer: close supervision  Safety/Cues: increased time to complete; hand placement; technique  Assistive Device: walker, front-wheeled  Comment:  from EOB cl s for vc for hand palcement    Stand to Sit Transfer  [place], Stand to Sit Transfer: close supervision  Safety/Cues: increased time to complete; technique  Assistive Device: walker, front-wheeled  Comment: to EOB cl s    Stand Pivot Transfer  [place], Stand Pivot/Stand Step Transfer: touching/steadying assist  Safety/Cues: increased time to complete; verbal cues; hand placement; sequencing  Assistive Device: walker, front-wheeled  Comment: via ambulatory approach with RW, steadying assist at pelvis for balance and sequencing of transfer.  verbal cues for proximity to wheelchair prior to sitting.    Car Transfer  Transfer Technique: stand pivot  [place]: moderate assist (50-74% patient effort)  Safety/Cues: maximal; verbal cues; hand placement; preparatory posture; proper use of assistive device; technique  Assistive Device: walker, front-wheeled  Comment: SPT with RW increased time in / out of simulated car Mod A for assistance with RLE in / out of car and with sit / stand      Toilet Transfer  Transfer Technique: stand pivot  [place]: close supervision  Safety/Cues: increased time to complete; minimal; verbal cues; proper use of assistive device; technique  Assistive Device: grab bars/safety frame; walker, front-wheeled (accessible height toilet)  Comment: via amb approach cl s d/t impaired balance min vc for hand placement and to aquare up with toilet with RW.    Shower Transfer  Transfer Technique: stand pivot  [place]: touching/steadying assist  Safety/Cues: increased time to complete; minimal; verbal cues; hand placement; technique  Assistive Device: grab bars/tub rail; shower chair; walker, front-wheeled  Comment: via amb approach R entry into barrier free shower stall steady assist for balance      Self Care  Bathing  Tasks: chest; left arm; right arm; abdomen; left upper leg; right upper leg  [place]: minimum assist (75% or more patient effort)  Safety/Cues:  increased time to complete; minimal; verbal cues; initiation; maintaining precautions; compensatory techniques; use of adaptive equipment  Setup Assistance: adjust water temperature/flow; obtain supplies  Adaptive Equipment: grab bar/tub rail; hand-held shower spray hose; long-handled sponge; shower chair  Comment: Pt completed full wet shower seated on shower chair majority of shower, stood briefly with unilateral support on anterior grab bar and completed posterior pericare with R posterior reach steady assist for balance, required min A to dry buttocks d/t wound dressing change post shower. Utilized long handled sponge to wash B feet, vc and visual demo to complete draping technique to dry B feet, pt completed w/ unilateral support on anterior grab bar.    Upper Body Dressing  Tasks: doff; don; pull over garment  [place]: minimum assist (75% or more patient effort)  Safety/Cues: increased time to complete; minimal; verbal cues; maintaining precautions; compensatory techniques  Adaptive Equipment: orthosis  Comment: total assist for [place] and [place] collar management. pt able to doff/kenzie pull over shirts with s min vc to thread RUE prior to threading LUE.    Lower Body Dressing  Tasks: doff; don; socks; shoes/slippers  [place]: touching/steadying assist  Safety/Cues: increased time to complete; moderate; verbal cues; proper use of assistive device; use of adaptive equipment  Adaptive Equipment: sock aid; long-handled shoe horn; dressing stick  Comment: pt utilized dressing pal to doff socks with s for vc for initiaion and use of AE. Pt refused to don footwear post shower, OT donned socks and sneakers with total assist, mod A overall.  seated on tub bench, pt utilized reacher to thread BLE into underwear/pants stood with alternating UE support on anterior grab bar to complete CM with steady assist for balance.    Grooming  Tasks: washes, rinses and dries face  [place]:  "supervision  Safety/Cues: increased time to complete  Setup Assistance: adjust water temperature/flow; open containers  Adaptive Equipment: none  Comment: Pt refused to complete oral care, stated his wife will complete later.    Toileting  Tasks: adjust/manage clothing  [place]: minimum assist (75% or more patient effort)  Safety/Cues: initiation  Setup Assistance: obtain supplies  Adaptive Equipment: urinal  Comment: pt stood with UE support on grab bar facing toilet to complete CM and +void, steady assist for balance. vc for pt to complete CM vs OT completing.    Self-Feeding  Tasks: scoop food onto utensil; utensil to mouth; liquids to mouth  [place]: set up (per most recent performance)  Safety/Cues: increased time to complete  Setup Assistance: prepare tray/open items  Adaptive Equipment: none  Comment: Assistance to prepare items on tray including opening lids and containers. Pt reporting \"it's slipping\"    Cognition  Affect/Mental Status: flat/blunted affect  Behavioral Issues: difficulty managing stress; overwhelmed easily  Orientation Status: oriented x 4  Follows Commands: 50-74% accuracy; physical/tactile prompts required; repetition of directions required; verbal cues/prompting required; follows one-step commands  Cognitive Function: executive function deficit; safety deficit  Attention Deficit: minimal deficit  Comment, Attention: Pt with fair attention to tasks today.  Occasional distracted by pain.  Easily redirected  Executive Function Deficit: problem-solving/reasoning  Comment, Executive Function: Divergent naming: [place] categories 15 items in 3 sets with good speech and independence.  Convergent categorzation set 2 90% accuracy TINO with good speed.  Memory Deficit: minimal deficit; short-term memory; semantic memory  Comment, Short Term Memory: Fxl recall: pt recalled previous ST activities and upcoming discharge date and plans (I).  Safety Deficit: moderate deficit; safety " "precautions awareness; safety precautions follow-through/compliance; insight into deficits/self-awareness  Comment, Cognition: provided education on cervical collar on AAT. Pt continues to refuse \"I can't breathe with that thing on.\"  City: 3-->spontaneous/free recall  Kind of Place: 3-->spontaneous/free recall  Name of Hospital: 3-->spontaneous/free recall  Month: 3-->spontaneous/free recall  Date: 3-->spontaneous/free recall  Year: 3-->spontaneous/free recall  Day of Week: 3-->spontaneous/free recall  Clock Time: 3-->spontaneous/free recall  Etiology/Event: 3-->spontaneous/free recall  Pathology Deficits: 3-->spontaneous/free recall  Total Score: 30    Communication  Breath Support (Motor Speech): minimal impairment  Comment, Motor Speech Assessment: Pt with reduced breath support for sustained phonation (5 secs for /a/); Voice quality with reduced volume/mild raspiness; improves with cues. Other parameters of speech WFLs. Pt reports voice has changed s/p fall and sounds weaker, pt relates this to feelings of depression.  Motor Speech Intervention: breath support for speech; phonation/voice production  Comment, Motor Speech Intervention: Pt completed incentive spirometer x 10.  Pt able to acheive 900-1100 ml in 10 opportunities   Pt benefited from 2 sets of 5 with a short break.  Sustained phonation avg 7.6 sec.  Pt had pitch breaks and some dysphonia.  Follows Commands (Auditory Comprehension): 2-step commands  Yes/No Questions (Auditory Comprehension): complex questions  Complex Questions (Auditory Comprehension): intact  Comment, Assessment (Auditory Comprehension): grossly functional; suspect decreased processing/decreased organizaiton may interfere with more complex receptive langugae demands      Frequency of Treatment (OT): 5-7 times per week  Intensity of Treatment (OT): 60-90 minutes per day  Frequency of Treatment (PT): 5-7 times per week  Intensity of Treatment (PT): 60-90 minutes per day  Frequency of " Treatment (SLP): 5-7 times per week  Intensity of Treatment (SLP): 30 minutes per day    Randolph requires an altered therapy schedule due to decreased endurance, medically complex, behavior.      Weekly Outcome Summaries:  Weekly Progress Summary (PT)  Progress Toward Functional Goals (PT): progressing toward functional goals as expected  Weekly Outcome Statement: Pt has made progress since last update. Grossly steady assist for transfers, ambulation up to 100’, and elevations (4 steps). Pt requires [place] for managing RLE during bed mobility. Pt ambulates at a gait speed of 0.11 m/s, significantly slower than age and gender matched norms of 1.2 m/s, indicating impaired functional community ambulation and increased risk of falls. Pt has been compliant with donning cervical collar during therapy sessions, but continues to require education on importance of wearing AAT. He has improved participation in therapy and tolerance to activity, but still requires increased time to complete all functional mobility. He is limited by pain and weakness (R>L), decreased balance, and decreased endurance. Pt also has decreased safety awareness and requires increased encouragement.  Barriers to Overall Progress (PT): impaired insight/safety awareness, fatigue, gait deviations, impaired standing balance  Recommendations: continue IP PT 60-90 min per day until scheduled DC on 2/7    Weekly Progress Summary (OT)  Progress Toward Functional Goals (OT): progressing toward functional goals as expected  Weekly Outcome Statement: Pt grossly cl s - steady assist for fxl transfers progressing to cl s and varies set up-min A for ADLs. Pt limited by pain, decreased dynamic standing balance, standing tolerance, safety awareness, insight, activity tolerance, endurance, spinal precautions, adherance to cervical collar orders, decreased carryover technique with long hanlded equipment for ADLs. Pt displays behaviors of learned helplessness and plans to  "rely on wife for assistance. Recommend continued skilled inpatient OT services to maximize safety and independence with fxl transfers, fxl mobility, and ADLs.    Weekly Progress Summary (SLP)  Progress Toward Functional Goals (SLP): progressing toward functional goals as expected  Weekly Outcome Statement: Therapy continues to focus on cognition and breath support for speech. Pt scored  40/50 on the BCAT indicating a mild cognitive impairment. Pt required MIN cues during a medication management task for attention to details. Insight remains poor, but pt is willing to participate and is open to education. Pt with improved sustained phonation and use of incentive spirometer.  Barriers to Overall Progress (SLP): Limited insight, cognitive deficits  Recommendations (SLP): Communication I; cognition set up.        Problem Resolution:  Team Weekly Outcome Statement: arm healing, still non compliant with brace, , cont b&b, room air, sacral area the same  Comment, Barriers to Discharge: non compliance with collar/insight, pain, deconditioning,  Comment, Facilitators for Discharge: ongoing education for collar and precautions-won't intiate wearing but will wear in therapy , family trng went well, strengthening exercises         Expected Level of Function  Self-Care: Supervision or touching assistance  Sphincter Control: Independent  Transfers: Independent  Locomotion: Independent  Communication: Independent  Social Cognition: Setup or clean-up assistance  Comment, Expected Level of Function at Discharge: mod with safety concerns, supervision shower      Goals/Support System:  Patient/Family Goals  Patient's Goals For Discharge:  (\"get out of here by Monday\")  Family/Support System  Caregiver Engagement: active learner related to care delivery  Family/Support System Care: self-care encouraged  Caregiver Training  Caregiver(s) to be Trained: spouse/significant other  Caregiver Training Plan: ambulation using assistive device, " bed mobility training, transfer training  Comment, Caregiver Training Plan: Pt's wife observed pt perform transfers, ambulation, stairs, and bed mobility. Hands on practice completed during bed mobility. Educated on use of RW for mobility and technique for navigating steps. Discussed pt's anticipated level of function on DC vs CLOF. Educated on energy conservation and discussed home safety. Wife was engaged and asked appropriate questions throughout.    IRF PT Goals    Flowsheet Row Most Recent Value   Bed Mobility Goal 1    Activity/Assistive Device bed mobility activities, all at 01/25/2024 0900   [place] minimum assist (75% or more patient effort) at 01/25/2024 0900   Time Frame short-term goal (STG), 5 - 7 days at 01/25/2024 0900   Progress/Outcome goal met at 02/05/2024 0857   Bed Mobility Goal 2    Activity/Assistive Device bed mobility activities, all at 01/25/2024 0900   [place] modified independence at 01/25/2024 0900   Time Frame long-term goal (LTG), 14 days or less at 01/25/2024 0900   Progress/Outcome goal met at 02/05/2024 0857   Transfer Goal 1    Activity/Assistive Device sit-to-stand/stand-to-sit, stand pivot at 01/29/2024 0839   [place] --  [steadying A] at 01/29/2024 0839   Time Frame short-term goal (STG), 5 - 7 days at 01/29/2024 0839   Progress/Outcome goal met at 02/05/2024 0857   Transfer Goal 2    Activity/Assistive Device sit-to-stand/stand-to-sit, bed-to-chair/chair-to-bed, stand pivot at 01/25/2024 0900   [place] modified independence at 01/25/2024 0900   Time Frame long-term goal (LTG), 14 days or less at 01/25/2024 0900   Progress/Outcome goal ongoing at 02/05/2024 0857   Gait/Walking Locomotion Goal 1    Activity/Assistive Device gait (walking locomotion) at 01/25/2024 0900   Distance 25 feet at 01/25/2024 0900   [place] minimum assist (75% or more patient effort) at 01/25/2024 0900   Time Frame short-term goal (STG), 5 - 7 days at 01/25/2024 0900    Progress/Outcome goal met at 02/05/2024 0857   Gait/Walking Locomotion Goal 2    Activity/Assistive Device gait (walking locomotion), assistive device use at 01/29/2024 0839   Distance 150 feet at 01/29/2024 0839   [place] modified independence at 01/29/2024 0839   Time Frame long-term goal (LTG), 14 days or less at 01/29/2024 0839   Progress/Outcome goal ongoing at 02/05/2024 0857   Stairs Goal 1    Activity/Assistive Device stairs, all skills, using handrail, left, using handrail, right at 01/28/2024 1304   Number of Stairs 12 at 01/28/2024 1304   [place] minimum assist (75% or more patient effort) at 01/28/2024 1304   Time Frame 5 - 7 days, short-term goal (STG) at 01/28/2024 1304   Progress/Outcome goal met at 02/05/2024 0857   Stairs Goal 2    Activity/Assistive Device stairs, all skills, using handrail, left, cane, straight at 01/28/2024 1304   Number of Stairs 12 at 01/28/2024 1304   [place] supervision required at 01/28/2024 1304   Time Frame 14 days or less at 01/28/2024 1304   Progress/Outcome goal ongoing at 02/05/2024 0857        IRF OT Goals    Flowsheet Row Most Recent Value   Transfer Goal 1    Activity/Assistive Device toilet at 01/25/2024 0629   [place] supervision required at 01/25/2024 0629   Time Frame short-term goal (STG), 5 - 7 days at 02/05/2024 0700   Progress/Outcome goal ongoing at 02/05/2024 0700   Transfer Goal 2    Activity/Assistive Device toilet at 01/25/2024 0629   [place] modified independence at 02/05/2024 0700   Time Frame long-term goal (LTG), 5 - 7 days at 02/05/2024 0700   Progress/Outcome goal ongoing at 02/05/2024 0700   Transfer Goal 3    Activity/Assistive Device shower at 01/25/2024 0629   [place] supervision required at 02/05/2024 0700   Time Frame short-term goal (STG), 5 - 7 days at 02/05/2024 0700   Progress/Outcome goal met, goal revised this date at 02/05/2024 0700   Transfer Goal 4    Activity/Assistive Device shower at 01/25/2024  0629   [place] supervision required at 02/05/2024 0700   Time Frame long-term goal (LTG), 5 - 7 days at 02/05/2024 0700   Progress/Outcome goal revised this date at 02/05/2024 0700   Bathing Goal 1    Activity/Assistive Device bathing skills, all at 01/25/2024 0629   [place] supervision required at 02/05/2024 0700   Time Frame short-term goal (STG), 5 - 7 days at 02/05/2024 0700   Progress/Outcome goal met, goal revised this date at 02/05/2024 0700   Bathing Goal 2    Activity/Assistive Device bathing skills, all at 01/25/2024 0629   [place] set-up required at 02/05/2024 0700   Time Frame long-term goal (LTG), 5 - 7 days at 02/05/2024 0700   Progress/Outcome goal revised this date at 02/05/2024 0700   UB Dressing Goal 1    Activity/Assistive Device upper body dressing at 01/25/2024 0629   [place] minimum assist (75% or more patient effort) at 01/29/2024 0708   Time Frame short-term goal (STG), 5 - 7 days at 02/05/2024 0700   Strategies/Barriers including clothing retrieval at 01/29/2024 0708   Progress/Outcome goal met at 02/05/2024 0700   UB Dressing Goal 2    Activity/Assistive Device upper body dressing at 01/25/2024 0629   [place] minimum assist (75% or more patient effort) at 01/29/2024 0708   Time Frame long-term goal (LTG), 5 - 7 days at 02/05/2024 0700   Progress/Outcome goal met at 02/05/2024 0700   LB Dressing Goal 1    Activity/Assistive Device lower body dressing at 01/25/2024 0629   [place] supervision required at 02/05/2024 0700   Time Frame short-term goal (STG), 5 - 7 days at 02/05/2024 0700   Progress/Outcome goal met, goal revised this date at 02/05/2024 0700   LB Dressing Goal 2    Activity/Assistive Device lower body dressing at 01/25/2024 0629   [place] supervision required at 02/05/2024 0700   Time Frame long-term goal (LTG), 5 - 7 days at 02/05/2024 0700   Progress/Outcome goal revised this date at 02/05/2024 0700   Grooming Goal 1    Activity/Assistive  Device grooming skills, all at 01/25/2024 0629   [place] set-up required at 01/25/2024 0629   Time Frame short-term goal (STG), 5 - 7 days at 02/05/2024 0700   Progress/Outcome goal ongoing at 02/05/2024 0700   Grooming Goal 2    Activity/Assistive Device grooming skills, all at 01/25/2024 0629   [place] modified independence at 01/25/2024 0629   Time Frame long-term goal (LTG), 5 - 7 days at 02/05/2024 0700   Progress/Outcome goal ongoing at 02/05/2024 0700   Toileting Goal 1    Activity/Assistive Device toileting skills, all at 01/25/2024 0629   [place] supervision required at 02/05/2024 0700   Time Frame short-term goal (STG), 5 - 7 days at 02/05/2024 0700   Progress/Outcome goal met, goal revised this date at 02/05/2024 0700   Toileting Goal 2    Activity/Assistive Device toileting skills, all at 01/25/2024 0629   [place] supervision required at 02/05/2024 0700   Time Frame long-term goal (LTG), 5 - 7 days at 02/05/2024 0700   Progress/Outcome goal revised this date at 02/05/2024 0700        IRF SLP Goals    Flowsheet Row Most Recent Value   Motor Speech/Voice Goal 1    Motor Speech/Voice Goal 1 breath support exercises (incentive spirometer, sustained phonation),  vocal function exercises for improved volume and voice quality x 80% mod cues. at 01/26/2024 0922   Time Frame short-term goal (STG), 2 weeks at 01/26/2024 0922   Progress/Outcome goal ongoing at 02/05/2024 0821   Motor Speech/Voice Goal 2    Motor Speech/Voice Goal 2 Functinal breath support and voice quality for complex convesational needs x 90% with I use of strategies at 01/26/2024 0922   Time Frame long-term goal (LTG), 3 weeks at 01/26/2024 0922   Progress/Outcome goal ongoing at 02/05/2024 0821   Executive Function Goal 1    Activity executive function tasks, information processing tasks, organization/sequencing tasks, planning/decision-making tasks, problem-solving/reasoning tasks, self-monitoring/self-correction   [convergent/divergent reasoning tasks,  mod level functional tasks related to return to I managing bills, meds, calendar, safety] at 01/26/2024 0922   [place]/Accuracy with 80% accuracy, with moderate, verbal cues/redirection at 01/26/2024 0922   Time Frame short-term goal (STG), 2 weeks at 01/26/2024 0922   Progress/Outcome goal ongoing at 02/05/2024 0821   Executive Function Goal 2    Activity executive function tasks, information processing tasks, organization/sequencing tasks, planning/decision-making tasks, problem-solving/reasoning tasks, self-monitoring/self-correction at 01/26/2024 0922   [place]/Accuracy with 90% accuracy, with minimum, verbal cues/redirection at 01/26/2024 0922   Time Frame long-term goal (LTG), 3 weeks at 01/26/2024 0922   Progress/Outcome goal ongoing at 02/05/2024 0821          Discharge Planning:  Anticipated Discharge Disposition: home with home health  Type of Home Care Services: home OT;home PT;nursing  Home Access/Discharge Environment  Identified Home Modification Needs: wkr,assessing bathroom dme  Discharge Planning  Does the patient need discharge transport arranged?: No  Additional Info Related to Length of Stay and Coverage: nrd 2/7    Anticipated Discharge Date: 2/7/2024    Needs Identified:       Team Members Present:     Rehab Attending Present:  Amelia Larose MD    Care Coordinator Present:  Aurora Rangel LSW    Nurse Present:  Lizbeth Nino RN    OT Present:  Paula Sandoval OT    PT Present:  Sue Rome PT    SLP Present:  Laly Ch CCC-SLP    Psychologist Present:  Anna Henry, PhD        Next Team Conference Date: 02/05/24

## 2024-02-05 NOTE — PLAN OF CARE
Problem: Cognitive Impairment  Goal: Optimal Cognitive Function  Outcome: Progressing     Problem: Communication Impairment  Goal: Effective Communication Skills  Outcome: Progressing

## 2024-02-05 NOTE — PROGRESS NOTES
Daily Progress Note     Patient was seen and examined.   Attestation Notes: Face to face encounter completed, discussed in team conference         Subjective       Interval History: Patient is feeling well this morning. He slept well last night. Arm is feeling better - erythema is improved. Patient remains non compliant with c-collar.     Review of Systems:  Negative except as per HPI    Current Functional Status:   Bed mobility:   [place], Supine to Sit: minimum assist (75% or more patient effort)   [place], Sit to Supine: touching/steadying assist, minimum assist (75% or more patient effort)   Transfers:    [place], Sit to Stand Transfer: touching/steadying assist  [place], Stand to Sit Transfer: touching/steadying assist   [place], Stand Pivot/Stand Step Transfer: touching/steadying assist   Gait:   [place], Gait: touching/steadying assist  Assistive Device: walker, front-wheeled   Distance in Feet: 50 feet (x2)    Bathing:   [place]: minimum assist (75% or more patient effort)   Toileting:   [place]: minimum assist (75% or more patient effort)   Upper body dressing:   [place]: minimum assist (75% or more patient effort)   Lower body dressing:   [place]: touching/steadying assist     Mild to moderate cognitive deficits, 40/50 on the BCAT    Functional Progress:    Functional status reviewed. Overall, patient's functional status is progressing       Objective     Objective   Vitals and Hemodynamics  Patient Vitals for the past 24 hrs:   BP Temp Temp src Pulse Resp SpO2   02/05/24 0902 115/60 -- -- 63 -- --   02/05/24 0659 121/63 36.6 °C (97.8 °F) Oral 61 16 95 %   02/04/24 2056 132/65 36.4 °C (97.6 °F) Oral 60 16 95 %   02/04/24 1526 (!) 130/59 36.6 °C (97.9 °F) Oral 62 18 95 %   02/04/24 1001 110/62 -- -- (!) 59 -- 98 %         Physical Exam    General: NAD, laying in bed   HEENT: Atraumatic, Normocephalic; c collar off  Cardiovascular: RRR  Respiratory: CTAB  "  Abdomen/GI: NT ND  Skin: Warm, Dry, erythema and induration of the left proximal forearm - improving  Extremities/MSK: Non tender bilateral gastrocs, trace to 1+ edema of the right foot; trace edema of the left foot   Neuro: AAOx3, follows commands, repetitive at times       Labs      Results from last 7 days   Lab Units 01/30/24  0601   WBC K/uL 9.36   RBC M/uL 2.63*   HEMOGLOBIN g/dL 8.2*   HEMATOCRIT % 25.6*   PLATELETS K/uL 173   MCV fL 97.3   RDW % 15.5*             No lab exists for component: \"BCR\"        No lab exists for component: \"\"          No lab exists for component: \"ALB\"    Imaging      Assessment and Plan     Randolph Daley is a 83 y.o. male with a PMHx of BPH, hypothyroid, cervical spine fracture, who presents with ADL/mobility dysfunction secondary to multi trauma after fall, found to have right pubic ramus fracture, right posterior rib fractures, chronic odontoid fracture.     #ADL/Ambulatory dysfunction: 2/2 multi trauma. Continue acute inpatient rehabilitation with PT/OT/SLP/SW/Rehab RN/Neuropsych to increase independence with ADLs, improve balance, coordination, endurance, strength, mobility, community reintegration, decreased burden of care on others and family education.  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 with mild reight jose luis-cord signal abnormality  - chronic  - evaluated by neurosurgery in acute care  - decline operative management at this time; continue hard cervical collar at all times  - " follow-up neurosurgery in 4-6 weeks     #right perivascular hematoma associated with mild mass effect on the urinary bladder and perivesicular infiltration  - monitor bladder scans, CIC PRN  - CT cystogram without evidence of contrast extravasation into prostatic parenchyma or other areas of extravasation from urinary bladder  - had avila catheter - removed      #BPH  - continue flomax     #Pleural effusion  - s/p thoracentesis  - monitor O2    #hyponatremia  - Na 138 1/29  - continue salt tabs, fluid restriction    #left forearm induration  - doxycycline course completed   - elevation, heat or ice PRN     #Immobility  -DVT prophylaxis with apixiban     # Follow-up  - PCP  - neurosurgery  - orthopedic surgery     #Disposition: 2/7/2024, pt to go home with skilled RN/PT/OT       Amelia Larose MD  Physical Medicine and Rehabilitation

## 2024-02-05 NOTE — PROGRESS NOTES
Patient: Randolph Daley  Location: [facility] [address]  MRN: [id number]  Today's date: 2/5/2024    History of Present Illness  Pt is a 83 y.o. male admitted on 1/24/2024 with Multiple injuries due to trauma [T07.XXXA]. Principal problem is Multiple injuries due to trauma.  is a 83 y.o. male whose primary indication for inpatient rehabilitation is Debility.    Pt is a 83 y.o M adm to [facility] 1/24/24 who presented to [facility] after a slip and fall on ice landing on his right side.  He was found to have multiple injuries including right 8-11 rib fractures, bilateral pleural effusion s/p IR thoracentesis 1/18 with drainage of 1.1L, pelvic fracture (non op mgmt), right adductor/obturator hematoma, questionable posttraumatic bladder injury and age-indeterminate cervical fracture.  Neurosurgery was consulted and offered c spinesurgery, but patient declined.  [place] Collar on at all times, except for meals to prevent aspiration. Urology was consulted.  CT cystogram showed possible extravasation of contrast into the prostatic urethra.  [place] catheter is currently draining clear urine.  Plan is to continue [place] catheter for 1 week.  CT cystogram in 1 week with possible removal at that time, but pt declining CT Cystogram.        Past Medical History  PMH: BPH, HTN       SLP Pain    Date/Time Pain Type Rating: Rest Who   02/05/24 1402 Pain Assessment 0 - no pain HL   02/05/24 1426 Pain Reassessment 0 - no pain HL          Prior Living Environment    Flowsheet Row Most Recent Value   People in Home spouse   Current Living Arrangements home   Home Accessibility stairs to enter home (Group), stairs within home (Group)   Living Environment Comment lives with wife in 2  home with first floor set up   Number of Stairs, Main Entrance 1   Surface of Stairs, Main Entrance hardwood   Stair Railings, Main Entrance none   Location, Patient Bedroom first (main) floor   Patient Bedroom Access Comment first  "floor set up   Location, Bathroom first (main) floor   Bathroom Access Comment First floor set up. Walk in shower. grab bar and shower \"bench\"   Stairs, Within Home, Primary 16   Number of Stairs, Within Home, Primary other (see comments)   Surface of Stairs, Within Home, Primary carpeting   Stair Railings, Within Home, Primary railings on both sides of stairs  [left side rail goes 1/2- then can hold the spindles]   Stairs Comment, Within Home, Primary pt reports wife office upstairs but master bed/bath 1st floor          Prior Level of Function    Flowsheet Row Most Recent Value   Dominant Hand right   Ambulation independent   Transferring independent   Toileting independent   Bathing independent   Dressing independent   Eating independent   IADLs independent   Driving/Transportation    Prior Level of Function Comment Ind PTA, previous cervical fx. 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,  reports manages bill paying, medications, calendar at home           IRF SLP Evaluation and Treatment - 02/05/24 1403        SLP Time Calculation    Start Time 1400     Stop Time 1430     Time Calculation (min) 30 min        Session Details    Document Type Daily Treatment/Progress Note     Mode of Treatment speech language pathology;individual therapy        General Information    General Observations of Patient Pt receieved laying in bed, aspen collar not in place. Pt edu extensively re: precautions and need for collar to be donned AAT. Pt became agitated and stated that he gets clausterphobic and will not place it. Cont'd reinforcement of precautions throughout session, however, pt cont'd to refuse.        Cognition/Psychosocial    Executive Function Deficit minimal deficit;organization/sequencing;problem-solving/reasoning     Comment, Executive Function 1. Pt participated in a letter placement task from Workbook for Cognitive Skills (pg. 299) for codes " where he had to create a hidden message based on the number-letter coding of the alphabet. He was able to complete this activity with 100% acc (I). 2. Pt participated in math word problems r/t daily life situations (i.e. money, time, cooking, orientation, medication) with 90% acc without cueing, incr to 100% acc with MIN cues.        Daily Progress Summary (SLP)    Daily Outcome Statement Session targeted cognition. Pt required MIN cues for problem solving, (I) thought organization. Pt required extensive edu for safety awareness surrounding precautions for aspen collar. Pt cont'd to refuse to wear it despite MAX edu. Recommend continuing current POC.                      IRF SLP Goals    Flowsheet Row Most Recent Value   Motor Speech/Voice Goal 1    Motor Speech/Voice Goal 1 breath support exercises (incentive spirometer, sustained phonation),  vocal function exercises for improved volume and voice quality x 80% mod cues. at 01/26/2024 0922   Time Frame short-term goal (STG), 2 weeks at 01/26/2024 0922   Progress/Outcome goal ongoing at 02/05/2024 0821   Motor Speech/Voice Goal 2    Motor Speech/Voice Goal 2 Functinal breath support and voice quality for complex convesational needs x 90% with I use of strategies at 01/26/2024 0922   Time Frame long-term goal (LTG), 3 weeks at 01/26/2024 0922   Progress/Outcome goal ongoing at 02/05/2024 0821   Executive Function Goal 1    Activity executive function tasks, information processing tasks, organization/sequencing tasks, planning/decision-making tasks, problem-solving/reasoning tasks, self-monitoring/self-correction  [convergent/divergent reasoning tasks,  mod level functional tasks related to return to I managing bills, meds, calendar, safety] at 01/26/2024 0922   [place]/Accuracy with 80% accuracy, with moderate, verbal cues/redirection at 01/26/2024 0922   Time Frame short-term goal (STG), 2 weeks at 01/26/2024 0922   Progress/Outcome goal ongoing at 02/05/2024  0821   Executive Function Goal 2    Activity executive function tasks, information processing tasks, organization/sequencing tasks, planning/decision-making tasks, problem-solving/reasoning tasks, self-monitoring/self-correction at 01/26/2024 0922   [place]/Accuracy with 90% accuracy, with minimum, verbal cues/redirection at 01/26/2024 0922   Time Frame long-term goal (LTG), 3 weeks at 01/26/2024 0922   Progress/Outcome goal ongoing at 02/05/2024 0821

## 2024-02-06 ENCOUNTER — APPOINTMENT (INPATIENT)
Dept: PHYSICAL THERAPY | Facility: REHABILITATION | Age: 84
DRG: 560 | End: 2024-02-06
Payer: COMMERCIAL

## 2024-02-06 ENCOUNTER — APPOINTMENT (INPATIENT)
Dept: OCCUPATIONAL THERAPY | Facility: REHABILITATION | Age: 84
DRG: 560 | End: 2024-02-06
Payer: COMMERCIAL

## 2024-02-06 ENCOUNTER — APPOINTMENT (INPATIENT)
Dept: SPEECH THERAPY | Facility: REHABILITATION | Age: 84
DRG: 560 | End: 2024-02-06
Payer: COMMERCIAL

## 2024-02-06 PROCEDURE — 97530 THERAPEUTIC ACTIVITIES: CPT | Mod: GP,59

## 2024-02-06 PROCEDURE — 12800000 HC ROOM AND CARE SEMIPRIVATE REHAB

## 2024-02-06 PROCEDURE — 97110 THERAPEUTIC EXERCISES: CPT | Mod: GO,59

## 2024-02-06 PROCEDURE — 63700000 HC SELF-ADMINISTRABLE DRUG

## 2024-02-06 PROCEDURE — 92507 TX SP LANG VOICE COMM INDIV: CPT | Mod: GN

## 2024-02-06 PROCEDURE — 97530 THERAPEUTIC ACTIVITIES: CPT | Mod: GO,59

## 2024-02-06 PROCEDURE — 97116 GAIT TRAINING THERAPY: CPT | Mod: GP

## 2024-02-06 PROCEDURE — 97535 SELF CARE MNGMENT TRAINING: CPT | Mod: GO

## 2024-02-06 PROCEDURE — 63700000 HC SELF-ADMINISTRABLE DRUG: Performed by: INTERNAL MEDICINE

## 2024-02-06 RX ADMIN — APIXABAN 2.5 MG: 2.5 TABLET, FILM COATED ORAL at 20:11

## 2024-02-06 RX ADMIN — Medication 2500 UNITS: at 17:24

## 2024-02-06 RX ADMIN — FERROUS SULFATE TAB 325 MG (65 MG ELEMENTAL FE) 325 MG: 325 (65 FE) TAB at 07:18

## 2024-02-06 RX ADMIN — OXYCODONE HYDROCHLORIDE AND ACETAMINOPHEN 500 MG: 500 TABLET ORAL at 07:18

## 2024-02-06 RX ADMIN — TAMSULOSIN HYDROCHLORIDE 0.4 MG: 0.4 CAPSULE ORAL at 20:11

## 2024-02-06 RX ADMIN — APIXABAN 2.5 MG: 2.5 TABLET, FILM COATED ORAL at 07:18

## 2024-02-06 RX ADMIN — LEVOTHYROXINE SODIUM 25 MCG: 25 TABLET ORAL at 04:58

## 2024-02-06 ASSESSMENT — COGNITIVE AND FUNCTIONAL STATUS - GENERAL: AFFECT: WFL

## 2024-02-06 NOTE — PLAN OF CARE
Problem: Cognitive Impairment  Goal: Optimal Cognitive Function  Outcome: Met     Problem: Communication Impairment  Goal: Effective Communication Skills  Outcome: Met

## 2024-02-06 NOTE — PLAN OF CARE
Plan of Care Review  Plan of Care Reviewed With: patient  Progress: improving  Outcome Evaluation: alert and oriented x4; continent of bowel and bladder, wearing [place] catheter at night; no complaints of pain; refusing to wear aspen collar; refusing q2 turn overnight; call bell in reach; bed alarm set; no other reports of concerns at this time.

## 2024-02-06 NOTE — PROGRESS NOTES
Patient: Randolph Daley  Location: [facility] [address]  MRN: [id number]  Today's date: 2/6/2024    History of Present Illness  Pt is a 83 y.o. male admitted on 1/24/2024 with Multiple injuries due to trauma [T07.XXXA]. Principal problem is Multiple injuries due to trauma.  is a 83 y.o. male whose primary indication for inpatient rehabilitation is Debility.    Pt is a 83 y.o M adm to [facility] 1/24/24 who presented to [facility] after a slip and fall on ice landing on his right side.  He was found to have multiple injuries including right 8-11 rib fractures, bilateral pleural effusion s/p IR thoracentesis 1/18 with drainage of 1.1L, pelvic fracture (non op mgmt), right adductor/obturator hematoma, questionable posttraumatic bladder injury and age-indeterminate cervical fracture.  Neurosurgery was consulted and offered c spinesurgery, but patient declined.  [place] Collar on at all times, except for meals to prevent aspiration. Urology was consulted.  CT cystogram showed possible extravasation of contrast into the prostatic urethra.  [place] catheter is currently draining clear urine.  Plan is to continue Womack catheter for 1 week.  CT cystogram in 1 week with possible removal at that time, but pt declining CT Cystogram.        Past Medical History  PMH: BPH, HTN       PT Vitals    Date/Time Pulse HR Source BP BP Location BP Method Pt Position [facility]   02/06/24 1307 57 Monitor 115/56 Left upper arm Automatic Sitting PLP      PT Pain    Date/Time Pain Type Side/Orientation Location Rating: Rest Rating: Activity [facility]   02/06/24 1307 Pain Assessment right pelvic 0 2 PLP   02/06/24 1358 Pain Reassessment -- -- 0 -- PLP             Prior Living Environment    Flowsheet Row Most Recent Value   People in Home spouse   Current Living Arrangements home   Home Accessibility stairs to enter home (Group), stairs within home (Group)   Living Environment Comment lives with wife in 2  home with first floor set  "up   Number of Stairs, Main Entrance 1   Surface of Stairs, Main Entrance hardwood   Stair Railings, Main Entrance none   Location, Patient Bedroom first (main) floor   Patient Bedroom Access Comment first floor set up   Location, Bathroom first (main) floor   Bathroom Access Comment First floor set up. Walk in shower. grab bar and shower \"bench\"   Stairs, Within Home, Primary 16   Number of Stairs, Within Home, Primary other (see comments)   Surface of Stairs, Within Home, Primary carpeting   Stair Railings, Within Home, Primary railings on both sides of stairs  [left side rail goes 1/2- then can hold the spindles]   Stairs Comment, Within Home, Primary pt reports wife office upstairs but master bed/bath 1st floor          Prior Level of Function    Flowsheet Row Most Recent Value   Dominant Hand right   Ambulation independent   Transferring independent   Toileting independent   Bathing independent   Dressing independent   Eating independent   IADLs independent   Driving/Transportation    Prior Level of Function Comment Ind PTA, previous cervical fx. 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,  reports manages bill paying, medications, calendar at home           IRF PT Evaluation and Treatment - 02/06/24 1307        PT Time Calculation    Start Time 1301     Stop Time 1401     Time Calculation (min) 60 min        Session Details    Document Type Discharge Evaluation     Mode of Treatment individual therapy;physical therapy        General Information    Patient Profile Reviewed yes     General Observations of Patient Pt received in  in main gym, cervical collar donned        Mobility Belt    Mobility Belt Used for All Out of Bed Activity yes        Orthosis Neck Cervical Collar    Orthosis Properties Date Obtained: 01/17/24 Time Obtained: 1900 Location: Neck Type: Cervical Collar Features: Hard Therapeutic Indications: fracture immobilization " Wearing Schedule: wear at all times (remove only for hygiene and skin inspection)       Cognition/Psychosocial    Affect/Mental Status WFL     Orientation Status oriented x 4        Sensory Assessment (Somatosensory)    Left LE Sensory Assessment light touch awareness;light touch localization;proprioception;intact   L1-S2 light touch, 1st MTP proprioception 5/5    Right LE Sensory Assessment light touch awareness;light touch localization;proprioception;intact   L1-S2 light touch, 1st MTP proprioception 5/5       Range of Motion (ROM)    Left Lower Extremity (ROM) left LE ROM is WFL     Right Lower Extremity (ROM) right LE ROM is WFL;hip     Hip, Right (ROM) limited by pain        Strength (Manual Muscle Testing)    Hip, Left (Strength) Flex 5/5, Ext 4/5, ER 4/5, IR 4+/5, Abd 3/5, Add 3/5     Knee, Left (Strength) ext 5/5, flex 4+/5     Ankle, Left (Strength) DF 5/5, PF 4+/5, Ev 4+/5, Inv 5/5     Hip, Right (Strength) Flex 3-/5, Ext 4-/5, ER 3+/5, IR 4/5, Abd 2+/5, Add 2+/5     Knee, Right (Strength) Ext 4+/5, Flex 5/5     Ankle, Right (Strength) DF 5/5, PF 4+/5, Ev 4+/5, Inv 5/5        Bed Mobility    [place], Roll Left modified independence     [place], Roll Right modified independence     [place], Supine to Sit modified independence     [place], Sit to Supine modified independence     Assistive Device bed rails     Comment increased time and effort, requests help but is able to complete on his own        Sit to Stand Transfer    [place], Sit to Stand Transfer modified independence     Assistive Device walker, front-wheeled        Stand to Sit Transfer    [place], Stand to Sit Transfer modified independence     Assistive Device walker, front-wheeled        Stand Pivot Transfer    [place], Stand Pivot/Stand Step Transfer modified independence     Safety/Cues increased time to complete     Assistive Device walker, front-wheeled     Comment mod I, concerns for safety with RW  "management at times        Toilet Transfer    Comment PT: pt continent of bladder, sit>stand from wc using grab bars at mod I        Car Transfer    Transfer Technique stand pivot     [place] modified independence     Assistive Device walker, front-wheeled     Comment SPT with RW; increased time to stand from low seat but able to complete without physical assist        Gait Training    [place], Gait modified independence     Safety/Cues increased time to complete     Assistive Device walker, front-wheeled     Distance in Feet 115 feet     Pattern step-through     Deviations/Abnormal Patterns antalgic;base of support, narrow;gait speed decreased;stride length decreased     Bilateral Gait Deviations heel strike decreased     Right Sided Gait Deviations --   decreased stance time    [place], Picking Up Object modified independence   use of reacher standing at RW    Comment (Gait/Stairs) increased reliance on RW        Curb Negotiation    [place] close supervision     Assistive Device walker, front-wheeled     Curb Height 6 inches     Comment up/down 2\"+6\" curb step with clS for safety and VC for sequencing and safety with RW        Rough/Uneven Surface Gait Skills    [place] modified independence;close supervision     Assistive Device walker, front-wheeled     Distance in Feet 30 feet     Comment (1) up/down 5' indoor ramp with RW and clS fro safety due to balance deficits (2) 30' on low pile carpet at mod I        Stairs Training    [place], Stairs close supervision     Safety/Cues increased time to complete;minimal;verbal cues;technique     Assistive Device railing     Handrail Location (Stairs) both sides     Number of Stairs 4     Stair Height 6 inches     Ascending Stairs Technique step-to-step   LLE leading    Descending Stairs Technique step-to-step   RLE leading    Comment clS due to LLE weakness, balance deficits, fatigue, an decraesed safety awareness        Wheelchair " Mobility/Management    Comment, Wheelchair Mobility see note from 2/5        Impairments/Safety Issues    Functional Endurance fair+; ambulates <120 feet, limited by decreased endurance and pain        Balance    Static Sitting Balance WFL     Dynamic Sitting Balance WFL     Static Standing Balance WFL;supported     Dynamic Standing Balance mild impairment;unsupported        Motor Skills    Coordination WFL;bilateral;lower extremity   alternating toe taps       Postural Deviations    Head and Neck forward head     Shoulder right shoulder forward;left shoulder forward;right shoulder elevation        Discharge Summary (PT)    Outcomes Achieved/Progress Made Upon Discharge (PT) goals partially achieved prior to discharge   stairs goal not achieved, pt can only complete 4 steps, does not need to navigate 12 steps at home    Transfer to Another Level of Care or Facility (PT) recommend therapy via home health     Discharge Summary Statement (PT) Pt has made progress since IE. He is mod I for bed mobility, transfers with RW, and ambulation up to 115 feet. He requires S for safety for all elevations. Pt improved gait speed from 0.11 m/s to 0.17 m/s. This is still significantly slower than age and gender matched norms of 1.2 m/s, indicating impaired functional community ambulation and increased risk of falls. Pt has been compliant with donning cervical collar during therapy sessions, but continues to require education on importance of wearing AAT. He is limited by pain and weakness (R>L), decreased balance, and decreased endurance. Pt also has decreased safety awareness and requires increased encouragement for participation. Family training with pt’s wife completed on 2/1. Pt provided with LE HEP to be completed daily. Pt will benefit from cont PT via home health in order to address remaining impairments and maximize function.                           IRF PT Goals    Flowsheet Row Most Recent Value   Bed Mobility Goal 1     Activity/Assistive Device bed mobility activities, all at 01/25/2024 0900   [place] minimum assist (75% or more patient effort) at 01/25/2024 0900   Time Frame short-term goal (STG), 5 - 7 days at 01/25/2024 0900   Progress/Outcome goal met at 02/06/2024 1307   Bed Mobility Goal 2    Activity/Assistive Device bed mobility activities, all at 01/25/2024 0900   [place] modified independence at 01/25/2024 0900   Time Frame long-term goal (LTG), 14 days or less at 01/25/2024 0900   Progress/Outcome goal met at 02/06/2024 1307   Transfer Goal 1    Activity/Assistive Device sit-to-stand/stand-to-sit, stand pivot at 01/29/2024 0839   [place] --  [steadying A] at 01/29/2024 0839   Time Frame short-term goal (STG), 5 - 7 days at 01/29/2024 0839   Progress/Outcome goal met at 02/06/2024 1307   Transfer Goal 2    Activity/Assistive Device sit-to-stand/stand-to-sit, bed-to-chair/chair-to-bed, stand pivot at 01/25/2024 0900   [place] modified independence at 01/25/2024 0900   Time Frame long-term goal (LTG), 14 days or less at 01/25/2024 0900   Progress/Outcome goal met at 02/06/2024 1307   Gait/Walking Locomotion Goal 1    Activity/Assistive Device gait (walking locomotion) at 01/25/2024 0900   Distance 25 feet at 01/25/2024 0900   [place] minimum assist (75% or more patient effort) at 01/25/2024 0900   Time Frame short-term goal (STG), 5 - 7 days at 01/25/2024 0900   Progress/Outcome goal met at 02/06/2024 1307   Gait/Walking Locomotion Goal 2    Activity/Assistive Device gait (walking locomotion), assistive device use at 01/29/2024 0839   Distance 150 feet at 01/29/2024 0839   [place] modified independence at 01/29/2024 0839   Time Frame long-term goal (LTG), 14 days or less at 01/29/2024 0839   Progress/Outcome goal met at 02/06/2024 1307   Stairs Goal 1    Activity/Assistive Device stairs, all skills, using handrail, left, using handrail, right at 01/28/2024 1304   Number of Stairs 12 at  01/28/2024 1304   [place] minimum assist (75% or more patient effort) at 01/28/2024 1304   Time Frame 5 - 7 days, short-term goal (STG) at 01/28/2024 1304   Progress/Outcome goal not met at 02/06/2024 1307   Stairs Goal 2    Activity/Assistive Device stairs, all skills, using handrail, left, cane, straight at 01/28/2024 1304   Number of Stairs 12 at 01/28/2024 1304   [place] supervision required at 01/28/2024 1304   Time Frame 14 days or less at 01/28/2024 1304   Progress/Outcome goal not met at 02/06/2024 1307

## 2024-02-06 NOTE — PLAN OF CARE
Plan of Care Review  Plan of Care Reviewed With: patient  Progress: improving  Outcome Evaluation: AAOx4. Pt refuses to have pictures taken of skin. Pt dressing changed to sacral eduardo after pt showered with OT today. Pt refuses to wear [place] collar. Pt verbalizes understanding of education yet refuses to wear collar or reposition self. Pt continent of bowels and bladder.

## 2024-02-06 NOTE — PROGRESS NOTES
[facility] Internal [facility] Progress Note          Patient was seen and examined at bedside.    Subjective:     Clinically he remains stable, pleasant, in NAD; he will finish his inpt acute rehab with good functional recovery, plan to d/c home tomorrow, provided with no new complaints or concerns, no O/N acute events, reviewed his recent labs, spent 35 min to prepare for his d/c home tomorrow.    Objective   Vital signs in last 24 hours:  Temp:  [36.5 °C (97.7 °F)-36.7 °C (98.1 °F)] 36.7 °C (98.1 °F)  Heart Rate:  [56-63] 56  Resp:  [18] 18  BP: (118-134)/(62-71) 125/67    No intake or output data in the 24 hours ending 02/06/24 1059  Intake/Output this shift:  No intake/output data recorded.   Review of Systems:  All other systems reviewed and negative except as noted in the HPI.   Objective      Labs  reviewed his labs thoroughly   Lab Results   Component Value Date    WBC 9.36 01/30/2024    HGB 8.2 (L) 01/30/2024    HCT 25.6 (L) 01/30/2024    MCV 97.3 01/30/2024     01/30/2024     Lab Results   Component Value Date    GLUCOSE 98 01/29/2024    CALCIUM 8.8 01/29/2024     01/29/2024    K 4.1 01/29/2024    CO2 23 01/29/2024     01/29/2024    BUN 42 (H) 01/29/2024    CREATININE 1.2 01/29/2024       Imaging  OSH imaging study reports reviewed:     CT cystography 1/24/24:  IMPRESSION:  No evidence of contrast extravasation into the prostatic parenchyma or other areas of contrast extravasation from the urinary bladder. Redemonstration of fractures involving the right superior and inferior pubic rami with extraperitoneal blood in the pelvis, which has slightly decreased compared to 1/17/2024. Persistent mild enlargement of the right adductor muscle compartment, similar to 1/17/2024.  His avila was removed.      MRI cervical spine 1/22/24  IMPRESSION:  1.  C2 fracture with no bone marrow edema to suggest acute fracture.  2.  No abnormal enhancement.  3.  No epidural collection or hematoma.  4.   Degenerative spondylosis worst at C3-C4 where there is also suggestion of  Mild right jose luis-cord signal abnormality.  Multilevel spondylosis otherwise noted  as described.      Full Code    Physical Exam:  Head/Ear/Nose/Throat: normocephalic; atraumatic; moisture mouth mm, no oropharyngeal thrush noted.   Eyes: anicteric sclera, EOMI; PERRL.   Neck : supple, no JVD, no carotid bruits appeciated.   Respiratory: no evidence of labored breathing, bibasilar lung BS diminished area, no remarkable w/r/c.   Cardiovascular: RRR; normal S1, S2; no m/r/g; no S3 or S4.   Gastrointestinal: soft; NT; BS normal; mildly distended; no CVAT b/l.   Genitourinary: off avila.   Extremities : no c/c/e .   Neurological: AO x 3, fluent speeches, following commands, CNS II-XII grossly intact; no focal neurologic deficits.   Behavior/Emotional: in NAD, appropriate; cooperative.   Skin: clean, dry and intact.     Plan of care was discussed with patient, RN, and PMR attending     Assessment   CC:S/p fall, sustained multi trauma: R post 8th-11th rib fractures,  R superior pubic rami fracture w hematoma, age undetermined C2 fracture; non surgically managed; ADL and ambulatory dysfunction          83 y.o. male with PMH of BPH, HTN, hypothyroidism, who presented and was admitted to [facility] on 1/17/24 after a slip and fall on ice landing on his right side. He was found to have multiple injuries including right 8-11 rib fractures, bilateral pleural effusion s/p IR thoracentesis 1/18 with drainage of 1.1L, pelvic fracture (non op mgmt), right adductor/obturator hematoma, questionable posttraumatic bladder injury and age-indeterminate cervical fracture.  Neurosurgery was consulted and offered c spinesurgery, but patient declined.  [place] Collar on at all times, except for meals to prevent aspiration.  Patient c/o difficulty coughing up mucus with collar on, but understands that he needs to wear the collar especially during therapy.  Urology was consulted.   CT cystogram showed possible extravasation of contrast into the prostatic urethra.  Avila catheter is currently draining clear urine.  Plan is to continue Avila catheter for 1 week.  CT cystogram in 1 week with possible removal at that time, but pt declining CT Cystogram.  Functionally, he has significant physical econditioning with ADL and ambulatory dysfunction, requiring inpt acute rehab, was admitted to [facility] on 1/24/24.        Repeated   CT cystography 1/24/24:  IMPRESSION:  No evidence of contrast extravasation into the prostatic parenchyma or other areas of contrast extravasation from the urinary bladder. Redemonstration of fractures involving the right superior and inferior pubic rami with extraperitoneal blood in the pelvis, which has slightly decreased compared to 1/17/2024. Persistent mild enlargement of the right adductor muscle compartment, similar to 1/17/2024.  His avila was removed.      A/P:  # S/p fall, sustained multi trauma: R post 8th-11th rib fractures,  R superior pubic rami fracture w hematoma, age undetermined C2 fracture; non surgically managed; ADL and ambulatory dysfunction  -Inpt acute rehab, pain and medical managements, DVT prophylaxis, dermal defense, fall precaution      # R post 8th-11th rib fractures, atelectasis, chest congestion, possible pleu effusion  -Monitor SO2, prn NC O2, to keep SO2>92%, incentive spirometry, bronchodilator nebulizer, mucolytic agent, pulm toileting.  -Check CXR, chest PT  - Chest wall pain management     # Urinary retention, BPH, r/o bladder extravasation  -Off avila, timed voiding, condom cath in the evening, monitor PVR with prn cic, adequate oral hydration, check UA and UCX, consider to add flomax 0.4 mg qhs.     # Hypothyroidism   -cont home dose of levothyroxine     # DVT prophylaxis   -Eliquis 2.5 mg bid      # Insomnia, anxiety   - address his pain   - prn ativan and Ambien  - psychology CBT            Billing code: [id number]  Diagnoses:  Patient Active  Problem List   Diagnosis   • Fall due to slipping on ice or snow, initial encounter   • Closed fracture of multiple ribs of right side   • Closed fracture of ramus of right pubis (CMS/HCC)   • Pelvic hematoma in male   • Pleural effusion on left   • Normocytic anemia   • Stage 3a chronic kidney disease (CMS/HCC)   • Closed odontoid fracture (CMS/HCC)   • Multiple injuries due to trauma             Neville Arndt MD  2/6/2024

## 2024-02-06 NOTE — PROGRESS NOTES
Daily Progress Note     Patient was seen and examined.   Attestation Notes: Face to face encounter completed         Subjective       Interval History: Patient is feeling well tomorrow and looking forward to going home. Reviewed prior lab results. Discussed follow-ups including with ortho and PCP. Questions and concerns answered at this time.    Review of Systems:  Negative except as per HPI    Current Functional Status:   Bed mobility:   [place], Supine to Sit: modified independence   [place], Sit to Supine: modified independence   Transfers:    [place], Sit to Stand Transfer: modified independence  [place], Stand to Sit Transfer: modified independence   [place], Stand Pivot/Stand Step Transfer: modified independence   Gait:   [place], Gait: modified independence  Assistive Device: walker, front-wheeled   Distance in Feet: 115 feet    Bathing:   [place]: supervision   Toileting:   [place]: modified independence   Upper body dressing:   [place]: minimum assist (75% or more patient effort)   Lower body dressing:   [place]: distant supervision     Mild to moderate cognitive deficits, 40/50 on the BCAT    Functional Progress:    Functional status reviewed. Overall, patient's functional status is progressing       Objective     Objective   Vitals and Hemodynamics  Patient Vitals for the past 24 hrs:   BP Temp Temp src Pulse Resp SpO2   02/06/24 1649 140/68 36.7 °C (98 °F) Oral (!) 59 18 97 %   02/06/24 1307 (!) 115/56 -- -- (!) 57 -- --   02/06/24 0732 125/67 -- -- (!) 56 -- --   02/06/24 0711 130/64 36.7 °C (98.1 °F) Oral (!) 57 18 96 %   02/05/24 1945 134/63 36.6 °C (97.9 °F) Oral (!) 57 18 94 %         Physical Exam    General: NAD, laying in bed   HEENT: Atraumatic, Normocephalic; c collar off  Cardiovascular: RRR  Respiratory: CTAB   Abdomen/GI: NT ND  Skin: Warm, Dry, erythema and induration of the left proximal forearm - improving  Extremities/MSK: Non tender  "bilateral gastrocs, trace to 1+ edema of the right foot; trace edema of the left foot, strength 4/5 at the right hip  Neuro: AAOx3, follows commands, repetitive at times       Labs            No lab exists for component: \"BANDSPCTMAN\", \"NEUTROPCT\", \"NEUTRO\", \"EOSINOPCT\"          No lab exists for component: \"BCR\"        No lab exists for component: \"\"          No lab exists for component: \"ALB\"    Imaging      Assessment and Plan     Randolph Daley is a 83 y.o. male with a PMHx of BPH, hypothyroid, cervical spine fracture, who presents with ADL/mobility dysfunction secondary to multi trauma after fall, found to have right pubic ramus fracture, right posterior rib fractures, chronic odontoid fracture.     #ADL/Ambulatory dysfunction: 2/2 multi trauma. Continue acute inpatient rehabilitation with PT/OT/SLP/SW/Rehab RN/Neuropsych to increase independence with ADLs, improve balance, coordination, endurance, strength, mobility, community reintegration, decreased burden of care on others and family education.  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 with mild reight jose luis-cord signal abnormality  - chronic  - evaluated by neurosurgery in acute care  - decline operative management at this time; continue hard cervical collar at all times  - follow-up neurosurgery in 4-6 weeks     #right perivascular hematoma associated with mild mass effect on the urinary bladder and perivesicular infiltration  - monitor bladder scans, CIC PRN  - CT " cystogram without evidence of contrast extravasation into prostatic parenchyma or other areas of extravasation from urinary bladder  - had avila catheter - removed      #BPH  - continue flomax     #Pleural effusion  - s/p thoracentesis  - monitor O2    #hyponatremia  - Na 138 1/29  - continue salt tabs, fluid restriction    #left forearm induration  - doxycycline course completed   - elevation, heat or ice PRN     #Immobility  -DVT prophylaxis with apixiban     # Follow-up  - PCP  - neurosurgery  - orthopedic surgery     #Disposition: 2/7/2024, pt to go home with skilled RN/PT/OT       Amelia Larose MD  Physical Medicine and Rehabilitation

## 2024-02-06 NOTE — PROGRESS NOTES
Patient: Randolph Daley  Location: [facility] [address]  MRN: [id number]  Today's date: 2/6/2024    History of Present Illness  Pt is a 83 y.o. male admitted on 1/24/2024 with Multiple injuries due to trauma [T07.XXXA]. Principal problem is Multiple injuries due to trauma.  is a 83 y.o. male whose primary indication for inpatient rehabilitation is Debility.    Pt is a 83 y.o M adm to [facility] 1/24/24 who presented to [facility] after a slip and fall on ice landing on his right side.  He was found to have multiple injuries including right 8-11 rib fractures, bilateral pleural effusion s/p IR thoracentesis 1/18 with drainage of 1.1L, pelvic fracture (non op mgmt), right adductor/obturator hematoma, questionable posttraumatic bladder injury and age-indeterminate cervical fracture.  Neurosurgery was consulted and offered c spinesurgery, but patient declined.  Henry Collar on at all times, except for meals to prevent aspiration. Urology was consulted.  CT cystogram showed possible extravasation of contrast into the prostatic urethra.  Womack catheter is currently draining clear urine.  Plan is to continue Womack catheter for 1 week.  CT cystogram in 1 week with possible removal at that time, but pt declining CT Cystogram.        Past Medical History  PMH: BPH, HTN       SLP Pain    Date/Time Pain Type Rating: Rest Who   02/06/24 1034 Pain Assessment 0 - no pain HL   02/06/24 1057 Pain Reassessment 0 - no pain HL          Prior Living Environment    Flowsheet Row Most Recent Value   People in Home spouse   Current Living Arrangements home   Home Accessibility stairs to enter home (Group), stairs within home (Group)   Living Environment Comment lives with wife in 2  home with first floor set up   Number of Stairs, Main Entrance 1   Surface of Stairs, Main Entrance hardwood   Stair Railings, Main Entrance none   Location, Patient Bedroom first (main) floor   Patient Bedroom Access Comment first  "floor set up   Location, Bathroom first (main) floor   Bathroom Access Comment First floor set up. Walk in shower. grab bar and shower \"bench\"   Stairs, Within Home, Primary 16   Number of Stairs, Within Home, Primary other (see comments)   Surface of Stairs, Within Home, Primary carpeting   Stair Railings, Within Home, Primary railings on both sides of stairs  [left side rail goes 1/2- then can hold the spindles]   Stairs Comment, Within Home, Primary pt reports wife office upstairs but master bed/bath 1st floor          Prior Level of Function    Flowsheet Row Most Recent Value   Dominant Hand right   Ambulation independent   Transferring independent   Toileting independent   Bathing independent   Dressing independent   Eating independent   IADLs independent   Driving/Transportation    Prior Level of Function Comment Ind PTA, previous cervical fx. 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,  reports manages bill paying, medications, calendar at home           IRF SLP Evaluation and Treatment - 02/06/24 1034        SLP Time Calculation    Start Time 1030     Stop Time 1100     Time Calculation (min) 30 min        Session Details    Document Type Discharge Evaluation     Mode of Treatment speech language pathology;individual therapy        General Information    General Observations of Patient Pt receieved laying in bed, aspen collar not in place. Pt edu extensively re: precautions and need for collar to be donned AAT. Pt refused despite edu.        Cognition/Psychosocial    Comment, Attention Pt participated in a letter placement task from [employer] where he was given a grid of letters and had to create a word  based on written directions via the grid. He was able to complete this activity with 100% acc with MIN cues for attention to detail.     Comment, Executive Function Pt and SLP reviewed STAR strategy for attention and " executive functioning. Pt was able to relate how to implement STAR into a day to day example (i.e. using walker to prevent a fall).        Functional Communication Measures    FCM: Memory 6-->Level 6     FCM: Problem Solving 5-->Level 5     FCM: Voice 6-->Level 6        Daily Progress Summary (SLP)    Daily Outcome Statement Performance day.        Discharge Summary (SLP)    Outcomes Achieved Upon Discharge (SLP) all goals met within established timeframes     Discharge Summary Statement (SLP) On initial evaluation, pt presented with mild dysphonia c/b poor breath support for speech and mild to moderate cognitive deficits c/b decreased STM for details; decreased reasoning/problem solving for mod complex tasks. He is on a regular solid/thin liquid diet. Motor speech and expressive/receptive language is intact. Pt was living with his wife PTA and was completing all IADL’s independently. Pt showing steady progress towards cognition goals with skilled acute rehabilitation stay. He scored a 40/50 on the BCAT on 1/30 indicating a mild cognitive impairment. Insight into deficits remains poor, but pt is willing to participate and is open to education. Pt with improved sustained phonation and use of incentive spirometer. Recommend set up assistance while completing high consequence IADL’s (finances, and medications) and continued skilled ST therapy upon d/c to continue to address cognition and motor speech goals.     Transfer to Another Level of Care or Facility (SLP) recommend continued therapy following discharge                 Education Documentation  Communication Techniques, taught by Laly Ch CCC-SLP at 2/6/2024 10:37 AM.  Learner: Patient  Readiness: Acceptance  Method: Explanation  Response: Verbalizes Understanding  Comment: Pt edu re: goals, progress, and d/c rec's.    Communication Techniques, taught by Laly Ch CCC-SLP at 2/6/2024 10:37 AM.  Learner: Patient  Readiness: Acceptance  Method:  Explanation  Response: Verbalizes Understanding  Comment: Pt edu re: goals, progress, and d/c rec's.    Cognitive Support Measures, taught by Laly Ch CCC-SLP at 2/6/2024 10:37 AM.  Learner: Patient  Readiness: Acceptance  Method: Explanation  Response: Verbalizes Understanding  Comment: Pt edu re: goals, progress, and d/c rec's.          IRF SLP Goals    Flowsheet Row Most Recent Value   Motor Speech/Voice Goal 1    Motor Speech/Voice Goal 1 breath support exercises (incentive spirometer, sustained phonation),  vocal function exercises for improved volume and voice quality x 80% mod cues. at 01/26/2024 0922   Time Frame short-term goal (STG), 2 weeks at 01/26/2024 0922   Progress/Outcome goal met at 02/06/2024 1034   Motor Speech/Voice Goal 2    Motor Speech/Voice Goal 2 Functinal breath support and voice quality for complex convesational needs x 90% with I use of strategies at 01/26/2024 0922   Time Frame long-term goal (LTG), 3 weeks at 01/26/2024 0922   Progress/Outcome goal met at 02/06/2024 1034   Executive Function Goal 1    Activity executive function tasks, information processing tasks, organization/sequencing tasks, planning/decision-making tasks, problem-solving/reasoning tasks, self-monitoring/self-correction  [convergent/divergent reasoning tasks,  mod level functional tasks related to return to I managing bills, meds, calendar, safety] at 01/26/2024 0922   [place]/Accuracy with 80% accuracy, with moderate, verbal cues/redirection at 01/26/2024 0922   Time Frame short-term goal (STG), 2 weeks at 01/26/2024 0922   Progress/Outcome goal met at 02/06/2024 1034   Executive Function Goal 2    Activity executive function tasks, information processing tasks, organization/sequencing tasks, planning/decision-making tasks, problem-solving/reasoning tasks, self-monitoring/self-correction at 01/26/2024 0922   [place]/Accuracy with 90% accuracy, with minimum, verbal cues/redirection at 01/26/2024  0922   Time Frame long-term goal (LTG), 3 weeks at 01/26/2024 0922   Progress/Outcome goal met at 02/06/2024 1038

## 2024-02-06 NOTE — PROGRESS NOTES
02/06/24 0900   Discharge Needs Assessment   Anticipated Discharge Disposition home with home health   Discharge Planning   Type of Home Care Services home OT;home PT;nursing   Med List Provided to Home Agency Paper (including fax)   Discharge Transportation   Does the patient need discharge transport arranged? Yes   Has discharge transport been arranged? Yes   Discharge Transportation Vendor other (see comment)   Type of Transportation   (car)   What day is the transport expected? 02/07/24   What time is the transport expected? 1000   Plan   Plan ome with wife and [facility]   Patient/Family in Agreement with Plan yes   Discharge Review for Designated Lay Caregiver Designated Lay Caregiver available     Met with pt to go over dc info and process. Did same with wife yesterday. Wife will pick pt up in am.

## 2024-02-06 NOTE — PROGRESS NOTES
Transitions of Care  Pharmacy Counseling Note      SUMMARY   Met with Randolph Daley for medication education. The patient states he is familiar  with most of his medications. Reviewed all patient’s current medications in detail. Discussed the name, dose, frequency, indication and side effects.  Discussed any changes in regimen:  New apixaban to prevent clots  Taking until 2/26/24  Informed of signs of bleeding to monitor for such as blood in urine/stool or extremely severe headache    Confirmed medications sent to appropriate pharmacy, [facility] in [place]     Provided my phone extension for any issues/concerns in this transitional period: [phone]      MEDICATIONS     Medication List    Current Facility-Administered Medications:     acetaminophen (TYLENOL) tablet 650 mg, 650 mg, oral, q4h PRN, Estephania Gonzales MD, 650 mg at 01/31/24 2016    apixaban (ELIQUIS) tablet 2.5 mg, 2.5 mg, oral, BID, Neville Arndt MD, 2.5 mg at 02/06/24 0718    ascorbic acid (VITAMIN C) tablet 500 mg, 500 mg, oral, Daily, Estephania Gonzales MD, 500 mg at 02/06/24 0718    cholecalciferol (vitamin D3) tablet 2,500 Units, 2,500 Units, oral, Daily with dinner, Estephania Gonzales MD, 2,500 Units at 02/05/24 1744    ferrous sulfate tablet 325 mg, 325 mg, oral, Daily with breakfast, Neville Arndt MD, 325 mg at 02/06/24 0718    levothyroxine (SYNTHROID) tablet 25 mcg, 25 mcg, oral, Daily (6:30a), Estephania Gonzales MD, 25 mcg at 02/06/24 0458    sennosides-docusate sodium (SENOKOT-S) 8.6-50 mg per tablet 2 tablet, 2 tablet, oral, BID, Estephania Gonzales MD, 2 tablet at 02/04/24 2007    tamsulosin (FLOMAX) 24 hr ER capsule 0.4 mg, 0.4 mg, oral, Nightly, Estephania Gonzales MD, 0.4 mg at 02/05/24 2052            Olga Jacob, PharmD,    (Time Spent: 20 minutes)  2/6/2024

## 2024-02-07 VITALS
RESPIRATION RATE: 18 BRPM | HEART RATE: 54 BPM | TEMPERATURE: 97.9 F | WEIGHT: 192 LBS | DIASTOLIC BLOOD PRESSURE: 62 MMHG | BODY MASS INDEX: 28.44 KG/M2 | SYSTOLIC BLOOD PRESSURE: 118 MMHG | HEIGHT: 69 IN | OXYGEN SATURATION: 96 %

## 2024-02-07 PROCEDURE — 63700000 HC SELF-ADMINISTRABLE DRUG: Performed by: INTERNAL MEDICINE

## 2024-02-07 PROCEDURE — 63700000 HC SELF-ADMINISTRABLE DRUG

## 2024-02-07 RX ADMIN — FERROUS SULFATE TAB 325 MG (65 MG ELEMENTAL FE) 325 MG: 325 (65 FE) TAB at 08:19

## 2024-02-07 RX ADMIN — APIXABAN 2.5 MG: 2.5 TABLET, FILM COATED ORAL at 08:19

## 2024-02-07 RX ADMIN — LEVOTHYROXINE SODIUM 25 MCG: 25 TABLET ORAL at 05:40

## 2024-02-07 RX ADMIN — SENNOSIDES AND DOCUSATE SODIUM 2 TABLET: 8.6; 5 TABLET ORAL at 08:19

## 2024-02-07 RX ADMIN — OXYCODONE HYDROCHLORIDE AND ACETAMINOPHEN 500 MG: 500 TABLET ORAL at 08:19

## 2024-02-07 ASSESSMENT — PATIENT HEALTH QUESTIONNAIRE - PHQ9: SUM OF ALL RESPONSES TO PHQ9 QUESTIONS 1 & 2: 0

## 2024-02-07 NOTE — NURSING NOTE
Pt given discharge instructions. Pt's wife verbalized understanding of wound instructions and home supplies sent with pt and wife.

## 2024-02-07 NOTE — PLAN OF CARE
Plan of Care Review  Plan of Care Reviewed With: patient  Progress: improving  Outcome Evaluation: alert and oriented x4; incontinent of bladder this evening, bed change and clean up provided; no complaints of pain; ready for discharge to home in AM; call bell in reach; bed alarm set; no reports of concerns at this time.

## 2024-02-07 NOTE — PLAN OF CARE
Plan of Care Review  Plan of Care Reviewed With: patient  Progress: improving  Outcome Evaluation: AAOx4. Pt due for d/c. Denies pain.

## 2024-02-07 NOTE — PROGRESS NOTES
[facility] Progress Note          Patient was seen and examined at bedside.    Subjective:   2/6/24  Clinically he remains stable, pleasant, in NAD; he will finish his inpt acute rehab with good functional recovery, plan to d/c home tomorrow, provided with no new complaints or concerns, no O/N acute events, reviewed his recent labs, spent 35 min to prepare for his d/c home tomorrow.    2/7/24  Clinically he remains stable, no O/N events, no complaints, he is ready to d/c home today   Objective   Vital signs in last 24 hours:  Temp:  [36.6 °C (97.9 °F)-36.9 °C (98.4 °F)] 36.6 °C (97.9 °F)  Heart Rate:  [54-80] 54  Resp:  [18] 18  BP: (115-142)/(56-68) 118/62    No intake or output data in the 24 hours ending 02/07/24 0859  Intake/Output this shift:  No intake/output data recorded.   Review of Systems:  All other systems reviewed and negative except as noted in the HPI.   Objective      Labs  reviewed his labs thoroughly   Lab Results   Component Value Date    WBC 9.36 01/30/2024    HGB 8.2 (L) 01/30/2024    HCT 25.6 (L) 01/30/2024    MCV 97.3 01/30/2024     01/30/2024     Lab Results   Component Value Date    GLUCOSE 98 01/29/2024    CALCIUM 8.8 01/29/2024     01/29/2024    K 4.1 01/29/2024    CO2 23 01/29/2024     01/29/2024    BUN 42 (H) 01/29/2024    CREATININE 1.2 01/29/2024       Imaging  OSH imaging study reports reviewed:     CT cystography 1/24/24:  IMPRESSION:  No evidence of contrast extravasation into the prostatic parenchyma or other areas of contrast extravasation from the urinary bladder. Redemonstration of fractures involving the right superior and inferior pubic rami with extraperitoneal blood in the pelvis, which has slightly decreased compared to 1/17/2024. Persistent mild enlargement of the right adductor muscle compartment, similar to 1/17/2024.  His avila was removed.      MRI cervical spine 1/22/24  IMPRESSION:  1.  C2 fracture with no bone marrow  edema to suggest acute fracture.  2.  No abnormal enhancement.  3.  No epidural collection or hematoma.  4.  Degenerative spondylosis worst at C3-C4 where there is also suggestion of  Mild right jose luis-cord signal abnormality.  Multilevel spondylosis otherwise noted  as described.      Full Code    Physical Exam:  Head/Ear/Nose/Throat: normocephalic; atraumatic; moisture mouth mm, no oropharyngeal thrush noted.   Eyes: anicteric sclera, EOMI; PERRL.   Neck : supple, no JVD, no carotid bruits appeciated.   Respiratory: no evidence of labored breathing, bibasilar lung BS diminished area, no remarkable w/r/c.   Cardiovascular: RRR; normal S1, S2; no m/r/g; no S3 or S4.   Gastrointestinal: soft; NT; BS normal; mildly distended; no CVAT b/l.   Genitourinary: off avila.   Extremities : no c/c/e .   Neurological: AO x 3, fluent speeches, following commands, CNS II-XII grossly intact; no focal neurologic deficits.   Behavior/Emotional: in NAD, appropriate; cooperative.   Skin: clean, dry and intact.     Plan of care was discussed with patient, RN, and PMR attending     Assessment   CC:S/p fall, sustained multi trauma: R post 8th-11th rib fractures,  R superior pubic rami fracture w hematoma, age undetermined C2 fracture; non surgically managed; ADL and ambulatory dysfunction          83 y.o. male with PMH of BPH, HTN, hypothyroidism, who presented and was [place] [facility] [place] after a slip and fall on ice landing on his right side. He was found to have multiple injuries including right 8-11 rib fractures, bilateral pleural effusion s/p IR thoracentesis 1/18 with drainage of 1.1L, pelvic fracture (non op mgmt), right adductor/obturator hematoma, questionable posttraumatic bladder injury and age-indeterminate cervical fracture.  Neurosurgery was consulted and offered c spinesurgery, but patient declined.  [place] Collar on at all times, except for meals to prevent aspiration.  Patient c/o difficulty coughing up mucus with  collar on, but understands that he needs to wear the collar especially during therapy.  Urology was consulted.  CT cystogram showed possible extravasation of contrast into the prostatic urethra.  Avila catheter is currently draining clear urine.  Plan is to continue Avila catheter for 1 week.  CT cystogram in 1 week with possible removal at that time, but pt declining CT Cystogram.  Functionally, he has significant physical econditioning with ADL and ambulatory dysfunction, requiring inpt acute rehab, was admitted to [facility] on 1/24/24.        Repeated   CT cystography 1/24/24:  IMPRESSION:  No evidence of contrast extravasation into the prostatic parenchyma or other areas of contrast extravasation from the urinary bladder. Redemonstration of fractures involving the right superior and inferior pubic rami with extraperitoneal blood in the pelvis, which has slightly decreased compared to 1/17/2024. Persistent mild enlargement of the right adductor muscle compartment, similar to 1/17/2024.  His avila was removed.      A/P:  # S/p fall, sustained multi trauma: R post 8th-11th rib fractures,  R superior pubic rami fracture w hematoma, age undetermined C2 fracture; non surgically managed; ADL and ambulatory dysfunction  -Inpt acute rehab, pain and medical managements, DVT prophylaxis, dermal defense, fall precaution      # R post 8th-11th rib fractures, atelectasis, chest congestion, possible pleu effusion  -Monitor SO2, prn NC O2, to keep SO2>92%, incentive spirometry, bronchodilator nebulizer, mucolytic agent, pulm toileting.  -Check CXR, chest PT  - Chest wall pain management     # Urinary retention, BPH, r/o bladder extravasation  -Off avila, timed voiding, condom cath in the evening, monitor PVR with prn cic, adequate oral hydration, check UA and UCX, consider to add flomax 0.4 mg qhs.     # Hypothyroidism   -cont home dose of levothyroxine     # DVT prophylaxis   -Eliquis 2.5 mg bid      # Insomnia, anxiety   - address  his pain   - prn ativan and Ambien  - psychology CBT            Billing code: [id number]  Diagnoses:  Patient Active Problem List   Diagnosis   • Fall due to slipping on ice or snow, initial encounter   • Closed fracture of multiple ribs of right side   • Closed fracture of ramus of right pubis (CMS/HCC)   • Pelvic hematoma in male   • Pleural effusion on left   • Normocytic anemia   • Stage 3a chronic kidney disease (CMS/HCC)   • Closed odontoid fracture (CMS/HCC)   • Multiple injuries due to trauma             Neville Arndt MD  2/7/2024

## 2024-02-07 NOTE — DISCHARGE SUMMARY
Rehab Discharge Summary  BRIEF OVERVIEW        Admitting Provider: Amelia Larose MD  Discharge Provider: Amelia Larose*  Primary Care Physician at Discharge: Bear Morin [phone]     Admission Date: 1/24/2024     Discharge Date: 2/7/2024      Primary Discharge Diagnosis  Multiple injuries due to trauma  - right superior pubic ramus comminuted fracture with right adductor/obturator intramuscular hematomas  - right 8th-11th posterior acute rib fractures  - right perivascular hematoma associated with mild mass effect on the urinary bladder and perivesicular infiltration    Secondary Discharge Diagnosis  ADL and ambulatory dysfunction   C2 fracture  BPH  Pleural effusion  Hyponatremia      Discharge Disposition     Code Status at Discharge: Full Code    Discharge Medications     Medication List      START taking these medications    apixaban 2.5 mg tablet  Commonly known as: ELIQUIS  Take 1 tablet (2.5 mg total) by mouth 2 (two) times a day for 21 days Indications: deep vein thrombosis prevention, multitrauma, ambulatory dysfunction, high risk of DVT.  Dose: 2.5 mg     cholecalciferol (vitamin D3) 5,000 unit (125 mcg) tablet  Take 0.5 tablets (2,500 Units total) by mouth daily with dinner.  Dose: 2,500 Units     ferrous sulfate 325 mg (65 mg iron) tablet  Take 1 tablet (325 mg total) by mouth daily with breakfast.  Dose: 325 mg     sennosides-docusate sodium 8.6-50 mg  Commonly known as: SENOKOT-S  Take 2 tablets by mouth daily.  Dose: 2 tablet     tamsulosin 0.4 mg capsule  Commonly known as: FLOMAX  Take 1 capsule (0.4 mg total) by mouth nightly.  Dose: 0.4 mg        CHANGE how you take these medications    acetaminophen 325 mg tablet  Commonly known as: TYLENOL  Take 3 tablets (975 mg total) by mouth every 8 (eight) hours as needed for pain (temp > 100.4°F / 38°C (If treating new fever, call physician). mild pain).  Dose: 975 mg  What changed:   · medication strength  · how  much to take  · when to take this  · reasons to take this     ascorbic acid 500 mg tablet  Commonly known as: VITAMIN C  Take 1 tablet (500 mg total) by mouth daily.  Dose: 500 mg  What changed: how much to take        CONTINUE taking these medications    levothyroxine 25 mcg tablet  Commonly known as: SYNTHROID  Take 25 mcg by mouth daily before breakfast.  Dose: 25 mcg        STOP taking these medications    bisacodyL 5 mg EC tablet  Commonly known as: DULCOLAX     enoxaparin 30 mg/0.3 mL syringe  Commonly known as: LOVENOX     guaiFENesin 100 mg/5 mL syrup  Commonly known as: ROBITUSSIN     METANX ORAL     sildenafiL 100 mg tablet  Commonly known as: VIAGRA     traMADoL 50 mg tablet  Commonly known as: ULTRAM              Outpatient Follow-Up  Encounter Information    This patient does not currently have any appointments scheduled.           Test Results Pending at Discharge  Unresulted Labs (From admission, onward)     Start     Ordered    01/26/24 0945  Occult blood x 1, stool Per Rectum  Once        Question:  Release to patient  Answer:  Immediate    01/26/24 0944               DETAILS OF HOSPITAL STAY   Presenting Problem/History of Present Illness  Multiple injuries due to trauma [T07.XXXA]  Per admission HPI, Randolph Daley is a 83 y.o. male with past medical history of BPH, hypothyroid who initially presented to [facility] 1/17/24 after a slip and fall with right sided pain. No LOC, GCS 15 on arrival. Imaging showed right superior pubic ramus comminuted fracture with right adductor/obturator intramuscular hematomas, right perivascular hematoma associated with mild mass effect on the urinary bladder and perivesicular infiltration, right 8th-11th posterior acute rib fractures, large loculated left pleural effusion with near complete collapse left lower lobe, moderate right layering pleural effusion; infiltration of SMA, SMV with poorly seen pancreas, right lower pole renal cystic lesion,  hepatosplenomegaly. CTH without acute intracranial lesion; CT cervical spine with age indeterminate odontoid process fracture. Patient was seen by orthopedic surgery - rec non operative management of right pubic ramus fracture - WBAT BLE. Patient had avila catheter placed due to concern for prostatic urethral injury.  CT cystogram without evidence of contrast extravasation into prostatic parenchyma or other areas of extravasation from urinary bladder. US kidneys showed multiple right renal cortical and sinus cysts with mild right hydronephrosis. Avila removed.  Patent underwent thoracentesis by IR on 1/18 for left pleural effusion with 1.1L removed. 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 He declined surgical intervention. He is to wear cervical collar at all times, and was informed if he has a fall he is at high risk for spinal cord injury. He is recommended for follow-up with neurosurgery in 4-6 weeks with repeat XRs.      Patient was functionally independent prior to admission for ADLs and ambulation. He is requiring moderate A for transfers and ambulation with RW. He is in cervical collar at all times. Patient transferred to [facility] on 1/24/24 for acute inpatient rehabilitation.    Hospital Course  The patient participated in a comprehensive rehabilitation program including physical therapy, occupational therapy, speech therapy, psychology, rehabilitation nursing, while under physiatric supervision and made good functional progress.    While in rehab the patient was evaluated by internal medicine. Patient had low hemoglobin. He was evaluated in the ED with repeat bloodwork above threshold for transfusion. Blood levels remained stable. He had hyponatremia - improved with salt tabs and fluid restriction. He had IV  infiltration with left forearm induration. He received course of doxycyline. Patient progressed with therapies. He was discharged home on 2/7/2024.     Functional status at discharge:     Bed mobility:   [place], Supine to Sit: modified independence   [place], Sit to Supine: modified independence   Transfers:    [place], Sit to Stand Transfer: modified independence  [place], Stand to Sit Transfer: modified independence   [place], Stand Pivot/Stand Step Transfer: modified independence   Gait:   [place], Gait: modified independence  Assistive Device: walker, front-wheeled   Distance in Feet: 115 feet    Bathing:   [place]: supervision   Toileting:   [place]: modified independence   Upper body dressing:   [place]: minimum assist (75% or more patient effort)   Lower body dressing:   [place]: distant supervision     Mild to moderate cognitive deficits, 40/50 on the BCAT    The patient was discharged home on 2/7/24. He will transition to home with home therapies. Will need to follow up with PCP, neurosurgery, orthopedic surgery, nephrology.      Day of Discharge  The patient was seen and examined on day of discharge. Patient is feeling well today. Denies CP or shortness of breath. He has some right periscapular pain, but it is controlled. All questions and concerns reviewed. Reviewed importance of using C collar at this time, as well as follow-up appointments.     ROS: negative except per HPI     General: NAD, sitting in WC at bedside   HEENT: Atraumatic, Normocephalic; c collar off  Cardiovascular: RRR  Respiratory: CTAB   Abdomen/GI: NT ND  Skin: Warm, Dry, erythema and induration of the left proximal forearm - improving  Extremities/MSK: Non tender bilateral gastrocs, trace to 1+ edema of the right foot; trace edema of the left foot, strength 4/5 at the right hip  Neuro: AAOx3, follows commands    >30 minutes spent on discharge services.      Amelia Larose,  MD  2/7/2024  10:01 AM

## 2024-04-16 ENCOUNTER — HOSPITAL ENCOUNTER (OUTPATIENT)
Dept: ULTRASOUND IMAGING | Facility: HOSPITAL | Age: 84
Discharge: HOME | End: 2024-04-16
Attending: UROLOGY
Payer: COMMERCIAL

## 2024-04-16 DIAGNOSIS — N39.0 URINARY TRACT INFECTION, SITE NOT SPECIFIED: ICD-10-CM

## 2024-04-16 PROCEDURE — 76770 US EXAM ABDO BACK WALL COMP: CPT

## 2024-05-28 ENCOUNTER — APPOINTMENT (OUTPATIENT)
Dept: LAB | Facility: HOSPITAL | Age: 84
End: 2024-05-28
Attending: UROLOGY
Payer: COMMERCIAL

## 2024-05-28 DIAGNOSIS — N39.0 URINARY TRACT INFECTION WITHOUT HEMATURIA, SITE UNSPECIFIED: ICD-10-CM

## 2024-05-28 PROCEDURE — 87186 SC STD MICRODIL/AGAR DIL: CPT

## 2024-05-28 PROCEDURE — 87077 CULTURE AEROBIC IDENTIFY: CPT

## 2024-05-28 PROCEDURE — 87086 URINE CULTURE/COLONY COUNT: CPT

## 2024-05-30 LAB — BACTERIA UR CULT: ABNORMAL

## 2024-06-28 ENCOUNTER — TELEPHONE (OUTPATIENT)
Age: 84
End: 2024-06-28

## 2024-06-28 NOTE — TELEPHONE ENCOUNTER
Called [facility] and scheduled him for  appointment with Dr Reeves for discuss of Bladder cancer and enlarged prostate - Scheduled for TURBT with [facility] in 4 weeks

## 2024-06-28 NOTE — TELEPHONE ENCOUNTER
Switch to a 30 min appointment and schedule within one week (urgent is okay). If unable to schedule in timeframe, schedule next soonest urgent slot and send telephone encounter to practice provider pool for further direction.       PT wife calling to scheduled a second opinion for Bladder cancer.  Wants to be scheduled with Dr. José.  PT has seen [facility].    Please review and call wife back at [phone]

## 2024-06-28 NOTE — TELEPHONE ENCOUNTER
Spoke with the patient and scheduled as follows:    Date: 7/11/2024     Arrival Time: 8:45 AM     Visit Type: OFFICE VISIT LONG [facility]      Provider: Adebayo José MD Department: [facility] [place]

## 2024-07-10 ENCOUNTER — HOSPITAL ENCOUNTER (EMERGENCY)
Facility: HOSPITAL | Age: 84
Discharge: HOME | End: 2024-07-10
Attending: EMERGENCY MEDICINE | Admitting: EMERGENCY MEDICINE
Payer: COMMERCIAL

## 2024-07-10 ENCOUNTER — APPOINTMENT (EMERGENCY)
Dept: CT IMAGING | Facility: HOSPITAL | Age: 84
End: 2024-07-10
Payer: COMMERCIAL

## 2024-07-10 VITALS
HEART RATE: 92 BPM | SYSTOLIC BLOOD PRESSURE: 120 MMHG | OXYGEN SATURATION: 96 % | RESPIRATION RATE: 18 BRPM | DIASTOLIC BLOOD PRESSURE: 71 MMHG | TEMPERATURE: 98.1 F

## 2024-07-10 DIAGNOSIS — R10.9 FLANK PAIN: Primary | ICD-10-CM

## 2024-07-10 LAB
ANION GAP SERPL CALCULATED.3IONS-SCNC: 9 MMOL/L (ref 4–13)
ANISOCYTOSIS BLD QL SMEAR: PRESENT
BACTERIA UR QL AUTO: ABNORMAL /HPF
BASOPHILS # BLD AUTO: 0.02 THOUSANDS/ÂΜL (ref 0–0.1)
BASOPHILS NFR BLD AUTO: 0 % (ref 0–1)
BILIRUB UR QL STRIP: NEGATIVE
BUN SERPL-MCNC: 42 MG/DL (ref 5–25)
CALCIUM SERPL-MCNC: 9.1 MG/DL (ref 8.4–10.2)
CAOX CRY URNS QL MICRO: ABNORMAL /HPF
CHLORIDE SERPL-SCNC: 100 MMOL/L (ref 96–108)
CLARITY UR: ABNORMAL
CO2 SERPL-SCNC: 24 MMOL/L (ref 21–32)
COLOR UR: ABNORMAL
CREAT SERPL-MCNC: 2.11 MG/DL (ref 0.6–1.3)
EOSINOPHIL # BLD AUTO: 0.01 THOUSAND/ÂΜL (ref 0–0.61)
EOSINOPHIL NFR BLD AUTO: 0 % (ref 0–6)
ERYTHROCYTE [DISTWIDTH] IN BLOOD BY AUTOMATED COUNT: 15 % (ref 11.6–15.1)
GFR SERPL CREATININE-BSD FRML MDRD: 28 ML/MIN/1.73SQ M
GLUCOSE SERPL-MCNC: 101 MG/DL (ref 65–140)
GLUCOSE UR STRIP-MCNC: NEGATIVE MG/DL
HCT VFR BLD AUTO: 23.5 % (ref 36.5–49.3)
HGB BLD-MCNC: 7.6 G/DL (ref 12–17)
HGB UR QL STRIP.AUTO: ABNORMAL
IMM GRANULOCYTES # BLD AUTO: 0.03 THOUSAND/UL (ref 0–0.2)
IMM GRANULOCYTES NFR BLD AUTO: 1 % (ref 0–2)
KETONES UR STRIP-MCNC: NEGATIVE MG/DL
LEUKOCYTE ESTERASE UR QL STRIP: ABNORMAL
LYMPHOCYTES # BLD AUTO: 0.87 THOUSANDS/ÂΜL (ref 0.6–4.47)
LYMPHOCYTES NFR BLD AUTO: 16 % (ref 14–44)
MCH RBC QN AUTO: 32.1 PG (ref 26.8–34.3)
MCHC RBC AUTO-ENTMCNC: 32.3 G/DL (ref 31.4–37.4)
MCV RBC AUTO: 99 FL (ref 82–98)
MONOCYTES # BLD AUTO: 0.34 THOUSAND/ÂΜL (ref 0.17–1.22)
MONOCYTES NFR BLD AUTO: 6 % (ref 4–12)
NEUTROPHILS # BLD AUTO: 4.35 THOUSANDS/ÂΜL (ref 1.85–7.62)
NEUTS SEG NFR BLD AUTO: 77 % (ref 43–75)
NITRITE UR QL STRIP: NEGATIVE
NON-SQ EPI CELLS URNS QL MICRO: ABNORMAL /HPF
NRBC BLD AUTO-RTO: 0 /100 WBCS
PH UR STRIP.AUTO: 6 [PH]
PLATELET # BLD AUTO: 91 THOUSANDS/UL (ref 149–390)
PLATELET BLD QL SMEAR: ABNORMAL
PMV BLD AUTO: 10 FL (ref 8.9–12.7)
POTASSIUM SERPL-SCNC: 4.4 MMOL/L (ref 3.5–5.3)
PROT UR STRIP-MCNC: ABNORMAL MG/DL
RBC # BLD AUTO: 2.37 MILLION/UL (ref 3.88–5.62)
RBC #/AREA URNS AUTO: ABNORMAL /HPF
RBC MORPH BLD: PRESENT
SODIUM SERPL-SCNC: 133 MMOL/L (ref 135–147)
SP GR UR STRIP.AUTO: 1.01 (ref 1–1.03)
UROBILINOGEN UR STRIP-ACNC: <2 MG/DL
WBC # BLD AUTO: 5.62 THOUSAND/UL (ref 4.31–10.16)
WBC #/AREA URNS AUTO: ABNORMAL /HPF

## 2024-07-10 PROCEDURE — 36415 COLL VENOUS BLD VENIPUNCTURE: CPT

## 2024-07-10 PROCEDURE — 74176 CT ABD & PELVIS W/O CONTRAST: CPT

## 2024-07-10 PROCEDURE — 96374 THER/PROPH/DIAG INJ IV PUSH: CPT

## 2024-07-10 PROCEDURE — 85025 COMPLETE CBC W/AUTO DIFF WBC: CPT

## 2024-07-10 PROCEDURE — 80048 BASIC METABOLIC PNL TOTAL CA: CPT

## 2024-07-10 PROCEDURE — 99285 EMERGENCY DEPT VISIT HI MDM: CPT | Performed by: EMERGENCY MEDICINE

## 2024-07-10 PROCEDURE — 99284 EMERGENCY DEPT VISIT MOD MDM: CPT

## 2024-07-10 PROCEDURE — 81001 URINALYSIS AUTO W/SCOPE: CPT

## 2024-07-10 PROCEDURE — 96361 HYDRATE IV INFUSION ADD-ON: CPT

## 2024-07-10 RX ORDER — OXYCODONE HYDROCHLORIDE 5 MG/1
5 TABLET ORAL EVERY 6 HOURS PRN
Qty: 15 TABLET | Refills: 0 | Status: SHIPPED | OUTPATIENT
Start: 2024-07-10 | End: 2024-07-20

## 2024-07-10 RX ORDER — ACETAMINOPHEN 325 MG/1
650 TABLET ORAL ONCE
Status: COMPLETED | OUTPATIENT
Start: 2024-07-10 | End: 2024-07-10

## 2024-07-10 RX ORDER — MORPHINE SULFATE 4 MG/ML
4 INJECTION, SOLUTION INTRAMUSCULAR; INTRAVENOUS ONCE
Status: COMPLETED | OUTPATIENT
Start: 2024-07-10 | End: 2024-07-10

## 2024-07-10 RX ADMIN — MORPHINE SULFATE 4 MG: 4 INJECTION INTRAVENOUS at 06:00

## 2024-07-10 RX ADMIN — ACETAMINOPHEN 650 MG: 325 TABLET, FILM COATED ORAL at 03:42

## 2024-07-10 RX ADMIN — SODIUM CHLORIDE 500 ML: 0.9 INJECTION, SOLUTION INTRAVENOUS at 03:43

## 2024-07-10 NOTE — ED NOTES
Patient ambulated with a steady gait down the hallway and back to his room. No signs of distress, no assistive devices needed.      Mulu Hancock RN  07/10/24 [id number]

## 2024-07-10 NOTE — ED ATTENDING ATTESTATION
7/10/2024  I, Anoop Altman MD, saw and evaluated the patient. I have discussed the patient with the resident/non-physician practitioner and agree with the resident's/non-physician practitioner's findings, Plan of Care, and MDM as documented in the resident's/non-physician practitioner's note, except where noted. All available labs and Radiology studies were reviewed.  I was present for key portions of any procedure(s) performed by the resident/non-physician practitioner and I was immediately available to provide assistance.       At this point I agree with the current assessment done in the Emergency Department.  I have conducted an independent evaluation of this patient a history and physical is as follows:    83-year-old male, presents with pain in right flank.  Had recent right kidney stent replacement at [facility].  Has had recent blood in urine, reports currently improved.  Denies any fevers.  On exam, patient looks well and in no distress, abdomen soft and nontender, no CVA tenderness.    ED Course     Results reviewed, no signs of acute urinary infection.  Patient with chronic anemia, hemoglobin slightly lower than previous.  CT scan results reviewed.  Reports feeling better in ED after receiving pain medication.  Has follow-up scheduled next day with oncology.  Also instructed to follow-up with his urologist at [facility].    Critical Care Time  Procedures

## 2024-07-10 NOTE — ED PROVIDER NOTES
History  Chief Complaint   Patient presents with    Back Pain     Pt reports right lower back pain since 2330 tonight. +nauesa, -vomiting. Reports diarrhea yesterday. Denies abdominal pain, headache, dizziness, chest pain.      The patient is an 83 year old male who presents to ED for evaluation of right flank pain. PMHx bladder cancer, Afib on Eliquis. Pt states he had a stent placed in the right kidney after his bladder/prostate resection surgery but the stent moved and had to be replaced on July 5th with [facility]. He states he was urinating bright red blood following the procedure until 1 day ago. He states that tonight he woke up around 1130pm with right flank pain and was concerned about an issue with the stent again so he came into get checked out. Denies fevers, chills, abdominal pain, nausea, vomiting, SOB, chest pain        Prior to Admission Medications   Prescriptions Last Dose Informant Patient Reported? Taking?   Cholecalciferol 125 MCG (5000 UT) capsule   Yes No   Sig: Take 5,000 Units by mouth daily   aspirin (ECOTRIN LOW STRENGTH) 81 mg EC tablet   Yes No   Sig: Take 81 mg by mouth   dutasteride (AVODART) 0.5 mg capsule   Yes No   Sig: Take 0.5 mg by mouth daily   dutasteride (AVODART) 0.5 mg capsule   Yes No   Sig: Take 1 capsule by mouth daily   levothyroxine 25 mcg tablet   Yes No   Sig: Take 25 mcg by mouth daily   losartan (COZAAR) 50 mg tablet   Yes No   Sig: Take 50 mg by mouth daily   sildenafil (VIAGRA) 100 mg tablet   Yes No   Sig: take 1 tablet by mouth if needed for ERECTILE DYSFUNCTION      Facility-Administered Medications: None       Past Medical History:   Diagnosis Date    Disease of thyroid gland     hypothyroidism    Hernia     Hypertension        Past Surgical History:   Procedure Laterality Date    APPENDECTOMY      CT CYSTOGRAM  1/24/2024    CT CYSTOGRAM  1/17/2024    HERNIA REPAIR      SHOULDER SURGERY Left        History reviewed. No pertinent family history.  I have  reviewed and agree with the history as documented.    E-Cigarette/Vaping    E-Cigarette Use Never User      E-Cigarette/Vaping Substances    Nicotine No     THC No     CBD No     Flavoring No     Other No     Unknown No      Social History     Tobacco Use    Smoking status: Light Smoker     Types: Cigars    Smokeless tobacco: Never   Vaping Use    Vaping status: Never Used   Substance Use Topics    Alcohol use: Not Currently     Alcohol/week: 3.0 standard drinks of alcohol     Types: 3 Cans of beer per week     Comment: socially    Drug use: No        Review of Systems   Constitutional:  Negative for chills and fever.   HENT:  Negative for congestion, sore throat and trouble swallowing.    Respiratory:  Negative for cough and shortness of breath.    Cardiovascular:  Negative for chest pain.   Gastrointestinal:  Negative for abdominal pain and vomiting.   Genitourinary:  Positive for flank pain and hematuria.   Musculoskeletal:  Positive for back pain.   Skin:  Negative for rash and wound.   Neurological:  Negative for syncope and headaches.       Physical Exam  ED Triage Vitals [07/10/24 0255]   Temperature Pulse Respirations Blood Pressure SpO2   98.1 °F (36.7 °C) 103 18 141/89 95 %      Temp Source Heart Rate Source Patient Position - Orthostatic VS BP Location FiO2 (%)   Oral Monitor Sitting Right arm --      Pain Score       8             Orthostatic Vital Signs  Vitals:    07/10/24 0500 07/10/24 0600 07/10/24 0604 07/10/24 0631   BP: 134/83 126/77 125/77 120/71   Pulse: 95 91 85 92   Patient Position - Orthostatic VS: Lying Lying Lying        Physical Exam  Vitals and nursing note reviewed.   Constitutional:       Appearance: Normal appearance.   HENT:      Head: Normocephalic and atraumatic.      Nose: Nose normal.      Mouth/Throat:      Mouth: Mucous membranes are moist.      Pharynx: Oropharynx is clear.   Eyes:      Conjunctiva/sclera: Conjunctivae normal.   Cardiovascular:      Rate and Rhythm: Normal  rate and regular rhythm.      Pulses: Normal pulses.      Heart sounds: Normal heart sounds.   Pulmonary:      Effort: Pulmonary effort is normal.      Breath sounds: Normal breath sounds.   Abdominal:      General: Abdomen is flat. Bowel sounds are normal.      Palpations: Abdomen is soft.      Tenderness: There is no abdominal tenderness. There is right CVA tenderness. There is no left CVA tenderness or guarding.   Musculoskeletal:         General: No swelling, tenderness, deformity or signs of injury. Normal range of motion.      Cervical back: Normal range of motion.   Skin:     General: Skin is warm and dry.      Findings: No rash.   Neurological:      General: No focal deficit present.      Mental Status: He is alert and oriented to person, place, and time.   Psychiatric:         Mood and Affect: Mood normal.         Behavior: Behavior normal.         Thought Content: Thought content normal.         Judgment: Judgment normal.         ED Medications  Medications   acetaminophen (TYLENOL) tablet 650 mg (650 mg Oral Given 7/10/24 0342)   sodium chloride 0.9 % bolus 500 mL (0 mL Intravenous Stopped 7/10/24 0451)   morphine injection 4 mg (4 mg Intravenous Given 7/10/24 0600)       Diagnostic Studies  Results Reviewed       Procedure Component Value Units Date/Time    Urine Microscopic [[id number]]  (Abnormal) Collected: 07/10/24 0345    Lab Status: Final result Specimen: Urine, Clean Catch Updated: 07/10/24 0420     RBC, UA Innumerable /hpf      WBC, UA 2-4 /hpf      Epithelial Cells None Seen /hpf      Bacteria, UA Occasional /hpf      Ca Oxalate Halie, UA Occasional /hpf     CBC and differential [[id number]]  (Abnormal) Collected: 07/10/24 0345    Lab Status: Final result Specimen: Blood from Arm, Left Updated: 07/10/24 0419     WBC 5.62 Thousand/uL      RBC 2.37 Million/uL      Hemoglobin 7.6 g/dL      Hematocrit 23.5 %      MCV 99 fL      MCH 32.1 pg      MCHC 32.3 g/dL      RDW 15.0 %      MPV 10.0 fL       Platelets 91 Thousands/uL      nRBC 0 /100 WBCs      Segmented % 77 %      Immature Grans % 1 %      Lymphocytes % 16 %      Monocytes % 6 %      Eosinophils Relative 0 %      Basophils Relative 0 %      Absolute Neutrophils 4.35 Thousands/µL      Absolute Immature Grans 0.03 Thousand/uL      Absolute Lymphocytes 0.87 Thousands/µL      Absolute Monocytes 0.34 Thousand/µL      Eosinophils Absolute 0.01 Thousand/µL      Basophils Absolute 0.02 Thousands/µL     Narrative:      This is an appended report.  These results have been appended to a previously verified report.    Smear Review(Phlebs Do Not Order) [[id number]]  (Abnormal) Collected: 07/10/24 0345    Lab Status: Final result Specimen: Blood from Arm, Left Updated: 07/10/24 0419     RBC Morphology Present     Platelet Estimate Borderline     Anisocytosis Present    UA w Reflex to Microscopic w Reflex to Culture [[id number]]  (Abnormal) Collected: 07/10/24 0345    Lab Status: Final result Specimen: Urine, Clean Catch Updated: 07/10/24 0412     Color, UA Light Orange     Clarity, UA Turbid     Specific Gravity, UA 1.013     pH, UA 6.0     Leukocytes, UA Small     Nitrite, UA Negative     Protein, UA 50 (1+) mg/dl      Glucose, UA Negative mg/dl      Ketones, UA Negative mg/dl      Urobilinogen, UA <2.0 mg/dl      Bilirubin, UA Negative     Occult Blood, UA Large    Basic metabolic panel [[id number]]  (Abnormal) Collected: 07/10/24 0345    Lab Status: Final result Specimen: Blood from Arm, Left Updated: 07/10/24 0411     Sodium 133 mmol/L      Potassium 4.4 mmol/L      Chloride 100 mmol/L      CO2 24 mmol/L      ANION GAP 9 mmol/L      BUN 42 mg/dL      Creatinine 2.11 mg/dL      Glucose 101 mg/dL      Calcium 9.1 mg/dL      eGFR 28 ml/min/1.73sq m     Narrative:      National Kidney Disease Foundation guidelines for Chronic Kidney Disease (CKD):     Stage 1 with normal or high GFR (GFR > 90 mL/min/1.73 square meters)    Stage 2 Mild CKD (GFR = 60-89 mL/min/1.73  square meters)    Stage 3A Moderate CKD (GFR = 45-59 mL/min/1.73 square meters)    Stage 3B Moderate CKD (GFR = 30-44 mL/min/1.73 square meters)    Stage 4 Severe CKD (GFR = 15-29 mL/min/1.73 square meters)    Stage 5 End Stage CKD (GFR <15 mL/min/1.73 square meters)  Note: GFR calculation is accurate only with a steady state creatinine                   CT abdomen pelvis wo contrast   Final Result by Jerel Perez MD (07/10 0657)      Bilateral pleural effusions, right larger than left, small pericardial effusion, trace ascites and mild body wall edema. Findings suggestive of fluid overload or third spacing among other etiologies.      Mild cardiomegaly. Decreased intracardiac blood pool density attributed to anemia.      Right nephroureteral stent in place with distal pigtail in the right UVJ. Evaluation for hydronephrosis limited by multiple renal cysts and lack of IV contrast. Cannot exclude some degree of right hydronephrosis. No hydroureter. No perinephric    collection. Consider follow-up with renal excretory scintigraphy if it would alter patient management.      Subacute to chronic appearing fracture of the right pubic body and right inferior pubic ramus.      Mildly enlarged right inguinal lymph node. This could be followed up with ultrasound in 3 months if clinically warranted.      Additional chronic findings and negatives as above.      This study demonstrates a significant  finding and was documented as such in [facility] for liaison and referring practitioner notification.                  Workstation performed: [id number]               Procedures  Procedures      ED Course  ED Course as of 07/10/24 0755   Wed Jul 10, 2024   0554 Recheck of pt - still having pain after tylenol, now willing to try morphine.    0644 Recheck of pt - pain decreased after morphine   0721 CT abdomen pelvis wo contrast  IMPRESSION:  Bilateral pleural effusions, right larger than left, small pericardial effusion,  trace ascites and mild body wall edema. Findings suggestive of fluid overload or third spacing among other etiologies.     Mild cardiomegaly. Decreased intracardiac blood pool density attributed to anemia.     Right nephroureteral stent in place with distal pigtail in the right UVJ. Evaluation for hydronephrosis limited by multiple renal cysts and lack of IV contrast. Cannot exclude some degree of right hydronephrosis. No hydroureter. No perinephric   collection. Consider follow-up with renal excretory scintigraphy if it would alter patient management.     Subacute to chronic appearing fracture of the right pubic body and right inferior pubic ramus.     Mildly enlarged right inguinal lymph node. This could be followed up with ultrasound in 3 months if clinically warranted.     Additional chronic findings and negatives as above.   0722 Recheck of pt and to discuss the results of CT with pt. Feel no signifianct utility of admission to hospital as pt has follow up tomorrow with oncology. Will take oxycodone for pain control at home.                                         Medical Decision Making  DDX: stent dislodgement, urinary tract obstruction, perforation, abscess,     Anemia increased from prior. Pt reporting caroline blood in urine over past 3 days.   Abrupt onset of pain last night. Pt initially declined narcotic pain medication, but then willing to accept after no relief from tylenol. Pain decreased with morphine  Increased creatinine from prior.  CT with stent in place.   Discussion with pt and spouse at bedside, pt has follow up with oncology tomorrow. No significant benefit felt with hospitalization. Pt offered RX for PO pain medication and plans to call his urologist today and follow up with oncology tomorrow.   Pt able to ambulate in ED without difficulty. Pt able to urinate prior to discharge with decreased hematuria.     Amount and/or Complexity of Data Reviewed  Independent Historian: spouse  Labs:  ordered.  Radiology: ordered. Decision-making details documented in ED Course.    Risk  OTC drugs.  Prescription drug management.          Disposition  Final diagnoses:   Flank pain     Time reflects when diagnosis was documented in both MDM as applicable and the Disposition within this note       Time User Action Codes Description Comment    7/10/2024  7:23 AM Anoop Altman Add [R10.9] Flank pain           ED Disposition       ED Disposition   Discharge    Condition   Stable    Date/Time   Wed Jul 10, 2024  7:26 AM    Comment   Cruzito Taylor discharge to home/self care.                   Follow-up Information       Follow up With Specialties Details Why Contact Info        Follow up with your oncologist tomorrow as previously scheduled.            Discharge Medication List as of 7/10/2024  7:31 AM        START taking these medications    Details   oxyCODONE (Roxicodone) 5 immediate release tablet Take 1 tablet (5 mg total) by mouth every 6 (six) hours as needed for moderate pain or severe pain for up to 10 days Max Daily Amount: 20 mg, Starting Wed 7/10/2024, Until Sat 7/20/2024 at 2359, Normal           CONTINUE these medications which have NOT CHANGED    Details   aspirin (ECOTRIN LOW STRENGTH) 81 mg EC tablet Take 81 mg by mouth, Historical Med      Cholecalciferol 125 MCG (5000 UT) capsule Take 5,000 Units by mouth daily, Historical Med      !! dutasteride (AVODART) 0.5 mg capsule Take 0.5 mg by mouth daily, Starting Sun 4/2/2023, Historical Med      !! dutasteride (AVODART) 0.5 mg capsule Take 1 capsule by mouth daily, Starting Tue 1/3/2023, Historical Med      levothyroxine 25 mcg tablet Take 25 mcg by mouth daily, Until Discontinued, Historical Med      losartan (COZAAR) 50 mg tablet Take 50 mg by mouth daily, Until Discontinued, Historical Med      sildenafil (VIAGRA) 100 mg tablet take 1 tablet by mouth if needed for ERECTILE DYSFUNCTION, Historical Med       !! - Potential duplicate medications found.  Please discuss with provider.        No discharge procedures on file.    PDMP Review         Value Time User    PDMP Reviewed  Yes 8/30/2021  7:00 AM Emir Colón MD             ED Provider  Attending physically available and evaluated Cruzito Taylor. I managed the patient along with the ED Attending.    Electronically Signed by           Erica Phelan MD  07/10/24 0800

## 2024-07-11 ENCOUNTER — OFFICE VISIT (OUTPATIENT)
Dept: UROLOGY | Facility: CLINIC | Age: 84
End: 2024-07-11
Payer: COMMERCIAL

## 2024-07-11 VITALS
HEIGHT: 69 IN | SYSTOLIC BLOOD PRESSURE: 110 MMHG | DIASTOLIC BLOOD PRESSURE: 60 MMHG | BODY MASS INDEX: 30.72 KG/M2 | OXYGEN SATURATION: 93 % | HEART RATE: 82 BPM

## 2024-07-11 DIAGNOSIS — C67.9 MALIGNANT NEOPLASM OF URINARY BLADDER, UNSPECIFIED SITE (HCC): Primary | ICD-10-CM

## 2024-07-11 LAB
SL AMB  POCT GLUCOSE, UA: ABNORMAL
SL AMB LEUKOCYTE ESTERASE,UA: ABNORMAL
SL AMB POCT BILIRUBIN,UA: ABNORMAL
SL AMB POCT BLOOD,UA: ABNORMAL
SL AMB POCT CLARITY,UA: ABNORMAL
SL AMB POCT COLOR,UA: ABNORMAL
SL AMB POCT KETONES,UA: ABNORMAL
SL AMB POCT NITRITE,UA: ABNORMAL
SL AMB POCT PH,UA: 5
SL AMB POCT SPECIFIC GRAVITY,UA: 1.01
SL AMB POCT URINE PROTEIN: ABNORMAL
SL AMB POCT UROBILINOGEN: 0.2

## 2024-07-11 PROCEDURE — 81002 URINALYSIS NONAUTO W/O SCOPE: CPT | Performed by: UROLOGY

## 2024-07-11 PROCEDURE — 99205 OFFICE O/P NEW HI 60 MIN: CPT | Performed by: UROLOGY

## 2024-07-11 NOTE — PROGRESS NOTES
7/11/2024    Cruzito Taylor  1940  [id number]    1. Malignant neoplasm of urinary bladder, unspecified site (HCC)  Assessment & Plan:  Impression: High-grade T1 urothelial carcinoma the bladder, status post right ureteral stent    Plan: I provided the patient and his wife with my opinion regarding his diagnosis and treatment plan for his high-grade T1 urothelial carcinoma the bladder with right ureteral stent in place.  I agree with the plan at [facility] to return to the operating room for second look TURBT to ensure proper staging.  At that time I would interrogate the right ureter with a retrograde pyelogram and make a determination if the stent should be exchanged or simply removed.  Lastly, we discussed the benefits of intravesical therapy such as BCG to limit recurrence after his next resection.  Lastly we discussed that he will undergo surveillance cystoscopy per protocol for at least the next 5 years if not more depending on a possible future recurrence.  At this time the patient was very comfortable with the plan at [facility] and appreciative of my opinion.  I recommend that he continue to follow with Dr. Whitley and provided reassurance that he is in excellent hands under his care.  Orders:  -     POCT urine dip       History of Present Illness  83 y.o. male with a history of gross hematuria and right-sided flank pain.  He was seen at [facility] and underwent cystoscopy which revealed a bladder tumor adjacent to the right ureteral orifice.  He underwent transurethral resection of the bladder tumor along with placement of a right ureteral stent.  He continues to follow at [facility] and request a second opinion by [facility] for urology.  Upon my review of the pathology he appears to have high-grade T1 urothelial carcinoma of the bladder.  Muscle was present and uninvolved by tumor.  We discussed this pathology at length in the office today.  He states  that he is scheduled for second look cystoscopy/TURBT with likely stent exchange or stent removal depending on retrograde pyelogram findings.  After that he will likely start intravesical therapy with BCG.  His wife is present with him in the office today.  He continues to have some urgency and frequency of urination likely secondary to the stent.  He continues to have intermittent hematuria again likely secondary to his stent.      AUA Symptom Score      Review of Systems   Constitutional: Negative.    HENT: Negative.     Eyes: Negative.    Respiratory: Negative.     Cardiovascular: Negative.    Gastrointestinal: Negative.    Endocrine: Negative.    Genitourinary:         Per HPI   Musculoskeletal: Negative.    Skin: Negative.    Allergic/Immunologic: Negative.    Neurological: Negative.    Hematological: Negative.    Psychiatric/Behavioral: Negative.         Past Medical History  Past Medical History:   Diagnosis Date   • Disease of thyroid gland     hypothyroidism   • Hernia    • Hypertension        Past Social History  Past Surgical History:   Procedure Laterality Date   • APPENDECTOMY     • CT CYSTOGRAM  1/24/2024   • CT CYSTOGRAM  1/17/2024   • HERNIA REPAIR     • SHOULDER SURGERY Left        Past Family History  History reviewed. No pertinent family history.    Past Social history  Social History     Socioeconomic History   • Marital status: /Civil Union     Spouse name: Not on file   • Number of children: Not on file   • Years of education: Not on file   • Highest education level: Not on file   Occupational History   • Not on file   Tobacco Use   • Smoking status: Light Smoker     Types: Cigars   • Smokeless tobacco: Never   Vaping Use   • Vaping status: Never Used   Substance and Sexual Activity   • Alcohol use: Not Currently     Alcohol/week: 3.0 standard drinks of alcohol     Types: 3 Cans of beer per week     Comment: socially   • Drug use: No   • Sexual activity: Not on file   Other Topics  Concern   • Not on file   Social History Narrative   • Not on file     Social Determinants of Health     Financial Resource Strain: Low Risk  (7/5/2024)    Received from [facility]    Overall Financial Resource Strain (CARDIA)    • Difficulty of Paying Living Expenses: Not very hard   Food Insecurity: No Food Insecurity (7/5/2024)    Received from [facility]    Hunger Vital Sign    • Worried About Running Out of Food in the Last Year: Never true    • Ran Out of Food in the Last Year: Never true   Transportation Needs: No Transportation Needs (7/5/2024)    Received from [facility]    PRAPARE - Transportation    • Lack of Transportation (Medical): No    • Lack of Transportation (Non-Medical): No   Physical Activity: Sufficiently Active (11/5/2023)    Received from [facility]    Exercise Vital Sign    • Days of Exercise per Week: 4 days    • Minutes of Exercise per Session: 50 min   Stress: Stress Concern Present (11/5/2023)    [facility]    [employer] [place] [employer] - Occupational Stress Questionnaire    • Feeling of Stress : To some extent   Social Connections: Socially Integrated (11/5/2023)    [facility]    Social Connection and Isolation Panel [NHANES]    • Frequency of Communication with Friends and Family: More than three times a week    • Frequency of Social Gatherings with Friends and Family: More than three times a week    • Attends [employer] Services: More than 4 times per year    • Active Member of Clubs or Organizations: Yes    • Attends Club or Organization Meetings: 1 to 4 times per year    • Marital Status:    Intimate Partner Violence: Patient Unable To Answer (7/5/2024)    Received from [facility]    Humiliation, Afraid, Rape, and Kick questionnaire    • Fear of Current  "or Ex-Partner: Patient unable to answer    • Emotionally Abused: Patient unable to answer    • Physically Abused: Patient unable to answer    • Sexually Abused: Patient unable to answer   Housing Stability: Low Risk  (7/5/2024)    Received from [facility]    Housing Stability Vital Sign    • Unable to Pay for Housing in the Last Year: No    • Number of Times Moved in the Last Year: 0    • Homeless in the Last Year: No       Current Medications  Current Outpatient Medications   Medication Sig Dispense Refill   • aspirin (ECOTRIN LOW STRENGTH) 81 mg EC tablet Take 81 mg by mouth     • Cholecalciferol 125 MCG (5000 UT) capsule Take 5,000 Units by mouth daily     • dutasteride (AVODART) 0.5 mg capsule Take 0.5 mg by mouth daily     • dutasteride (AVODART) 0.5 mg capsule Take 1 capsule by mouth daily     • levothyroxine 25 mcg tablet Take 25 mcg by mouth daily     • losartan (COZAAR) 50 mg tablet Take 50 mg by mouth daily     • oxyCODONE (Roxicodone) 5 immediate release tablet Take 1 tablet (5 mg total) by mouth every 6 (six) hours as needed for moderate pain or severe pain for up to 10 days Max Daily Amount: 20 mg 15 tablet 0   • sildenafil (VIAGRA) 100 mg tablet take 1 tablet by mouth if needed for ERECTILE DYSFUNCTION       No current facility-administered medications for this visit.       Allergies  Allergies   Allergen Reactions   • Miralax [Polyethylene Glycol]    • Other      Steroids   • Xylocaine [Lidocaine Hcl]    • Lidocaine Hives   • Prednisone Hives     hives to \"steroids\"       Past Medical History, Social History, Family History, medications and allergies were reviewed.    Vitals  Vitals:    07/11/24 1044   BP: 110/60   BP Location: Right arm   Patient Position: Sitting   Cuff Size: Adult   Pulse: 82   SpO2: 93%   Height: 5' 9\" (1.753 m)       Physical Exam  On examination he is in no acute distress.  His abdomen is soft nontender nondistended.   examination reveals no CVA tenderness.  " "Skin is warm extremities without edema.  Neurologic is grossly intact and nonfocal.  Gait normal.  Affect normal.    Results  No results found for: \"PSA\"  Lab Results   Component Value Date    CALCIUM 9.1 07/10/2024    K 4.4 07/10/2024    CO2 24 07/10/2024     07/10/2024    BUN 42 (H) 07/10/2024    CREATININE 2.11 (H) 07/10/2024     Lab Results   Component Value Date    WBC 5.62 07/10/2024    HGB 7.6 (L) 07/10/2024    HCT 23.5 (L) 07/10/2024    MCV 99 (H) 07/10/2024    PLT 91 (L) 07/10/2024       Office Urine Dip  No results found for this or any previous visit (from the past 1 hour(s)).  ]  "

## 2024-07-11 NOTE — LETTER
July 12, 2024     Andrea Moreno DO  [address] [facility]  [place] [address]    Patient: Cruzito Taylor   YOB: 1940   Date of Visit: 7/11/2024       Dear Dr. Moreno:    Thank you for referring Cruzito Taylor to me for evaluation. Below are my notes for this consultation.    If you have questions, please do not hesitate to call me. I look forward to following your patient along with you.         Sincerely,        Adebayo José MD        CC: MD Adebayo Centeno MD  7/12/2024  7:23 AM  Sign when Signing Visit  7/11/2024    Cruzito Taylor  1940  [id number]    1. Malignant neoplasm of urinary bladder, unspecified site (HCC)  Assessment & Plan:  Impression: High-grade T1 urothelial carcinoma the bladder, status post right ureteral stent    Plan: I provided the patient and his wife with my opinion regarding his diagnosis and treatment plan for his high-grade T1 urothelial carcinoma the bladder with right ureteral stent in place.  I agree with the plan at [facility] to return to the operating room for second look TURBT to ensure proper staging.  At that time I would interrogate the right ureter with a retrograde pyelogram and make a determination if the stent should be exchanged or simply removed.  Lastly, we discussed the benefits of intravesical therapy such as BCG to limit recurrence after his next resection.  Lastly we discussed that he will undergo surveillance cystoscopy per protocol for at least the next 5 years if not more depending on a possible future recurrence.  At this time the patient was very comfortable with the plan at [facility] and appreciative of my opinion.  I recommend that he continue to follow with Dr. Whitley and provided reassurance that he is in excellent hands under his care.  Orders:  -     POCT urine dip       History of Present Illness  83 y.o. male with a history of gross hematuria and right-sided flank  pain.  He was seen at [facility] and underwent cystoscopy which revealed a bladder tumor adjacent to the right ureteral orifice.  He underwent transurethral resection of the bladder tumor along with placement of a right ureteral stent.  He continues to follow at [facility] and request a second opinion by [facility] for urology.  Upon my review of the pathology he appears to have high-grade T1 urothelial carcinoma of the bladder.  Muscle was present and uninvolved by tumor.  We discussed this pathology at length in the office today.  He states that he is scheduled for second look cystoscopy/TURBT with likely stent exchange or stent removal depending on retrograde pyelogram findings.  After that he will likely start intravesical therapy with BCG.  His wife is present with him in the office today.  He continues to have some urgency and frequency of urination likely secondary to the stent.  He continues to have intermittent hematuria again likely secondary to his stent.      AUA Symptom Score      Review of Systems   Constitutional: Negative.    HENT: Negative.     Eyes: Negative.    Respiratory: Negative.     Cardiovascular: Negative.    Gastrointestinal: Negative.    Endocrine: Negative.    Genitourinary:         Per HPI   Musculoskeletal: Negative.    Skin: Negative.    Allergic/Immunologic: Negative.    Neurological: Negative.    Hematological: Negative.    Psychiatric/Behavioral: Negative.         Past Medical History  Past Medical History:   Diagnosis Date   • Disease of thyroid gland     hypothyroidism   • Hernia    • Hypertension        Past Social History  Past Surgical History:   Procedure Laterality Date   • APPENDECTOMY     • CT CYSTOGRAM  1/24/2024   • CT CYSTOGRAM  1/17/2024   • HERNIA REPAIR     • SHOULDER SURGERY Left        Past Family History  History reviewed. No pertinent family history.    Past Social history  Social History     Socioeconomic History   • Marital  status: /Civil Union     Spouse name: Not on file   • Number of children: Not on file   • Years of education: Not on file   • Highest education level: Not on file   Occupational History   • Not on file   Tobacco Use   • Smoking status: Light Smoker     Types: Cigars   • Smokeless tobacco: Never   Vaping Use   • Vaping status: Never Used   Substance and Sexual Activity   • Alcohol use: Not Currently     Alcohol/week: 3.0 standard drinks of alcohol     Types: 3 Cans of beer per week     Comment: socially   • Drug use: No   • Sexual activity: Not on file   Other Topics Concern   • Not on file   Social History Narrative   • Not on file     Social Determinants of Health     Financial Resource Strain: Low Risk  (7/5/2024)    Received from [facility]    Overall Financial Resource Strain (CARDIA)    • Difficulty of Paying Living Expenses: Not very hard   Food Insecurity: No Food Insecurity (7/5/2024)    Received from [facility]    Hunger Vital Sign    • Worried About Running Out of Food in the Last Year: Never true    • Ran Out of Food in the Last Year: Never true   Transportation Needs: No Transportation Needs (7/5/2024)    Received from [facility]    PRAPARE - Transportation    • Lack of Transportation (Medical): No    • Lack of Transportation (Non-Medical): No   Physical Activity: Sufficiently Active (11/5/2023)    Received from [facility]    Exercise Vital Sign    • Days of Exercise per Week: 4 days    • Minutes of Exercise per Session: 50 min   Stress: Stress Concern Present (11/5/2023)    Received from [facility], [facility]    [employer] [place] [employer] - Occupational Stress Questionnaire    • Feeling of Stress : To some extent   Social Connections: Socially Integrated (11/5/2023)    Received from [facility], [facility]    Social Connection and Isolation  Panel [NHANES]    • Frequency of Communication with Friends and Family: More than three times a week    • Frequency of Social Gatherings with Friends and Family: More than three times a week    • Attends Anabaptist Services: More than 4 times per year    • Active Member of Clubs or Organizations: Yes    • Attends Club or Organization Meetings: 1 to 4 times per year    • Marital Status:    Intimate Partner Violence: Patient Unable To Answer (7/5/2024)    Received from [facility]    Humiliation, Afraid, Rape, and Kick questionnaire    • Fear of Current or Ex-Partner: Patient unable to answer    • Emotionally Abused: Patient unable to answer    • Physically Abused: Patient unable to answer    • Sexually Abused: Patient unable to answer   Housing Stability: Low Risk  (7/5/2024)    Received from [facility]    Housing Stability Vital Sign    • Unable to Pay for Housing in the Last Year: No    • Number of Times Moved in the Last Year: 0    • Homeless in the Last Year: No       Current Medications  Current Outpatient Medications   Medication Sig Dispense Refill   • aspirin (ECOTRIN LOW STRENGTH) 81 mg EC tablet Take 81 mg by mouth     • Cholecalciferol 125 MCG (5000 UT) capsule Take 5,000 Units by mouth daily     • dutasteride (AVODART) 0.5 mg capsule Take 0.5 mg by mouth daily     • dutasteride (AVODART) 0.5 mg capsule Take 1 capsule by mouth daily     • levothyroxine 25 mcg tablet Take 25 mcg by mouth daily     • losartan (COZAAR) 50 mg tablet Take 50 mg by mouth daily     • oxyCODONE (Roxicodone) 5 immediate release tablet Take 1 tablet (5 mg total) by mouth every 6 (six) hours as needed for moderate pain or severe pain for up to 10 days Max Daily Amount: 20 mg 15 tablet 0   • sildenafil (VIAGRA) 100 mg tablet take 1 tablet by mouth if needed for ERECTILE DYSFUNCTION       No current facility-administered medications for this visit.       Allergies  Allergies   Allergen Reactions  "  • Miralax [Polyethylene Glycol]    • Other      Steroids   • Xylocaine [Lidocaine Hcl]    • Lidocaine Hives   • Prednisone Hives     hives to \"steroids\"       Past Medical History, Social History, Family History, medications and allergies were reviewed.    Vitals  Vitals:    07/11/24 1044   BP: 110/60   BP Location: Right arm   Patient Position: Sitting   Cuff Size: Adult   Pulse: 82   SpO2: 93%   Height: 5' 9\" (1.753 m)       Physical Exam  On examination he is in no acute distress.  His abdomen is soft nontender nondistended.   examination reveals no CVA tenderness.  Skin is warm extremities without edema.  Neurologic is grossly intact and nonfocal.  Gait normal.  Affect normal.    Results  No results found for: \"PSA\"  Lab Results   Component Value Date    CALCIUM 9.1 07/10/2024    K 4.4 07/10/2024    CO2 24 07/10/2024     07/10/2024    BUN 42 (H) 07/10/2024    CREATININE 2.11 (H) 07/10/2024     Lab Results   Component Value Date    WBC 5.62 07/10/2024    HGB 7.6 (L) 07/10/2024    HCT 23.5 (L) 07/10/2024    MCV 99 (H) 07/10/2024    PLT 91 (L) 07/10/2024       Office Urine Dip  No results found for this or any previous visit (from the past 1 hour(s)).  ]  "

## 2024-07-12 PROBLEM — C67.9 MALIGNANT NEOPLASM OF URINARY BLADDER (HCC): Status: ACTIVE | Noted: 2024-07-12

## 2024-07-12 NOTE — ASSESSMENT & PLAN NOTE
Impression: High-grade T1 urothelial carcinoma the bladder, status post right ureteral stent    Plan: I provided the patient and his wife with my opinion regarding his diagnosis and treatment plan for his high-grade T1 urothelial carcinoma the bladder with right ureteral stent in place.  I agree with the plan at [facility] to return to the operating room for second look TURBT to ensure proper staging.  At that time I would interrogate the right ureter with a retrograde pyelogram and make a determination if the stent should be exchanged or simply removed.  Lastly, we discussed the benefits of intravesical therapy such as BCG to limit recurrence after his next resection.  Lastly we discussed that he will undergo surveillance cystoscopy per protocol for at least the next 5 years if not more depending on a possible future recurrence.  At this time the patient was very comfortable with the plan at [facility] and appreciative of my opinion.  I recommend that he continue to follow with Dr. Whitley and provided reassurance that he is in excellent hands under his care.

## 2024-09-30 ENCOUNTER — TELEPHONE (OUTPATIENT)
Age: 84
End: 2024-09-30

## 2024-09-30 NOTE — TELEPHONE ENCOUNTER
Patient's spouse Amanda called today to cancel 1/10/25 2:15 PM follow up appointment with Dr José. No reason was given.